# Patient Record
Sex: MALE | Race: WHITE
[De-identification: names, ages, dates, MRNs, and addresses within clinical notes are randomized per-mention and may not be internally consistent; named-entity substitution may affect disease eponyms.]

---

## 2021-08-25 ENCOUNTER — HOSPITAL ENCOUNTER (INPATIENT)
Dept: HOSPITAL 8 - ED | Age: 51
LOS: 3 days | Discharge: HOME | DRG: 380 | End: 2021-08-28
Attending: INTERNAL MEDICINE | Admitting: INTERNAL MEDICINE
Payer: COMMERCIAL

## 2021-08-25 VITALS — SYSTOLIC BLOOD PRESSURE: 168 MMHG | DIASTOLIC BLOOD PRESSURE: 95 MMHG

## 2021-08-25 VITALS — HEIGHT: 69 IN | WEIGHT: 210.1 LBS | BODY MASS INDEX: 31.12 KG/M2

## 2021-08-25 DIAGNOSIS — R00.0: ICD-10-CM

## 2021-08-25 DIAGNOSIS — R65.10: ICD-10-CM

## 2021-08-25 DIAGNOSIS — Z88.0: ICD-10-CM

## 2021-08-25 DIAGNOSIS — Z20.822: ICD-10-CM

## 2021-08-25 DIAGNOSIS — Z90.49: ICD-10-CM

## 2021-08-25 DIAGNOSIS — N17.0: ICD-10-CM

## 2021-08-25 DIAGNOSIS — K21.9: ICD-10-CM

## 2021-08-25 DIAGNOSIS — E87.1: ICD-10-CM

## 2021-08-25 DIAGNOSIS — K25.4: ICD-10-CM

## 2021-08-25 DIAGNOSIS — K29.70: ICD-10-CM

## 2021-08-25 DIAGNOSIS — I10: ICD-10-CM

## 2021-08-25 DIAGNOSIS — D72.829: ICD-10-CM

## 2021-08-25 DIAGNOSIS — E87.2: ICD-10-CM

## 2021-08-25 DIAGNOSIS — R06.82: ICD-10-CM

## 2021-08-25 DIAGNOSIS — K22.11: Primary | ICD-10-CM

## 2021-08-25 DIAGNOSIS — E86.0: ICD-10-CM

## 2021-08-25 DIAGNOSIS — F12.90: ICD-10-CM

## 2021-08-25 LAB
ALBUMIN SERPL-MCNC: 3.9 G/DL (ref 3.4–5)
ALP SERPL-CCNC: 96 U/L (ref 45–117)
ALT SERPL-CCNC: 49 U/L (ref 12–78)
ANION GAP SERPL CALC-SCNC: 13 MMOL/L (ref 5–15)
BASOPHILS # BLD AUTO: 0.1 X10^3/UL (ref 0–0.1)
BASOPHILS NFR BLD AUTO: 1 % (ref 0–1)
BILIRUB SERPL-MCNC: 0.7 MG/DL (ref 0.2–1)
CALCIUM SERPL-MCNC: 10.4 MG/DL (ref 8.5–10.1)
CHLORIDE SERPL-SCNC: 100 MMOL/L (ref 98–107)
CREAT SERPL-MCNC: 1.33 MG/DL (ref 0.7–1.3)
EOSINOPHIL # BLD AUTO: 0 X10^3/UL (ref 0–0.4)
EOSINOPHIL NFR BLD AUTO: 0 % (ref 1–7)
ERYTHROCYTE [DISTWIDTH] IN BLOOD BY AUTOMATED COUNT: 15 % (ref 9.4–14.8)
LYMPHOCYTES # BLD AUTO: 2.5 X10^3/UL (ref 1–3.4)
LYMPHOCYTES NFR BLD AUTO: 19 % (ref 22–44)
MCH RBC QN AUTO: 28.9 PG (ref 27.5–34.5)
MCHC RBC AUTO-ENTMCNC: 34.3 G/DL (ref 33.2–36.2)
MICROSCOPIC: (no result)
MONOCYTES # BLD AUTO: 1.3 X10^3/UL (ref 0.2–0.8)
MONOCYTES NFR BLD AUTO: 10 % (ref 2–9)
NEUTROPHILS # BLD AUTO: 9.4 X10^3/UL (ref 1.8–6.8)
NEUTROPHILS NFR BLD AUTO: 71 % (ref 42–75)
PLATELET # BLD AUTO: 413 X10^3/UL (ref 130–400)
PMV BLD AUTO: 7.1 FL (ref 7.4–10.4)
PROT SERPL-MCNC: 9.1 G/DL (ref 6.4–8.2)
RBC # BLD AUTO: 6.16 X10^6/UL (ref 4.38–5.82)

## 2021-08-25 PROCEDURE — 71045 X-RAY EXAM CHEST 1 VIEW: CPT

## 2021-08-25 PROCEDURE — 74177 CT ABD & PELVIS W/CONTRAST: CPT

## 2021-08-25 PROCEDURE — 88305 TISSUE EXAM BY PATHOLOGIST: CPT

## 2021-08-25 PROCEDURE — 71275 CT ANGIOGRAPHY CHEST: CPT

## 2021-08-25 PROCEDURE — 96374 THER/PROPH/DIAG INJ IV PUSH: CPT

## 2021-08-25 PROCEDURE — 74018 RADEX ABDOMEN 1 VIEW: CPT

## 2021-08-25 PROCEDURE — 81003 URINALYSIS AUTO W/O SCOPE: CPT

## 2021-08-25 PROCEDURE — 83690 ASSAY OF LIPASE: CPT

## 2021-08-25 PROCEDURE — 83605 ASSAY OF LACTIC ACID: CPT

## 2021-08-25 PROCEDURE — 82550 ASSAY OF CK (CPK): CPT

## 2021-08-25 PROCEDURE — 85025 COMPLETE CBC W/AUTO DIFF WBC: CPT

## 2021-08-25 PROCEDURE — 36415 COLL VENOUS BLD VENIPUNCTURE: CPT

## 2021-08-25 PROCEDURE — 88312 SPECIAL STAINS GROUP 1: CPT

## 2021-08-25 PROCEDURE — C9113 INJ PANTOPRAZOLE SODIUM, VIA: HCPCS

## 2021-08-25 PROCEDURE — 76700 US EXAM ABDOM COMPLETE: CPT

## 2021-08-25 PROCEDURE — 80053 COMPREHEN METABOLIC PANEL: CPT

## 2021-08-25 PROCEDURE — 93005 ELECTROCARDIOGRAM TRACING: CPT

## 2021-08-25 PROCEDURE — 85379 FIBRIN DEGRADATION QUANT: CPT

## 2021-08-25 PROCEDURE — 87040 BLOOD CULTURE FOR BACTERIA: CPT

## 2021-08-25 PROCEDURE — 87635 SARS-COV-2 COVID-19 AMP PRB: CPT

## 2021-08-25 RX ADMIN — HYDROCODONE BITARTRATE AND ACETAMINOPHEN PRN TAB: 5; 325 TABLET ORAL at 21:44

## 2021-08-25 RX ADMIN — CEFTRIAXONE SCH MLS/HR: 1 INJECTION, POWDER, FOR SOLUTION INTRAMUSCULAR; INTRAVENOUS at 14:30

## 2021-08-25 RX ADMIN — SODIUM CHLORIDE AND POTASSIUM CHLORIDE SCH MLS/HR: .9; .15 SOLUTION INTRAVENOUS at 14:00

## 2021-08-25 RX ADMIN — TEMAZEPAM PRN MG: 15 CAPSULE ORAL at 20:05

## 2021-08-25 RX ADMIN — MORPHINE SULFATE PRN MG: 10 INJECTION INTRAVENOUS at 17:01

## 2021-08-25 RX ADMIN — MORPHINE SULFATE PRN MG: 10 INJECTION INTRAVENOUS at 15:12

## 2021-08-25 RX ADMIN — MORPHINE SULFATE PRN MG: 10 INJECTION INTRAVENOUS at 18:07

## 2021-08-25 RX ADMIN — SODIUM CHLORIDE AND POTASSIUM CHLORIDE SCH MLS/HR: .9; .15 SOLUTION INTRAVENOUS at 21:44

## 2021-08-25 RX ADMIN — HYDROCODONE BITARTRATE AND ACETAMINOPHEN PRN TAB: 5; 325 TABLET ORAL at 19:54

## 2021-08-25 RX ADMIN — ENOXAPARIN SODIUM SCH MG: 40 INJECTION SUBCUTANEOUS at 15:02

## 2021-08-25 RX ADMIN — MORPHINE SULFATE PRN MG: 10 INJECTION INTRAVENOUS at 22:49

## 2021-08-25 NOTE — NUR
-------------------------------------------------------------------------------

           *** Note undone in [facility] - 08/25/21 at 1326 by LEWIS ***           

-------------------------------------------------------------------------------

REPORT FROM PAULO WATKINS AS

## 2021-08-25 NOTE — NUR
PT BIB SELF VIA POV. PER PT C/O RIGHT UPPER ABD PAIN AND N/V FOR A MONTH. PT 
REPORTS PAIN YESTERDAY AND TODAY IS NOW UNBEARABLE. PER PT PAIN IS WORSE WHEN 
EATING. PT RESTING IN GURNEY, MONITORING IN PLACE, PIV PLACE, LAB SPECIMEN 
DRAWN, PT MEDICATED PER EMAR, WCTM.

## 2021-08-25 NOTE — NUR
PT OUT OF BED, EXPLAIN TO CALL DUE TO HIGH FALL RISK DUE TO MEDICATION.  PT 
VERBALIZED UNDERSTANDING

## 2021-08-25 NOTE — NUR
PILLOW GIVEN, PT STATES ABD PAIN IS 10/10 MEDICATED PER MAR.  CALL LIGHT IN 
PLACE PT VERBALIZED NO OTHER NEEDS AT THIS TIME.

## 2021-08-25 NOTE — NUR
PER RUBIO: PT NPO BUT CAN HAVE SMALL SIPS WATER. PT STS IS ITCHY AFTER CT 
SCAN. MD MADE AWARE, BENADRYL. VSS, SLIGHTLY TACHY, WILL MEDICATED FOR PAIN. 
CALL BELL. AS

## 2021-08-26 VITALS — SYSTOLIC BLOOD PRESSURE: 123 MMHG | DIASTOLIC BLOOD PRESSURE: 75 MMHG

## 2021-08-26 VITALS — SYSTOLIC BLOOD PRESSURE: 138 MMHG | DIASTOLIC BLOOD PRESSURE: 88 MMHG

## 2021-08-26 VITALS — DIASTOLIC BLOOD PRESSURE: 91 MMHG | SYSTOLIC BLOOD PRESSURE: 152 MMHG

## 2021-08-26 VITALS — SYSTOLIC BLOOD PRESSURE: 134 MMHG | DIASTOLIC BLOOD PRESSURE: 90 MMHG

## 2021-08-26 LAB
ALBUMIN SERPL-MCNC: 3 G/DL (ref 3.4–5)
ALP SERPL-CCNC: 72 U/L (ref 45–117)
ALT SERPL-CCNC: 34 U/L (ref 12–78)
ANION GAP SERPL CALC-SCNC: 8 MMOL/L (ref 5–15)
BASOPHILS # BLD AUTO: 0.1 X10^3/UL (ref 0–0.1)
BASOPHILS NFR BLD AUTO: 2 % (ref 0–1)
BILIRUB SERPL-MCNC: 0.5 MG/DL (ref 0.2–1)
CALCIUM SERPL-MCNC: 8.2 MG/DL (ref 8.5–10.1)
CHLORIDE SERPL-SCNC: 106 MMOL/L (ref 98–107)
CREAT SERPL-MCNC: 0.89 MG/DL (ref 0.7–1.3)
EOSINOPHIL # BLD AUTO: 0.2 X10^3/UL (ref 0–0.4)
EOSINOPHIL NFR BLD AUTO: 4 % (ref 1–7)
ERYTHROCYTE [DISTWIDTH] IN BLOOD BY AUTOMATED COUNT: 14.6 % (ref 9.4–14.8)
LYMPHOCYTES # BLD AUTO: 1.5 X10^3/UL (ref 1–3.4)
LYMPHOCYTES NFR BLD AUTO: 23 % (ref 22–44)
MCH RBC QN AUTO: 28.7 PG (ref 27.5–34.5)
MCHC RBC AUTO-ENTMCNC: 33.5 G/DL (ref 33.2–36.2)
MICROSCOPIC: (no result)
MONOCYTES # BLD AUTO: 0.5 X10^3/UL (ref 0.2–0.8)
MONOCYTES NFR BLD AUTO: 8 % (ref 2–9)
NEUTROPHILS # BLD AUTO: 4.2 X10^3/UL (ref 1.8–6.8)
NEUTROPHILS NFR BLD AUTO: 64 % (ref 42–75)
PLATELET # BLD AUTO: 215 X10^3/UL (ref 130–400)
PMV BLD AUTO: 6.7 FL (ref 7.4–10.4)
PROT SERPL-MCNC: 6.9 G/DL (ref 6.4–8.2)
RBC # BLD AUTO: 5.1 X10^6/UL (ref 4.38–5.82)

## 2021-08-26 RX ADMIN — DIPHENHYDRAMINE HYDROCHLORIDE PRN MG: 50 INJECTION, SOLUTION INTRAMUSCULAR; INTRAVENOUS at 10:32

## 2021-08-26 RX ADMIN — SODIUM CHLORIDE AND POTASSIUM CHLORIDE SCH MLS/HR: .9; .15 SOLUTION INTRAVENOUS at 04:10

## 2021-08-26 RX ADMIN — MORPHINE SULFATE PRN MG: 10 INJECTION INTRAVENOUS at 09:10

## 2021-08-26 RX ADMIN — SODIUM CHLORIDE AND POTASSIUM CHLORIDE SCH MLS/HR: .9; .15 SOLUTION INTRAVENOUS at 15:15

## 2021-08-26 RX ADMIN — SODIUM CHLORIDE AND POTASSIUM CHLORIDE SCH MLS/HR: .9; .15 SOLUTION INTRAVENOUS at 09:26

## 2021-08-26 RX ADMIN — HYDROCODONE BITARTRATE AND ACETAMINOPHEN PRN TAB: 5; 325 TABLET ORAL at 21:34

## 2021-08-26 RX ADMIN — TEMAZEPAM PRN MG: 15 CAPSULE ORAL at 21:34

## 2021-08-26 RX ADMIN — DIPHENHYDRAMINE HYDROCHLORIDE PRN MG: 50 INJECTION, SOLUTION INTRAMUSCULAR; INTRAVENOUS at 02:39

## 2021-08-26 RX ADMIN — PANTOPRAZOLE SODIUM SCH MG: 40 INJECTION, POWDER, FOR SOLUTION INTRAVENOUS at 09:10

## 2021-08-26 RX ADMIN — MORPHINE SULFATE PRN MG: 10 INJECTION INTRAVENOUS at 23:27

## 2021-08-26 RX ADMIN — PANTOPRAZOLE SODIUM SCH MG: 40 INJECTION, POWDER, FOR SOLUTION INTRAVENOUS at 21:33

## 2021-08-26 RX ADMIN — METHOCARBAMOL PRN MG: 500 TABLET ORAL at 22:47

## 2021-08-26 RX ADMIN — ENOXAPARIN SODIUM SCH MG: 40 INJECTION SUBCUTANEOUS at 14:24

## 2021-08-26 RX ADMIN — MORPHINE SULFATE PRN MG: 10 INJECTION INTRAVENOUS at 16:07

## 2021-08-26 RX ADMIN — CEFTRIAXONE SCH MLS/HR: 1 INJECTION, POWDER, FOR SOLUTION INTRAMUSCULAR; INTRAVENOUS at 14:24

## 2021-08-26 RX ADMIN — MORPHINE SULFATE PRN MG: 10 INJECTION INTRAVENOUS at 02:04

## 2021-08-26 RX ADMIN — MORPHINE SULFATE PRN MG: 10 INJECTION INTRAVENOUS at 19:24

## 2021-08-26 RX ADMIN — SODIUM CHLORIDE AND POTASSIUM CHLORIDE SCH MLS/HR: .9; .15 SOLUTION INTRAVENOUS at 22:47

## 2021-08-26 RX ADMIN — HYDROCODONE BITARTRATE AND ACETAMINOPHEN PRN TAB: 5; 325 TABLET ORAL at 14:37

## 2021-08-26 RX ADMIN — MORPHINE SULFATE PRN MG: 10 INJECTION INTRAVENOUS at 12:20

## 2021-08-27 VITALS — SYSTOLIC BLOOD PRESSURE: 156 MMHG | DIASTOLIC BLOOD PRESSURE: 96 MMHG

## 2021-08-27 VITALS — SYSTOLIC BLOOD PRESSURE: 157 MMHG | DIASTOLIC BLOOD PRESSURE: 91 MMHG

## 2021-08-27 VITALS — DIASTOLIC BLOOD PRESSURE: 76 MMHG | SYSTOLIC BLOOD PRESSURE: 124 MMHG

## 2021-08-27 VITALS — DIASTOLIC BLOOD PRESSURE: 76 MMHG | SYSTOLIC BLOOD PRESSURE: 128 MMHG

## 2021-08-27 PROCEDURE — 0DB68ZX EXCISION OF STOMACH, VIA NATURAL OR ARTIFICIAL OPENING ENDOSCOPIC, DIAGNOSTIC: ICD-10-PCS | Performed by: INTERNAL MEDICINE

## 2021-08-27 PROCEDURE — 0DB38ZX EXCISION OF LOWER ESOPHAGUS, VIA NATURAL OR ARTIFICIAL OPENING ENDOSCOPIC, DIAGNOSTIC: ICD-10-PCS | Performed by: INTERNAL MEDICINE

## 2021-08-27 RX ADMIN — DIPHENHYDRAMINE HYDROCHLORIDE PRN MG: 50 INJECTION, SOLUTION INTRAMUSCULAR; INTRAVENOUS at 08:38

## 2021-08-27 RX ADMIN — MORPHINE SULFATE PRN MG: 10 INJECTION INTRAVENOUS at 11:26

## 2021-08-27 RX ADMIN — MORPHINE SULFATE PRN MG: 10 INJECTION INTRAVENOUS at 18:49

## 2021-08-27 RX ADMIN — FENTANYL CITRATE PRN MCG: 50 INJECTION INTRAMUSCULAR; INTRAVENOUS at 14:28

## 2021-08-27 RX ADMIN — ENOXAPARIN SODIUM SCH MG: 40 INJECTION SUBCUTANEOUS at 15:23

## 2021-08-27 RX ADMIN — SODIUM CHLORIDE AND POTASSIUM CHLORIDE SCH MLS/HR: .9; .15 SOLUTION INTRAVENOUS at 20:16

## 2021-08-27 RX ADMIN — MORPHINE SULFATE PRN MG: 10 INJECTION INTRAVENOUS at 05:23

## 2021-08-27 RX ADMIN — SODIUM CHLORIDE AND POTASSIUM CHLORIDE SCH MLS/HR: .9; .15 SOLUTION INTRAVENOUS at 11:25

## 2021-08-27 RX ADMIN — FENTANYL CITRATE PRN MCG: 50 INJECTION INTRAMUSCULAR; INTRAVENOUS at 14:46

## 2021-08-27 RX ADMIN — METHOCARBAMOL PRN MG: 500 TABLET ORAL at 21:48

## 2021-08-27 RX ADMIN — TEMAZEPAM PRN MG: 15 CAPSULE ORAL at 20:17

## 2021-08-27 RX ADMIN — PANTOPRAZOLE SODIUM SCH MG: 40 INJECTION, POWDER, FOR SOLUTION INTRAVENOUS at 08:30

## 2021-08-27 RX ADMIN — MORPHINE SULFATE PRN MG: 10 INJECTION INTRAVENOUS at 08:31

## 2021-08-27 RX ADMIN — CEFTRIAXONE SCH MLS/HR: 1 INJECTION, POWDER, FOR SOLUTION INTRAMUSCULAR; INTRAVENOUS at 15:27

## 2021-08-27 RX ADMIN — FENTANYL CITRATE PRN MCG: 50 INJECTION INTRAMUSCULAR; INTRAVENOUS at 14:10

## 2021-08-27 RX ADMIN — SODIUM CHLORIDE AND POTASSIUM CHLORIDE SCH MLS/HR: .9; .15 SOLUTION INTRAVENOUS at 05:11

## 2021-08-27 RX ADMIN — OMEPRAZOLE SCH MG: 20 CAPSULE, DELAYED RELEASE ORAL at 15:22

## 2021-08-27 RX ADMIN — HYDROCODONE BITARTRATE AND ACETAMINOPHEN PRN TAB: 5; 325 TABLET ORAL at 20:17

## 2021-08-28 VITALS — DIASTOLIC BLOOD PRESSURE: 88 MMHG | SYSTOLIC BLOOD PRESSURE: 126 MMHG

## 2021-08-28 VITALS — SYSTOLIC BLOOD PRESSURE: 133 MMHG | DIASTOLIC BLOOD PRESSURE: 94 MMHG

## 2021-08-28 RX ADMIN — HYDROCODONE BITARTRATE AND ACETAMINOPHEN PRN TAB: 5; 325 TABLET ORAL at 00:20

## 2021-08-28 RX ADMIN — HYDROCODONE BITARTRATE AND ACETAMINOPHEN PRN TAB: 5; 325 TABLET ORAL at 05:25

## 2021-08-28 RX ADMIN — OMEPRAZOLE SCH MG: 20 CAPSULE, DELAYED RELEASE ORAL at 08:03

## 2021-08-28 RX ADMIN — SODIUM CHLORIDE AND POTASSIUM CHLORIDE SCH MLS/HR: .9; .15 SOLUTION INTRAVENOUS at 02:52

## 2021-08-28 RX ADMIN — SODIUM CHLORIDE AND POTASSIUM CHLORIDE SCH MLS/HR: .9; .15 SOLUTION INTRAVENOUS at 08:26

## 2021-08-29 ENCOUNTER — HOSPITAL ENCOUNTER (INPATIENT)
Dept: HOSPITAL 8 - ED | Age: 51
LOS: 6 days | Discharge: HOME | DRG: 381 | End: 2021-09-04
Attending: FAMILY MEDICINE | Admitting: HOSPITALIST
Payer: COMMERCIAL

## 2021-08-29 VITALS — BODY MASS INDEX: 30.73 KG/M2 | WEIGHT: 207.45 LBS | HEIGHT: 69 IN

## 2021-08-29 DIAGNOSIS — I10: ICD-10-CM

## 2021-08-29 DIAGNOSIS — N28.89: ICD-10-CM

## 2021-08-29 DIAGNOSIS — E87.2: ICD-10-CM

## 2021-08-29 DIAGNOSIS — E83.52: ICD-10-CM

## 2021-08-29 DIAGNOSIS — K22.10: Primary | ICD-10-CM

## 2021-08-29 DIAGNOSIS — D72.829: ICD-10-CM

## 2021-08-29 DIAGNOSIS — Z87.19: ICD-10-CM

## 2021-08-29 DIAGNOSIS — K21.9: ICD-10-CM

## 2021-08-29 DIAGNOSIS — K25.9: ICD-10-CM

## 2021-08-29 DIAGNOSIS — N28.1: ICD-10-CM

## 2021-08-29 DIAGNOSIS — Z20.822: ICD-10-CM

## 2021-08-29 DIAGNOSIS — Z79.899: ICD-10-CM

## 2021-08-29 DIAGNOSIS — Z88.0: ICD-10-CM

## 2021-08-29 DIAGNOSIS — D75.1: ICD-10-CM

## 2021-08-29 DIAGNOSIS — R65.10: ICD-10-CM

## 2021-08-29 DIAGNOSIS — K57.30: ICD-10-CM

## 2021-08-29 DIAGNOSIS — E86.0: ICD-10-CM

## 2021-08-29 DIAGNOSIS — Z87.11: ICD-10-CM

## 2021-08-29 DIAGNOSIS — K44.9: ICD-10-CM

## 2021-08-29 DIAGNOSIS — F12.90: ICD-10-CM

## 2021-08-29 DIAGNOSIS — Z90.49: ICD-10-CM

## 2021-08-29 DIAGNOSIS — K76.0: ICD-10-CM

## 2021-08-29 DIAGNOSIS — E86.1: ICD-10-CM

## 2021-08-29 DIAGNOSIS — Z87.891: ICD-10-CM

## 2021-08-29 DIAGNOSIS — N17.9: ICD-10-CM

## 2021-08-29 LAB
ALBUMIN SERPL-MCNC: 3.7 G/DL (ref 3.4–5)
ALP SERPL-CCNC: 116 U/L (ref 45–117)
ALT SERPL-CCNC: 79 U/L (ref 12–78)
ANION GAP SERPL CALC-SCNC: 15 MMOL/L (ref 5–15)
BASOPHILS # BLD AUTO: 0.1 X10^3/UL (ref 0–0.1)
BASOPHILS NFR BLD AUTO: 0 % (ref 0–1)
BILIRUB SERPL-MCNC: 0.4 MG/DL (ref 0.2–1)
CALCIUM SERPL-MCNC: 11.3 MG/DL (ref 8.5–10.1)
CHLORIDE SERPL-SCNC: 98 MMOL/L (ref 98–107)
CREAT SERPL-MCNC: 1.81 MG/DL (ref 0.7–1.3)
EOSINOPHIL # BLD AUTO: 0 X10^3/UL (ref 0–0.4)
EOSINOPHIL NFR BLD AUTO: 0 % (ref 1–7)
ERYTHROCYTE [DISTWIDTH] IN BLOOD BY AUTOMATED COUNT: 15.1 % (ref 9.4–14.8)
LYMPHOCYTES # BLD AUTO: 2.6 X10^3/UL (ref 1–3.4)
LYMPHOCYTES NFR BLD AUTO: 14 % (ref 22–44)
MCH RBC QN AUTO: 28.9 PG (ref 27.5–34.5)
MCHC RBC AUTO-ENTMCNC: 34 G/DL (ref 33.2–36.2)
MONOCYTES # BLD AUTO: 1.6 X10^3/UL (ref 0.2–0.8)
MONOCYTES NFR BLD AUTO: 8 % (ref 2–9)
NEUTROPHILS # BLD AUTO: 15 X10^3/UL (ref 1.8–6.8)
NEUTROPHILS NFR BLD AUTO: 78 % (ref 42–75)
PLATELET # BLD AUTO: 424 X10^3/UL (ref 130–400)
PMV BLD AUTO: 7.5 FL (ref 7.4–10.4)
PROT SERPL-MCNC: 9.1 G/DL (ref 6.4–8.2)
RBC # BLD AUTO: 6.35 X10^6/UL (ref 4.38–5.82)
TROPONIN I SERPL-MCNC: < 0.015 NG/ML (ref 0–0.04)

## 2021-08-29 PROCEDURE — 83605 ASSAY OF LACTIC ACID: CPT

## 2021-08-29 PROCEDURE — 87046 STOOL CULTR AEROBIC BACT EA: CPT

## 2021-08-29 PROCEDURE — 81003 URINALYSIS AUTO W/O SCOPE: CPT

## 2021-08-29 PROCEDURE — 74240 X-RAY XM UPR GI TRC 1CNTRST: CPT

## 2021-08-29 PROCEDURE — 74018 RADEX ABDOMEN 1 VIEW: CPT

## 2021-08-29 PROCEDURE — 74177 CT ABD & PELVIS W/CONTRAST: CPT

## 2021-08-29 PROCEDURE — 96372 THER/PROPH/DIAG INJ SC/IM: CPT

## 2021-08-29 PROCEDURE — 88305 TISSUE EXAM BY PATHOLOGIST: CPT

## 2021-08-29 PROCEDURE — 80053 COMPREHEN METABOLIC PANEL: CPT

## 2021-08-29 PROCEDURE — 87427 SHIGA-LIKE TOXIN AG IA: CPT

## 2021-08-29 PROCEDURE — 84145 PROCALCITONIN (PCT): CPT

## 2021-08-29 PROCEDURE — C9113 INJ PANTOPRAZOLE SODIUM, VIA: HCPCS

## 2021-08-29 PROCEDURE — 84484 ASSAY OF TROPONIN QUANT: CPT

## 2021-08-29 PROCEDURE — 86900 BLOOD TYPING SEROLOGIC ABO: CPT

## 2021-08-29 PROCEDURE — 80307 DRUG TEST PRSMV CHEM ANLYZR: CPT

## 2021-08-29 PROCEDURE — 74022 RADEX COMPL AQT ABD SERIES: CPT

## 2021-08-29 PROCEDURE — 87635 SARS-COV-2 COVID-19 AMP PRB: CPT

## 2021-08-29 PROCEDURE — 96374 THER/PROPH/DIAG INJ IV PUSH: CPT

## 2021-08-29 PROCEDURE — 80048 BASIC METABOLIC PNL TOTAL CA: CPT

## 2021-08-29 PROCEDURE — 86850 RBC ANTIBODY SCREEN: CPT

## 2021-08-29 PROCEDURE — 96361 HYDRATE IV INFUSION ADD-ON: CPT

## 2021-08-29 PROCEDURE — 83690 ASSAY OF LIPASE: CPT

## 2021-08-29 PROCEDURE — 85025 COMPLETE CBC W/AUTO DIFF WBC: CPT

## 2021-08-29 PROCEDURE — 93005 ELECTROCARDIOGRAM TRACING: CPT

## 2021-08-29 PROCEDURE — 74178 CT ABD&PLV WO CNTR FLWD CNTR: CPT

## 2021-08-29 PROCEDURE — 89055 LEUKOCYTE ASSESSMENT FECAL: CPT

## 2021-08-29 PROCEDURE — 87040 BLOOD CULTURE FOR BACTERIA: CPT

## 2021-08-29 PROCEDURE — 96375 TX/PRO/DX INJ NEW DRUG ADDON: CPT

## 2021-08-29 PROCEDURE — 36415 COLL VENOUS BLD VENIPUNCTURE: CPT

## 2021-08-30 VITALS — SYSTOLIC BLOOD PRESSURE: 160 MMHG | DIASTOLIC BLOOD PRESSURE: 94 MMHG

## 2021-08-30 VITALS — SYSTOLIC BLOOD PRESSURE: 130 MMHG | DIASTOLIC BLOOD PRESSURE: 92 MMHG

## 2021-08-30 VITALS — DIASTOLIC BLOOD PRESSURE: 91 MMHG | SYSTOLIC BLOOD PRESSURE: 158 MMHG

## 2021-08-30 VITALS — DIASTOLIC BLOOD PRESSURE: 91 MMHG | SYSTOLIC BLOOD PRESSURE: 148 MMHG

## 2021-08-30 LAB
ALBUMIN SERPL-MCNC: 2.8 G/DL (ref 3.4–5)
ALP SERPL-CCNC: 85 U/L (ref 45–117)
ALT SERPL-CCNC: 58 U/L (ref 12–78)
ANION GAP SERPL CALC-SCNC: 7 MMOL/L (ref 5–15)
BASOPHILS # BLD AUTO: 0.1 X10^3/UL (ref 0–0.1)
BASOPHILS NFR BLD AUTO: 1 % (ref 0–1)
BILIRUB SERPL-MCNC: 0.3 MG/DL (ref 0.2–1)
CALCIUM SERPL-MCNC: 9 MG/DL (ref 8.5–10.1)
CHLORIDE SERPL-SCNC: 109 MMOL/L (ref 98–107)
CREAT SERPL-MCNC: 1.1 MG/DL (ref 0.7–1.3)
EOSINOPHIL # BLD AUTO: 0 X10^3/UL (ref 0–0.4)
EOSINOPHIL NFR BLD AUTO: 0 % (ref 1–7)
ERYTHROCYTE [DISTWIDTH] IN BLOOD BY AUTOMATED COUNT: 15 % (ref 9.4–14.8)
LYMPHOCYTES # BLD AUTO: 2.2 X10^3/UL (ref 1–3.4)
LYMPHOCYTES NFR BLD AUTO: 14 % (ref 22–44)
MCH RBC QN AUTO: 28.1 PG (ref 27.5–34.5)
MCHC RBC AUTO-ENTMCNC: 33.3 G/DL (ref 33.2–36.2)
MICROSCOPIC: (no result)
MONOCYTES # BLD AUTO: 1.3 X10^3/UL (ref 0.2–0.8)
MONOCYTES NFR BLD AUTO: 8 % (ref 2–9)
NEUTROPHILS # BLD AUTO: 11.8 X10^3/UL (ref 1.8–6.8)
NEUTROPHILS NFR BLD AUTO: 77 % (ref 42–75)
PLATELET # BLD AUTO: 302 X10^3/UL (ref 130–400)
PMV BLD AUTO: 6.9 FL (ref 7.4–10.4)
PROT SERPL-MCNC: 7.2 G/DL (ref 6.4–8.2)
RBC # BLD AUTO: 5.5 X10^6/UL (ref 4.38–5.82)

## 2021-08-30 RX ADMIN — BISACODYL SCH MG: 10 SUPPOSITORY RECTAL at 02:32

## 2021-08-30 RX ADMIN — HYDROMORPHONE HYDROCHLORIDE PRN MG: 2 INJECTION INTRAMUSCULAR; INTRAVENOUS; SUBCUTANEOUS at 18:33

## 2021-08-30 RX ADMIN — PANTOPRAZOLE SODIUM SCH MG: 40 INJECTION, POWDER, FOR SOLUTION INTRAVENOUS at 06:41

## 2021-08-30 RX ADMIN — SUCRALFATE SCH GM: 1 SUSPENSION ORAL at 20:15

## 2021-08-30 RX ADMIN — HYDROMORPHONE HYDROCHLORIDE PRN MG: 2 INJECTION INTRAMUSCULAR; INTRAVENOUS; SUBCUTANEOUS at 21:38

## 2021-08-30 RX ADMIN — SUCRALFATE SCH GM: 1 SUSPENSION ORAL at 09:18

## 2021-08-30 RX ADMIN — HYDROMORPHONE HYDROCHLORIDE PRN MG: 2 INJECTION INTRAMUSCULAR; INTRAVENOUS; SUBCUTANEOUS at 10:52

## 2021-08-30 RX ADMIN — SODIUM CHLORIDE SCH MLS/HR: 0.9 INJECTION, SOLUTION INTRAVENOUS at 00:49

## 2021-08-30 RX ADMIN — SODIUM CHLORIDE SCH MLS/HR: 0.9 INJECTION, SOLUTION INTRAVENOUS at 23:50

## 2021-08-30 RX ADMIN — DIPHENHYDRAMINE HYDROCHLORIDE PRN MG: 50 INJECTION, SOLUTION INTRAMUSCULAR; INTRAVENOUS at 20:15

## 2021-08-30 RX ADMIN — SUCRALFATE SCH GM: 1 SUSPENSION ORAL at 14:33

## 2021-08-30 RX ADMIN — PANTOPRAZOLE SODIUM SCH MG: 40 INJECTION, POWDER, FOR SOLUTION INTRAVENOUS at 18:31

## 2021-08-30 RX ADMIN — BISACODYL SCH MG: 10 SUPPOSITORY RECTAL at 09:19

## 2021-08-30 RX ADMIN — HYDROMORPHONE HYDROCHLORIDE PRN MG: 2 INJECTION INTRAMUSCULAR; INTRAVENOUS; SUBCUTANEOUS at 14:39

## 2021-08-30 RX ADMIN — SODIUM CHLORIDE SCH MLS/HR: 0.9 INJECTION, SOLUTION INTRAVENOUS at 09:00

## 2021-08-30 RX ADMIN — ONDANSETRON PRN MG: 2 INJECTION, SOLUTION INTRAMUSCULAR; INTRAVENOUS at 16:02

## 2021-08-31 VITALS — SYSTOLIC BLOOD PRESSURE: 137 MMHG | DIASTOLIC BLOOD PRESSURE: 75 MMHG

## 2021-08-31 VITALS — DIASTOLIC BLOOD PRESSURE: 80 MMHG | SYSTOLIC BLOOD PRESSURE: 140 MMHG

## 2021-08-31 VITALS — SYSTOLIC BLOOD PRESSURE: 136 MMHG | DIASTOLIC BLOOD PRESSURE: 78 MMHG

## 2021-08-31 VITALS — DIASTOLIC BLOOD PRESSURE: 83 MMHG | SYSTOLIC BLOOD PRESSURE: 143 MMHG

## 2021-08-31 LAB
ALBUMIN SERPL-MCNC: 2.8 G/DL (ref 3.4–5)
ALP SERPL-CCNC: 70 U/L (ref 45–117)
ALT SERPL-CCNC: 41 U/L (ref 12–78)
ANION GAP SERPL CALC-SCNC: 6 MMOL/L (ref 5–15)
BASOPHILS # BLD AUTO: 0.1 X10^3/UL (ref 0–0.1)
BASOPHILS NFR BLD AUTO: 1 % (ref 0–1)
BILIRUB SERPL-MCNC: 0.4 MG/DL (ref 0.2–1)
CALCIUM SERPL-MCNC: 8 MG/DL (ref 8.5–10.1)
CHLORIDE SERPL-SCNC: 107 MMOL/L (ref 98–107)
CREAT SERPL-MCNC: 0.76 MG/DL (ref 0.7–1.3)
EOSINOPHIL # BLD AUTO: 0.3 X10^3/UL (ref 0–0.4)
EOSINOPHIL NFR BLD AUTO: 4 % (ref 1–7)
ERYTHROCYTE [DISTWIDTH] IN BLOOD BY AUTOMATED COUNT: 14.8 % (ref 9.4–14.8)
LYMPHOCYTES # BLD AUTO: 2.2 X10^3/UL (ref 1–3.4)
LYMPHOCYTES NFR BLD AUTO: 26 % (ref 22–44)
MCH RBC QN AUTO: 28.3 PG (ref 27.5–34.5)
MCHC RBC AUTO-ENTMCNC: 32.9 G/DL (ref 33.2–36.2)
MONOCYTES # BLD AUTO: 0.7 X10^3/UL (ref 0.2–0.8)
MONOCYTES NFR BLD AUTO: 8 % (ref 2–9)
NEUTROPHILS # BLD AUTO: 5.1 X10^3/UL (ref 1.8–6.8)
NEUTROPHILS NFR BLD AUTO: 61 % (ref 42–75)
PLATELET # BLD AUTO: 230 X10^3/UL (ref 130–400)
PMV BLD AUTO: 6.7 FL (ref 7.4–10.4)
PROT SERPL-MCNC: 6.5 G/DL (ref 6.4–8.2)
RBC # BLD AUTO: 4.8 X10^6/UL (ref 4.38–5.82)

## 2021-08-31 RX ADMIN — DIPHENHYDRAMINE HYDROCHLORIDE PRN MG: 50 INJECTION, SOLUTION INTRAMUSCULAR; INTRAVENOUS at 20:21

## 2021-08-31 RX ADMIN — HYDROMORPHONE HYDROCHLORIDE PRN MG: 2 INJECTION INTRAMUSCULAR; INTRAVENOUS; SUBCUTANEOUS at 00:45

## 2021-08-31 RX ADMIN — PANTOPRAZOLE SODIUM SCH MG: 40 INJECTION, POWDER, FOR SOLUTION INTRAVENOUS at 05:48

## 2021-08-31 RX ADMIN — SUCRALFATE SCH GM: 1 SUSPENSION ORAL at 05:47

## 2021-08-31 RX ADMIN — HYDROMORPHONE HYDROCHLORIDE PRN MG: 2 INJECTION INTRAMUSCULAR; INTRAVENOUS; SUBCUTANEOUS at 04:01

## 2021-08-31 RX ADMIN — SUCRALFATE SCH GM: 1 SUSPENSION ORAL at 12:15

## 2021-08-31 RX ADMIN — SUCRALFATE SCH GM: 1 SUSPENSION ORAL at 18:23

## 2021-08-31 RX ADMIN — SUCRALFATE SCH GM: 1 SUSPENSION ORAL at 00:44

## 2021-08-31 RX ADMIN — HYDROMORPHONE HYDROCHLORIDE PRN MG: 2 INJECTION INTRAMUSCULAR; INTRAVENOUS; SUBCUTANEOUS at 12:16

## 2021-08-31 RX ADMIN — SODIUM CHLORIDE SCH MLS/HR: 0.9 INJECTION, SOLUTION INTRAVENOUS at 20:21

## 2021-08-31 RX ADMIN — PANTOPRAZOLE SODIUM SCH MG: 40 TABLET, DELAYED RELEASE ORAL at 16:21

## 2021-08-31 RX ADMIN — HYDROMORPHONE HYDROCHLORIDE PRN MG: 2 INJECTION INTRAMUSCULAR; INTRAVENOUS; SUBCUTANEOUS at 07:51

## 2021-08-31 RX ADMIN — DIPHENHYDRAMINE HYDROCHLORIDE PRN MG: 50 INJECTION, SOLUTION INTRAMUSCULAR; INTRAVENOUS at 12:15

## 2021-08-31 RX ADMIN — BISACODYL SCH MG: 10 SUPPOSITORY RECTAL at 09:00

## 2021-08-31 RX ADMIN — DIPHENHYDRAMINE HYDROCHLORIDE PRN MG: 50 INJECTION, SOLUTION INTRAMUSCULAR; INTRAVENOUS at 02:57

## 2021-09-01 VITALS — SYSTOLIC BLOOD PRESSURE: 117 MMHG | DIASTOLIC BLOOD PRESSURE: 65 MMHG

## 2021-09-01 VITALS — DIASTOLIC BLOOD PRESSURE: 75 MMHG | SYSTOLIC BLOOD PRESSURE: 128 MMHG

## 2021-09-01 VITALS — DIASTOLIC BLOOD PRESSURE: 86 MMHG | SYSTOLIC BLOOD PRESSURE: 135 MMHG

## 2021-09-01 PROCEDURE — 0DB68ZX EXCISION OF STOMACH, VIA NATURAL OR ARTIFICIAL OPENING ENDOSCOPIC, DIAGNOSTIC: ICD-10-PCS | Performed by: INTERNAL MEDICINE

## 2021-09-01 RX ADMIN — DIPHENHYDRAMINE HYDROCHLORIDE PRN MG: 50 INJECTION, SOLUTION INTRAMUSCULAR; INTRAVENOUS at 20:12

## 2021-09-01 RX ADMIN — PROMETHAZINE HYDROCHLORIDE PRN MG: 12.5 SUPPOSITORY RECTAL at 18:41

## 2021-09-01 RX ADMIN — SUCRALFATE SCH GM: 1 SUSPENSION ORAL at 20:12

## 2021-09-01 RX ADMIN — BISACODYL SCH MG: 10 SUPPOSITORY RECTAL at 09:00

## 2021-09-01 RX ADMIN — SUCRALFATE SCH GM: 1 SUSPENSION ORAL at 06:22

## 2021-09-01 RX ADMIN — HYDROMORPHONE HYDROCHLORIDE PRN MG: 1 INJECTION, SOLUTION INTRAMUSCULAR; INTRAVENOUS; SUBCUTANEOUS at 20:13

## 2021-09-01 RX ADMIN — DIPHENHYDRAMINE HYDROCHLORIDE PRN MG: 50 INJECTION, SOLUTION INTRAMUSCULAR; INTRAVENOUS at 11:33

## 2021-09-01 RX ADMIN — HYDROMORPHONE HYDROCHLORIDE PRN MG: 1 INJECTION, SOLUTION INTRAMUSCULAR; INTRAVENOUS; SUBCUTANEOUS at 16:03

## 2021-09-01 RX ADMIN — SODIUM CHLORIDE SCH MLS/HR: 0.9 INJECTION, SOLUTION INTRAVENOUS at 16:10

## 2021-09-01 RX ADMIN — SUCRALFATE SCH GM: 1 SUSPENSION ORAL at 00:31

## 2021-09-01 RX ADMIN — PROMETHAZINE HYDROCHLORIDE PRN MG: 12.5 SUPPOSITORY RECTAL at 11:52

## 2021-09-01 RX ADMIN — ONDANSETRON PRN MG: 2 INJECTION, SOLUTION INTRAMUSCULAR; INTRAVENOUS at 09:50

## 2021-09-01 RX ADMIN — HYDROMORPHONE HYDROCHLORIDE PRN MG: 1 INJECTION, SOLUTION INTRAMUSCULAR; INTRAVENOUS; SUBCUTANEOUS at 09:02

## 2021-09-01 RX ADMIN — PANTOPRAZOLE SODIUM SCH MG: 40 TABLET, DELAYED RELEASE ORAL at 06:22

## 2021-09-01 RX ADMIN — PANTOPRAZOLE SODIUM SCH MG: 40 TABLET, DELAYED RELEASE ORAL at 16:07

## 2021-09-01 RX ADMIN — OXYCODONE HYDROCHLORIDE PRN MG: 5 TABLET ORAL at 23:23

## 2021-09-01 RX ADMIN — SUCRALFATE SCH GM: 1 SUSPENSION ORAL at 11:33

## 2021-09-02 VITALS — SYSTOLIC BLOOD PRESSURE: 128 MMHG | DIASTOLIC BLOOD PRESSURE: 65 MMHG

## 2021-09-02 VITALS — DIASTOLIC BLOOD PRESSURE: 72 MMHG | SYSTOLIC BLOOD PRESSURE: 113 MMHG

## 2021-09-02 VITALS — SYSTOLIC BLOOD PRESSURE: 130 MMHG | DIASTOLIC BLOOD PRESSURE: 71 MMHG

## 2021-09-02 VITALS — DIASTOLIC BLOOD PRESSURE: 77 MMHG | SYSTOLIC BLOOD PRESSURE: 137 MMHG

## 2021-09-02 VITALS — SYSTOLIC BLOOD PRESSURE: 129 MMHG | DIASTOLIC BLOOD PRESSURE: 61 MMHG

## 2021-09-02 VITALS — DIASTOLIC BLOOD PRESSURE: 91 MMHG | SYSTOLIC BLOOD PRESSURE: 152 MMHG

## 2021-09-02 RX ADMIN — ONDANSETRON PRN MG: 2 INJECTION, SOLUTION INTRAMUSCULAR; INTRAVENOUS at 16:07

## 2021-09-02 RX ADMIN — PANTOPRAZOLE SODIUM SCH MG: 40 TABLET, DELAYED RELEASE ORAL at 16:26

## 2021-09-02 RX ADMIN — BISACODYL SCH MG: 10 SUPPOSITORY RECTAL at 12:03

## 2021-09-02 RX ADMIN — SUCRALFATE SCH GM: 1 SUSPENSION ORAL at 06:24

## 2021-09-02 RX ADMIN — BISACODYL SCH MG: 10 SUPPOSITORY RECTAL at 08:51

## 2021-09-02 RX ADMIN — DIPHENHYDRAMINE HYDROCHLORIDE PRN MG: 50 INJECTION, SOLUTION INTRAMUSCULAR; INTRAVENOUS at 12:16

## 2021-09-02 RX ADMIN — ONDANSETRON PRN MG: 2 INJECTION, SOLUTION INTRAMUSCULAR; INTRAVENOUS at 03:52

## 2021-09-02 RX ADMIN — DIPHENHYDRAMINE HYDROCHLORIDE PRN MG: 50 INJECTION, SOLUTION INTRAMUSCULAR; INTRAVENOUS at 18:28

## 2021-09-02 RX ADMIN — SUCRALFATE SCH GM: 1 SUSPENSION ORAL at 20:54

## 2021-09-02 RX ADMIN — SODIUM CHLORIDE SCH MLS/HR: 0.9 INJECTION, SOLUTION INTRAVENOUS at 12:03

## 2021-09-02 RX ADMIN — PROMETHAZINE HYDROCHLORIDE PRN MG: 12.5 SUPPOSITORY RECTAL at 04:59

## 2021-09-02 RX ADMIN — SUCRALFATE SCH GM: 1 SUSPENSION ORAL at 01:58

## 2021-09-02 RX ADMIN — HYDROMORPHONE HYDROCHLORIDE PRN MG: 1 INJECTION, SOLUTION INTRAMUSCULAR; INTRAVENOUS; SUBCUTANEOUS at 20:55

## 2021-09-02 RX ADMIN — HYDROMORPHONE HYDROCHLORIDE PRN MG: 1 INJECTION, SOLUTION INTRAMUSCULAR; INTRAVENOUS; SUBCUTANEOUS at 01:58

## 2021-09-02 RX ADMIN — DOCUSATE SODIUM SCH MG: 100 CAPSULE, LIQUID FILLED ORAL at 20:57

## 2021-09-02 RX ADMIN — METHOCARBAMOL PRN MG: 500 TABLET ORAL at 23:31

## 2021-09-02 RX ADMIN — HYDROMORPHONE HYDROCHLORIDE PRN MG: 1 INJECTION, SOLUTION INTRAMUSCULAR; INTRAVENOUS; SUBCUTANEOUS at 06:24

## 2021-09-02 RX ADMIN — OXYCODONE HYDROCHLORIDE PRN MG: 5 TABLET ORAL at 15:17

## 2021-09-02 RX ADMIN — LIDOCAINE HYDROCHLORIDE SCH ML: 20 SOLUTION ORAL; TOPICAL at 17:37

## 2021-09-02 RX ADMIN — SUCRALFATE SCH GM: 1 SUSPENSION ORAL at 14:20

## 2021-09-02 RX ADMIN — DIPHENHYDRAMINE HYDROCHLORIDE PRN MG: 50 INJECTION, SOLUTION INTRAMUSCULAR; INTRAVENOUS at 04:52

## 2021-09-02 RX ADMIN — PANTOPRAZOLE SODIUM SCH MG: 40 TABLET, DELAYED RELEASE ORAL at 06:23

## 2021-09-02 RX ADMIN — DOCUSATE SODIUM SCH MG: 100 CAPSULE, LIQUID FILLED ORAL at 14:20

## 2021-09-02 RX ADMIN — METHOCARBAMOL PRN MG: 500 TABLET ORAL at 16:54

## 2021-09-02 RX ADMIN — HYDROMORPHONE HYDROCHLORIDE PRN MG: 1 INJECTION, SOLUTION INTRAMUSCULAR; INTRAVENOUS; SUBCUTANEOUS at 10:47

## 2021-09-02 RX ADMIN — OXYCODONE HYDROCHLORIDE PRN MG: 5 TABLET ORAL at 08:53

## 2021-09-03 VITALS — DIASTOLIC BLOOD PRESSURE: 72 MMHG | SYSTOLIC BLOOD PRESSURE: 149 MMHG

## 2021-09-03 VITALS — SYSTOLIC BLOOD PRESSURE: 142 MMHG | DIASTOLIC BLOOD PRESSURE: 74 MMHG

## 2021-09-03 VITALS — SYSTOLIC BLOOD PRESSURE: 155 MMHG | DIASTOLIC BLOOD PRESSURE: 71 MMHG

## 2021-09-03 VITALS — SYSTOLIC BLOOD PRESSURE: 146 MMHG | DIASTOLIC BLOOD PRESSURE: 88 MMHG

## 2021-09-03 VITALS — SYSTOLIC BLOOD PRESSURE: 128 MMHG | DIASTOLIC BLOOD PRESSURE: 72 MMHG

## 2021-09-03 VITALS — DIASTOLIC BLOOD PRESSURE: 86 MMHG | SYSTOLIC BLOOD PRESSURE: 152 MMHG

## 2021-09-03 VITALS — DIASTOLIC BLOOD PRESSURE: 73 MMHG | SYSTOLIC BLOOD PRESSURE: 144 MMHG

## 2021-09-03 VITALS — SYSTOLIC BLOOD PRESSURE: 151 MMHG | DIASTOLIC BLOOD PRESSURE: 90 MMHG

## 2021-09-03 VITALS — DIASTOLIC BLOOD PRESSURE: 77 MMHG | SYSTOLIC BLOOD PRESSURE: 135 MMHG

## 2021-09-03 VITALS — DIASTOLIC BLOOD PRESSURE: 74 MMHG | SYSTOLIC BLOOD PRESSURE: 153 MMHG

## 2021-09-03 RX ADMIN — BISACODYL SCH MG: 10 SUPPOSITORY RECTAL at 09:00

## 2021-09-03 RX ADMIN — SUCRALFATE SCH GM: 1 SUSPENSION ORAL at 03:05

## 2021-09-03 RX ADMIN — PANTOPRAZOLE SODIUM SCH MG: 40 TABLET, DELAYED RELEASE ORAL at 06:22

## 2021-09-03 RX ADMIN — METHOCARBAMOL PRN MG: 500 TABLET ORAL at 19:04

## 2021-09-03 RX ADMIN — METHOCARBAMOL PRN MG: 500 TABLET ORAL at 13:03

## 2021-09-03 RX ADMIN — OXYCODONE HYDROCHLORIDE PRN MG: 5 TABLET ORAL at 20:57

## 2021-09-03 RX ADMIN — SUCRALFATE SCH GM: 1 SUSPENSION ORAL at 10:06

## 2021-09-03 RX ADMIN — LIDOCAINE HYDROCHLORIDE SCH ML: 20 SOLUTION ORAL; TOPICAL at 11:45

## 2021-09-03 RX ADMIN — OXYCODONE HYDROCHLORIDE PRN MG: 5 TABLET ORAL at 15:06

## 2021-09-03 RX ADMIN — LIDOCAINE HYDROCHLORIDE SCH ML: 20 SOLUTION ORAL; TOPICAL at 07:35

## 2021-09-03 RX ADMIN — DOCUSATE SODIUM SCH MG: 100 CAPSULE, LIQUID FILLED ORAL at 20:47

## 2021-09-03 RX ADMIN — PANTOPRAZOLE SODIUM SCH MG: 40 TABLET, DELAYED RELEASE ORAL at 17:07

## 2021-09-03 RX ADMIN — SODIUM CHLORIDE SCH MLS/HR: 0.9 INJECTION, SOLUTION INTRAVENOUS at 17:07

## 2021-09-03 RX ADMIN — SUCRALFATE SCH GM: 1 SUSPENSION ORAL at 20:46

## 2021-09-03 RX ADMIN — DOCUSATE SODIUM SCH MG: 100 CAPSULE, LIQUID FILLED ORAL at 09:00

## 2021-09-03 RX ADMIN — LIDOCAINE HYDROCHLORIDE SCH ML: 20 SOLUTION ORAL; TOPICAL at 17:07

## 2021-09-03 RX ADMIN — SUCRALFATE SCH GM: 1 SUSPENSION ORAL at 15:06

## 2021-09-03 RX ADMIN — METHOCARBAMOL PRN MG: 500 TABLET ORAL at 06:22

## 2021-09-03 RX ADMIN — HYDROMORPHONE HYDROCHLORIDE PRN MG: 1 INJECTION, SOLUTION INTRAMUSCULAR; INTRAVENOUS; SUBCUTANEOUS at 10:07

## 2021-09-03 RX ADMIN — DIPHENHYDRAMINE HYDROCHLORIDE PRN MG: 50 INJECTION, SOLUTION INTRAMUSCULAR; INTRAVENOUS at 20:56

## 2021-09-03 NOTE — NUR
ELSA MARQUEZ  Fall Risk Medications present and NOT receiving anticoagulants.

-------------------------------------------------------------------------------

Signed:    09/03/21 at 1006 by Syed CAMACHO

-------------------------------------------------------------------------------

## 2021-09-04 VITALS — DIASTOLIC BLOOD PRESSURE: 76 MMHG | SYSTOLIC BLOOD PRESSURE: 124 MMHG

## 2021-09-04 VITALS — DIASTOLIC BLOOD PRESSURE: 92 MMHG | SYSTOLIC BLOOD PRESSURE: 143 MMHG

## 2021-09-04 LAB
ANION GAP SERPL CALC-SCNC: 6 MMOL/L (ref 5–15)
CALCIUM SERPL-MCNC: 8.7 MG/DL (ref 8.5–10.1)
CHLORIDE SERPL-SCNC: 109 MMOL/L (ref 98–107)
CREAT SERPL-MCNC: 0.82 MG/DL (ref 0.7–1.3)

## 2021-09-04 RX ADMIN — LIDOCAINE HYDROCHLORIDE SCH ML: 20 SOLUTION ORAL; TOPICAL at 07:35

## 2021-09-04 RX ADMIN — METHOCARBAMOL PRN MG: 500 TABLET ORAL at 01:07

## 2021-09-04 RX ADMIN — OXYCODONE HYDROCHLORIDE PRN MG: 5 TABLET ORAL at 03:14

## 2021-09-04 RX ADMIN — METHOCARBAMOL PRN MG: 500 TABLET ORAL at 07:33

## 2021-09-04 RX ADMIN — SUCRALFATE SCH GM: 1 SUSPENSION ORAL at 02:00

## 2021-09-04 RX ADMIN — PANTOPRAZOLE SODIUM SCH MG: 40 TABLET, DELAYED RELEASE ORAL at 07:33

## 2021-09-04 RX ADMIN — BISACODYL SCH MG: 10 SUPPOSITORY RECTAL at 07:36

## 2021-09-04 RX ADMIN — DOCUSATE SODIUM SCH MG: 100 CAPSULE, LIQUID FILLED ORAL at 07:35

## 2021-09-04 RX ADMIN — SUCRALFATE SCH GM: 1 SUSPENSION ORAL at 07:33

## 2021-09-04 RX ADMIN — OXYCODONE HYDROCHLORIDE PRN MG: 5 TABLET ORAL at 09:40

## 2021-10-14 ENCOUNTER — APPOINTMENT (OUTPATIENT)
Dept: RADIOLOGY | Facility: MEDICAL CENTER | Age: 51
End: 2021-10-14
Attending: EMERGENCY MEDICINE
Payer: COMMERCIAL

## 2021-10-14 ENCOUNTER — HOSPITAL ENCOUNTER (EMERGENCY)
Facility: MEDICAL CENTER | Age: 51
End: 2021-10-14
Attending: EMERGENCY MEDICINE
Payer: COMMERCIAL

## 2021-10-14 VITALS
DIASTOLIC BLOOD PRESSURE: 102 MMHG | HEART RATE: 79 BPM | RESPIRATION RATE: 18 BRPM | OXYGEN SATURATION: 94 % | WEIGHT: 199.08 LBS | TEMPERATURE: 98 F | SYSTOLIC BLOOD PRESSURE: 172 MMHG | HEIGHT: 69 IN | BODY MASS INDEX: 29.49 KG/M2

## 2021-10-14 VITALS
BODY MASS INDEX: 29.47 KG/M2 | DIASTOLIC BLOOD PRESSURE: 95 MMHG | OXYGEN SATURATION: 96 % | HEART RATE: 100 BPM | TEMPERATURE: 98.9 F | RESPIRATION RATE: 18 BRPM | WEIGHT: 199 LBS | HEIGHT: 69 IN | SYSTOLIC BLOOD PRESSURE: 153 MMHG

## 2021-10-14 DIAGNOSIS — R11.2 NAUSEA AND VOMITING, INTRACTABILITY OF VOMITING NOT SPECIFIED, UNSPECIFIED VOMITING TYPE: ICD-10-CM

## 2021-10-14 DIAGNOSIS — K27.9 PEPTIC ULCER: ICD-10-CM

## 2021-10-14 DIAGNOSIS — R10.12 LEFT UPPER QUADRANT ABDOMINAL PAIN: ICD-10-CM

## 2021-10-14 DIAGNOSIS — R11.2 NON-INTRACTABLE VOMITING WITH NAUSEA, UNSPECIFIED VOMITING TYPE: ICD-10-CM

## 2021-10-14 DIAGNOSIS — R10.9 ABDOMINAL PAIN, UNSPECIFIED ABDOMINAL LOCATION: ICD-10-CM

## 2021-10-14 DIAGNOSIS — K20.90 ESOPHAGITIS: ICD-10-CM

## 2021-10-14 LAB
ALBUMIN SERPL BCP-MCNC: 4.4 G/DL (ref 3.2–4.9)
ALBUMIN SERPL BCP-MCNC: 4.6 G/DL (ref 3.2–4.9)
ALBUMIN/GLOB SERPL: 1.3 G/DL
ALBUMIN/GLOB SERPL: 1.4 G/DL
ALP SERPL-CCNC: 90 U/L (ref 30–99)
ALP SERPL-CCNC: 93 U/L (ref 30–99)
ALT SERPL-CCNC: 48 U/L (ref 2–50)
ALT SERPL-CCNC: 48 U/L (ref 2–50)
ANION GAP SERPL CALC-SCNC: 21 MMOL/L (ref 7–16)
ANION GAP SERPL CALC-SCNC: 22 MMOL/L (ref 7–16)
AST SERPL-CCNC: 54 U/L (ref 12–45)
AST SERPL-CCNC: 56 U/L (ref 12–45)
BASOPHILS # BLD AUTO: 0.9 % (ref 0–1.8)
BASOPHILS # BLD AUTO: 1 % (ref 0–1.8)
BASOPHILS # BLD: 0.07 K/UL (ref 0–0.12)
BASOPHILS # BLD: 0.09 K/UL (ref 0–0.12)
BILIRUB SERPL-MCNC: 0.4 MG/DL (ref 0.1–1.5)
BILIRUB SERPL-MCNC: 0.8 MG/DL (ref 0.1–1.5)
BUN SERPL-MCNC: 7 MG/DL (ref 8–22)
BUN SERPL-MCNC: 9 MG/DL (ref 8–22)
CALCIUM SERPL-MCNC: 9.4 MG/DL (ref 8.5–10.5)
CALCIUM SERPL-MCNC: 9.5 MG/DL (ref 8.5–10.5)
CHLORIDE SERPL-SCNC: 101 MMOL/L (ref 96–112)
CHLORIDE SERPL-SCNC: 104 MMOL/L (ref 96–112)
CO2 SERPL-SCNC: 16 MMOL/L (ref 20–33)
CO2 SERPL-SCNC: 19 MMOL/L (ref 20–33)
CREAT SERPL-MCNC: 0.74 MG/DL (ref 0.5–1.4)
CREAT SERPL-MCNC: 0.79 MG/DL (ref 0.5–1.4)
EKG IMPRESSION: NORMAL
EOSINOPHIL # BLD AUTO: 0.02 K/UL (ref 0–0.51)
EOSINOPHIL # BLD AUTO: 0.12 K/UL (ref 0–0.51)
EOSINOPHIL NFR BLD: 0.3 % (ref 0–6.9)
EOSINOPHIL NFR BLD: 1.4 % (ref 0–6.9)
ERYTHROCYTE [DISTWIDTH] IN BLOOD BY AUTOMATED COUNT: 42.8 FL (ref 35.9–50)
ERYTHROCYTE [DISTWIDTH] IN BLOOD BY AUTOMATED COUNT: 43 FL (ref 35.9–50)
ETHANOL BLD-MCNC: 121.5 MG/DL (ref 0–10)
GLOBULIN SER CALC-MCNC: 3.3 G/DL (ref 1.9–3.5)
GLOBULIN SER CALC-MCNC: 3.5 G/DL (ref 1.9–3.5)
GLUCOSE SERPL-MCNC: 122 MG/DL (ref 65–99)
GLUCOSE SERPL-MCNC: 171 MG/DL (ref 65–99)
HCT VFR BLD AUTO: 46.6 % (ref 42–52)
HCT VFR BLD AUTO: 47.9 % (ref 42–52)
HGB BLD-MCNC: 15.8 G/DL (ref 14–18)
HGB BLD-MCNC: 16.4 G/DL (ref 14–18)
IMM GRANULOCYTES # BLD AUTO: 0.03 K/UL (ref 0–0.11)
IMM GRANULOCYTES # BLD AUTO: 0.03 K/UL (ref 0–0.11)
IMM GRANULOCYTES NFR BLD AUTO: 0.3 % (ref 0–0.9)
IMM GRANULOCYTES NFR BLD AUTO: 0.4 % (ref 0–0.9)
LIPASE SERPL-CCNC: 35 U/L (ref 11–82)
LIPASE SERPL-CCNC: 48 U/L (ref 11–82)
LYMPHOCYTES # BLD AUTO: 1.77 K/UL (ref 1–4.8)
LYMPHOCYTES # BLD AUTO: 3.4 K/UL (ref 1–4.8)
LYMPHOCYTES NFR BLD: 23.1 % (ref 22–41)
LYMPHOCYTES NFR BLD: 38.9 % (ref 22–41)
MCH RBC QN AUTO: 28.3 PG (ref 27–33)
MCH RBC QN AUTO: 28.4 PG (ref 27–33)
MCHC RBC AUTO-ENTMCNC: 33.9 G/DL (ref 33.7–35.3)
MCHC RBC AUTO-ENTMCNC: 34.2 G/DL (ref 33.7–35.3)
MCV RBC AUTO: 83 FL (ref 81.4–97.8)
MCV RBC AUTO: 83.4 FL (ref 81.4–97.8)
MONOCYTES # BLD AUTO: 0.84 K/UL (ref 0–0.85)
MONOCYTES # BLD AUTO: 0.89 K/UL (ref 0–0.85)
MONOCYTES NFR BLD AUTO: 10.2 % (ref 0–13.4)
MONOCYTES NFR BLD AUTO: 11 % (ref 0–13.4)
NEUTROPHILS # BLD AUTO: 4.22 K/UL (ref 1.82–7.42)
NEUTROPHILS # BLD AUTO: 4.92 K/UL (ref 1.82–7.42)
NEUTROPHILS NFR BLD: 48.2 % (ref 44–72)
NEUTROPHILS NFR BLD: 64.3 % (ref 44–72)
NRBC # BLD AUTO: 0 K/UL
NRBC # BLD AUTO: 0 K/UL
NRBC BLD-RTO: 0 /100 WBC
NRBC BLD-RTO: 0 /100 WBC
PLATELET # BLD AUTO: 247 K/UL (ref 164–446)
PLATELET # BLD AUTO: 282 K/UL (ref 164–446)
PMV BLD AUTO: 8.7 FL (ref 9–12.9)
PMV BLD AUTO: 8.8 FL (ref 9–12.9)
POTASSIUM SERPL-SCNC: 3.7 MMOL/L (ref 3.6–5.5)
POTASSIUM SERPL-SCNC: 3.9 MMOL/L (ref 3.6–5.5)
PROT SERPL-MCNC: 7.9 G/DL (ref 6–8.2)
PROT SERPL-MCNC: 7.9 G/DL (ref 6–8.2)
RBC # BLD AUTO: 5.59 M/UL (ref 4.7–6.1)
RBC # BLD AUTO: 5.77 M/UL (ref 4.7–6.1)
SODIUM SERPL-SCNC: 141 MMOL/L (ref 135–145)
SODIUM SERPL-SCNC: 142 MMOL/L (ref 135–145)
TROPONIN T SERPL-MCNC: 8 NG/L (ref 6–19)
WBC # BLD AUTO: 7.7 K/UL (ref 4.8–10.8)
WBC # BLD AUTO: 8.8 K/UL (ref 4.8–10.8)

## 2021-10-14 PROCEDURE — 93005 ELECTROCARDIOGRAM TRACING: CPT

## 2021-10-14 PROCEDURE — 700102 HCHG RX REV CODE 250 W/ 637 OVERRIDE(OP): Performed by: EMERGENCY MEDICINE

## 2021-10-14 PROCEDURE — 74177 CT ABD & PELVIS W/CONTRAST: CPT

## 2021-10-14 PROCEDURE — 84484 ASSAY OF TROPONIN QUANT: CPT

## 2021-10-14 PROCEDURE — 93005 ELECTROCARDIOGRAM TRACING: CPT | Performed by: EMERGENCY MEDICINE

## 2021-10-14 PROCEDURE — 85025 COMPLETE CBC W/AUTO DIFF WBC: CPT

## 2021-10-14 PROCEDURE — 96374 THER/PROPH/DIAG INJ IV PUSH: CPT

## 2021-10-14 PROCEDURE — 96375 TX/PRO/DX INJ NEW DRUG ADDON: CPT

## 2021-10-14 PROCEDURE — 99285 EMERGENCY DEPT VISIT HI MDM: CPT

## 2021-10-14 PROCEDURE — 83690 ASSAY OF LIPASE: CPT | Mod: 91

## 2021-10-14 PROCEDURE — A9270 NON-COVERED ITEM OR SERVICE: HCPCS | Performed by: EMERGENCY MEDICINE

## 2021-10-14 PROCEDURE — 700111 HCHG RX REV CODE 636 W/ 250 OVERRIDE (IP): Performed by: EMERGENCY MEDICINE

## 2021-10-14 PROCEDURE — 76775 US EXAM ABDO BACK WALL LIM: CPT

## 2021-10-14 PROCEDURE — 700105 HCHG RX REV CODE 258: Performed by: EMERGENCY MEDICINE

## 2021-10-14 PROCEDURE — 700117 HCHG RX CONTRAST REV CODE 255: Performed by: EMERGENCY MEDICINE

## 2021-10-14 PROCEDURE — 80053 COMPREHEN METABOLIC PANEL: CPT

## 2021-10-14 PROCEDURE — 82077 ASSAY SPEC XCP UR&BREATH IA: CPT

## 2021-10-14 PROCEDURE — 80053 COMPREHEN METABOLIC PANEL: CPT | Mod: 91

## 2021-10-14 PROCEDURE — 83690 ASSAY OF LIPASE: CPT

## 2021-10-14 PROCEDURE — 85025 COMPLETE CBC W/AUTO DIFF WBC: CPT | Mod: 91

## 2021-10-14 RX ORDER — ONDANSETRON 2 MG/ML
4 INJECTION INTRAMUSCULAR; INTRAVENOUS ONCE
Status: COMPLETED | OUTPATIENT
Start: 2021-10-14 | End: 2021-10-14

## 2021-10-14 RX ORDER — SODIUM CHLORIDE 9 MG/ML
1000 INJECTION, SOLUTION INTRAVENOUS ONCE
Status: COMPLETED | OUTPATIENT
Start: 2021-10-14 | End: 2021-10-14

## 2021-10-14 RX ORDER — ONDANSETRON 4 MG/1
4 TABLET, ORALLY DISINTEGRATING ORAL EVERY 8 HOURS PRN
Qty: 20 TABLET | Refills: 0 | Status: SHIPPED | OUTPATIENT
Start: 2021-10-14 | End: 2022-05-31

## 2021-10-14 RX ORDER — HALOPERIDOL 5 MG/ML
5 INJECTION INTRAMUSCULAR ONCE
Status: COMPLETED | OUTPATIENT
Start: 2021-10-14 | End: 2021-10-14

## 2021-10-14 RX ORDER — OMEPRAZOLE 20 MG/1
20 CAPSULE, DELAYED RELEASE ORAL 2 TIMES DAILY
Qty: 60 CAPSULE | Refills: 0 | Status: SHIPPED | OUTPATIENT
Start: 2021-10-14 | End: 2021-11-13

## 2021-10-14 RX ORDER — PROMETHAZINE HYDROCHLORIDE 25 MG/1
25 TABLET ORAL EVERY 6 HOURS PRN
Qty: 10 TABLET | Refills: 0 | Status: SHIPPED | OUTPATIENT
Start: 2021-10-14 | End: 2022-05-31

## 2021-10-14 RX ORDER — DIPHENHYDRAMINE HYDROCHLORIDE 50 MG/ML
50 INJECTION INTRAMUSCULAR; INTRAVENOUS ONCE
Status: COMPLETED | OUTPATIENT
Start: 2021-10-14 | End: 2021-10-14

## 2021-10-14 RX ORDER — SUCRALFATE 1 G/1
1 TABLET ORAL
Qty: 120 TABLET | Refills: 0 | Status: SHIPPED | OUTPATIENT
Start: 2021-10-14 | End: 2022-05-31

## 2021-10-14 RX ORDER — HYDROMORPHONE HYDROCHLORIDE 1 MG/ML
1 INJECTION, SOLUTION INTRAMUSCULAR; INTRAVENOUS; SUBCUTANEOUS ONCE
Status: COMPLETED | OUTPATIENT
Start: 2021-10-14 | End: 2021-10-14

## 2021-10-14 RX ORDER — ACETAMINOPHEN 500 MG
1000 TABLET ORAL ONCE
Status: COMPLETED | OUTPATIENT
Start: 2021-10-14 | End: 2021-10-14

## 2021-10-14 RX ADMIN — LIDOCAINE HYDROCHLORIDE 30 ML: 20 SOLUTION OROPHARYNGEAL at 04:59

## 2021-10-14 RX ADMIN — HALOPERIDOL LACTATE 5 MG: 5 INJECTION, SOLUTION INTRAMUSCULAR at 14:54

## 2021-10-14 RX ADMIN — DIPHENHYDRAMINE HYDROCHLORIDE 50 MG: 50 INJECTION INTRAMUSCULAR; INTRAVENOUS at 14:58

## 2021-10-14 RX ADMIN — SODIUM CHLORIDE 1000 ML: 9 INJECTION, SOLUTION INTRAVENOUS at 14:54

## 2021-10-14 RX ADMIN — ACETAMINOPHEN 1000 MG: 500 TABLET ORAL at 14:59

## 2021-10-14 RX ADMIN — SODIUM CHLORIDE 1000 ML: 9 INJECTION, SOLUTION INTRAVENOUS at 04:59

## 2021-10-14 RX ADMIN — ONDANSETRON 4 MG: 2 INJECTION INTRAMUSCULAR; INTRAVENOUS at 04:59

## 2021-10-14 RX ADMIN — IOHEXOL 95 ML: 350 INJECTION, SOLUTION INTRAVENOUS at 16:30

## 2021-10-14 RX ADMIN — HYDROMORPHONE HYDROCHLORIDE 1 MG: 1 INJECTION, SOLUTION INTRAMUSCULAR; INTRAVENOUS; SUBCUTANEOUS at 04:59

## 2021-10-14 ASSESSMENT — PAIN DESCRIPTION - PAIN TYPE: TYPE: ACUTE PAIN

## 2021-10-14 ASSESSMENT — PAIN DESCRIPTION - DESCRIPTORS: DESCRIPTORS: SHARP

## 2021-10-14 ASSESSMENT — FIBROSIS 4 INDEX: FIB4 SCORE: 1.35

## 2021-10-14 NOTE — DISCHARGE INSTRUCTIONS
Avoid alcohol. Review provided diet information. Take medication as prescribed. Call the specialist today.

## 2021-10-14 NOTE — ED PROVIDER NOTES
ED Provider Note    Scribed for Junior Salamanca M.D. by Sara Edwards. 10/14/2021  4:40 AM    Primary care provider: PCP, Pt states none  Means of arrival: EMS  History obtained from: Patient  History limited by: None    CHIEF COMPLAINT  Chief Complaint   Patient presents with    Abdominal Pain     x 1 month, LLQ pain radiating to the back    Nausea     x1 month       HPI  Donal Carpio is a 51 y.o. male, with a history of stomach and esophageal ulcers, who presents to the Emergency Department via EMS, for worsening left lower quadrant abdominal pain with an onset of one month ago. Patient was seen and evaluated at [facility] and discharged 6 days ago after a 12 day stay for the same complaint  He was reportedly notified he had a small case of pancreatitis. Patient also notes he had a full lab and imaging workup including abdominal CT at [facility]. He was prescribed with Zofran and instructed to come back to the ED for worsening pain. He reports associated nausea, and vomiting. He denies any associated diarrhea, fever, or chills. No alleviating or exacerbating factors were identified. He admits to drinking alcohol occasionally, but not recently.     REVIEW OF SYSTEMS  Pertinent positives include abdominal pain, nausea, and vomiting.   Pertinent negatives include no diarrhea, fever, or chills.    All other systems reviewed and negative. See HPI for further details.     PAST MEDICAL HISTORY   has a past medical history of Ulcer of abdomen wall (HCC).    SURGICAL HISTORY  patient denies any surgical history    SOCIAL HISTORY  Social History     Tobacco Use    Smoking status: Never Smoker    Smokeless tobacco: Never Used   Vaping Use    Vaping Use: Former   Substance Use Topics    Alcohol use: Yes     Comment: occasional     Drug use: Yes     Types: Inhaled     Comment: marijuana      Social History     Substance and Sexual Activity   Drug Use Yes    Types: Inhaled    Comment: marijuana       FAMILY  "HISTORY  History reviewed. No pertinent family history.    CURRENT MEDICATIONS  Home Medications       Reviewed by Jaylin Lassiter R.N. (Registered Nurse) on 10/14/21 at 0432  Med List Status: Not Addressed     Medication Last Dose Status        Patient Brennan Taking any Medications                           ALLERGIES  Allergies   Allergen Reactions    Penicillins        PHYSICAL EXAM  VITAL SIGNS: /96   Pulse (!) 147   Temp 37 °C (98.6 °F)   Resp (!) 26   Ht 1.753 m (5' 9\")   Wt 90.3 kg (199 lb)   SpO2 96%   BMI 29.39 kg/m²     Nursing note and vitals reviewed.  Constitutional: Well-developed and well-nourished. Severe distress secondary to pain.    HENT: Head is normocephalic and atraumatic. Oropharynx is clear and moist without exudate or erythema.   Eyes: Pupils are equal, round, and reactive to light. Conjunctiva are normal.   Cardiovascular: Tachycardic, Normal regular rhythm. No murmur heard. Normal radial pulses.  Pulmonary/Chest: Breath sounds normal. No wheezes or rales.   Abdominal: Tender diffusely across upper abdomen with guarding, Active bowel sounds. Soft. No distention    Musculoskeletal: Extremities exhibit normal range of motion without edema or tenderness.   Neurological: Awake, alert and oriented to person, place, and time. No focal deficits noted.  Skin: Skin is warm and dry. No rash.   Psychiatric: Normal mood and affect. Appropriate for clinical situation.    DIAGNOSTIC STUDIES / PROCEDURES    EKG Interpretation  Interpreted by me as below.    LABS  Results for orders placed or performed during the hospital encounter of 10/14/21   CBC WITH DIFFERENTIAL   Result Value Ref Range    WBC 8.8 4.8 - 10.8 K/uL    RBC 5.77 4.70 - 6.10 M/uL    Hemoglobin 16.4 14.0 - 18.0 g/dL    Hematocrit 47.9 42.0 - 52.0 %    MCV 83.0 81.4 - 97.8 fL    MCH 28.4 27.0 - 33.0 pg    MCHC 34.2 33.7 - 35.3 g/dL    RDW 42.8 35.9 - 50.0 fL    Platelet Count 282 164 - 446 K/uL    MPV 8.7 (L) 9.0 - 12.9 fL    " Neutrophils-Polys 48.20 44.00 - 72.00 %    Lymphocytes 38.90 22.00 - 41.00 %    Monocytes 10.20 0.00 - 13.40 %    Eosinophils 1.40 0.00 - 6.90 %    Basophils 1.00 0.00 - 1.80 %    Immature Granulocytes 0.30 0.00 - 0.90 %    Nucleated RBC 0.00 /100 WBC    Neutrophils (Absolute) 4.22 1.82 - 7.42 K/uL    Lymphs (Absolute) 3.40 1.00 - 4.80 K/uL    Monos (Absolute) 0.89 (H) 0.00 - 0.85 K/uL    Eos (Absolute) 0.12 0.00 - 0.51 K/uL    Baso (Absolute) 0.09 0.00 - 0.12 K/uL    Immature Granulocytes (abs) 0.03 0.00 - 0.11 K/uL    NRBC (Absolute) 0.00 K/uL   COMP METABOLIC PANEL   Result Value Ref Range    Sodium 142 135 - 145 mmol/L    Potassium 3.7 3.6 - 5.5 mmol/L    Chloride 104 96 - 112 mmol/L    Co2 16 (L) 20 - 33 mmol/L    Anion Gap 22.0 (H) 7.0 - 16.0    Glucose 122 (H) 65 - 99 mg/dL    Bun 7 (L) 8 - 22 mg/dL    Creatinine 0.79 0.50 - 1.40 mg/dL    Calcium 9.5 8.5 - 10.5 mg/dL    AST(SGOT) 54 (H) 12 - 45 U/L    ALT(SGPT) 48 2 - 50 U/L    Alkaline Phosphatase 93 30 - 99 U/L    Total Bilirubin 0.4 0.1 - 1.5 mg/dL    Albumin 4.4 3.2 - 4.9 g/dL    Total Protein 7.9 6.0 - 8.2 g/dL    Globulin 3.5 1.9 - 3.5 g/dL    A-G Ratio 1.3 g/dL   LIPASE   Result Value Ref Range    Lipase 48 11 - 82 U/L   TROPONIN   Result Value Ref Range    Troponin T 8 6 - 19 ng/L   DIAGNOSTIC ALCOHOL   Result Value Ref Range    Diagnostic Alcohol 121.5 (H) 0.0 - 10.0 mg/dL   ESTIMATED GFR   Result Value Ref Range    GFR If African American >60 >60 mL/min/1.73 m 2    GFR If Non African American >60 >60 mL/min/1.73 m 2   EKG   Result Value Ref Range    Report       [facility] Emergency Dept.    Test Date:  2021-10-14  Pt Name:    JERICHO ALBA                  Department: ER  MRN:        [id number]                      Room:        12  Gender:     Male                         Technician: [id number]  :        1970                   Requested By:ER TRIAGE PROTOCOL  Order #:    [id number]                    Shayy MD: BIN GABRIEL,  MD    Measurements  Intervals                                Axis  Rate:       142                          P:          0  VT:         88                           QRS:        -9  QRSD:       80                           T:          -30  QT:         292  QTc:        449    Interpretive Statements  SINUS TACHYCARDIA  PROBABLE INFERIOR INFARCT, AGE INDETERMINATE  No previous ECG available for comparison  Electronically Signed On 10- 5:58:28 PDT by BIN GABRIEL MD       All labs reviewed by me.    COURSE & MEDICAL DECISION MAKING  Nursing notes, VS, PMSFHx reviewed in chart.     Review of past medical records shows the patient has no prior records to review.    4:40 AM - Patient seen and examined at bedside. Patient recently discharged from [facility] after having an extensive work up reportedly showing mild pancreatitis, and ulcers. Records will be obtained. Discussed plan of care with the patient. I informed him I will give him medications for his vomiting and abdominal pain. Lab studies will also be ordered to evaluate. He is understanding and agreeable to plan.  Patient will be treated with GI Cocktail 30 ml oral suspension, Zofran 4 mg injection, and Dilaudid 1 mg injection. Intravenous fluids were administered for acute vomiting. Ordered EKG,  Estimated GFR, Diagnostic Alcohol, Troponin, Lipase, CMP, CBC with diff to evaluate his symptoms. The differential diagnoses include but are not limited to: Pancreatitis, Gastritis, Peptic Ulcer Disease.     5:25 AM - Obtained past medical records from [facility] showed he had severe esophagitis and peptic ulcer disease. He left the hospital against medical advice. His work up showed Lipase 171, and Hemoglobin 17.    6:30 AM - Patient was reevaluated at bedside. He is resting in bed feeling improved.  Abdominal exam is now benign.  Discussed lab and radiology results with the patient and informed them that they showed a high level of alcohol. I advised him to  avoid alcohol, review provided diet information, and take his medication as prescribed.  He will follow up with his specialist today. The patient will return for new or worsening symptoms and is stable at the time of discharge. Patient verbalizes understanding and agreement to this plan of care.      The patient is referred to a primary physician for blood pressure management, diabetic screening, and for all other preventative health concerns.    HYDRATION: Based on the patient's presentation of Acute Vomiting and Tachycardia the patient was given IV fluids. IV Hydration was used because oral hydration was not adequate alone. Upon recheck following hydration, the patient was improved.    DISPOSITION:  Patient will be discharged home in stable condition.    FOLLOW UP:  [facility], Emergency Dept  [address] [facility] [address] [phone]    If symptoms worsen    Chauncey Kenney M.D.  [address] [place] [id number] [phone]    Schedule an appointment as soon as possible for a visit   call today. GI specialist      OUTPATIENT MEDICATIONS:  New Prescriptions    OMEPRAZOLE (PRILOSEC) 20 MG DELAYED-RELEASE CAPSULE    Take 1 Capsule by mouth 2 times a day for 30 days.    SUCRALFATE (CARAFATE) 1 GM TAB    Take 1 Tablet by mouth 4 Times a Day,Before Meals and at Bedtime.       FINAL IMPRESSION  1. Nausea and vomiting, intractability of vomiting not specified, unspecified vomiting type    2. Abdominal pain, unspecified abdominal location    3. Peptic ulcer    4. Esophagitis          Sara NEWTON (Timothy), am scribing for, and in the presence of, Junior Salamanca M.D..    Electronically signed by: Sara Edwards (Timothy), 10/14/2021    Junior NEWTON M.D. personally performed the services described in this documentation, as scribed by Sara Edwards in my presence, and it is both accurate and complete.    C    The note accurately reflects work and decisions made by me.  Junior PERALTA  JEFFREY Salamanca  10/14/2021  11:05 AM

## 2021-10-14 NOTE — ED PROVIDER NOTES
"ED Provider Note    Scribed for Js Gtz M.D. by Claudio Ballesteros. 10/14/2021  2:27 PM    Primary care provider: None reported.  Means of arrival: EMS  History obtained from: Patient  History limited by: None    CHIEF COMPLAINT  Chief Complaint   Patient presents with    Abdominal Pain     LLQ abd pain x two months, worsening several days ago. +Constipation. Seen in ED this AM for same. Hx peptic ulcer.     Vomiting     several episodes daily, dry heaving in triage. +\"streaks of blood\"       HPI  Donal Carpio is a 51 y.o. male who presents to the Emergency Department complaining of worsening left lower quadrant abdominal pain onset several days ago. He was in the ED this morning and was given fluids and discharged. He was prompted to return to the ED as when he got home he began vomiting and there was blood in his vomit. He has associated symptoms of back pain, nausea, dizziness, and difficulty breathing. He denies any fevers. He notes that he has fallen two times since he was discharged this morning. He has not seen a GI doctor for his symptoms, as his insurance does not cover this outside of [place].     REVIEW OF SYSTEMS  Pertinent positives include left lower quadrant abdominal pain, vomiting, blood in vomit, back pain, nausea, dizziness, and difficulty breathing . Pertinent negatives include no fevers.  All other systems reviewed and negative.    PAST MEDICAL HISTORY   has a past medical history of Peptic ulcer and Ulcer of abdomen wall (HCC).    SURGICAL HISTORY   has a past surgical history that includes appendectomy.    SOCIAL HISTORY  Social History     Tobacco Use    Smoking status: Never Smoker    Smokeless tobacco: Never Used   Vaping Use    Vaping Use: Former   Substance Use Topics    Alcohol use: Yes     Comment: occasional     Drug use: Yes     Types: Inhaled     Comment: marijuana      Social History     Substance and Sexual Activity   Drug Use Yes    Types: Inhaled    Comment: marijuana " "      FAMILY HISTORY  History reviewed. No pertinent family history.    CURRENT MEDICATIONS  Current Outpatient Medications   Medication Instructions    omeprazole (PRILOSEC) 20 mg, Oral, 2 TIMES DAILY    sucralfate (CARAFATE) 1 g, Oral, 4 TIMES DAILY - BEFORE MEALS , NIGHTLY           ALLERGIES  Allergies   Allergen Reactions    Penicillins Unspecified     unknown       PHYSICAL EXAM  VITAL SIGNS: BP (!) 163/104   Pulse 93   Temp 36.6 °C (97.9 °F) (Oral)   Resp 20   Ht 1.753 m (5' 9\")   Wt 90.3 kg (199 lb 1.2 oz)   SpO2 92%   BMI 29.40 kg/m²     Constitutional: Well developed, Well nourished, Moderate distress, Non-toxic appearance.   HENT: Dry mucous membranes, Normocephalic, Atraumatic, Bilateral external ears normal, No oral exudates.   Eyes: PERRLA, EOMI, Conjunctiva normal, No discharge.   Neck: No tenderness, Supple, No stridor.   Lymphatic: No lymphadenopathy noted.   Cardiovascular: Normal heart rate, Normal rhythm.   Thorax & Lungs: Clear to auscultation bilaterally, No respiratory distress, No wheezing, No crackles.   Abdomen: Abdomen difficult to examine, due to voluntary guarding with light touch of skin on left side. Soft, No masses, No pulsatile masses.   Skin: Warm, Dry, No erythema, No rash.   Extremities:, No edema No cyanosis.   Musculoskeletal: No tenderness to palpation or major deformities noted.  Intact distal pulses  Neurologic: Awake, alert. Moves all extremities spontaneously.  Psychiatric: Anxious, Hyperventilating, Affect normal, Judgment normal, Mood normal.     LABS  Results for orders placed or performed during the hospital encounter of 10/14/21   CBC WITH DIFFERENTIAL   Result Value Ref Range    WBC 7.7 4.8 - 10.8 K/uL    RBC 5.59 4.70 - 6.10 M/uL    Hemoglobin 15.8 14.0 - 18.0 g/dL    Hematocrit 46.6 42.0 - 52.0 %    MCV 83.4 81.4 - 97.8 fL    MCH 28.3 27.0 - 33.0 pg    MCHC 33.9 33.7 - 35.3 g/dL    RDW 43.0 35.9 - 50.0 fL    Platelet Count 247 164 - 446 K/uL    MPV 8.8 (L) " 9.0 - 12.9 fL    Neutrophils-Polys 64.30 44.00 - 72.00 %    Lymphocytes 23.10 22.00 - 41.00 %    Monocytes 11.00 0.00 - 13.40 %    Eosinophils 0.30 0.00 - 6.90 %    Basophils 0.90 0.00 - 1.80 %    Immature Granulocytes 0.40 0.00 - 0.90 %    Nucleated RBC 0.00 /100 WBC    Neutrophils (Absolute) 4.92 1.82 - 7.42 K/uL    Lymphs (Absolute) 1.77 1.00 - 4.80 K/uL    Monos (Absolute) 0.84 0.00 - 0.85 K/uL    Eos (Absolute) 0.02 0.00 - 0.51 K/uL    Baso (Absolute) 0.07 0.00 - 0.12 K/uL    Immature Granulocytes (abs) 0.03 0.00 - 0.11 K/uL    NRBC (Absolute) 0.00 K/uL   COMP METABOLIC PANEL   Result Value Ref Range    Sodium 141 135 - 145 mmol/L    Potassium 3.9 3.6 - 5.5 mmol/L    Chloride 101 96 - 112 mmol/L    Co2 19 (L) 20 - 33 mmol/L    Anion Gap 21.0 (H) 7.0 - 16.0    Glucose 171 (H) 65 - 99 mg/dL    Bun 9 8 - 22 mg/dL    Creatinine 0.74 0.50 - 1.40 mg/dL    Calcium 9.4 8.5 - 10.5 mg/dL    AST(SGOT) 56 (H) 12 - 45 U/L    ALT(SGPT) 48 2 - 50 U/L    Alkaline Phosphatase 90 30 - 99 U/L    Total Bilirubin 0.8 0.1 - 1.5 mg/dL    Albumin 4.6 3.2 - 4.9 g/dL    Total Protein 7.9 6.0 - 8.2 g/dL    Globulin 3.3 1.9 - 3.5 g/dL    A-G Ratio 1.4 g/dL   LIPASE   Result Value Ref Range    Lipase 35 11 - 82 U/L   ESTIMATED GFR   Result Value Ref Range    GFR If African American >60 >60 mL/min/1.73 m 2    GFR If Non African American >60 >60 mL/min/1.73 m 2   EKG   Result Value Ref Range    Report       [facility] Emergency Dept.    Test Date:  2021-10-14  Pt Name:    JERICHO ALBA                  Department: ER  MRN:        [id number]                      Room:  Gender:     Male                         Technician: ALEJANDRO  :        1970                   Requested By:ER TRIAGE PROTOCOL  Order #:    [id number]                    Reading MD:    Measurements  Intervals                                Axis  Rate:       111                          P:          78  KY:         121                          QRS:         -26  QRSD:       92                           T:          -9  QT:         357  QTc:        486    Interpretive Statements  Sinus tachycardia  Inferior infarct, old  Compared to ECG 10/14/2021 04:24:29  No significant changes          RADIOLOGY  US-RENAL   Final Result      1.  Bilateral renal cysts.   2.  No evidence of hydronephrosis.   3.  Post void residual 53 mL.      CT-ABDOMEN-PELVIS WITH   Final Result      1.  No acute intra-abdominal abnormality is identified.   2.  Hepatic steatosis.   3.  Bilateral dense renal lesions may represent hemorrhagic/proteinaceous cysts but solid mass is not excluded. Further evaluation with renal ultrasound is recommended.   4.  Borderline splenomegaly.   5.  Colonic diverticulosis.           The radiologist's interpretation of all radiological studies have been reviewed by me.      COURSE & MEDICAL DECISION MAKING  Pertinent Labs & Imaging studies reviewed. (See chart for details)    I reviewed the patient's medical records which showed the patient was admitted to [facility] on the 10/5/2021 for and left AMA on 10/08/2021. He was admitted for abdominal pain and vomiting blood. He exhibited drug seeking behavior, and ultimately left AMA. He was seen here this morning, got IV fluids and lab tests, and was discharged.     2:27 PM - Patient seen and examined at bedside. Patient will be treated with NS 1,000 mL, Haldol injection 5 mg, and Benadryl injection 50 mg. Ordered Ct-Abdomen-Pelvis with, Urine drug screen, Estimated GFR, CBC with differential, CMP, Lipase, Urinalysis, and EKG to evaluate his symptoms. The differential diagnoses include but are not limited to: Cyclic vomiting syndrome, Choric abdominal pain, Intraabdominal infection.    2:56 PM - Nursing staff informed me the patient was asking to be given Tylenol. Patient will be treated with Tylenol 1,000 mg.     4:50 PM - Patient was reevaluated at bedside. The patient informs me they are feeling improved.     6:34 PM -  I reevaluated the patient at bedside. The patient informs me they feel improved following. I discussed the patient's diagnostic study results. I discussed plan for discharge and follow up as outlined below. The patient verbalizes they feel comfortable going home. The patient is stable for discharge at this time and will return for any new or worsening symptoms. Patient verbalizes understanding and support with my plan for discharge.       Decision Making:  Patient with recent hospitalization at [facility] for several days late, left AMA.  The patient returned this morning was seen by my partner, work-up was negative, the patient continues to have abdominal pain with just light touch, since the patient's pain is getting worse over the last several days I did elect to get a CT scan, CT scan was unremarkable except for questionable renal cyst, renal ultrasound was unremarkable except for cyst.  Patient was dramatically improved with Haldol and Benadryl.  I recommend the patient follow-up with the GI, have the patient return with worsening symptoms.    HYDRATION: Based on the patient's presentation of Acute Vomiting the patient was given IV fluids. IV Hydration was used because oral hydration was not adequate alone. Upon recheck following hydration, the patient was feeling slightly improved.      The patient is referred to a primary physician for blood pressure management, diabetic screening, and for all other preventative health concerns.    DISPOSITION:  Patient will be discharged home in stable condition.    FOLLOW UP:  [facility], Emergency Dept  [address] [facility] [id number] [phone]    If symptoms worsen      OUTPATIENT MEDICATIONS:  Discharge Medication List as of 10/14/2021  6:38 PM        START taking these medications    Details   ondansetron (ZOFRAN ODT) 4 MG TABLET DISPERSIBLE Take 1 Tablet by mouth every 8 hours as needed., Disp-20 Tablet, R-0, Normal      promethazine  (PHENERGAN) 25 MG Tab Take 1 Tablet by mouth every 6 hours as needed for Nausea/Vomiting., Disp-10 Tablet, R-0, Normal               FINAL IMPRESSION  1. Non-intractable vomiting with nausea, unspecified vomiting type    2. Left upper quadrant abdominal pain          Claudio NEWTON (Scribe), am scribing for, and in the presence of, Js Gtz M.D..    Electronically signed by: Claudio Ballesteros (Scribe), 10/14/2021    IJs M.D. personally performed the services described in this documentation, as scribed by Claudio Ballesteros in my presence, and it is both accurate and complete. C.    The note accurately reflects work and decisions made by me.  Js Gtz M.D.  10/14/2021  8:28 PM

## 2021-10-14 NOTE — ED NOTES
Pt brought back to [address] from triage. Pt able to transfer self to bed, call light in reach. Chart up for ERP.

## 2021-10-14 NOTE — ED NOTES
Pt returned from imaging. Unable to provide urine sample at this time. Urinal left at bedside and instructed to call with sample.

## 2021-10-14 NOTE — ED TRIAGE NOTES
Chief Complaint   Patient presents with   • Abdominal Pain     x 1 month, LLQ pain radiating to the back   • Nausea     x1 month     Patient was discharged Friday from [facility] Friday after an 11 day stay for the same complaints. Patient reports he was diagnosed with esophageal ulcers. Patient given 4mg Zofran per EMS. Patient is in town for work from [place].

## 2021-10-14 NOTE — ED TRIAGE NOTES
"Chief Complaint   Patient presents with   • Abdominal Pain     LLQ abd pain x two months, worsening several days ago. +Constipation. Seen in ED this AM for same. Hx peptic ulcer.    • Vomiting     several episodes daily, dry heaving in triage. +\"streaks of blood\"     Rx'd omeprazole and carafate this AM in ED, unable to fill.     BP (!) 166/98   Pulse (!) 120   Temp 36.6 °C (97.9 °F) (Oral)   Resp 16   Ht 1.753 m (5' 9\")   Wt 90.3 kg (199 lb 1.2 oz)   SpO2 97%   BMI 29.40 kg/m²       Pt to triage via wheelchair in moderate distress secondary to pain. ED/triage process explained to patient who demonstrates understanding. Pt agrees to notify RN/tech of new or worsening s/s   "

## 2021-10-15 NOTE — ED NOTES
Discharge orders received, IV and monitor discontinued, instructions and education given, follow-up discussed, prescriptions sent to pharmacy, pt verbalized understanding.

## 2022-02-08 ENCOUNTER — HOSPITAL ENCOUNTER (EMERGENCY)
Facility: MEDICAL CENTER | Age: 52
End: 2022-02-09
Attending: EMERGENCY MEDICINE
Payer: COMMERCIAL

## 2022-02-08 DIAGNOSIS — F10.10 ALCOHOL ABUSE: ICD-10-CM

## 2022-02-08 LAB — EKG IMPRESSION: NORMAL

## 2022-02-08 PROCEDURE — 93005 ELECTROCARDIOGRAM TRACING: CPT | Performed by: EMERGENCY MEDICINE

## 2022-02-08 PROCEDURE — 85025 COMPLETE CBC W/AUTO DIFF WBC: CPT

## 2022-02-08 PROCEDURE — 83690 ASSAY OF LIPASE: CPT

## 2022-02-08 PROCEDURE — 99285 EMERGENCY DEPT VISIT HI MDM: CPT

## 2022-02-08 PROCEDURE — 96374 THER/PROPH/DIAG INJ IV PUSH: CPT

## 2022-02-08 PROCEDURE — 80053 COMPREHEN METABOLIC PANEL: CPT

## 2022-02-08 PROCEDURE — 82077 ASSAY SPEC XCP UR&BREATH IA: CPT

## 2022-02-08 PROCEDURE — 700111 HCHG RX REV CODE 636 W/ 250 OVERRIDE (IP): Performed by: EMERGENCY MEDICINE

## 2022-02-08 PROCEDURE — 84484 ASSAY OF TROPONIN QUANT: CPT

## 2022-02-08 RX ORDER — ONDANSETRON 2 MG/ML
4 INJECTION INTRAMUSCULAR; INTRAVENOUS ONCE
Status: COMPLETED | OUTPATIENT
Start: 2022-02-09 | End: 2022-02-09

## 2022-02-08 RX ORDER — ONDANSETRON 2 MG/ML
INJECTION INTRAMUSCULAR; INTRAVENOUS
Status: COMPLETED
Start: 2022-02-08 | End: 2022-02-09

## 2022-02-08 RX ORDER — LORAZEPAM 2 MG/ML
1 INJECTION INTRAMUSCULAR ONCE
Status: COMPLETED | OUTPATIENT
Start: 2022-02-09 | End: 2022-02-08

## 2022-02-08 RX ADMIN — LORAZEPAM 1 MG: 2 INJECTION INTRAMUSCULAR; INTRAVENOUS at 23:50

## 2022-02-08 ASSESSMENT — PAIN DESCRIPTION - DESCRIPTORS: DESCRIPTORS: SHARP

## 2022-02-08 ASSESSMENT — FIBROSIS 4 INDEX: FIB4 SCORE: 1.6

## 2022-02-09 ENCOUNTER — APPOINTMENT (OUTPATIENT)
Dept: RADIOLOGY | Facility: MEDICAL CENTER | Age: 52
End: 2022-02-09
Attending: EMERGENCY MEDICINE
Payer: COMMERCIAL

## 2022-02-09 VITALS
WEIGHT: 190 LBS | BODY MASS INDEX: 28.14 KG/M2 | HEART RATE: 102 BPM | TEMPERATURE: 98.7 F | DIASTOLIC BLOOD PRESSURE: 72 MMHG | SYSTOLIC BLOOD PRESSURE: 134 MMHG | RESPIRATION RATE: 17 BRPM | HEIGHT: 69 IN | OXYGEN SATURATION: 97 %

## 2022-02-09 LAB
ALBUMIN SERPL BCP-MCNC: 4.1 G/DL (ref 3.2–4.9)
ALBUMIN/GLOB SERPL: 1.1 G/DL
ALP SERPL-CCNC: 86 U/L (ref 30–99)
ALT SERPL-CCNC: 38 U/L (ref 2–50)
ANION GAP SERPL CALC-SCNC: 18 MMOL/L (ref 7–16)
AST SERPL-CCNC: 40 U/L (ref 12–45)
BASOPHILS # BLD AUTO: 1 % (ref 0–1.8)
BASOPHILS # BLD: 0.11 K/UL (ref 0–0.12)
BILIRUB SERPL-MCNC: 0.2 MG/DL (ref 0.1–1.5)
BUN SERPL-MCNC: 12 MG/DL (ref 8–22)
CALCIUM SERPL-MCNC: 8.3 MG/DL (ref 8.5–10.5)
CHLORIDE SERPL-SCNC: 106 MMOL/L (ref 96–112)
CO2 SERPL-SCNC: 17 MMOL/L (ref 20–33)
CREAT SERPL-MCNC: 0.73 MG/DL (ref 0.5–1.4)
EKG IMPRESSION: NORMAL
EKG IMPRESSION: NORMAL
EOSINOPHIL # BLD AUTO: 0.27 K/UL (ref 0–0.51)
EOSINOPHIL NFR BLD: 2.4 % (ref 0–6.9)
ERYTHROCYTE [DISTWIDTH] IN BLOOD BY AUTOMATED COUNT: 47 FL (ref 35.9–50)
ETHANOL BLD-MCNC: 263.9 MG/DL (ref 0–10)
GLOBULIN SER CALC-MCNC: 3.7 G/DL (ref 1.9–3.5)
GLUCOSE SERPL-MCNC: 93 MG/DL (ref 65–99)
HCT VFR BLD AUTO: 46.7 % (ref 42–52)
HGB BLD-MCNC: 15.6 G/DL (ref 14–18)
IMM GRANULOCYTES # BLD AUTO: 0.05 K/UL (ref 0–0.11)
IMM GRANULOCYTES NFR BLD AUTO: 0.4 % (ref 0–0.9)
LIPASE SERPL-CCNC: 44 U/L (ref 11–82)
LYMPHOCYTES # BLD AUTO: 3.56 K/UL (ref 1–4.8)
LYMPHOCYTES NFR BLD: 31.2 % (ref 22–41)
MCH RBC QN AUTO: 28.3 PG (ref 27–33)
MCHC RBC AUTO-ENTMCNC: 33.4 G/DL (ref 33.7–35.3)
MCV RBC AUTO: 84.6 FL (ref 81.4–97.8)
MONOCYTES # BLD AUTO: 0.71 K/UL (ref 0–0.85)
MONOCYTES NFR BLD AUTO: 6.2 % (ref 0–13.4)
NEUTROPHILS # BLD AUTO: 6.72 K/UL (ref 1.82–7.42)
NEUTROPHILS NFR BLD: 58.8 % (ref 44–72)
NRBC # BLD AUTO: 0 K/UL
NRBC BLD-RTO: 0 /100 WBC
PLATELET # BLD AUTO: 335 K/UL (ref 164–446)
PMV BLD AUTO: 9.2 FL (ref 9–12.9)
POTASSIUM SERPL-SCNC: 3.8 MMOL/L (ref 3.6–5.5)
PROT SERPL-MCNC: 7.8 G/DL (ref 6–8.2)
RBC # BLD AUTO: 5.52 M/UL (ref 4.7–6.1)
SODIUM SERPL-SCNC: 141 MMOL/L (ref 135–145)
TROPONIN T SERPL-MCNC: 9 NG/L (ref 6–19)
WBC # BLD AUTO: 11.4 K/UL (ref 4.8–10.8)

## 2022-02-09 PROCEDURE — 36415 COLL VENOUS BLD VENIPUNCTURE: CPT

## 2022-02-09 PROCEDURE — 96375 TX/PRO/DX INJ NEW DRUG ADDON: CPT

## 2022-02-09 PROCEDURE — 700102 HCHG RX REV CODE 250 W/ 637 OVERRIDE(OP): Performed by: EMERGENCY MEDICINE

## 2022-02-09 PROCEDURE — 71045 X-RAY EXAM CHEST 1 VIEW: CPT

## 2022-02-09 PROCEDURE — 700111 HCHG RX REV CODE 636 W/ 250 OVERRIDE (IP)

## 2022-02-09 PROCEDURE — A9270 NON-COVERED ITEM OR SERVICE: HCPCS | Performed by: EMERGENCY MEDICINE

## 2022-02-09 RX ADMIN — LIDOCAINE HYDROCHLORIDE 30 ML: 20 SOLUTION OROPHARYNGEAL at 02:34

## 2022-02-09 RX ADMIN — ONDANSETRON 4 MG: 2 INJECTION INTRAMUSCULAR; INTRAVENOUS at 00:15

## 2022-02-09 ASSESSMENT — HEART SCORE
HEART SCORE: 1
HISTORY: SLIGHTLY SUSPICIOUS
RISK FACTORS: NO KNOWN RISK FACTORS
TROPONIN: LESS THAN OR EQUAL TO NORMAL LIMIT
AGE: 45-64
ECG: NORMAL

## 2022-02-09 NOTE — ED TRIAGE NOTES
"Chief Complaint   Patient presents with   • Abdominal Pain   • Chest Pain   • N/V   • Syncope       52 yo M to triage for above complaint. Pt BIB EMS from home. Pt reports he has been having severe generalized abdominal pain this morning, pt report had one episode of coffee ground emesis prior to EMS arriving. Pt reports to also have L sided chest pain, and reports it to also to have numbness and tingling. Pt reports breathing increases the pain. +Guarding. FSBS 102. Pt reports he drank a pint of whiskey to help with the pain. History of pancreatitis and reports GI issues but does not state which ones. He reports he does not drink regularly. RN started to complete EKG and patient had about 6-8 episodes of syncope. +Pulse and +airway every time however would awake confused. RN reoriented patient. Notified Charge RN. Pt placed in RD 06, ERP paged to bedside.    PIV in place, PTA EMS administered 250 xx of NS and 4mg of Zofran.    /96   Pulse (!) 117   Temp 36.7 °C (98 °F) (Temporal)   Resp 18   Ht 1.753 m (5' 9\")   Wt 86.2 kg (190 lb)   SpO2 93% Comment: Room air  BMI 28.06 kg/m²   "

## 2022-02-09 NOTE — ED NOTES
Discharge instructions provided, pt verbalizes understanding and has no questions. Vital signs stable, PIV removed. pt ambulated out of ER with steady gate to ER lobby.

## 2022-02-09 NOTE — ED PROVIDER NOTES
ED Provider Note    CHIEF COMPLAINT  Chief Complaint   Patient presents with   • Abdominal Pain   • Chest Pain   • N/V   • Syncope       HPI  Donal Carpio is a 51 y.o. male who presents with abdominal pain.  The patient states the pain started earlier in the day.  He states he did drink a significant alcohol secondary to the pain.  The patient was taken emergently back to the room after he had multiple syncopal episodes in triage.  The patient does have active emesis on exam.  He states he also has some left-sided chest pain.  He denies change in bowel and bladder function.  He does appear significant intoxicated and is difficult interview.    REVIEW OF SYSTEMS  See HPI for further details. All other systems are negative.     PAST MEDICAL HISTORY  Past Medical History:   Diagnosis Date   • Peptic ulcer    • Ulcer of abdomen wall (HCC)        FAMILY HISTORY  [unfilled]    SOCIAL HISTORY  Social History     Socioeconomic History   • Marital status: Single     Spouse name: Not on file   • Number of children: Not on file   • Years of education: Not on file   • Highest education level: Not on file   Occupational History   • Not on file   Tobacco Use   • Smoking status: Never Smoker   • Smokeless tobacco: Never Used   Vaping Use   • Vaping Use: Every day   Substance and Sexual Activity   • Alcohol use: Yes     Comment: pint of whiskey   • Drug use: Yes     Types: Inhaled     Comment: marijuana   • Sexual activity: Not on file   Other Topics Concern   • Not on file   Social History Narrative   • Not on file     Social Determinants of Health     Financial Resource Strain:    • Difficulty of Paying Living Expenses: Not on file   Food Insecurity:    • Worried About Running Out of Food in the Last Year: Not on file   • Ran Out of Food in the Last Year: Not on file   Transportation Needs:    • Lack of Transportation (Medical): Not on file   • Lack of Transportation (Non-Medical): Not on file   Physical Activity:    • Days of  "Exercise per Week: Not on file   • Minutes of Exercise per Session: Not on file   Stress:    • Feeling of Stress : Not on file   Social Connections:    • Frequency of Communication with Friends and Family: Not on file   • Frequency of Social Gatherings with Friends and Family: Not on file   • Attends Bahai Services: Not on file   • Active Member of Clubs or Organizations: Not on file   • Attends Club or Organization Meetings: Not on file   • Marital Status: Not on file   Intimate Partner Violence:    • Fear of Current or Ex-Partner: Not on file   • Emotionally Abused: Not on file   • Physically Abused: Not on file   • Sexually Abused: Not on file   Housing Stability:    • Unable to Pay for Housing in the Last Year: Not on file   • Number of Places Lived in the Last Year: Not on file   • Unstable Housing in the Last Year: Not on file       SURGICAL HISTORY  Past Surgical History:   Procedure Laterality Date   • APPENDECTOMY         CURRENT MEDICATIONS  Home Medications     Reviewed by Amanda Franco R.N. (Registered Nurse) on 02/08/22 at 2329  Med List Status: Partial   Medication Last Dose Status   ondansetron (ZOFRAN ODT) 4 MG TABLET DISPERSIBLE  Active   promethazine (PHENERGAN) 25 MG Tab  Active   sucralfate (CARAFATE) 1 GM Tab  Active                ALLERGIES  Allergies   Allergen Reactions   • Penicillins Unspecified     unknown       PHYSICAL EXAM  VITAL SIGNS: /96   Pulse (!) 117   Temp 36.7 °C (98 °F) (Temporal)   Resp 18   Ht 1.753 m (5' 9\")   Wt 86.2 kg (190 lb)   SpO2 93% Comment: Room air  BMI 28.06 kg/m²       Constitutional: In acute distress, Non-toxic appearance.   HENT: Normocephalic, Atraumatic, Bilateral external ears normal, Oropharynx moist, No oral exudates, Nose normal.   Eyes: PERRLA, EOMI, Conjunctiva normal, No discharge.   Neck: Normal range of motion, No tenderness, Supple, No stridor.   Lymphatic: No lymphadenopathy noted.   Cardiovascular: Tachycardic heart rate, " Normal rhythm, No murmurs, No rubs, No gallops.   Thorax & Lungs: Normal breath sounds, No respiratory distress, No wheezing, No chest tenderness.   Abdomen: Bowel sounds normal, Soft, diffuse tenderness, No masses, No pulsatile masses.   Skin: Warm, Dry, No erythema, No rash.   Back: No tenderness, No CVA tenderness.   Extremities: Intact distal pulses, No edema, No tenderness, No cyanosis, No clubbing. .   Neurologic: Alert & oriented x 3, Normal motor function, Normal sensory function, No focal deficits noted.   Psychiatric: Affect normal, Judgment normal, Mood normal.     Results for orders placed or performed during the hospital encounter of 02/08/22   CBC with Differential   Result Value Ref Range    WBC 11.4 (H) 4.8 - 10.8 K/uL    RBC 5.52 4.70 - 6.10 M/uL    Hemoglobin 15.6 14.0 - 18.0 g/dL    Hematocrit 46.7 42.0 - 52.0 %    MCV 84.6 81.4 - 97.8 fL    MCH 28.3 27.0 - 33.0 pg    MCHC 33.4 (L) 33.7 - 35.3 g/dL    RDW 47.0 35.9 - 50.0 fL    Platelet Count 335 164 - 446 K/uL    MPV 9.2 9.0 - 12.9 fL    Neutrophils-Polys 58.80 44.00 - 72.00 %    Lymphocytes 31.20 22.00 - 41.00 %    Monocytes 6.20 0.00 - 13.40 %    Eosinophils 2.40 0.00 - 6.90 %    Basophils 1.00 0.00 - 1.80 %    Immature Granulocytes 0.40 0.00 - 0.90 %    Nucleated RBC 0.00 /100 WBC    Neutrophils (Absolute) 6.72 1.82 - 7.42 K/uL    Lymphs (Absolute) 3.56 1.00 - 4.80 K/uL    Monos (Absolute) 0.71 0.00 - 0.85 K/uL    Eos (Absolute) 0.27 0.00 - 0.51 K/uL    Baso (Absolute) 0.11 0.00 - 0.12 K/uL    Immature Granulocytes (abs) 0.05 0.00 - 0.11 K/uL    NRBC (Absolute) 0.00 K/uL   Complete Metabolic Panel (CMP)   Result Value Ref Range    Sodium 141 135 - 145 mmol/L    Potassium 3.8 3.6 - 5.5 mmol/L    Chloride 106 96 - 112 mmol/L    Co2 17 (L) 20 - 33 mmol/L    Anion Gap 18.0 (H) 7.0 - 16.0    Glucose 93 65 - 99 mg/dL    Bun 12 8 - 22 mg/dL    Creatinine 0.73 0.50 - 1.40 mg/dL    Calcium 8.3 (L) 8.5 - 10.5 mg/dL    AST(SGOT) 40 12 - 45 U/L     ALT(SGPT) 38 2 - 50 U/L    Alkaline Phosphatase 86 30 - 99 U/L    Total Bilirubin 0.2 0.1 - 1.5 mg/dL    Albumin 4.1 3.2 - 4.9 g/dL    Total Protein 7.8 6.0 - 8.2 g/dL    Globulin 3.7 (H) 1.9 - 3.5 g/dL    A-G Ratio 1.1 g/dL   Troponin   Result Value Ref Range    Troponin T 9 6 - 19 ng/L   DIAGNOSTIC ALCOHOL   Result Value Ref Range    Diagnostic Alcohol 263.9 (H) 0.0 - 10.0 mg/dL   LIPASE   Result Value Ref Range    Lipase 44 11 - 82 U/L   ESTIMATED GFR   Result Value Ref Range    GFR If African American >60 >60 mL/min/1.73 m 2    GFR If Non African American >60 >60 mL/min/1.73 m 2   EKG   Result Value Ref Range    Report       [facility] Emergency Dept.    Test Date:  2022  Pt Name:    JERICHO ALBA                  Department: ER  MRN:        [id number]                      Room:  Gender:     Male                         Technician: [id number]  :        1970                   Requested By:ER TRIAGE PROTOCOL  Order #:    [id number]                    Reading MD:    Measurements  Intervals                                Axis  Rate:       119                          P:          19  MO:         110                          QRS:        2  QRSD:       80                           T:          -5  QT:         316  QTc:        445    Interpretive Statements  SINUS TACHYCARDIA  PROBABLE LEFT ATRIAL ABNORMALITY  BORDERLINE R WAVE PROGRESSION, ANTERIOR LEADS  NONSPECIFIC T ABNORMALITIES, INFERIOR LEADS  ARTIFACT IN LEAD(S) I,II,aVR,aVL,aVF,V1,V4  Compared to ECG 10/14/2021 11:35:29  T-wave abnormality now present  Myocardial infarct finding no longer present     EKG   Result Value Ref Range    Report       [facility] Emergency Dept.    Test Date:  2022  Pt Name:    JERICHO ALBA                  Department: ER  MRN:        [id number]                      Room:        06  Gender:     Male                         Technician: [id number]  :        1970                    Requested By:PENNIE CARTER  Order #:    [id number]                    Reading MD: PENNIE CARTER MD    Measurements  Intervals                                Axis  Rate:       116                          P:          44  CT:         136                          QRS:        -3  QRSD:       90                           T:          4  QT:         336  QTc:        467    Interpretive Statements  Twelve-lead EKG shows a sinus tachycardia with a ventricular rate of 116, QRS  has poor R wave progression, no ST segment elevation or depression overall no  dynamic change from prior  Electronically Signed On 2-9-2022 2:24:11 PST by PENNIE CARTER MD       HEART Score: 1    RADIOLOGY/PROCEDURES  DX-CHEST-PORTABLE (1 VIEW)   Final Result         1.  No acute cardiopulmonary disease.            COURSE & MEDICAL DECISION MAKING  Pertinent Labs & Imaging studies reviewed. (See chart for details)  This a 51-year-old male who was brought back emergently after multiple recurrent syncopal episodes.  On arrival to bedside on open the patient's eyes he did attempt to swing out at us.  He admitted to alcohol use and clinically appears intoxicated.  His vital signs were stable on the monitor and is not suspect he was truly having syncopal episodes.  I suspect there is a psychogenic component.  He is been watched for a prolonged period of time.  EKG does not show any significant arrhythmic change.  He is little tachycardic most likely from his alcohol abuse.  Labs do not show any evidence of an electrolyte imbalance that could cause cardiac irritability.  As for his chest pain EKG was repeated after the initial 1 and needed EKG shows any dynamic change consistent with cardiac ischemia.  The only significant laboratory abnormality is his alcohol level which is significantly elevated.  The patient will be observed until he can walk safely at which time he will be discharged with clear instructions to follow-up with his primary care  doctor for routine health maintenance.  He is instructed refrain from further alcohol abuse.  As for the abdominal pain the labs do not support pancreatitis nor cholecystitis.  His abdomen overall is nonsurgical.  I suspect his pain could be from gastritis.    FINAL IMPRESSION  1.  Alcohol abuse and intoxication  2.  Abdominal pain possible gastritis  3.  Chest pain nonspecific    Disposition  The patient will be discharged in stable condition         Electronically signed by: Braden Bae M.D., 2/8/2022 11:45 PM

## 2022-02-10 ENCOUNTER — HOSPITAL ENCOUNTER (EMERGENCY)
Facility: MEDICAL CENTER | Age: 52
End: 2022-02-10
Payer: COMMERCIAL

## 2022-02-10 VITALS
RESPIRATION RATE: 18 BRPM | DIASTOLIC BLOOD PRESSURE: 119 MMHG | TEMPERATURE: 98.7 F | HEIGHT: 69 IN | HEART RATE: 116 BPM | SYSTOLIC BLOOD PRESSURE: 163 MMHG | BODY MASS INDEX: 28.14 KG/M2 | WEIGHT: 190 LBS | OXYGEN SATURATION: 96 %

## 2022-02-10 LAB
ALBUMIN SERPL BCP-MCNC: 4.4 G/DL (ref 3.2–4.9)
ALBUMIN/GLOB SERPL: 1.4 G/DL
ALP SERPL-CCNC: 91 U/L (ref 30–99)
ALT SERPL-CCNC: 37 U/L (ref 2–50)
ANION GAP SERPL CALC-SCNC: 19 MMOL/L (ref 7–16)
AST SERPL-CCNC: 45 U/L (ref 12–45)
BASOPHILS # BLD AUTO: 1.2 % (ref 0–1.8)
BASOPHILS # BLD: 0.12 K/UL (ref 0–0.12)
BILIRUB SERPL-MCNC: 0.3 MG/DL (ref 0.1–1.5)
BUN SERPL-MCNC: 12 MG/DL (ref 8–22)
CALCIUM SERPL-MCNC: 8.4 MG/DL (ref 8.5–10.5)
CHLORIDE SERPL-SCNC: 103 MMOL/L (ref 96–112)
CO2 SERPL-SCNC: 19 MMOL/L (ref 20–33)
CREAT SERPL-MCNC: 0.73 MG/DL (ref 0.5–1.4)
EKG IMPRESSION: NORMAL
EOSINOPHIL # BLD AUTO: 0.23 K/UL (ref 0–0.51)
EOSINOPHIL NFR BLD: 2.3 % (ref 0–6.9)
ERYTHROCYTE [DISTWIDTH] IN BLOOD BY AUTOMATED COUNT: 46.2 FL (ref 35.9–50)
GLOBULIN SER CALC-MCNC: 3.2 G/DL (ref 1.9–3.5)
GLUCOSE SERPL-MCNC: 98 MG/DL (ref 65–99)
HCT VFR BLD AUTO: 45.7 % (ref 42–52)
HGB BLD-MCNC: 15.6 G/DL (ref 14–18)
IMM GRANULOCYTES # BLD AUTO: 0.03 K/UL (ref 0–0.11)
IMM GRANULOCYTES NFR BLD AUTO: 0.3 % (ref 0–0.9)
LIPASE SERPL-CCNC: 34 U/L (ref 11–82)
LYMPHOCYTES # BLD AUTO: 2.29 K/UL (ref 1–4.8)
LYMPHOCYTES NFR BLD: 23.1 % (ref 22–41)
MCH RBC QN AUTO: 28.5 PG (ref 27–33)
MCHC RBC AUTO-ENTMCNC: 34.1 G/DL (ref 33.7–35.3)
MCV RBC AUTO: 83.4 FL (ref 81.4–97.8)
MONOCYTES # BLD AUTO: 0.63 K/UL (ref 0–0.85)
MONOCYTES NFR BLD AUTO: 6.4 % (ref 0–13.4)
NEUTROPHILS # BLD AUTO: 6.61 K/UL (ref 1.82–7.42)
NEUTROPHILS NFR BLD: 66.7 % (ref 44–72)
NRBC # BLD AUTO: 0 K/UL
NRBC BLD-RTO: 0 /100 WBC
PLATELET # BLD AUTO: 287 K/UL (ref 164–446)
PMV BLD AUTO: 8.6 FL (ref 9–12.9)
POTASSIUM SERPL-SCNC: 3.7 MMOL/L (ref 3.6–5.5)
PROT SERPL-MCNC: 7.6 G/DL (ref 6–8.2)
RBC # BLD AUTO: 5.48 M/UL (ref 4.7–6.1)
SODIUM SERPL-SCNC: 141 MMOL/L (ref 135–145)
TROPONIN T SERPL-MCNC: 8 NG/L (ref 6–19)
WBC # BLD AUTO: 9.9 K/UL (ref 4.8–10.8)

## 2022-02-10 PROCEDURE — 83690 ASSAY OF LIPASE: CPT

## 2022-02-10 PROCEDURE — 93005 ELECTROCARDIOGRAM TRACING: CPT

## 2022-02-10 PROCEDURE — 84484 ASSAY OF TROPONIN QUANT: CPT

## 2022-02-10 PROCEDURE — 80053 COMPREHEN METABOLIC PANEL: CPT

## 2022-02-10 PROCEDURE — 302449 STATCHG TRIAGE ONLY (STATISTIC)

## 2022-02-10 PROCEDURE — 85025 COMPLETE CBC W/AUTO DIFF WBC: CPT

## 2022-02-10 ASSESSMENT — FIBROSIS 4 INDEX: FIB4 SCORE: 0.95

## 2022-02-10 NOTE — ED NOTES
"Patient screaming \"aspirin\". Educated patient on the importance of not taking any medications or food or water prior to ERP evaluation, verbalized understanding   "

## 2022-02-10 NOTE — ED TRIAGE NOTES
"Chief Complaint   Patient presents with   • Abdominal Pain     Pt complains of chest and ab pain starting today after drinking a pint of whiskey. Pt seen yesterday in ER and dx with ulcers. Pt has not picked up his rx from yesterday. Pt states \"I don't really drink\". Given zofran by EMS       EKG completed in triage    Pt moaning and groaning in triage. Pt stopped answering triage questions to answer his phone call, educated patient on importance of completing triage. Patient verbalized understanding and hung up phone.      Pt amb to triage with steady gait for above complaint.   Pt is alert and oriented, speaking in full sentences, follows commands and responds appropriately to questions. Resp are even and unlabored. No obvious acute distress.    Pt placed in lobby. Pt educated on triage process. Pt encouraged to alert staff for any changes.    Vitals:    02/10/22 0401   BP: (!) 163/119   Pulse: (!) 116   Resp: 18   Temp: 37.1 °C (98.7 °F)   SpO2: 96%       "

## 2022-02-10 NOTE — ED NOTES
Patient leaving AMA, wouldn't answer questions about why he is leaving. Patient refused to sign AMA paperwork

## 2022-02-17 LAB — EKG IMPRESSION: NORMAL

## 2022-02-17 PROCEDURE — 93010 ELECTROCARDIOGRAM REPORT: CPT | Performed by: INTERNAL MEDICINE

## 2022-05-20 ENCOUNTER — HOSPITAL ENCOUNTER (EMERGENCY)
Facility: MEDICAL CENTER | Age: 52
End: 2022-05-20
Attending: EMERGENCY MEDICINE
Payer: COMMERCIAL

## 2022-05-20 VITALS
DIASTOLIC BLOOD PRESSURE: 82 MMHG | HEART RATE: 112 BPM | WEIGHT: 190 LBS | HEIGHT: 69 IN | BODY MASS INDEX: 28.14 KG/M2 | RESPIRATION RATE: 18 BRPM | SYSTOLIC BLOOD PRESSURE: 150 MMHG | OXYGEN SATURATION: 93 % | TEMPERATURE: 98 F

## 2022-05-20 DIAGNOSIS — F10.930 ALCOHOL WITHDRAWAL SYNDROME WITHOUT COMPLICATION (HCC): ICD-10-CM

## 2022-05-20 LAB
ALBUMIN SERPL BCP-MCNC: 3.9 G/DL (ref 3.2–4.9)
ALBUMIN/GLOB SERPL: 1.1 G/DL
ALP SERPL-CCNC: 112 U/L (ref 30–99)
ALT SERPL-CCNC: 71 U/L (ref 2–50)
ANION GAP SERPL CALC-SCNC: 19 MMOL/L (ref 7–16)
AST SERPL-CCNC: 55 U/L (ref 12–45)
BASOPHILS # BLD AUTO: 0.9 % (ref 0–1.8)
BASOPHILS # BLD: 0.07 K/UL (ref 0–0.12)
BILIRUB SERPL-MCNC: 0.2 MG/DL (ref 0.1–1.5)
BUN SERPL-MCNC: 14 MG/DL (ref 8–22)
CALCIUM SERPL-MCNC: 9 MG/DL (ref 8.5–10.5)
CHLORIDE SERPL-SCNC: 107 MMOL/L (ref 96–112)
CO2 SERPL-SCNC: 14 MMOL/L (ref 20–33)
CREAT SERPL-MCNC: 0.77 MG/DL (ref 0.5–1.4)
EKG IMPRESSION: NORMAL
EOSINOPHIL # BLD AUTO: 0.06 K/UL (ref 0–0.51)
EOSINOPHIL NFR BLD: 0.8 % (ref 0–6.9)
ERYTHROCYTE [DISTWIDTH] IN BLOOD BY AUTOMATED COUNT: 45.1 FL (ref 35.9–50)
ETHANOL BLD-MCNC: 30 MG/DL
GFR SERPLBLD CREATININE-BSD FMLA CKD-EPI: 109 ML/MIN/1.73 M 2
GLOBULIN SER CALC-MCNC: 3.6 G/DL (ref 1.9–3.5)
GLUCOSE SERPL-MCNC: 158 MG/DL (ref 65–99)
HCT VFR BLD AUTO: 43.1 % (ref 42–52)
HGB BLD-MCNC: 14.7 G/DL (ref 14–18)
IMM GRANULOCYTES # BLD AUTO: 0.03 K/UL (ref 0–0.11)
IMM GRANULOCYTES NFR BLD AUTO: 0.4 % (ref 0–0.9)
LIPASE SERPL-CCNC: 30 U/L (ref 11–82)
LYMPHOCYTES # BLD AUTO: 1.85 K/UL (ref 1–4.8)
LYMPHOCYTES NFR BLD: 24.9 % (ref 22–41)
MAGNESIUM SERPL-MCNC: 2.1 MG/DL (ref 1.5–2.5)
MCH RBC QN AUTO: 27.6 PG (ref 27–33)
MCHC RBC AUTO-ENTMCNC: 34.1 G/DL (ref 33.7–35.3)
MCV RBC AUTO: 81 FL (ref 81.4–97.8)
MONOCYTES # BLD AUTO: 0.85 K/UL (ref 0–0.85)
MONOCYTES NFR BLD AUTO: 11.4 % (ref 0–13.4)
NEUTROPHILS # BLD AUTO: 4.58 K/UL (ref 1.82–7.42)
NEUTROPHILS NFR BLD: 61.6 % (ref 44–72)
NRBC # BLD AUTO: 0 K/UL
NRBC BLD-RTO: 0 /100 WBC
PLATELET # BLD AUTO: 360 K/UL (ref 164–446)
PMV BLD AUTO: 8.3 FL (ref 9–12.9)
POTASSIUM SERPL-SCNC: 3.7 MMOL/L (ref 3.6–5.5)
PROT SERPL-MCNC: 7.5 G/DL (ref 6–8.2)
RBC # BLD AUTO: 5.32 M/UL (ref 4.7–6.1)
SODIUM SERPL-SCNC: 140 MMOL/L (ref 135–145)
WBC # BLD AUTO: 7.4 K/UL (ref 4.8–10.8)

## 2022-05-20 PROCEDURE — 96376 TX/PRO/DX INJ SAME DRUG ADON: CPT

## 2022-05-20 PROCEDURE — 96365 THER/PROPH/DIAG IV INF INIT: CPT

## 2022-05-20 PROCEDURE — 96366 THER/PROPH/DIAG IV INF ADDON: CPT

## 2022-05-20 PROCEDURE — A9270 NON-COVERED ITEM OR SERVICE: HCPCS | Performed by: EMERGENCY MEDICINE

## 2022-05-20 PROCEDURE — 93005 ELECTROCARDIOGRAM TRACING: CPT | Performed by: EMERGENCY MEDICINE

## 2022-05-20 PROCEDURE — 85025 COMPLETE CBC W/AUTO DIFF WBC: CPT

## 2022-05-20 PROCEDURE — 700102 HCHG RX REV CODE 250 W/ 637 OVERRIDE(OP): Performed by: EMERGENCY MEDICINE

## 2022-05-20 PROCEDURE — 99285 EMERGENCY DEPT VISIT HI MDM: CPT

## 2022-05-20 PROCEDURE — 700101 HCHG RX REV CODE 250: Performed by: EMERGENCY MEDICINE

## 2022-05-20 PROCEDURE — 83735 ASSAY OF MAGNESIUM: CPT

## 2022-05-20 PROCEDURE — 36415 COLL VENOUS BLD VENIPUNCTURE: CPT

## 2022-05-20 PROCEDURE — 700111 HCHG RX REV CODE 636 W/ 250 OVERRIDE (IP): Performed by: EMERGENCY MEDICINE

## 2022-05-20 PROCEDURE — 83690 ASSAY OF LIPASE: CPT

## 2022-05-20 PROCEDURE — 82077 ASSAY SPEC XCP UR&BREATH IA: CPT

## 2022-05-20 PROCEDURE — 96375 TX/PRO/DX INJ NEW DRUG ADDON: CPT

## 2022-05-20 PROCEDURE — 80053 COMPREHEN METABOLIC PANEL: CPT

## 2022-05-20 RX ORDER — ONDANSETRON 2 MG/ML
4 INJECTION INTRAMUSCULAR; INTRAVENOUS ONCE
Status: COMPLETED | OUTPATIENT
Start: 2022-05-20 | End: 2022-05-20

## 2022-05-20 RX ORDER — LORAZEPAM 2 MG/ML
2 INJECTION INTRAMUSCULAR
Status: DISCONTINUED | OUTPATIENT
Start: 2022-05-20 | End: 2022-05-20 | Stop reason: HOSPADM

## 2022-05-20 RX ORDER — CHLORDIAZEPOXIDE HYDROCHLORIDE 25 MG/1
50 CAPSULE, GELATIN COATED ORAL ONCE
Status: COMPLETED | OUTPATIENT
Start: 2022-05-20 | End: 2022-05-20

## 2022-05-20 RX ORDER — KETOROLAC TROMETHAMINE 30 MG/ML
15 INJECTION, SOLUTION INTRAMUSCULAR; INTRAVENOUS ONCE
Status: COMPLETED | OUTPATIENT
Start: 2022-05-20 | End: 2022-05-20

## 2022-05-20 RX ADMIN — CHLORDIAZEPOXIDE HYDROCHLORIDE 50 MG: 25 CAPSULE ORAL at 18:33

## 2022-05-20 RX ADMIN — KETOROLAC TROMETHAMINE 15 MG: 30 INJECTION, SOLUTION INTRAMUSCULAR at 17:10

## 2022-05-20 RX ADMIN — THIAMINE HYDROCHLORIDE: 100 INJECTION, SOLUTION INTRAMUSCULAR; INTRAVENOUS at 16:15

## 2022-05-20 RX ADMIN — LORAZEPAM 2 MG: 2 INJECTION INTRAMUSCULAR; INTRAVENOUS at 17:28

## 2022-05-20 RX ADMIN — LORAZEPAM 2 MG: 2 INJECTION INTRAMUSCULAR; INTRAVENOUS at 16:16

## 2022-05-20 RX ADMIN — ONDANSETRON 4 MG: 2 INJECTION INTRAMUSCULAR; INTRAVENOUS at 16:16

## 2022-05-20 ASSESSMENT — LIFESTYLE VARIABLES
ORIENTATION AND CLOUDING OF SENSORIUM: ORIENTED AND CAN DO SERIAL ADDITIONS
TREMOR: NO TREMOR
ANXIETY: MODERATELY ANXIOUS OR GUARDED, SO ANXIETY IS INFERRED
HEADACHE, FULLNESS IN HEAD: MODERATE
PAROXYSMAL SWEATS: BARELY PERCEPTIBLE SWEATING. PALMS MOIST
AGITATION: *
AGITATION: MODERATELY FIDGETY AND RESTLESS
TOTAL SCORE: 9
VISUAL DISTURBANCES: NOT PRESENT
VISUAL DISTURBANCES: NOT PRESENT
ANXIETY: *
HEADACHE, FULLNESS IN HEAD: SEVERE
PAROXYSMAL SWEATS: NO SWEAT VISIBLE
AUDITORY DISTURBANCES: NOT PRESENT
TOTAL SCORE: 19
NAUSEA AND VOMITING: INTERMITTENT NAUSEA WITH DRY HEAVES
AUDITORY DISTURBANCES: NOT PRESENT
TREMOR: TREMOR NOT VISIBLE BUT CAN BE FELT, FINGERTIP TO FINGERTIP
ORIENTATION AND CLOUDING OF SENSORIUM: ORIENTED AND CAN DO SERIAL ADDITIONS
NAUSEA AND VOMITING: MILD NAUSEA WITH NO VOMITING

## 2022-05-20 ASSESSMENT — FIBROSIS 4 INDEX: FIB4 SCORE: 1.26

## 2022-05-20 NOTE — ED PROVIDER NOTES
ED Provider Note    CHIEF COMPLAINT  Chief Complaint   Patient presents with   • Seizure     Approx 1 hour ago pt was walking to take a shower, felt fine, then woke up on the ground, incontinent of urine, reports bleeding from mouth (no trauma noted). LOC approx 3 minutes, unwitnessed. Unsure if head strike - no trauma noted. Now c/o 7/10 generalized headache & nausea.  for REMSA on arrival, GCD 15, not post ictal.        HPI  Donal Carpio is a 51 y.o. male who presents for evaluation of likely seizure activity.  The patient was brought in by EMS.  He was walking to take a shower and then woke up on the ground.  He was incontinent of urine and had a small laceration to the tongue.  This was unwitnessed.  The patient was just recently hospitalized at [facility] for alcohol withdrawal and seizure activity.  He had extensive work-up there including CT scan of the head which was unremarkable.  Patient has a history of alcohol abuse.  He has had withdrawal seizures in the past.  He denies drinking any alcohol since he was discharged from [facility] 48 hours ago.  He reports agitation and dizziness mild headache.  Denies any fevers chills or productive cough.  No chest pain    REVIEW OF SYSTEMS  See HPI for further details.  No night sweats weight loss numbness tingling weakness rash all other systems are negative.     PAST MEDICAL HISTORY  Past Medical History:   Diagnosis Date   • Peptic ulcer    • Ulcer of abdomen wall (HCC)      History of alcoholism and alcohol withdrawal  FAMILY HISTORY  Noncontributory    SOCIAL HISTORY  Social History     Socioeconomic History   • Marital status: Single   Tobacco Use   • Smoking status: Never Smoker   • Smokeless tobacco: Never Used   Vaping Use   • Vaping Use: Every day   Substance and Sexual Activity   • Alcohol use: Yes     Comment: pint of whiskey   • Drug use: Yes     Types: Inhaled     Comment: marijuana   Extensive polysubstance use  "history    SURGICAL HISTORY  Past Surgical History:   Procedure Laterality Date   • APPENDECTOMY         CURRENT MEDICATIONS  Home Medications     Reviewed by Qiana Cline R.N. (Registered Nurse) on 05/20/22 at 1600  Med List Status: Not Addressed   Medication Last Dose Status   ondansetron (ZOFRAN ODT) 4 MG TABLET DISPERSIBLE  Active   promethazine (PHENERGAN) 25 MG Tab  Active   sucralfate (CARAFATE) 1 GM Tab  Active                ALLERGIES  Allergies   Allergen Reactions   • Penicillins Unspecified     unknown       PHYSICAL EXAM  VITAL SIGNS: BP (!) 141/80   Pulse (!) 110   Temp 37.3 °C (99.1 °F) (Temporal)   Resp (!) 25   Ht 1.753 m (5' 9\")   Wt 86.2 kg (190 lb)   SpO2 93%   BMI 28.06 kg/m²       Constitutional: Well developed, Well nourished, No acute distress, Non-toxic appearance.   HENT: Normocephalic, no hematomas noted no hemotympanum negative white sign atraumatic, Bilateral external ears normal, Oropharynx moist, No oral exudates, Nose normal.   Eyes: PERRLA, EOMI, Conjunctiva normal, No discharge.   Neck: Normal range of motion, No tenderness, Supple, No stridor.   Cardiovascular: Tachycardic normal rhythm, No murmurs, No rubs, No gallops.   Thorax & Lungs: Normal breath sounds, No respiratory distress, No wheezing, No chest tenderness.   Abdomen: Bowel sounds normal, Soft, No tenderness, No masses, No pulsatile masses.   Skin: Warm, Dry, No erythema, No rash.   Back: No tenderness, No CVA tenderness.   Extremities: Intact distal pulses, No edema, No tenderness, No cyanosis, No clubbing.   Neurologic: Tremulous no active seizures alert & oriented x 3, Normal motor function, Normal sensory function, No focal deficits noted.   Psychiatric: Agitated    Results for orders placed or performed during the hospital encounter of 05/20/22   MAGNESIUM   Result Value Ref Range    Magnesium 2.1 1.5 - 2.5 mg/dL   DIAGNOSTIC ALCOHOL   Result Value Ref Range    Diagnostic Alcohol 30.0 (H) <10.1 mg/dL "   CBC WITH DIFFERENTIAL   Result Value Ref Range    WBC 7.4 4.8 - 10.8 K/uL    RBC 5.32 4.70 - 6.10 M/uL    Hemoglobin 14.7 14.0 - 18.0 g/dL    Hematocrit 43.1 42.0 - 52.0 %    MCV 81.0 (L) 81.4 - 97.8 fL    MCH 27.6 27.0 - 33.0 pg    MCHC 34.1 33.7 - 35.3 g/dL    RDW 45.1 35.9 - 50.0 fL    Platelet Count 360 164 - 446 K/uL    MPV 8.3 (L) 9.0 - 12.9 fL    Neutrophils-Polys 61.60 44.00 - 72.00 %    Lymphocytes 24.90 22.00 - 41.00 %    Monocytes 11.40 0.00 - 13.40 %    Eosinophils 0.80 0.00 - 6.90 %    Basophils 0.90 0.00 - 1.80 %    Immature Granulocytes 0.40 0.00 - 0.90 %    Nucleated RBC 0.00 /100 WBC    Neutrophils (Absolute) 4.58 1.82 - 7.42 K/uL    Lymphs (Absolute) 1.85 1.00 - 4.80 K/uL    Monos (Absolute) 0.85 0.00 - 0.85 K/uL    Eos (Absolute) 0.06 0.00 - 0.51 K/uL    Baso (Absolute) 0.07 0.00 - 0.12 K/uL    Immature Granulocytes (abs) 0.03 0.00 - 0.11 K/uL    NRBC (Absolute) 0.00 K/uL   Comp Metabolic Panel   Result Value Ref Range    Sodium 140 135 - 145 mmol/L    Potassium 3.7 3.6 - 5.5 mmol/L    Chloride 107 96 - 112 mmol/L    Co2 14 (L) 20 - 33 mmol/L    Anion Gap 19.0 (H) 7.0 - 16.0    Glucose 158 (H) 65 - 99 mg/dL    Bun 14 8 - 22 mg/dL    Creatinine 0.77 0.50 - 1.40 mg/dL    Calcium 9.0 8.5 - 10.5 mg/dL    AST(SGOT) 55 (H) 12 - 45 U/L    ALT(SGPT) 71 (H) 2 - 50 U/L    Alkaline Phosphatase 112 (H) 30 - 99 U/L    Total Bilirubin 0.2 0.1 - 1.5 mg/dL    Albumin 3.9 3.2 - 4.9 g/dL    Total Protein 7.5 6.0 - 8.2 g/dL    Globulin 3.6 (H) 1.9 - 3.5 g/dL    A-G Ratio 1.1 g/dL   LIPASE   Result Value Ref Range    Lipase 30 11 - 82 U/L   ESTIMATED GFR   Result Value Ref Range    GFR (CKD-EPI) 109 >60 mL/min/1.73 m 2   EKG   Result Value Ref Range    Report       [facility] Emergency Dept.    Test Date:  2022-05-20  Pt Name:    JERICHO ALBA                  Department: ER  MRN:        [id number]                      Room:        03  Gender:     Male                         Technician:  [id number]  :        1970                   Requested By:ER TRIAGE PROTOCOL  Order #:    [id number]                    Reading MD:    Measurements  Intervals                                Axis  Rate:       131                          P:          8  OR:         134                          QRS:        70  QRSD:       79                           T:          46  QT:         303  QTc:        448    Interpretive Statements  Sinus tachycardia  Probable left atrial enlargement  Compared to ECG 2022 12:44:38  Sinus rhythm no longer present           COURSE & MEDICAL DECISION MAKING  Pertinent Labs & Imaging studies reviewed. (See chart for details)  An IV was established.  The patient presents here after likely alcohol withdrawal seizure.  He has a extensive history of this.  He was recently hospitalized at [facility] not extensive work-up including CT angiograms of the head and MRI CT scan of the head and will blood work.  He reports that he did not drink any alcohol since leaving [place] but he has a small amount of alcohol in his system here.  I did address this with the patient and he continued to deny alcohol use.  He was given a detox bag as well as Ativan and some oral Librium.  Heart rate came down.  He was observed for several hours and did not have any significant tremulous notes or repeated seizures.  His laboratory studies were reassuring.  He had bedside resources for alcohol detox programs.  He would like to go home to assess some of his affairs and insist that he will partake in alcohol cessation programs.  I think it is reasonable to discharge the patient.  He was given oral Librium as well which should mitigate withdrawal seizures for the next day or so.  Return precautions were reviewed    FINAL IMPRESSION  1.  Chronic alcoholism  2.  Alcohol withdrawal seizure         Electronically signed by: Donal Clay M.D., 2022 4:22 PM

## 2022-05-20 NOTE — ED TRIAGE NOTES
Donal Carpio  51 y.o.  male  Chief Complaint   Patient presents with   • Seizure     Approx 1 hour ago pt was walking to take a shower, felt fine, then woke up on the ground, incontinent of urine, reports bleeding from mouth (no trauma noted). LOC approx 3 minutes, unwitnessed. Unsure if head strike - no trauma noted. Now c/o 7/10 generalized headache & nausea.  for REMSA on arrival, GCD 15, not post ictal.        Hx seizure 1 year ago, not on anticonvulsant meds. Pt trying to wean off of ETOH, last drink 2 days ago, denies withdrawal symptoms prior to fall today. Drinks approx 1/2 pint vodka 3x/week.  with REMSA.

## 2022-05-31 ENCOUNTER — APPOINTMENT (OUTPATIENT)
Dept: RADIOLOGY | Facility: MEDICAL CENTER | Age: 52
End: 2022-05-31
Attending: EMERGENCY MEDICINE
Payer: COMMERCIAL

## 2022-05-31 ENCOUNTER — HOSPITAL ENCOUNTER (EMERGENCY)
Facility: MEDICAL CENTER | Age: 52
End: 2022-05-31
Attending: EMERGENCY MEDICINE
Payer: COMMERCIAL

## 2022-05-31 VITALS
RESPIRATION RATE: 16 BRPM | TEMPERATURE: 97.1 F | HEART RATE: 93 BPM | DIASTOLIC BLOOD PRESSURE: 98 MMHG | OXYGEN SATURATION: 94 % | HEIGHT: 69 IN | BODY MASS INDEX: 28.14 KG/M2 | WEIGHT: 190 LBS | SYSTOLIC BLOOD PRESSURE: 173 MMHG

## 2022-05-31 DIAGNOSIS — R56.9 SEIZURE (HCC): ICD-10-CM

## 2022-05-31 LAB
ALBUMIN SERPL BCP-MCNC: 4 G/DL (ref 3.2–4.9)
ALBUMIN/GLOB SERPL: 1.2 G/DL
ALP SERPL-CCNC: 120 U/L (ref 30–99)
ALT SERPL-CCNC: 129 U/L (ref 2–50)
AMPHET UR QL SCN: NEGATIVE
ANION GAP SERPL CALC-SCNC: 14 MMOL/L (ref 7–16)
APPEARANCE UR: CLEAR
AST SERPL-CCNC: 116 U/L (ref 12–45)
BACTERIA #/AREA URNS HPF: NEGATIVE /HPF
BARBITURATES UR QL SCN: NEGATIVE
BASOPHILS # BLD AUTO: 1.3 % (ref 0–1.8)
BASOPHILS # BLD: 0.13 K/UL (ref 0–0.12)
BENZODIAZ UR QL SCN: POSITIVE
BILIRUB SERPL-MCNC: 0.3 MG/DL (ref 0.1–1.5)
BILIRUB UR QL STRIP.AUTO: NEGATIVE
BUN SERPL-MCNC: 10 MG/DL (ref 8–22)
BZE UR QL SCN: NEGATIVE
CALCIUM SERPL-MCNC: 9.4 MG/DL (ref 8.5–10.5)
CANNABINOIDS UR QL SCN: POSITIVE
CHLORIDE SERPL-SCNC: 108 MMOL/L (ref 96–112)
CK SERPL-CCNC: 48 U/L (ref 0–154)
CO2 SERPL-SCNC: 20 MMOL/L (ref 20–33)
COLOR UR: YELLOW
CREAT SERPL-MCNC: 0.74 MG/DL (ref 0.5–1.4)
EOSINOPHIL # BLD AUTO: 0.1 K/UL (ref 0–0.51)
EOSINOPHIL NFR BLD: 1 % (ref 0–6.9)
EPI CELLS #/AREA URNS HPF: NEGATIVE /HPF
ERYTHROCYTE [DISTWIDTH] IN BLOOD BY AUTOMATED COUNT: 48.9 FL (ref 35.9–50)
ETHANOL BLD-MCNC: <10.1 MG/DL
GFR SERPLBLD CREATININE-BSD FMLA CKD-EPI: 110 ML/MIN/1.73 M 2
GLOBULIN SER CALC-MCNC: 3.4 G/DL (ref 1.9–3.5)
GLUCOSE SERPL-MCNC: 116 MG/DL (ref 65–99)
GLUCOSE UR STRIP.AUTO-MCNC: NEGATIVE MG/DL
HCT VFR BLD AUTO: 42.8 % (ref 42–52)
HGB BLD-MCNC: 14.5 G/DL (ref 14–18)
HYALINE CASTS #/AREA URNS LPF: ABNORMAL /LPF
IMM GRANULOCYTES # BLD AUTO: 0.17 K/UL (ref 0–0.11)
IMM GRANULOCYTES NFR BLD AUTO: 1.7 % (ref 0–0.9)
KETONES UR STRIP.AUTO-MCNC: ABNORMAL MG/DL
LEUKOCYTE ESTERASE UR QL STRIP.AUTO: NEGATIVE
LYMPHOCYTES # BLD AUTO: 2.46 K/UL (ref 1–4.8)
LYMPHOCYTES NFR BLD: 24.1 % (ref 22–41)
MCH RBC QN AUTO: 28.1 PG (ref 27–33)
MCHC RBC AUTO-ENTMCNC: 33.9 G/DL (ref 33.7–35.3)
MCV RBC AUTO: 82.9 FL (ref 81.4–97.8)
METHADONE UR QL SCN: NEGATIVE
MICRO URNS: ABNORMAL
MONOCYTES # BLD AUTO: 1.12 K/UL (ref 0–0.85)
MONOCYTES NFR BLD AUTO: 11 % (ref 0–13.4)
NEUTROPHILS # BLD AUTO: 6.23 K/UL (ref 1.82–7.42)
NEUTROPHILS NFR BLD: 60.9 % (ref 44–72)
NITRITE UR QL STRIP.AUTO: NEGATIVE
NRBC # BLD AUTO: 0 K/UL
NRBC BLD-RTO: 0 /100 WBC
OPIATES UR QL SCN: NEGATIVE
OXYCODONE UR QL SCN: NEGATIVE
PCP UR QL SCN: NEGATIVE
PH UR STRIP.AUTO: 8.5 [PH] (ref 5–8)
PLATELET # BLD AUTO: 353 K/UL (ref 164–446)
PMV BLD AUTO: 8.8 FL (ref 9–12.9)
POTASSIUM SERPL-SCNC: 4.1 MMOL/L (ref 3.6–5.5)
PROPOXYPH UR QL SCN: NEGATIVE
PROT SERPL-MCNC: 7.4 G/DL (ref 6–8.2)
PROT UR QL STRIP: 100 MG/DL
RBC # BLD AUTO: 5.16 M/UL (ref 4.7–6.1)
RBC # URNS HPF: ABNORMAL /HPF
RBC UR QL AUTO: NEGATIVE
SODIUM SERPL-SCNC: 142 MMOL/L (ref 135–145)
SP GR UR STRIP.AUTO: 1.03
UROBILINOGEN UR STRIP.AUTO-MCNC: 1 MG/DL
WBC # BLD AUTO: 10.2 K/UL (ref 4.8–10.8)
WBC #/AREA URNS HPF: ABNORMAL /HPF

## 2022-05-31 PROCEDURE — 700111 HCHG RX REV CODE 636 W/ 250 OVERRIDE (IP): Performed by: EMERGENCY MEDICINE

## 2022-05-31 PROCEDURE — 70450 CT HEAD/BRAIN W/O DYE: CPT

## 2022-05-31 PROCEDURE — 82550 ASSAY OF CK (CPK): CPT

## 2022-05-31 PROCEDURE — 700105 HCHG RX REV CODE 258: Performed by: EMERGENCY MEDICINE

## 2022-05-31 PROCEDURE — 72100 X-RAY EXAM L-S SPINE 2/3 VWS: CPT

## 2022-05-31 PROCEDURE — 80053 COMPREHEN METABOLIC PANEL: CPT

## 2022-05-31 PROCEDURE — 96374 THER/PROPH/DIAG INJ IV PUSH: CPT

## 2022-05-31 PROCEDURE — 36415 COLL VENOUS BLD VENIPUNCTURE: CPT

## 2022-05-31 PROCEDURE — A9270 NON-COVERED ITEM OR SERVICE: HCPCS | Performed by: EMERGENCY MEDICINE

## 2022-05-31 PROCEDURE — 81001 URINALYSIS AUTO W/SCOPE: CPT

## 2022-05-31 PROCEDURE — 99285 EMERGENCY DEPT VISIT HI MDM: CPT

## 2022-05-31 PROCEDURE — 96375 TX/PRO/DX INJ NEW DRUG ADDON: CPT

## 2022-05-31 PROCEDURE — 82077 ASSAY SPEC XCP UR&BREATH IA: CPT

## 2022-05-31 PROCEDURE — 85025 COMPLETE CBC W/AUTO DIFF WBC: CPT

## 2022-05-31 PROCEDURE — 700102 HCHG RX REV CODE 250 W/ 637 OVERRIDE(OP): Performed by: EMERGENCY MEDICINE

## 2022-05-31 PROCEDURE — 80307 DRUG TEST PRSMV CHEM ANLYZR: CPT

## 2022-05-31 PROCEDURE — 72070 X-RAY EXAM THORAC SPINE 2VWS: CPT

## 2022-05-31 PROCEDURE — 96376 TX/PRO/DX INJ SAME DRUG ADON: CPT

## 2022-05-31 RX ORDER — SODIUM CHLORIDE, SODIUM LACTATE, POTASSIUM CHLORIDE, CALCIUM CHLORIDE 600; 310; 30; 20 MG/100ML; MG/100ML; MG/100ML; MG/100ML
1000 INJECTION, SOLUTION INTRAVENOUS ONCE
Status: COMPLETED | OUTPATIENT
Start: 2022-05-31 | End: 2022-05-31

## 2022-05-31 RX ORDER — ONDANSETRON 2 MG/ML
4 INJECTION INTRAMUSCULAR; INTRAVENOUS ONCE
Status: COMPLETED | OUTPATIENT
Start: 2022-05-31 | End: 2022-05-31

## 2022-05-31 RX ORDER — ACETAMINOPHEN 325 MG/1
650 TABLET ORAL ONCE
Status: COMPLETED | OUTPATIENT
Start: 2022-05-31 | End: 2022-05-31

## 2022-05-31 RX ORDER — LORAZEPAM 2 MG/ML
1 INJECTION INTRAMUSCULAR ONCE
Status: COMPLETED | OUTPATIENT
Start: 2022-05-31 | End: 2022-05-31

## 2022-05-31 RX ORDER — MULTIVITAMIN,THERAPEUTIC
1 TABLET ORAL DAILY
COMMUNITY
Start: 2022-05-19 | End: 2022-06-02

## 2022-05-31 RX ORDER — LEVETIRACETAM 500 MG/1
500 TABLET ORAL 2 TIMES DAILY
COMMUNITY
Start: 2022-05-23 | End: 2022-06-22

## 2022-05-31 RX ORDER — ESOMEPRAZOLE MAGNESIUM 20 MG/1
20 GRANULE, DELAYED RELEASE ORAL
Status: ON HOLD | COMMUNITY
End: 2022-06-30

## 2022-05-31 RX ORDER — HYDROCODONE BITARTRATE AND ACETAMINOPHEN 5; 325 MG/1; MG/1
1 TABLET ORAL ONCE
Status: COMPLETED | OUTPATIENT
Start: 2022-05-31 | End: 2022-05-31

## 2022-05-31 RX ORDER — SODIUM CHLORIDE, SODIUM LACTATE, POTASSIUM CHLORIDE, CALCIUM CHLORIDE 600; 310; 30; 20 MG/100ML; MG/100ML; MG/100ML; MG/100ML
1000 INJECTION, SOLUTION INTRAVENOUS ONCE
Status: DISCONTINUED | OUTPATIENT
Start: 2022-05-31 | End: 2022-05-31 | Stop reason: HOSPADM

## 2022-05-31 RX ADMIN — ONDANSETRON 4 MG: 2 INJECTION INTRAMUSCULAR; INTRAVENOUS at 11:36

## 2022-05-31 RX ADMIN — SODIUM CHLORIDE, POTASSIUM CHLORIDE, SODIUM LACTATE AND CALCIUM CHLORIDE 1000 ML: 600; 310; 30; 20 INJECTION, SOLUTION INTRAVENOUS at 11:13

## 2022-05-31 RX ADMIN — ACETAMINOPHEN 650 MG: 325 TABLET ORAL at 11:35

## 2022-05-31 RX ADMIN — LORAZEPAM 1 MG: 2 INJECTION INTRAMUSCULAR; INTRAVENOUS at 11:13

## 2022-05-31 RX ADMIN — HYDROCODONE BITARTRATE AND ACETAMINOPHEN 1 TABLET: 5; 325 TABLET ORAL at 13:18

## 2022-05-31 RX ADMIN — LORAZEPAM 1 MG: 2 INJECTION INTRAMUSCULAR; INTRAVENOUS at 13:18

## 2022-05-31 ASSESSMENT — FIBROSIS 4 INDEX: FIB4 SCORE: 0.91

## 2022-05-31 NOTE — ED PROVIDER NOTES
ED Provider Note    CHIEF COMPLAINT  Chief Complaint   Patient presents with   • Seizure     x 3 today, unwitnessed.  Pt reports taking Keppra as prescribed.  Pt reports in bed when seizures happened and he did not hit his head.   • Back Pain     Since 2nd seizure this morning.       HPI  Donal Carpio is a 52 y.o. male who presents with estimated 3 seizures today.  They were not witnessed by anyone else at his home.  He states that he lost consciousness and felt confused for a few minutes as he was coming to.    The patient states that he recently relocated from [place] about 6 months ago and is under [employer].    He states that he has never had alcohol withdrawal seizures.  That was in fact his diagnosis however upon recent visits to [facility] emergency department.  He was initially presenting for alcohol intoxication and alcohol withdrawal and he was thought to have possibly a withdrawal seizure.    He recently started Keppra a few weeks ago.    Upon review of the patient's medical record, this is the patient's eighth visit in a month.  He states that his seizure just started a few weeks ago when he had a traumatic blunt head trauma however upon review of the medical record, it appears that he has had a history of seizure documented back in October 2021 and has had several seizures this month thought to be related to alcohol withdrawal.  He has had multiple work-ups including MRI and multiple CTs of the head which have all been negative.  It appears that the patient had some unsteadiness in his gait throughout his multiple visits at [place].    Patient states that his last alcoholic drink was 1 beer on Friday.    Patient reports severe diffuse headache and severe diffuse back pain.  No focal numbness or weakness in the extremities at this time.    REVIEW OF SYSTEMS  See HPI for further details. All other systems are negative.     PAST MEDICAL HISTORY   has a past medical history of  "Peptic ulcer and Ulcer of abdomen wall (HCC).    SOCIAL HISTORY  Social History     Tobacco Use   • Smoking status: Never Smoker   • Smokeless tobacco: Never Used   Vaping Use   • Vaping Use: Every day   Substance and Sexual Activity   • Alcohol use: Yes     Comment: pint of whiskey   • Drug use: Yes     Types: Inhaled     Comment: marijuana   • Sexual activity: Not on file       SURGICAL HISTORY   has a past surgical history that includes appendectomy.    CURRENT MEDICATIONS  Home Medications     Reviewed by Haroldo Grimm (Pharmacy Tech) on 05/31/22 at 1125  Med List Status: Complete   Medication Last Dose Status   Esomeprazole Magnesium 20 MG Pack 5/31/2022 Active   levETIRAcetam (KEPPRA) 500 MG Tab 5/31/2022 Active   multivitamin (THERAPEUTIC MULTIVITAMIN) Tab 5/31/2022 Active                ALLERGIES  Allergies   Allergen Reactions   • Penicillins Unspecified     unknown       PHYSICAL EXAM  VITAL SIGNS: BP (!) 142/101   Pulse (!) 136   Temp 36.8 °C (98.2 °F) (Temporal)   Resp (!) 30   Ht 1.753 m (5' 9\")   Wt 86.2 kg (190 lb)   SpO2 98%   BMI 28.06 kg/m²   Pulse ox interpretation: I interpret this pulse ox as normal.  Constitutional: Alert in no apparent distress.  HENT: No signs of trauma, Bilateral external ears normal, Nose normal.   Eyes: Pupils are equal and reactive, Conjunctiva normal, Non-icteric.   Neck: Normal range of motion, No tenderness, Supple, No stridor.   Cardiovascular: Regular rate and rhythm.   Thorax & Lungs: Normal breath sounds, No respiratory distress, No wheezing  Abdomen: Bowel sounds normal, Soft, No tenderness, No masses, No pulsatile masses. No peritoneal signs.  Skin: Warm, Dry, No erythema, No rash.   Back: Diffuse nonfocal tenderness, No CVA tenderness.   Extremities: Intact distal pulses, No edema, No cyanosis  Musculoskeletal: Good range of motion in all major joints. No tenderness to palpation or major deformities noted.   Neurologic: Alert, Normal motor function, " Normal sensory function, No focal deficits noted.       DIAGNOSTIC STUDIES / PROCEDURES    EKG - Physician interpretation      LABS  Labs Reviewed   CBC WITH DIFFERENTIAL - Abnormal; Notable for the following components:       Result Value    MPV 8.8 (*)     Immature Granulocytes 1.70 (*)     Monos (Absolute) 1.12 (*)     Baso (Absolute) 0.13 (*)     Immature Granulocytes (abs) 0.17 (*)     All other components within normal limits   COMP METABOLIC PANEL - Abnormal; Notable for the following components:    Glucose 116 (*)     AST(SGOT) 116 (*)     ALT(SGPT) 129 (*)     Alkaline Phosphatase 120 (*)     All other components within normal limits   URINALYSIS - Abnormal; Notable for the following components:    Ph 8.5 (*)     Ketones Trace (*)     Protein 100 (*)     All other components within normal limits   URINE DRUG SCREEN - Abnormal; Notable for the following components:    Benzodiazepines Positive (*)     Cannabinoid Metab Positive (*)     All other components within normal limits   URINE MICROSCOPIC (W/UA) - Abnormal; Notable for the following components:    WBC 0-2 (*)     RBC 0-2 (*)     All other components within normal limits   CREATINE KINASE   DIAGNOSTIC ALCOHOL   ESTIMATED GFR         RADIOLOGY  DX-LUMBAR SPINE-2 OR 3 VIEWS   Final Result      1.  No acute traumatic injury of the lumbar spine. If there is concern for occult fracture, cross-sectional imaging can be obtained.   2.  Degenerative changes at T12-L1 and L5-S1.   3.  Catheter projects at the left mid abdomen on frontal view, which may be external to patient.      DX-THORACIC SPINE-2 VIEWS   Final Result      1.  Mild anterior wedging of T7, most likely chronic.   2.  No acute fracture is detected.   3.  Mild to moderate thoracic spondylosis.      CT-HEAD W/O   Final Result      No acute intracranial abnormality.               COURSE & MEDICAL DECISION MAKING    Medications   lactated ringers (LR) bolus (0 mL Intravenous Held 5/31/22 8325)    LORazepam (ATIVAN) injection 1 mg (1 mg Intravenous Given 5/31/22 1113)   lactated ringers (LR) bolus (0 mL Intravenous Stopped 5/31/22 1453)   ondansetron (ZOFRAN) syringe/vial injection 4 mg (4 mg Intravenous Given 5/31/22 1136)   acetaminophen (Tylenol) tablet 650 mg (650 mg Oral Given 5/31/22 1135)   LORazepam (ATIVAN) injection 1 mg (1 mg Intravenous Given 5/31/22 1318)   HYDROcodone-acetaminophen (NORCO) 5-325 MG per tablet 1 Tablet (1 Tablet Oral Given 5/31/22 1318)       Pertinent Labs & Imaging studies reviewed. (See chart for details)  52 y.o. male presenting with concerns for recurrent seizures.  Was recently diagnosed with alcohol withdrawal seizures.  He was also recently started on Keppra 500 twice daily over the past few weeks.  He has not followed by a neurologist as an outpatient.  Has had several visits to the emergency department in hospital over the past month.  This is his eighth.  This included hospitalization with MRI of the head and several CT images of the head without acute abnormalities identified.  Patient states that his last drink was on Friday.  Over this period of time he has also had some unsteadiness with his gait.  Thought to be related to deconditioning.  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    Laboratory studies were performed.  Largely unremarkable.  Does have slight elevation in liver enzymes consistent with chronic alcohol use.  Positive for benzodiazepines and cannabinoids.  No alcohol in his system.  Last alcoholic drink was about 4 days ago according to the patient.      Given the patient's headache and back pain, imaging was performed.  These were found to be unremarkable for any acute findings.  I have reviewed the results with the patient.  Recommending outpatient follow-up with a primary care physician.    I briefly consulted neurology as well by telephone.  No further recommendations given the history of alcohol withdrawal seizures and recent MRI imaging  "that was unremarkable.  Recommending outpatient follow-up with neurology as needed.  And to abstain from alcohol use.  No obvious signs of active alcohol withdrawal at this time.  No seizures here in the emergency department.    Patient was discharged by nursing staff.  He was able to transfer unassisted.    The patient was instructed to follow-up with primary care physician for further management.  To return immediately for any worsening symptoms or development of any other concerning signs or symptoms. The patient verbalizes understanding in their own words.    BP (!) 173/98   Pulse 93   Temp 36.2 °C (97.1 °F) (Temporal)   Resp 16   Ht 1.753 m (5' 9\")   Wt 86.2 kg (190 lb)   SpO2 94%   BMI 28.06 kg/m²     The patient was referred to primary care where they will receive further BP management.      [facility], Emergency Dept  [address] [place] [id number] [phone]    As needed, If symptoms worsen    Primary care doctor    Schedule an appointment as soon as possible for a visit         FINAL IMPRESSION  1. Seizure (HCC)            Electronically signed by: Leobardo Marin M.D., 5/31/2022 10:59 AM    "

## 2022-05-31 NOTE — ED NOTES
Reviewed discharge instructions, pt verbalized understanding of instructions and medication. States he will schedule follow-up appointment. Denies further questions at this time. Pt wheeled out to the front lobby to be driven home by family.

## 2022-05-31 NOTE — ED TRIAGE NOTES
"Donal Carpio 52 y.o. male bib EMS to triage via w/c for     Chief Complaint   Patient presents with   • Seizure     x 3 today, unwitnessed.  Pt reports taking Keppra as prescribed.  Pt reports in bed when seizures happened and he did not hit his head.   • Back Pain     Since 2nd seizure this morning.     Pt hyperventilating in triage, c/o tingling, dry heaving.  Pt educated on slow deep breathing.  PIV placed PTA and pt was given 1 gram tylenol, 600mg ibuprofen, and a heat pack by EMS.  EKG pta WNL.  Pt seen here 2 weeks ago after a fall and was told he had concussion, forehead sutures placed and pt states seizures started after the fall.  Pt reports he wants sober housing as well, states he hasn't drank since Friday.  Charge RN aware of patient.  BP (!) 142/101   Pulse (!) 136   Temp 36.8 °C (98.2 °F) (Temporal)   Resp (!) 30   Ht 1.753 m (5' 9\")   Wt 86.2 kg (190 lb)   SpO2 98%   BMI 28.06 kg/m²     "

## 2022-06-06 ENCOUNTER — HOSPITAL ENCOUNTER (EMERGENCY)
Facility: MEDICAL CENTER | Age: 52
End: 2022-06-06
Attending: EMERGENCY MEDICINE
Payer: COMMERCIAL

## 2022-06-06 ENCOUNTER — APPOINTMENT (OUTPATIENT)
Dept: RADIOLOGY | Facility: MEDICAL CENTER | Age: 52
End: 2022-06-06
Attending: EMERGENCY MEDICINE
Payer: COMMERCIAL

## 2022-06-06 ENCOUNTER — HOSPITAL ENCOUNTER (INPATIENT)
Facility: MEDICAL CENTER | Age: 52
LOS: 3 days | DRG: 897 | End: 2022-06-09
Attending: EMERGENCY MEDICINE | Admitting: INTERNAL MEDICINE
Payer: COMMERCIAL

## 2022-06-06 VITALS
HEIGHT: 69 IN | OXYGEN SATURATION: 97 % | DIASTOLIC BLOOD PRESSURE: 84 MMHG | HEART RATE: 102 BPM | BODY MASS INDEX: 28.14 KG/M2 | WEIGHT: 190 LBS | TEMPERATURE: 98.6 F | SYSTOLIC BLOOD PRESSURE: 145 MMHG | RESPIRATION RATE: 50 BRPM

## 2022-06-06 DIAGNOSIS — R56.9 SEIZURE (HCC): ICD-10-CM

## 2022-06-06 DIAGNOSIS — F10.930 ALCOHOL WITHDRAWAL SYNDROME WITHOUT COMPLICATION (HCC): ICD-10-CM

## 2022-06-06 DIAGNOSIS — F10.920 ALCOHOLIC INTOXICATION WITHOUT COMPLICATION (HCC): ICD-10-CM

## 2022-06-06 PROBLEM — R79.89 LFT ELEVATION: Status: ACTIVE | Noted: 2022-06-06

## 2022-06-06 PROBLEM — F10.931 ALCOHOL WITHDRAWAL DELIRIUM (HCC): Status: ACTIVE | Noted: 2022-06-06

## 2022-06-06 PROBLEM — E87.20 LACTIC ACIDOSIS: Status: ACTIVE | Noted: 2022-06-06

## 2022-06-06 LAB
ALBUMIN SERPL BCP-MCNC: 4 G/DL (ref 3.2–4.9)
ALBUMIN/GLOB SERPL: 1.1 G/DL
ALP SERPL-CCNC: 126 U/L (ref 30–99)
ALT SERPL-CCNC: 138 U/L (ref 2–50)
AMPHET UR QL SCN: NEGATIVE
ANION GAP SERPL CALC-SCNC: 17 MMOL/L (ref 7–16)
ANION GAP SERPL CALC-SCNC: 19 MMOL/L (ref 7–16)
APPEARANCE UR: CLEAR
AST SERPL-CCNC: 157 U/L (ref 12–45)
BARBITURATES UR QL SCN: NEGATIVE
BASOPHILS # BLD AUTO: 0.6 % (ref 0–1.8)
BASOPHILS # BLD AUTO: 1.2 % (ref 0–1.8)
BASOPHILS # BLD: 0.06 K/UL (ref 0–0.12)
BASOPHILS # BLD: 0.12 K/UL (ref 0–0.12)
BENZODIAZ UR QL SCN: NEGATIVE
BILIRUB SERPL-MCNC: 0.3 MG/DL (ref 0.1–1.5)
BILIRUB UR QL STRIP.AUTO: NEGATIVE
BUN SERPL-MCNC: 10 MG/DL (ref 8–22)
BUN SERPL-MCNC: 9 MG/DL (ref 8–22)
BZE UR QL SCN: NEGATIVE
CALCIUM SERPL-MCNC: 8.6 MG/DL (ref 8.5–10.5)
CALCIUM SERPL-MCNC: 8.7 MG/DL (ref 8.5–10.5)
CANNABINOIDS UR QL SCN: POSITIVE
CHLORIDE SERPL-SCNC: 104 MMOL/L (ref 96–112)
CHLORIDE SERPL-SCNC: 104 MMOL/L (ref 96–112)
CO2 SERPL-SCNC: 18 MMOL/L (ref 20–33)
CO2 SERPL-SCNC: 19 MMOL/L (ref 20–33)
COLOR UR: YELLOW
CREAT SERPL-MCNC: 0.83 MG/DL (ref 0.5–1.4)
CREAT SERPL-MCNC: 0.88 MG/DL (ref 0.5–1.4)
CRP SERPL HS-MCNC: <0.3 MG/DL (ref 0–0.75)
EKG IMPRESSION: NORMAL
EOSINOPHIL # BLD AUTO: 0.13 K/UL (ref 0–0.51)
EOSINOPHIL # BLD AUTO: 0.3 K/UL (ref 0–0.51)
EOSINOPHIL NFR BLD: 1.3 % (ref 0–6.9)
EOSINOPHIL NFR BLD: 2.9 % (ref 0–6.9)
ERYTHROCYTE [DISTWIDTH] IN BLOOD BY AUTOMATED COUNT: 50.8 FL (ref 35.9–50)
ERYTHROCYTE [DISTWIDTH] IN BLOOD BY AUTOMATED COUNT: 53.6 FL (ref 35.9–50)
ETHANOL BLD-MCNC: 312.3 MG/DL
ETHANOL BLD-MCNC: <10.1 MG/DL
GFR SERPLBLD CREATININE-BSD FMLA CKD-EPI: 105 ML/MIN/1.73 M 2
GFR SERPLBLD CREATININE-BSD FMLA CKD-EPI: 107 ML/MIN/1.73 M 2
GLOBULIN SER CALC-MCNC: 3.7 G/DL (ref 1.9–3.5)
GLUCOSE SERPL-MCNC: 154 MG/DL (ref 65–99)
GLUCOSE SERPL-MCNC: 96 MG/DL (ref 65–99)
GLUCOSE UR STRIP.AUTO-MCNC: NEGATIVE MG/DL
HCT VFR BLD AUTO: 43.8 % (ref 42–52)
HCT VFR BLD AUTO: 49.1 % (ref 42–52)
HGB BLD-MCNC: 15 G/DL (ref 14–18)
HGB BLD-MCNC: 16.1 G/DL (ref 14–18)
IMM GRANULOCYTES # BLD AUTO: 0.06 K/UL (ref 0–0.11)
IMM GRANULOCYTES # BLD AUTO: 0.09 K/UL (ref 0–0.11)
IMM GRANULOCYTES NFR BLD AUTO: 0.6 % (ref 0–0.9)
IMM GRANULOCYTES NFR BLD AUTO: 0.9 % (ref 0–0.9)
KETONES UR STRIP.AUTO-MCNC: NEGATIVE MG/DL
LACTATE BLD-SCNC: 3.1 MMOL/L (ref 0.5–2)
LACTATE BLD-SCNC: 3.9 MMOL/L (ref 0.5–2)
LEUKOCYTE ESTERASE UR QL STRIP.AUTO: NEGATIVE
LYMPHOCYTES # BLD AUTO: 1.06 K/UL (ref 1–4.8)
LYMPHOCYTES # BLD AUTO: 4.12 K/UL (ref 1–4.8)
LYMPHOCYTES NFR BLD: 10.3 % (ref 22–41)
LYMPHOCYTES NFR BLD: 40.3 % (ref 22–41)
MAGNESIUM SERPL-MCNC: 1.6 MG/DL (ref 1.5–2.5)
MCH RBC QN AUTO: 28 PG (ref 27–33)
MCH RBC QN AUTO: 28.9 PG (ref 27–33)
MCHC RBC AUTO-ENTMCNC: 32.8 G/DL (ref 33.7–35.3)
MCHC RBC AUTO-ENTMCNC: 34.2 G/DL (ref 33.7–35.3)
MCV RBC AUTO: 84.4 FL (ref 81.4–97.8)
MCV RBC AUTO: 85.2 FL (ref 81.4–97.8)
METHADONE UR QL SCN: NEGATIVE
MICRO URNS: NORMAL
MONOCYTES # BLD AUTO: 0.68 K/UL (ref 0–0.85)
MONOCYTES # BLD AUTO: 0.82 K/UL (ref 0–0.85)
MONOCYTES NFR BLD AUTO: 6.6 % (ref 0–13.4)
MONOCYTES NFR BLD AUTO: 8 % (ref 0–13.4)
NEUTROPHILS # BLD AUTO: 4.8 K/UL (ref 1.82–7.42)
NEUTROPHILS # BLD AUTO: 8.24 K/UL (ref 1.82–7.42)
NEUTROPHILS NFR BLD: 47 % (ref 44–72)
NEUTROPHILS NFR BLD: 80.3 % (ref 44–72)
NITRITE UR QL STRIP.AUTO: NEGATIVE
NRBC # BLD AUTO: 0 K/UL
NRBC # BLD AUTO: 0 K/UL
NRBC BLD-RTO: 0 /100 WBC
NRBC BLD-RTO: 0 /100 WBC
OPIATES UR QL SCN: NEGATIVE
OXYCODONE UR QL SCN: NEGATIVE
PCP UR QL SCN: NEGATIVE
PH UR STRIP.AUTO: 8 [PH] (ref 5–8)
PLATELET # BLD AUTO: 227 K/UL (ref 164–446)
PLATELET # BLD AUTO: 323 K/UL (ref 164–446)
PMV BLD AUTO: 8.4 FL (ref 9–12.9)
PMV BLD AUTO: 8.5 FL (ref 9–12.9)
POTASSIUM SERPL-SCNC: 3.8 MMOL/L (ref 3.6–5.5)
POTASSIUM SERPL-SCNC: 3.9 MMOL/L (ref 3.6–5.5)
PROPOXYPH UR QL SCN: NEGATIVE
PROT SERPL-MCNC: 7.7 G/DL (ref 6–8.2)
PROT UR QL STRIP: NEGATIVE MG/DL
RBC # BLD AUTO: 5.19 M/UL (ref 4.7–6.1)
RBC # BLD AUTO: 5.76 M/UL (ref 4.7–6.1)
RBC UR QL AUTO: NEGATIVE
SODIUM SERPL-SCNC: 139 MMOL/L (ref 135–145)
SODIUM SERPL-SCNC: 142 MMOL/L (ref 135–145)
SP GR UR STRIP.AUTO: 1.01
UROBILINOGEN UR STRIP.AUTO-MCNC: 0.2 MG/DL
WBC # BLD AUTO: 10.2 K/UL (ref 4.8–10.8)
WBC # BLD AUTO: 10.3 K/UL (ref 4.8–10.8)

## 2022-06-06 PROCEDURE — 700102 HCHG RX REV CODE 250 W/ 637 OVERRIDE(OP): Performed by: EMERGENCY MEDICINE

## 2022-06-06 PROCEDURE — 700105 HCHG RX REV CODE 258: Performed by: INTERNAL MEDICINE

## 2022-06-06 PROCEDURE — 700111 HCHG RX REV CODE 636 W/ 250 OVERRIDE (IP): Performed by: EMERGENCY MEDICINE

## 2022-06-06 PROCEDURE — 81003 URINALYSIS AUTO W/O SCOPE: CPT

## 2022-06-06 PROCEDURE — 70450 CT HEAD/BRAIN W/O DYE: CPT

## 2022-06-06 PROCEDURE — 83735 ASSAY OF MAGNESIUM: CPT

## 2022-06-06 PROCEDURE — 82077 ASSAY SPEC XCP UR&BREATH IA: CPT | Mod: 91

## 2022-06-06 PROCEDURE — A9270 NON-COVERED ITEM OR SERVICE: HCPCS | Performed by: EMERGENCY MEDICINE

## 2022-06-06 PROCEDURE — 700105 HCHG RX REV CODE 258: Performed by: EMERGENCY MEDICINE

## 2022-06-06 PROCEDURE — 83605 ASSAY OF LACTIC ACID: CPT | Mod: 91

## 2022-06-06 PROCEDURE — 86140 C-REACTIVE PROTEIN: CPT

## 2022-06-06 PROCEDURE — 99284 EMERGENCY DEPT VISIT MOD MDM: CPT

## 2022-06-06 PROCEDURE — 80053 COMPREHEN METABOLIC PANEL: CPT

## 2022-06-06 PROCEDURE — 93005 ELECTROCARDIOGRAM TRACING: CPT | Performed by: EMERGENCY MEDICINE

## 2022-06-06 PROCEDURE — 85025 COMPLETE CBC W/AUTO DIFF WBC: CPT | Mod: 91

## 2022-06-06 PROCEDURE — 96374 THER/PROPH/DIAG INJ IV PUSH: CPT

## 2022-06-06 PROCEDURE — 700111 HCHG RX REV CODE 636 W/ 250 OVERRIDE (IP): Performed by: INTERNAL MEDICINE

## 2022-06-06 PROCEDURE — HZ2ZZZZ DETOXIFICATION SERVICES FOR SUBSTANCE ABUSE TREATMENT: ICD-10-PCS | Performed by: INTERNAL MEDICINE

## 2022-06-06 PROCEDURE — 99223 1ST HOSP IP/OBS HIGH 75: CPT | Performed by: INTERNAL MEDICINE

## 2022-06-06 PROCEDURE — 36415 COLL VENOUS BLD VENIPUNCTURE: CPT

## 2022-06-06 PROCEDURE — 96375 TX/PRO/DX INJ NEW DRUG ADDON: CPT

## 2022-06-06 PROCEDURE — 770020 HCHG ROOM/CARE - TELE (206)

## 2022-06-06 PROCEDURE — 82077 ASSAY SPEC XCP UR&BREATH IA: CPT

## 2022-06-06 PROCEDURE — 80307 DRUG TEST PRSMV CHEM ANLYZR: CPT

## 2022-06-06 PROCEDURE — 99285 EMERGENCY DEPT VISIT HI MDM: CPT

## 2022-06-06 PROCEDURE — 80048 BASIC METABOLIC PNL TOTAL CA: CPT

## 2022-06-06 PROCEDURE — 85025 COMPLETE CBC W/AUTO DIFF WBC: CPT

## 2022-06-06 RX ORDER — LORAZEPAM 2 MG/1
2 TABLET ORAL
Status: DISCONTINUED | OUTPATIENT
Start: 2022-06-06 | End: 2022-06-09 | Stop reason: HOSPADM

## 2022-06-06 RX ORDER — OMEPRAZOLE 20 MG/1
20 CAPSULE, DELAYED RELEASE ORAL DAILY
Status: DISCONTINUED | OUTPATIENT
Start: 2022-06-07 | End: 2022-06-09 | Stop reason: HOSPADM

## 2022-06-06 RX ORDER — KETOROLAC TROMETHAMINE 30 MG/ML
15 INJECTION, SOLUTION INTRAMUSCULAR; INTRAVENOUS EVERY 6 HOURS PRN
Status: DISPENSED | OUTPATIENT
Start: 2022-06-06 | End: 2022-06-07

## 2022-06-06 RX ORDER — SODIUM CHLORIDE 9 MG/ML
INJECTION, SOLUTION INTRAVENOUS CONTINUOUS
Status: ACTIVE | OUTPATIENT
Start: 2022-06-06 | End: 2022-06-07

## 2022-06-06 RX ORDER — LORAZEPAM 2 MG/ML
0.5 INJECTION INTRAMUSCULAR EVERY 4 HOURS PRN
Status: DISCONTINUED | OUTPATIENT
Start: 2022-06-06 | End: 2022-06-09 | Stop reason: HOSPADM

## 2022-06-06 RX ORDER — LEVETIRACETAM 500 MG/1
500 TABLET ORAL 2 TIMES DAILY
Status: DISCONTINUED | OUTPATIENT
Start: 2022-06-06 | End: 2022-06-09 | Stop reason: HOSPADM

## 2022-06-06 RX ORDER — METOCLOPRAMIDE HYDROCHLORIDE 5 MG/ML
10 INJECTION INTRAMUSCULAR; INTRAVENOUS ONCE
Status: COMPLETED | OUTPATIENT
Start: 2022-06-06 | End: 2022-06-06

## 2022-06-06 RX ORDER — SODIUM CHLORIDE, SODIUM LACTATE, POTASSIUM CHLORIDE, CALCIUM CHLORIDE 600; 310; 30; 20 MG/100ML; MG/100ML; MG/100ML; MG/100ML
1000 INJECTION, SOLUTION INTRAVENOUS ONCE
Status: COMPLETED | OUTPATIENT
Start: 2022-06-06 | End: 2022-06-06

## 2022-06-06 RX ORDER — LORAZEPAM 2 MG/ML
1 INJECTION INTRAMUSCULAR
Status: DISCONTINUED | OUTPATIENT
Start: 2022-06-06 | End: 2022-06-09 | Stop reason: HOSPADM

## 2022-06-06 RX ORDER — ONDANSETRON 2 MG/ML
4 INJECTION INTRAMUSCULAR; INTRAVENOUS EVERY 4 HOURS PRN
Status: DISCONTINUED | OUTPATIENT
Start: 2022-06-06 | End: 2022-06-09 | Stop reason: HOSPADM

## 2022-06-06 RX ORDER — FOLIC ACID 1 MG/1
1 TABLET ORAL DAILY
Status: DISCONTINUED | OUTPATIENT
Start: 2022-06-07 | End: 2022-06-09 | Stop reason: HOSPADM

## 2022-06-06 RX ORDER — GAUZE BANDAGE 2" X 2"
100 BANDAGE TOPICAL EVERY EVENING
Status: DISCONTINUED | OUTPATIENT
Start: 2022-06-06 | End: 2022-06-07

## 2022-06-06 RX ORDER — KETOROLAC TROMETHAMINE 30 MG/ML
15 INJECTION, SOLUTION INTRAMUSCULAR; INTRAVENOUS ONCE
Status: COMPLETED | OUTPATIENT
Start: 2022-06-06 | End: 2022-06-06

## 2022-06-06 RX ORDER — DIPHENHYDRAMINE HYDROCHLORIDE 50 MG/ML
25 INJECTION INTRAMUSCULAR; INTRAVENOUS ONCE
Status: COMPLETED | OUTPATIENT
Start: 2022-06-06 | End: 2022-06-06

## 2022-06-06 RX ORDER — ENOXAPARIN SODIUM 100 MG/ML
40 INJECTION SUBCUTANEOUS DAILY
Status: DISCONTINUED | OUTPATIENT
Start: 2022-06-06 | End: 2022-06-09 | Stop reason: HOSPADM

## 2022-06-06 RX ORDER — AMOXICILLIN 250 MG
2 CAPSULE ORAL 2 TIMES DAILY
Status: DISCONTINUED | OUTPATIENT
Start: 2022-06-06 | End: 2022-06-09 | Stop reason: HOSPADM

## 2022-06-06 RX ORDER — DIAZEPAM 5 MG/ML
10 INJECTION, SOLUTION INTRAMUSCULAR; INTRAVENOUS ONCE
Status: COMPLETED | OUTPATIENT
Start: 2022-06-06 | End: 2022-06-06

## 2022-06-06 RX ORDER — ACETAMINOPHEN 325 MG/1
650 TABLET ORAL ONCE
Status: COMPLETED | OUTPATIENT
Start: 2022-06-06 | End: 2022-06-06

## 2022-06-06 RX ORDER — LORAZEPAM 2 MG/ML
1.5 INJECTION INTRAMUSCULAR
Status: DISCONTINUED | OUTPATIENT
Start: 2022-06-06 | End: 2022-06-09 | Stop reason: HOSPADM

## 2022-06-06 RX ORDER — LORAZEPAM 2 MG/1
2 TABLET ORAL ONCE
Status: COMPLETED | OUTPATIENT
Start: 2022-06-06 | End: 2022-06-06

## 2022-06-06 RX ORDER — LORAZEPAM 0.5 MG/1
0.5 TABLET ORAL EVERY 4 HOURS PRN
Status: DISCONTINUED | OUTPATIENT
Start: 2022-06-06 | End: 2022-06-09 | Stop reason: HOSPADM

## 2022-06-06 RX ORDER — ESOMEPRAZOLE MAGNESIUM 20 MG/1
20 GRANULE, DELAYED RELEASE ORAL
Status: DISCONTINUED | OUTPATIENT
Start: 2022-06-07 | End: 2022-06-06

## 2022-06-06 RX ORDER — LORAZEPAM 2 MG/ML
2 INJECTION INTRAMUSCULAR ONCE
Status: COMPLETED | OUTPATIENT
Start: 2022-06-06 | End: 2022-06-06

## 2022-06-06 RX ORDER — DIAZEPAM 5 MG/ML
20 INJECTION, SOLUTION INTRAMUSCULAR; INTRAVENOUS ONCE
Status: COMPLETED | OUTPATIENT
Start: 2022-06-06 | End: 2022-06-06

## 2022-06-06 RX ORDER — POLYETHYLENE GLYCOL 3350 17 G/17G
1 POWDER, FOR SOLUTION ORAL
Status: DISCONTINUED | OUTPATIENT
Start: 2022-06-06 | End: 2022-06-09 | Stop reason: HOSPADM

## 2022-06-06 RX ORDER — BISACODYL 10 MG
10 SUPPOSITORY, RECTAL RECTAL
Status: DISCONTINUED | OUTPATIENT
Start: 2022-06-06 | End: 2022-06-09 | Stop reason: HOSPADM

## 2022-06-06 RX ORDER — HYDRALAZINE HYDROCHLORIDE 20 MG/ML
10 INJECTION INTRAMUSCULAR; INTRAVENOUS EVERY 4 HOURS PRN
Status: DISCONTINUED | OUTPATIENT
Start: 2022-06-06 | End: 2022-06-09 | Stop reason: HOSPADM

## 2022-06-06 RX ORDER — M-VIT,TX,IRON,MINS/CALC/FOLIC 27MG-0.4MG
1 TABLET ORAL DAILY
COMMUNITY
End: 2022-06-28

## 2022-06-06 RX ORDER — LORAZEPAM 1 MG/1
1 TABLET ORAL EVERY 4 HOURS PRN
Status: DISCONTINUED | OUTPATIENT
Start: 2022-06-06 | End: 2022-06-09 | Stop reason: HOSPADM

## 2022-06-06 RX ORDER — LORAZEPAM 2 MG/1
4 TABLET ORAL
Status: DISCONTINUED | OUTPATIENT
Start: 2022-06-06 | End: 2022-06-09 | Stop reason: HOSPADM

## 2022-06-06 RX ORDER — LORAZEPAM 2 MG/ML
2 INJECTION INTRAMUSCULAR
Status: DISCONTINUED | OUTPATIENT
Start: 2022-06-06 | End: 2022-06-09 | Stop reason: HOSPADM

## 2022-06-06 RX ADMIN — LORAZEPAM 2 MG: 2 TABLET ORAL at 10:38

## 2022-06-06 RX ADMIN — SODIUM CHLORIDE, POTASSIUM CHLORIDE, SODIUM LACTATE AND CALCIUM CHLORIDE 1000 ML: 600; 310; 30; 20 INJECTION, SOLUTION INTRAVENOUS at 06:52

## 2022-06-06 RX ADMIN — SODIUM CHLORIDE, POTASSIUM CHLORIDE, SODIUM LACTATE AND CALCIUM CHLORIDE 1000 ML: 600; 310; 30; 20 INJECTION, SOLUTION INTRAVENOUS at 05:18

## 2022-06-06 RX ADMIN — DIAZEPAM 20 MG: 5 INJECTION, SOLUTION INTRAMUSCULAR; INTRAVENOUS at 21:08

## 2022-06-06 RX ADMIN — METOCLOPRAMIDE 10 MG: 5 INJECTION, SOLUTION INTRAMUSCULAR; INTRAVENOUS at 05:23

## 2022-06-06 RX ADMIN — ACETAMINOPHEN 650 MG: 325 TABLET ORAL at 03:28

## 2022-06-06 RX ADMIN — DIAZEPAM 10 MG: 5 INJECTION, SOLUTION INTRAMUSCULAR; INTRAVENOUS at 20:59

## 2022-06-06 RX ADMIN — LORAZEPAM 2 MG: 2 INJECTION INTRAMUSCULAR; INTRAVENOUS at 02:44

## 2022-06-06 RX ADMIN — DIPHENHYDRAMINE HYDROCHLORIDE 25 MG: 50 INJECTION INTRAMUSCULAR; INTRAVENOUS at 05:23

## 2022-06-06 RX ADMIN — SODIUM CHLORIDE, POTASSIUM CHLORIDE, SODIUM LACTATE AND CALCIUM CHLORIDE 1000 ML: 600; 310; 30; 20 INJECTION, SOLUTION INTRAVENOUS at 20:59

## 2022-06-06 RX ADMIN — KETOROLAC TROMETHAMINE 15 MG: 30 INJECTION, SOLUTION INTRAMUSCULAR at 04:15

## 2022-06-06 RX ADMIN — SODIUM CHLORIDE: 9 INJECTION, SOLUTION INTRAVENOUS at 22:43

## 2022-06-06 RX ADMIN — SODIUM CHLORIDE, POTASSIUM CHLORIDE, SODIUM LACTATE AND CALCIUM CHLORIDE 1000 ML: 600; 310; 30; 20 INJECTION, SOLUTION INTRAVENOUS at 03:30

## 2022-06-06 RX ADMIN — LORAZEPAM 2 MG: 2 INJECTION INTRAMUSCULAR; INTRAVENOUS at 22:26

## 2022-06-06 ASSESSMENT — LIFESTYLE VARIABLES
ALCOHOL_USE: YES
NAUSEA AND VOMITING: INTERMITTENT NAUSEA WITH DRY HEAVES
AUDITORY DISTURBANCES: MILD HARSHNESS OR ABILITY TO FRIGHTEN
TOTAL SCORE: 24
DOES PATIENT WANT TO TALK TO SOMEONE ABOUT QUITTING: NO
TACTILE DISTURBANCES: MODERATELY SEVERE HALLUCINATIONS
PAROXYSMAL SWEATS: *
ON A TYPICAL DAY WHEN YOU DRINK ALCOHOL HOW MANY DRINKS DO YOU HAVE: 5
EVER FELT BAD OR GUILTY ABOUT YOUR DRINKING: YES
HEADACHE, FULLNESS IN HEAD: VERY MILD
CONSUMPTION TOTAL: POSITIVE
EVER HAD A DRINK FIRST THING IN THE MORNING TO STEADY YOUR NERVES TO GET RID OF A HANGOVER: YES
TACTILE DISTURBANCES: MODERATELY SEVERE HALLUCINATIONS
HEADACHE, FULLNESS IN HEAD: VERY MILD
AVERAGE NUMBER OF DAYS PER WEEK YOU HAVE A DRINK CONTAINING ALCOHOL: 4
AGITATION: SOMEWHAT MORE THAN NORMAL ACTIVITY
AGITATION: *
ORIENTATION AND CLOUDING OF SENSORIUM: ORIENTED AND CAN DO SERIAL ADDITIONS
HAVE PEOPLE ANNOYED YOU BY CRITICIZING YOUR DRINKING: YES
PAROXYSMAL SWEATS: *
PAROXYSMAL SWEATS: *
TOTAL SCORE: 4
TREMOR: *
TOTAL SCORE: 4
TREMOR: MODERATE TREMOR WITH ARMS EXTENDED
AUDITORY DISTURBANCES: MILD HARSHNESS OR ABILITY TO FRIGHTEN
ANXIETY: MODERATELY ANXIOUS OR GUARDED, SO ANXIETY IS INFERRED
HAVE YOU EVER FELT YOU SHOULD CUT DOWN ON YOUR DRINKING: YES
VISUAL DISTURBANCES: NOT PRESENT
TREMOR: MODERATE TREMOR WITH ARMS EXTENDED
TOTAL SCORE: 4
TOTAL SCORE: 17
TOTAL SCORE: MILD ITCHING, PINS AND NEEDLES SENSATION, BURNING OR NUMBNESS
ANXIETY: MODERATELY ANXIOUS OR GUARDED, SO ANXIETY IS INFERRED
TOTAL SCORE: 19
ORIENTATION AND CLOUDING OF SENSORIUM: ORIENTED AND CAN DO SERIAL ADDITIONS
HEADACHE, FULLNESS IN HEAD: MODERATE
NAUSEA AND VOMITING: MILD NAUSEA WITH NO VOMITING
ANXIETY: *
DOES PATIENT WANT TO STOP DRINKING: YES
AGITATION: *
HOW MANY TIMES IN THE PAST YEAR HAVE YOU HAD 5 OR MORE DRINKS IN A DAY: 30
NAUSEA AND VOMITING: NO NAUSEA AND NO VOMITING
AUDITORY DISTURBANCES: VERY MILD HARSHNESS OR ABILITY TO FRIGHTEN
VISUAL DISTURBANCES: NOT PRESENT
ORIENTATION AND CLOUDING OF SENSORIUM: ORIENTED AND CAN DO SERIAL ADDITIONS
VISUAL DISTURBANCES: NOT PRESENT

## 2022-06-06 ASSESSMENT — PATIENT HEALTH QUESTIONNAIRE - PHQ9
2. FEELING DOWN, DEPRESSED, IRRITABLE, OR HOPELESS: NOT AT ALL
1. LITTLE INTEREST OR PLEASURE IN DOING THINGS: NOT AT ALL
1. LITTLE INTEREST OR PLEASURE IN DOING THINGS: NOT AT ALL
SUM OF ALL RESPONSES TO PHQ9 QUESTIONS 1 AND 2: 0
SUM OF ALL RESPONSES TO PHQ9 QUESTIONS 1 AND 2: 0
2. FEELING DOWN, DEPRESSED, IRRITABLE, OR HOPELESS: NOT AT ALL

## 2022-06-06 ASSESSMENT — COGNITIVE AND FUNCTIONAL STATUS - GENERAL
MOVING FROM LYING ON BACK TO SITTING ON SIDE OF FLAT BED: A LITTLE
DRESSING REGULAR LOWER BODY CLOTHING: A LITTLE
CLIMB 3 TO 5 STEPS WITH RAILING: A LOT
TOILETING: A LITTLE
STANDING UP FROM CHAIR USING ARMS: A LOT
MOVING TO AND FROM BED TO CHAIR: A LITTLE
DAILY ACTIVITIY SCORE: 20
WALKING IN HOSPITAL ROOM: A LOT
HELP NEEDED FOR BATHING: A LITTLE
SUGGESTED CMS G CODE MODIFIER DAILY ACTIVITY: CJ
SUGGESTED CMS G CODE MODIFIER MOBILITY: CK
DRESSING REGULAR UPPER BODY CLOTHING: A LITTLE
MOBILITY SCORE: 16

## 2022-06-06 ASSESSMENT — FIBROSIS 4 INDEX
FIB4 SCORE: 2.07
FIB4 SCORE: 1.45
FIB4 SCORE: 2.94

## 2022-06-06 NOTE — ED NOTES
Assumed care of pt, report received. Pt resting on gurney, no distress noted.   HR noted. Other VS stable.

## 2022-06-06 NOTE — DISCHARGE PLANNING
SW provided a cab voucher for the patient to discharge home. The patient reported residing at the [facility] located on [address] [place] [address] which is a different address noted on the facesheet.

## 2022-06-06 NOTE — ED NOTES
Reviewed discharge instructions, pt verbalized understanding of instructions.   States he will schedule follow-up as needed with [facility] for resources and detox as needed.   Denies further questions at this time.   Requested cab voucher back to [facility] where he is living.     He is notably tremulous. ERP notified, requested medication for pt for this.

## 2022-06-06 NOTE — ED PROVIDER NOTES
ED Provider Note    CHIEF COMPLAINT  Chief Complaint   Patient presents with   • Seizure     Pt pt, he has been having seizures all day. Pt unable to describe the seizure activity.    • ALOC     Pt unable to answer questions, poor historian.   • Fall     Pt states he fell today and that he hit his head.  Pt denies use of blood thinners.         HPI  Donal Carpio is a 52 y.o. male who presents following seizures at home.  He had multiple seizures at home apparently -however there were no witnesses and the patient states that he lives alone.  He states that he drank a pint of whiskey and beer today.  He has a known history of alcohol withdrawal seizures.  He is on Keppra as well.  He has multiple visits for similar presentations recently.  This is his 10th visit to the ER since late April between [facility] and [facility].  He has been hospitalized at [facility] for seizure activity in the past.  He has had MRI and neurology evaluation which were unremarkable.  Patient has chronic headache and neck pain.  No new numbness or weakness.  No active seizure activity upon arrival.  No fever or recent illness.    REVIEW OF SYSTEMS  See HPI for further details. All other systems are negative.     PAST MEDICAL HISTORY   has a past medical history of Peptic ulcer and Ulcer of abdomen wall (HCC).    FAMILY HISTORY  No family history on file.    SOCIAL HISTORY  Social History     Tobacco Use   • Smoking status: Never Smoker   • Smokeless tobacco: Never Used   Vaping Use   • Vaping Use: Every day   Substance and Sexual Activity   • Alcohol use: Yes     Comment: pint of whiskey   • Drug use: Yes     Types: Inhaled     Comment: marijuana   • Sexual activity: Not on file       SURGICAL HISTORY   has a past surgical history that includes appendectomy.    CURRENT MEDICATIONS  Home Medications    **Home medications have not yet been reviewed for this encounter**         ALLERGIES  Allergies   Allergen Reactions   • Penicillins Unspecified      "Unknown childhood reaction        PHYSICAL EXAM  VITAL SIGNS: Pulse (!) 143   Resp (!) 22   Ht 1.753 m (5' 9\")   Wt 86.2 kg (190 lb)   SpO2 96%   BMI 28.06 kg/m²   Pulse ox interpretation: I interpret this pulse ox as normal.  Constitutional: Alert in no apparent distress.  HENT: No signs of trauma, Bilateral external ears normal, Nose normal.   Eyes: Pupils are equal and reactive, Conjunctiva normal, Non-icteric.   Neck: Normal range of motion, No tenderness, Supple, No stridor.   Cardiovascular: Regular rate and rhythm.   Thorax & Lungs: Normal breath sounds, No respiratory distress, No wheezing, No chest tenderness.   Abdomen: Bowel sounds normal, Soft, No tenderness, No masses, No pulsatile masses. No peritoneal signs.  Skin: Warm, Dry, No erythema, No rash.   Back: No bony tenderness, No CVA tenderness.   Extremities: Intact distal pulses, No edema, No cyanosis  Musculoskeletal: Good range of motion in all major joints. No major deformities noted.   Neurologic: Alert, No focal deficits noted.       DIAGNOSTIC STUDIES / PROCEDURES      LABS  Labs Reviewed   CBC WITH DIFFERENTIAL - Abnormal; Notable for the following components:       Result Value    MCHC 32.8 (*)     RDW 53.6 (*)     MPV 8.4 (*)     All other components within normal limits   COMP METABOLIC PANEL - Abnormal; Notable for the following components:    Co2 19 (*)     Anion Gap 19.0 (*)     Glucose 154 (*)     AST(SGOT) 157 (*)     ALT(SGPT) 138 (*)     Alkaline Phosphatase 126 (*)     Globulin 3.7 (*)     All other components within normal limits   LACTIC ACID - Abnormal; Notable for the following components:    Lactic Acid 3.9 (*)     All other components within normal limits   DIAGNOSTIC ALCOHOL - Abnormal; Notable for the following components:    Diagnostic Alcohol 312.3 (*)     All other components within normal limits   CRP QUANTITIVE (NON-CARDIAC)   ESTIMATED GFR   URINALYSIS   URINE DRUG SCREEN         RADIOLOGY  CT-HEAD W/O   Final " Result      Negative noncontrast CT scan of the head / brain.               COURSE & MEDICAL DECISION MAKING    Medications   lactated ringers (LR) bolus (has no administration in time range)   LORazepam (ATIVAN) injection 2 mg (2 mg Intravenous Given 6/6/22 0244)   acetaminophen (Tylenol) tablet 650 mg (650 mg Oral Given 6/6/22 0328)   lactated ringers (LR) bolus (0 mL Intravenous Stopped 6/6/22 0517)   ketorolac (TORADOL) injection 15 mg (15 mg Intravenous Given 6/6/22 0415)   lactated ringers (LR) bolus (1,000 mL Intravenous New Bag 6/6/22 0518)   metoclopramide (REGLAN) injection 10 mg (10 mg Intravenous Given 6/6/22 0523)   diphenhydrAMINE (BENADRYL) injection 25 mg (25 mg Intravenous Given 6/6/22 0523)       Pertinent Labs & Imaging studies reviewed. (See chart for details)  52 y.o. male presenting with suspected seizures.  Patient believes that he may have had multiple seizures today.  He lives alone and there were no witnesses to these events and he does not recall how many he may have had.  He has had multiple seizures in the past thought to be secondary to withdrawal seizures.  He has been evaluated several times in the emergency department both here and at [place] for similar symptoms.  He has had neurology evaluation and MRI at [place].  MRI was unremarkable.  Patient was seen in this emergency department recently about a week ago.  He was discharged after an unremarkable work-up.  The patient is on Keppra however the patient's symptoms appear to be secondary to withdrawal seizures.    According to the patient, the patient drank approximately a pint of whiskey earlier today.    Patient states that he had a fall earlier today and has severe headache.  CT head was performed given his intoxicated state and history of trauma and seizures.  There is no acute abnormality on CT.    Patient is significantly intoxicated on laboratory studies.  Blood alcohol level is 312.  Slightly elevated lactic acid  "likely related to dehydration from chronic alcohol abuse.  He was given IV fluid hydration.  Tachycardia is improving with IV fluid hydration.  Patient remains safely intoxicated.  Repeat lactic acid was ordered to see that there is resolution.    Symptoms appear to be related to alcohol intoxication and possible withdrawal seizures from alcohol earlier in the day though these were not witnessed.  Recommending outpatient follow-up with a neurologist and primary care physician.  We will await for the patient to be more alert prior to discharge as he is rather intoxicated.      The case was signed out to oncoming physician to follow-up and discharge the patient when more sober.    The patient was instructed to follow-up with primary care physician for further management.  To return immediately for any worsening symptoms or development of any other concerning signs or symptoms. The patient verbalizes understanding in their own words.    BP (!) 152/72   Pulse (!) 124   Temp 37.1 °C (98.8 °F) (Temporal)   Resp 20   Ht 1.753 m (5' 9\")   Wt 86.2 kg (190 lb)   SpO2 92%   BMI 28.06 kg/m²     The patient was referred to primary care where they will receive further BP management.      [facility], Emergency Dept  [address] [facility] [id number] [phone]    As needed, If symptoms worsen    Primary care doctor    Schedule an appointment as soon as possible for a visit         FINAL IMPRESSION  1. Alcoholic intoxication without complication (HCC)    2. Seizure (HCC)            Electronically signed by: Leobardo Marin M.D., 6/6/2022 2:36 AM    "

## 2022-06-06 NOTE — ED TRIAGE NOTES
Chief Complaint   Patient presents with   • Seizure     Pt pt, he has been having seizures all day. Pt unable to describe the seizure activity.    • ALOC     Pt unable to answer questions, poor historian.   • Fall     Pt states he fell today and that he hit his head.  Pt denies use of blood thinners.           Per EMS, pt was here a few weeks ago for a head injury and is on keppra for seizures.  Pt is shaky and anxious.  Pt keeps having periods of falling asleep then wakes up with aggression.  Pt reoriented as needed.  Pt placed on monitor. Chart up for ERP.

## 2022-06-06 NOTE — DISCHARGE SUMMARY
"  ED Observation Discharge Summary    Patient:Donal Carpio  Patient : 1970  Patient MRN: [id number]  Patient PCP: No primary care provider on file.    Admit Date: 2022  Discharge Date and Time: 22 9:09 AM  Discharge Diagnosis:   1. Alcoholic intoxication without complication (HCC)    2. Seizure (HCC)        Discharge Attending: Junior Salamanca M.D.  Discharge Service: ED Observation    ED Course  Donal is a 52 y.o. male who was evaluated at [facility] for alcohol related seizures and alcohol intoxication.  The patient presented today profoundly intoxicated.  He has a history of heavy alcohol use.  Prior history of seizure.  Seen here previously for similar symptoms.  Neurology does not recommend any additional treatment or intervention.  Patient needs to seek treatment for alcoholism.  Patient was observed in the emergency department.  Initially significantly tachycardic.  This is likely related to volume depletion.  Was volume resuscitated in the emergency department with good improvement of his heart rate.  At this time the patient is clinically improved and stable for discharge.  Able to ambulate with a stable gait.    Discharge Exam:  /73   Pulse (!) 112   Temp 37.1 °C (98.8 °F) (Temporal)   Resp 18   Ht 1.753 m (5' 9\")   Wt 86.2 kg (190 lb)   SpO2 93%   BMI 28.06 kg/m² .    Constitutional: Awake and alert. Nontoxic  HENT:  Grossly normal  Eyes: Grossly normal  Neck: Normal range of motion  Cardiovascular: Regular rhythm, improving tachycardia.  Thorax & Lungs: No respiratory distress  Abdomen: Nontender.  No rebound or guarding.  Active bowel sounds.  Skin:  No pathologic rash.   Extremities: Well perfused  Psychiatric: Affect normal    Labs  Results for orders placed or performed during the hospital encounter of 22   CBC WITH DIFFERENTIAL   Result Value Ref Range    WBC 10.2 4.8 - 10.8 K/uL    RBC 5.76 4.70 - 6.10 M/uL    Hemoglobin 16.1 14.0 - 18.0 g/dL    " Hematocrit 49.1 42.0 - 52.0 %    MCV 85.2 81.4 - 97.8 fL    MCH 28.0 27.0 - 33.0 pg    MCHC 32.8 (L) 33.7 - 35.3 g/dL    RDW 53.6 (H) 35.9 - 50.0 fL    Platelet Count 323 164 - 446 K/uL    MPV 8.4 (L) 9.0 - 12.9 fL    Neutrophils-Polys 47.00 44.00 - 72.00 %    Lymphocytes 40.30 22.00 - 41.00 %    Monocytes 8.00 0.00 - 13.40 %    Eosinophils 2.90 0.00 - 6.90 %    Basophils 1.20 0.00 - 1.80 %    Immature Granulocytes 0.60 0.00 - 0.90 %    Nucleated RBC 0.00 /100 WBC    Neutrophils (Absolute) 4.80 1.82 - 7.42 K/uL    Lymphs (Absolute) 4.12 1.00 - 4.80 K/uL    Monos (Absolute) 0.82 0.00 - 0.85 K/uL    Eos (Absolute) 0.30 0.00 - 0.51 K/uL    Baso (Absolute) 0.12 0.00 - 0.12 K/uL    Immature Granulocytes (abs) 0.06 0.00 - 0.11 K/uL    NRBC (Absolute) 0.00 K/uL   Comp Metabolic Panel   Result Value Ref Range    Sodium 142 135 - 145 mmol/L    Potassium 3.9 3.6 - 5.5 mmol/L    Chloride 104 96 - 112 mmol/L    Co2 19 (L) 20 - 33 mmol/L    Anion Gap 19.0 (H) 7.0 - 16.0    Glucose 154 (H) 65 - 99 mg/dL    Bun 9 8 - 22 mg/dL    Creatinine 0.88 0.50 - 1.40 mg/dL    Calcium 8.7 8.5 - 10.5 mg/dL    AST(SGOT) 157 (H) 12 - 45 U/L    ALT(SGPT) 138 (H) 2 - 50 U/L    Alkaline Phosphatase 126 (H) 30 - 99 U/L    Total Bilirubin 0.3 0.1 - 1.5 mg/dL    Albumin 4.0 3.2 - 4.9 g/dL    Total Protein 7.7 6.0 - 8.2 g/dL    Globulin 3.7 (H) 1.9 - 3.5 g/dL    A-G Ratio 1.1 g/dL   LACTIC ACID   Result Value Ref Range    Lactic Acid 3.9 (H) 0.5 - 2.0 mmol/L   CRP QUANTITIVE (NON-CARDIAC)   Result Value Ref Range    Stat C-Reactive Protein <0.30 0.00 - 0.75 mg/dL   DIAGNOSTIC ALCOHOL   Result Value Ref Range    Diagnostic Alcohol 312.3 (H) <10.1 mg/dL   ESTIMATED GFR   Result Value Ref Range    GFR (CKD-EPI) 105 >60 mL/min/1.73 m 2   LACTIC ACID   Result Value Ref Range    Lactic Acid 3.1 (H) 0.5 - 2.0 mmol/L       Radiology  CT-HEAD W/O   Final Result      Negative noncontrast CT scan of the head / brain.               Medications:   New Prescriptions     No medications on file       Discharge Condition: Stable    Electronically signed by: Junior Salamanca M.D., 6/6/2022 9:09 AM

## 2022-06-06 NOTE — ED NOTES
"Pt was asleep in bed, awoken for assessment.  Pt immediately requests pain medications.  States, \"I suddenly feel so confused again.\"   No distress noted. HR decreasing.   "

## 2022-06-07 PROBLEM — F10.939 ALCOHOL WITHDRAWAL SYNDROME WITH COMPLICATION (HCC): Status: ACTIVE | Noted: 2022-06-06

## 2022-06-07 PROBLEM — F10.931 ALCOHOL WITHDRAWAL DELIRIUM (HCC): Status: RESOLVED | Noted: 2022-06-06 | Resolved: 2022-06-07

## 2022-06-07 LAB
ALBUMIN SERPL BCP-MCNC: 3.5 G/DL (ref 3.2–4.9)
ALBUMIN/GLOB SERPL: 1.2 G/DL
ALP SERPL-CCNC: 110 U/L (ref 30–99)
ALT SERPL-CCNC: 101 U/L (ref 2–50)
AMPHET UR QL SCN: NEGATIVE
ANION GAP SERPL CALC-SCNC: 13 MMOL/L (ref 7–16)
AST SERPL-CCNC: 106 U/L (ref 12–45)
BARBITURATES UR QL SCN: NEGATIVE
BENZODIAZ UR QL SCN: POSITIVE
BILIRUB SERPL-MCNC: 0.7 MG/DL (ref 0.1–1.5)
BUN SERPL-MCNC: 9 MG/DL (ref 8–22)
BZE UR QL SCN: NEGATIVE
CALCIUM SERPL-MCNC: 8.3 MG/DL (ref 8.5–10.5)
CANNABINOIDS UR QL SCN: POSITIVE
CHLORIDE SERPL-SCNC: 106 MMOL/L (ref 96–112)
CO2 SERPL-SCNC: 19 MMOL/L (ref 20–33)
CREAT SERPL-MCNC: 0.63 MG/DL (ref 0.5–1.4)
ERYTHROCYTE [DISTWIDTH] IN BLOOD BY AUTOMATED COUNT: 52.3 FL (ref 35.9–50)
GFR SERPLBLD CREATININE-BSD FMLA CKD-EPI: 116 ML/MIN/1.73 M 2
GLOBULIN SER CALC-MCNC: 2.9 G/DL (ref 1.9–3.5)
GLUCOSE SERPL-MCNC: 102 MG/DL (ref 65–99)
HCT VFR BLD AUTO: 40.1 % (ref 42–52)
HGB BLD-MCNC: 13.4 G/DL (ref 14–18)
LIPASE SERPL-CCNC: 28 U/L (ref 11–82)
MCH RBC QN AUTO: 28.3 PG (ref 27–33)
MCHC RBC AUTO-ENTMCNC: 33.4 G/DL (ref 33.7–35.3)
MCV RBC AUTO: 84.8 FL (ref 81.4–97.8)
METHADONE UR QL SCN: NEGATIVE
OPIATES UR QL SCN: NEGATIVE
OXYCODONE UR QL SCN: NEGATIVE
PCP UR QL SCN: NEGATIVE
PHOSPHATE SERPL-MCNC: 3 MG/DL (ref 2.5–4.5)
PLATELET # BLD AUTO: 179 K/UL (ref 164–446)
PMV BLD AUTO: 8.6 FL (ref 9–12.9)
POTASSIUM SERPL-SCNC: 3.4 MMOL/L (ref 3.6–5.5)
PROPOXYPH UR QL SCN: NEGATIVE
PROT SERPL-MCNC: 6.4 G/DL (ref 6–8.2)
RBC # BLD AUTO: 4.73 M/UL (ref 4.7–6.1)
SODIUM SERPL-SCNC: 138 MMOL/L (ref 135–145)
WBC # BLD AUTO: 8.4 K/UL (ref 4.8–10.8)

## 2022-06-07 PROCEDURE — 85027 COMPLETE CBC AUTOMATED: CPT

## 2022-06-07 PROCEDURE — 700102 HCHG RX REV CODE 250 W/ 637 OVERRIDE(OP): Performed by: INTERNAL MEDICINE

## 2022-06-07 PROCEDURE — 83690 ASSAY OF LIPASE: CPT

## 2022-06-07 PROCEDURE — 770020 HCHG ROOM/CARE - TELE (206)

## 2022-06-07 PROCEDURE — 700102 HCHG RX REV CODE 250 W/ 637 OVERRIDE(OP)

## 2022-06-07 PROCEDURE — 700111 HCHG RX REV CODE 636 W/ 250 OVERRIDE (IP): Performed by: STUDENT IN AN ORGANIZED HEALTH CARE EDUCATION/TRAINING PROGRAM

## 2022-06-07 PROCEDURE — 700111 HCHG RX REV CODE 636 W/ 250 OVERRIDE (IP)

## 2022-06-07 PROCEDURE — 84100 ASSAY OF PHOSPHORUS: CPT

## 2022-06-07 PROCEDURE — 80053 COMPREHEN METABOLIC PANEL: CPT

## 2022-06-07 PROCEDURE — A9270 NON-COVERED ITEM OR SERVICE: HCPCS | Performed by: INTERNAL MEDICINE

## 2022-06-07 PROCEDURE — 700105 HCHG RX REV CODE 258

## 2022-06-07 PROCEDURE — 99233 SBSQ HOSP IP/OBS HIGH 50: CPT | Mod: GC | Performed by: HOSPITALIST

## 2022-06-07 PROCEDURE — 80307 DRUG TEST PRSMV CHEM ANLYZR: CPT

## 2022-06-07 PROCEDURE — 36415 COLL VENOUS BLD VENIPUNCTURE: CPT

## 2022-06-07 PROCEDURE — A9270 NON-COVERED ITEM OR SERVICE: HCPCS

## 2022-06-07 PROCEDURE — 700111 HCHG RX REV CODE 636 W/ 250 OVERRIDE (IP): Performed by: INTERNAL MEDICINE

## 2022-06-07 RX ORDER — POTASSIUM CHLORIDE 20 MEQ/1
40 TABLET, EXTENDED RELEASE ORAL ONCE
Status: COMPLETED | OUTPATIENT
Start: 2022-06-07 | End: 2022-06-07

## 2022-06-07 RX ORDER — HYDROCORTISONE 25 MG/G
CREAM TOPICAL DAILY
Status: DISCONTINUED | OUTPATIENT
Start: 2022-06-07 | End: 2022-06-09 | Stop reason: HOSPADM

## 2022-06-07 RX ORDER — SODIUM CHLORIDE, SODIUM LACTATE, POTASSIUM CHLORIDE, AND CALCIUM CHLORIDE .6; .31; .03; .02 G/100ML; G/100ML; G/100ML; G/100ML
150 INJECTION, SOLUTION INTRAVENOUS ONCE
Status: COMPLETED | OUTPATIENT
Start: 2022-06-07 | End: 2022-06-07

## 2022-06-07 RX ORDER — MAGNESIUM SULFATE HEPTAHYDRATE 40 MG/ML
4 INJECTION, SOLUTION INTRAVENOUS ONCE
Status: COMPLETED | OUTPATIENT
Start: 2022-06-07 | End: 2022-06-07

## 2022-06-07 RX ORDER — SODIUM CHLORIDE, SODIUM LACTATE, POTASSIUM CHLORIDE, CALCIUM CHLORIDE 600; 310; 30; 20 MG/100ML; MG/100ML; MG/100ML; MG/100ML
INJECTION, SOLUTION INTRAVENOUS CONTINUOUS
Status: DISCONTINUED | OUTPATIENT
Start: 2022-06-07 | End: 2022-06-09

## 2022-06-07 RX ORDER — CHOLECALCIFEROL (VITAMIN D3) 125 MCG
5 CAPSULE ORAL NIGHTLY
Status: DISCONTINUED | OUTPATIENT
Start: 2022-06-07 | End: 2022-06-09 | Stop reason: HOSPADM

## 2022-06-07 RX ORDER — CHLORDIAZEPOXIDE HYDROCHLORIDE 25 MG/1
50 CAPSULE, GELATIN COATED ORAL EVERY 6 HOURS
Status: DISCONTINUED | OUTPATIENT
Start: 2022-06-07 | End: 2022-06-07

## 2022-06-07 RX ORDER — BENZOCAINE/MENTHOL 6 MG-10 MG
LOZENGE MUCOUS MEMBRANE 2 TIMES DAILY
Status: DISCONTINUED | OUTPATIENT
Start: 2022-06-07 | End: 2022-06-07

## 2022-06-07 RX ORDER — HALOPERIDOL 5 MG/ML
5 INJECTION INTRAMUSCULAR ONCE
Status: DISCONTINUED | OUTPATIENT
Start: 2022-06-07 | End: 2022-06-08

## 2022-06-07 RX ORDER — CHLORDIAZEPOXIDE HYDROCHLORIDE 25 MG/1
25 CAPSULE, GELATIN COATED ORAL EVERY 6 HOURS
Status: DISCONTINUED | OUTPATIENT
Start: 2022-06-08 | End: 2022-06-07

## 2022-06-07 RX ADMIN — THERA TABS 1 TABLET: TAB at 05:29

## 2022-06-07 RX ADMIN — LORAZEPAM 1 MG: 2 INJECTION INTRAMUSCULAR; INTRAVENOUS at 09:53

## 2022-06-07 RX ADMIN — OMEPRAZOLE 20 MG: 20 CAPSULE, DELAYED RELEASE ORAL at 05:28

## 2022-06-07 RX ADMIN — KETOROLAC TROMETHAMINE 15 MG: 30 INJECTION, SOLUTION INTRAMUSCULAR at 00:02

## 2022-06-07 RX ADMIN — LORAZEPAM 2 MG: 2 INJECTION INTRAMUSCULAR; INTRAVENOUS at 01:26

## 2022-06-07 RX ADMIN — FOLIC ACID 1 MG: 1 TABLET ORAL at 05:28

## 2022-06-07 RX ADMIN — LORAZEPAM 1.5 MG: 2 INJECTION INTRAMUSCULAR; INTRAVENOUS at 05:31

## 2022-06-07 RX ADMIN — THIAMINE HYDROCHLORIDE: 100 INJECTION, SOLUTION INTRAMUSCULAR; INTRAVENOUS at 17:52

## 2022-06-07 RX ADMIN — LORAZEPAM 1.5 MG: 2 INJECTION INTRAMUSCULAR; INTRAVENOUS at 03:39

## 2022-06-07 RX ADMIN — LORAZEPAM 2 MG: 2 INJECTION INTRAMUSCULAR; INTRAVENOUS at 21:35

## 2022-06-07 RX ADMIN — ENOXAPARIN SODIUM 40 MG: 40 INJECTION SUBCUTANEOUS at 17:52

## 2022-06-07 RX ADMIN — LORAZEPAM 2 MG: 2 TABLET ORAL at 19:46

## 2022-06-07 RX ADMIN — Medication 5 MG: at 21:00

## 2022-06-07 RX ADMIN — LEVETIRACETAM 500 MG: 500 TABLET, FILM COATED ORAL at 05:28

## 2022-06-07 RX ADMIN — THIAMINE HCL TAB 100 MG 100 MG: 100 TAB at 00:02

## 2022-06-07 RX ADMIN — SODIUM CHLORIDE, POTASSIUM CHLORIDE, SODIUM LACTATE AND CALCIUM CHLORIDE 150 ML: 600; 310; 30; 20 INJECTION, SOLUTION INTRAVENOUS at 11:09

## 2022-06-07 RX ADMIN — LEVETIRACETAM 500 MG: 500 TABLET, FILM COATED ORAL at 17:53

## 2022-06-07 RX ADMIN — KETOROLAC TROMETHAMINE 15 MG: 30 INJECTION, SOLUTION INTRAMUSCULAR at 17:06

## 2022-06-07 RX ADMIN — CHLORDIAZEPOXIDE HYDROCHLORIDE 50 MG: 25 CAPSULE ORAL at 07:46

## 2022-06-07 RX ADMIN — LORAZEPAM 1 MG: 2 INJECTION INTRAMUSCULAR; INTRAVENOUS at 16:58

## 2022-06-07 RX ADMIN — KETOROLAC TROMETHAMINE 15 MG: 30 INJECTION, SOLUTION INTRAMUSCULAR at 05:37

## 2022-06-07 RX ADMIN — LORAZEPAM 1 MG: 2 INJECTION INTRAMUSCULAR; INTRAVENOUS at 14:23

## 2022-06-07 RX ADMIN — LORAZEPAM 1 MG: 2 INJECTION INTRAMUSCULAR; INTRAVENOUS at 12:15

## 2022-06-07 RX ADMIN — HALOPERIDOL LACTATE 5 MG: 5 INJECTION, SOLUTION INTRAMUSCULAR at 23:30

## 2022-06-07 RX ADMIN — LORAZEPAM 1.5 MG: 2 INJECTION INTRAMUSCULAR; INTRAVENOUS at 00:01

## 2022-06-07 RX ADMIN — MAGNESIUM SULFATE HEPTAHYDRATE 4 G: 40 INJECTION, SOLUTION INTRAVENOUS at 10:45

## 2022-06-07 RX ADMIN — SODIUM CHLORIDE, POTASSIUM CHLORIDE, SODIUM LACTATE AND CALCIUM CHLORIDE: 600; 310; 30; 20 INJECTION, SOLUTION INTRAVENOUS at 11:10

## 2022-06-07 RX ADMIN — LORAZEPAM 2 MG: 2 INJECTION INTRAMUSCULAR; INTRAVENOUS at 22:21

## 2022-06-07 RX ADMIN — THIAMINE HYDROCHLORIDE: 100 INJECTION, SOLUTION INTRAMUSCULAR; INTRAVENOUS at 14:27

## 2022-06-07 RX ADMIN — ENOXAPARIN SODIUM 40 MG: 40 INJECTION SUBCUTANEOUS at 00:02

## 2022-06-07 RX ADMIN — LORAZEPAM 2 MG: 2 INJECTION INTRAMUSCULAR; INTRAVENOUS at 23:09

## 2022-06-07 RX ADMIN — POTASSIUM CHLORIDE 40 MEQ: 1500 TABLET, EXTENDED RELEASE ORAL at 11:07

## 2022-06-07 RX ADMIN — LEVETIRACETAM 500 MG: 500 TABLET, FILM COATED ORAL at 00:02

## 2022-06-07 ASSESSMENT — LIFESTYLE VARIABLES
AGITATION: *
ORIENTATION AND CLOUDING OF SENSORIUM: ORIENTED AND CAN DO SERIAL ADDITIONS
TOTAL SCORE: 26
ANXIETY: MODERATELY ANXIOUS OR GUARDED, SO ANXIETY IS INFERRED
HEADACHE, FULLNESS IN HEAD: VERY MILD
TREMOR: *
ANXIETY: *
TREMOR: NO TREMOR
AUDITORY DISTURBANCES: NOT PRESENT
PAROXYSMAL SWEATS: *
PAROXYSMAL SWEATS: NO SWEAT VISIBLE
AUDITORY DISTURBANCES: MODERATELY SEVERE HALLUCINATIONS
NAUSEA AND VOMITING: MILD NAUSEA WITH NO VOMITING
TOTAL SCORE: MILD ITCHING, PINS AND NEEDLES SENSATION, BURNING OR NUMBNESS
TOTAL SCORE: MILD ITCHING, PINS AND NEEDLES SENSATION, BURNING OR NUMBNESS
VISUAL DISTURBANCES: NOT PRESENT
AGITATION: MODERATELY FIDGETY AND RESTLESS
NAUSEA AND VOMITING: *
NAUSEA AND VOMITING: NO NAUSEA AND NO VOMITING
NAUSEA AND VOMITING: *
AGITATION: MODERATELY FIDGETY AND RESTLESS
VISUAL DISTURBANCES: VERY MILD SENSITIVITY
AGITATION: *
AGITATION: MODERATELY FIDGETY AND RESTLESS
ORIENTATION AND CLOUDING OF SENSORIUM: ORIENTED AND CAN DO SERIAL ADDITIONS
ANXIETY: MODERATELY ANXIOUS OR GUARDED, SO ANXIETY IS INFERRED
SUBSTANCE_ABUSE: 1
VISUAL DISTURBANCES: MILD SENSITIVITY
AUDITORY DISTURBANCES: NOT PRESENT
ANXIETY: *
VISUAL DISTURBANCES: MILD SENSITIVITY
TOTAL SCORE: 26
VISUAL DISTURBANCES: MILD SENSITIVITY
NAUSEA AND VOMITING: *
ANXIETY: *
AGITATION: NORMAL ACTIVITY
ORIENTATION AND CLOUDING OF SENSORIUM: ORIENTED AND CAN DO SERIAL ADDITIONS
AUDITORY DISTURBANCES: VERY MILD HARSHNESS OR ABILITY TO FRIGHTEN
TREMOR: *
ORIENTATION AND CLOUDING OF SENSORIUM: ORIENTED AND CAN DO SERIAL ADDITIONS
AUDITORY DISTURBANCES: NOT PRESENT
TREMOR: MODERATE TREMOR WITH ARMS EXTENDED
HEADACHE, FULLNESS IN HEAD: VERY MILD
PAROXYSMAL SWEATS: *
TOTAL SCORE: MILD ITCHING, PINS AND NEEDLES SENSATION, BURNING OR NUMBNESS
PAROXYSMAL SWEATS: BARELY PERCEPTIBLE SWEATING. PALMS MOIST
TREMOR: *
AUDITORY DISTURBANCES: MODERATELY SEVERE HALLUCINATIONS
HEADACHE, FULLNESS IN HEAD: MODERATE
AUDITORY DISTURBANCES: MODERATELY SEVERE HALLUCINATIONS
PAROXYSMAL SWEATS: BARELY PERCEPTIBLE SWEATING. PALMS MOIST
TREMOR: TREMOR NOT VISIBLE BUT CAN BE FELT, FINGERTIP TO FINGERTIP
PAROXYSMAL SWEATS: BARELY PERCEPTIBLE SWEATING. PALMS MOIST
NAUSEA AND VOMITING: *
TOTAL SCORE: 26
ORIENTATION AND CLOUDING OF SENSORIUM: ORIENTED AND CAN DO SERIAL ADDITIONS
HEADACHE, FULLNESS IN HEAD: MODERATE
ANXIETY: *
HEADACHE, FULLNESS IN HEAD: MODERATE
AGITATION: MODERATELY FIDGETY AND RESTLESS
PAROXYSMAL SWEATS: NO SWEAT VISIBLE
PAROXYSMAL SWEATS: BARELY PERCEPTIBLE SWEATING. PALMS MOIST
AGITATION: *
PAROXYSMAL SWEATS: BARELY PERCEPTIBLE SWEATING. PALMS MOIST
TOTAL SCORE: MILD ITCHING, PINS AND NEEDLES SENSATION, BURNING OR NUMBNESS
AGITATION: *
AGITATION: MODERATELY FIDGETY AND RESTLESS
NAUSEA AND VOMITING: MILD NAUSEA WITH NO VOMITING
ANXIETY: MODERATELY ANXIOUS OR GUARDED, SO ANXIETY IS INFERRED
HEADACHE, FULLNESS IN HEAD: MILD
PAROXYSMAL SWEATS: BEADS OF SWEAT OBVIOUS ON FOREHEAD
TREMOR: *
HEADACHE, FULLNESS IN HEAD: VERY MILD
VISUAL DISTURBANCES: MILD SENSITIVITY
TREMOR: *
AGITATION: SOMEWHAT MORE THAN NORMAL ACTIVITY
NAUSEA AND VOMITING: *
VISUAL DISTURBANCES: VERY MILD SENSITIVITY
ORIENTATION AND CLOUDING OF SENSORIUM: ORIENTED AND CAN DO SERIAL ADDITIONS
ORIENTATION AND CLOUDING OF SENSORIUM: CANNOT DO SERIAL ADDITIONS OR IS UNCERTAIN ABOUT DATE
ORIENTATION AND CLOUDING OF SENSORIUM: ORIENTED AND CAN DO SERIAL ADDITIONS
ORIENTATION AND CLOUDING OF SENSORIUM: ORIENTED AND CAN DO SERIAL ADDITIONS
PAROXYSMAL SWEATS: BARELY PERCEPTIBLE SWEATING. PALMS MOIST
TOTAL SCORE: 14
NAUSEA AND VOMITING: NO NAUSEA AND NO VOMITING
AGITATION: *
AUDITORY DISTURBANCES: NOT PRESENT
ANXIETY: MODERATELY ANXIOUS OR GUARDED, SO ANXIETY IS INFERRED
AGITATION: *
ORIENTATION AND CLOUDING OF SENSORIUM: CANNOT DO SERIAL ADDITIONS OR IS UNCERTAIN ABOUT DATE
TOTAL SCORE: 26
TOTAL SCORE: 20
TREMOR: *
TREMOR: TREMOR NOT VISIBLE BUT CAN BE FELT, FINGERTIP TO FINGERTIP
TOTAL SCORE: 14
VISUAL DISTURBANCES: MILD SENSITIVITY
HEADACHE, FULLNESS IN HEAD: VERY MILD
VISUAL DISTURBANCES: VERY MILD SENSITIVITY
TOTAL SCORE: 14
HEADACHE, FULLNESS IN HEAD: MODERATE
ORIENTATION AND CLOUDING OF SENSORIUM: ORIENTED AND CAN DO SERIAL ADDITIONS
AUDITORY DISTURBANCES: MODERATELY SEVERE HALLUCINATIONS
TOTAL SCORE: 26
VISUAL DISTURBANCES: MILD SENSITIVITY
PAROXYSMAL SWEATS: *
PAROXYSMAL SWEATS: BARELY PERCEPTIBLE SWEATING. PALMS MOIST
NAUSEA AND VOMITING: *
TOTAL SCORE: 26
ANXIETY: *
AUDITORY DISTURBANCES: NOT PRESENT
ANXIETY: *
AUDITORY DISTURBANCES: MODERATELY SEVERE HALLUCINATIONS
HEADACHE, FULLNESS IN HEAD: VERY MILD
ANXIETY: *
AUDITORY DISTURBANCES: VERY MILD HARSHNESS OR ABILITY TO FRIGHTEN
VISUAL DISTURBANCES: NOT PRESENT
TOTAL SCORE: 21
AGITATION: MODERATELY FIDGETY AND RESTLESS
VISUAL DISTURBANCES: MILD SENSITIVITY
ORIENTATION AND CLOUDING OF SENSORIUM: ORIENTED AND CAN DO SERIAL ADDITIONS
NAUSEA AND VOMITING: NO NAUSEA AND NO VOMITING
NAUSEA AND VOMITING: *
AUDITORY DISTURBANCES: NOT PRESENT
ORIENTATION AND CLOUDING OF SENSORIUM: ORIENTED AND CAN DO SERIAL ADDITIONS
TREMOR: *
ANXIETY: *
TREMOR: *
VISUAL DISTURBANCES: MILD SENSITIVITY
ORIENTATION AND CLOUDING OF SENSORIUM: ORIENTED AND CAN DO SERIAL ADDITIONS
HEADACHE, FULLNESS IN HEAD: VERY MILD
ANXIETY: *
NAUSEA AND VOMITING: NO NAUSEA AND NO VOMITING
TOTAL SCORE: 7
HEADACHE, FULLNESS IN HEAD: VERY MILD
TOTAL SCORE: 21
ANXIETY: *
HEADACHE, FULLNESS IN HEAD: VERY MILD
PAROXYSMAL SWEATS: BARELY PERCEPTIBLE SWEATING. PALMS MOIST
ANXIETY: *
TREMOR: TREMOR NOT VISIBLE BUT CAN BE FELT, FINGERTIP TO FINGERTIP
VISUAL DISTURBANCES: VERY MILD SENSITIVITY
NAUSEA AND VOMITING: *
TOTAL SCORE: 14
TOTAL SCORE: 12
VISUAL DISTURBANCES: MODERATELY SEVERE HALLUCINATIONS
NAUSEA AND VOMITING: MILD NAUSEA WITH NO VOMITING
TOTAL SCORE: VERY MILD ITCHING, PINS AND NEEDLES SENSATION, BURNING OR NUMBNESS
PAROXYSMAL SWEATS: BARELY PERCEPTIBLE SWEATING. PALMS MOIST
AUDITORY DISTURBANCES: VERY MILD HARSHNESS OR ABILITY TO FRIGHTEN
TREMOR: *
TREMOR: *
ORIENTATION AND CLOUDING OF SENSORIUM: ORIENTED AND CAN DO SERIAL ADDITIONS
HEADACHE, FULLNESS IN HEAD: VERY MILD
AUDITORY DISTURBANCES: VERY MILD HARSHNESS OR ABILITY TO FRIGHTEN
AGITATION: *
HEADACHE, FULLNESS IN HEAD: MILD

## 2022-06-07 ASSESSMENT — ENCOUNTER SYMPTOMS
ORTHOPNEA: 0
COUGH: 0
INSOMNIA: 0
MYALGIAS: 0
HEMOPTYSIS: 0
SHORTNESS OF BREATH: 0
SORE THROAT: 0
SEIZURES: 1
DEPRESSION: 0
PALPITATIONS: 1
HEADACHES: 1
NAUSEA: 0
WHEEZING: 0
BLURRED VISION: 0
DOUBLE VISION: 0
WEIGHT LOSS: 0
BLURRED VISION: 1
STRIDOR: 0
FEVER: 0
NECK PAIN: 0
NAUSEA: 1
DIZZINESS: 0
ABDOMINAL PAIN: 1
PALPITATIONS: 0
HEADACHES: 0
VOMITING: 0
BRUISES/BLEEDS EASILY: 0
CHILLS: 0
VOMITING: 1

## 2022-06-07 ASSESSMENT — FIBROSIS 4 INDEX: FIB4 SCORE: 2.95

## 2022-06-07 NOTE — H&P
Hospital Medicine History & Physical Note    Date of Service  6/6/2022    Primary Care Physician  No primary care provider on file.      Code Status  Full Code    Chief Complaint  Chief Complaint   Patient presents with   • ETOH Withdrawal     Last drink yesterday at 0630 AM       History of Presenting Illness  Donal Carpio is a 52 y.o. male who presented 6/6/2022 following seizures at home.  He had multiple seizures at home apparently -however there were no witnesses and the patient states that he lives alone.  He states that he drank a pint of whiskey and beer today.  He has a known history of alcohol withdrawal seizures.  He is on Keppra as well.  He has multiple visits for similar presentations recently.  This is his 10th visit to the ER since late April between [facility] and [facility].  He has been hospitalized at [facility] for seizure activity in the past.  He has had MRI and neurology evaluation which were unremarkable.  Patient has chronic headache and neck pain.  No new numbness or weakness.  No active seizure activity upon arrival.  No fever or recent illness.   In the emergency department he develops severe tremor, tachycardia hypertension and delirium he receives Valium 10 mg IV x2 without significant improvement and he is referred to the hospitalist for admission.    I discussed the plan of care with patient.    Review of Systems  Review of Systems   Unable to perform ROS: Medical condition   Constitutional: Negative for fever, malaise/fatigue and weight loss.   HENT: Negative for sore throat and tinnitus.    Eyes: Negative for blurred vision and double vision.   Respiratory: Negative for cough, hemoptysis and stridor.    Cardiovascular: Negative for chest pain and palpitations.   Gastrointestinal: Negative for nausea and vomiting.   Genitourinary: Negative for dysuria and urgency.   Musculoskeletal: Negative for myalgias and neck pain.   Skin: Negative for itching and rash.   Neurological: Negative for  dizziness and headaches.   Endo/Heme/Allergies: Does not bruise/bleed easily.   Psychiatric/Behavioral: Negative for depression. The patient does not have insomnia.        Past Medical History   has a past medical history of Peptic ulcer, Seizure disorder (HCC), and Ulcer of abdomen wall (HCC).    Surgical History   has a past surgical history that includes appendectomy.     Family History  family history is not on file. istory reviewed with patient. There is no family history that is pertinent to the chief complaint.     Social History   reports that he has never smoked. He has never used smokeless tobacco. He reports current alcohol use. He reports current drug use. Drug: Inhaled.    Allergies  Allergies   Allergen Reactions   • Penicillins Unspecified     Unknown childhood reaction        Medications  Prior to Admission Medications   Prescriptions Last Dose Informant Patient Reported? Taking?   Esomeprazole Magnesium 20 MG Pack  Patient Yes No   Sig: Take 20 mg by mouth every morning before breakfast.   levETIRAcetam (KEPPRA) 500 MG Tab  Patient Yes No   Sig: Take 500 mg by mouth 2 times a day.      Facility-Administered Medications: None       Physical Exam  Temp:  [36.5 °C (97.7 °F)-37.1 °C (98.8 °F)] 36.5 °C (97.7 °F)  Pulse:  [] 140  Resp:  [15-50] 20  BP: (119-176)/() 176/103  SpO2:  [89 %-99 %] 99 %  Blood Pressure: (!) 176/103   Temperature: 36.5 °C (97.7 °F)   Pulse: (!) 140   Respiration: 20   Pulse Oximetry: 99 %       Physical Exam  Vitals and nursing note reviewed.   Constitutional:       General: He is not in acute distress.     Appearance: Normal appearance. He is normal weight. He is not toxic-appearing.   HENT:      Head: Normocephalic and atraumatic.      Nose: Nose normal. No congestion or rhinorrhea.      Mouth/Throat:      Mouth: Mucous membranes are moist.      Pharynx: Oropharynx is clear.   Eyes:      Extraocular Movements: Extraocular movements intact.      Conjunctiva/sclera:  Conjunctivae normal.      Pupils: Pupils are equal, round, and reactive to light.   Neck:      Vascular: No carotid bruit.   Cardiovascular:      Rate and Rhythm: Normal rate and regular rhythm.      Pulses: Normal pulses.      Heart sounds: Normal heart sounds. No murmur heard.    No gallop.   Pulmonary:      Effort: No respiratory distress.      Breath sounds: Normal breath sounds. No wheezing or rales.   Abdominal:      General: Abdomen is flat. Bowel sounds are normal. There is no distension.      Palpations: Abdomen is soft. There is no mass.      Tenderness: There is no abdominal tenderness.      Hernia: No hernia is present.   Musculoskeletal:         General: No tenderness or signs of injury.      Cervical back: Normal range of motion and neck supple. No muscular tenderness.   Lymphadenopathy:      Cervical: No cervical adenopathy.   Skin:     Capillary Refill: Capillary refill takes less than 2 seconds.      Coloration: Skin is not jaundiced or pale.      Findings: No bruising.   Neurological:      General: No focal deficit present.      Mental Status: He is alert and oriented to person, place, and time. Mental status is at baseline.      Cranial Nerves: No cranial nerve deficit.      Motor: No weakness.      Coordination: Coordination normal.   Psychiatric:         Mood and Affect: Mood normal.         Thought Content: Thought content normal.         Judgment: Judgment normal.         Laboratory:  Recent Labs     06/06/22 0237 06/06/22 2103   WBC 10.2 10.3   RBC 5.76 5.19   HEMOGLOBIN 16.1 15.0   HEMATOCRIT 49.1 43.8   MCV 85.2 84.4   MCH 28.0 28.9   MCHC 32.8* 34.2   RDW 53.6* 50.8*   PLATELETCT 323 227   MPV 8.4* 8.5*     Recent Labs     06/06/22 0237 06/06/22 2103   SODIUM 142 139   POTASSIUM 3.9 3.8   CHLORIDE 104 104   CO2 19* 18*   GLUCOSE 154* 96   BUN 9 10   CREATININE 0.88 0.83   CALCIUM 8.7 8.6     Recent Labs     06/06/22 0237 06/06/22 2103   ALTSGPT 138*  --    ASTSGOT 157*  --     ALKPHOSPHAT 126*  --    TBILIRUBIN 0.3  --    GLUCOSE 154* 96         No results for input(s): NTPROBNP in the last 72 hours.      No results for input(s): TROPONINT in the last 72 hours.    Imaging:  No orders to display       X-Ray:  I have personally reviewed the images and compared with prior images.    Assessment/Plan:  Justification for Admission Status  I anticipate this patient will require at least two midnights for appropriate medical management, necessitating inpatient admission because Alcohol withdrawal complicated by seizure    * Alcohol withdrawal delirium (HCC)- (present on admission)  Assessment & Plan  Telemetry admission, [facility] protocol    Lactic acidosis  Assessment & Plan  Secondary to alcoholism, follow-up chemistry    LFT elevation  Assessment & Plan  Likely secondary to alcohol abuse, follow-up LFTs      VTE prophylaxis: SCDs/TEDs

## 2022-06-07 NOTE — PROGRESS NOTES
Report received from night shift RN; patient is actively in ETOH withdrawal; CIWA=14; Ativan q2 hrs; patient very impulsive; likes to get out of bed; poor ambulation; very unsteady on feet; reinforced need to call RN for help/needs; bed alarm in place; monitor

## 2022-06-07 NOTE — DISCHARGE PLANNING
Case Management Discharge Planning    Admission Date: 6/6/2022  GMLOS: 3.4  ALOS: 1    6-Clicks ADL Score: 20  6-Clicks Mobility Score: 16  PT and/or OT Eval ordered: No  Post-acute Referrals Ordered: No  Post-acute Choice Obtained: No  Has referral(s) been sent to post-acute provider:  No      Anticipated Discharge Dispo: Discharge Disposition: Discharged to home/self care (01)    DME Needed: TBD    Action(s) Taken: chart review    Escalations Completed: None    Medically Clear: No    Next Steps:  f/u with pt, medical and nursing teams regarding treatment plan, discharge planning needs and barriers.     Barriers to Discharge: Medical clearance, [facility] ins

## 2022-06-07 NOTE — ED TRIAGE NOTES
Patient comes in for ETOH withdrawal symptoms, patient states his last drink was 0630 yesterday morning. Tremors noted, visible bead sweats, , aox4 at this time.

## 2022-06-07 NOTE — ED PROVIDER NOTES
"ER Provider Note     Scribed for Paulo Isidro M.D. by Tino Keller. 6/6/2022, 8:46 PM.    Primary Care Provider: No primary care provider noted  Means of Arrival: EMS   History obtained from: Patient  History limited by: None     CHIEF COMPLAINT  Chief Complaint   Patient presents with    ETOH Withdrawal     Last drink yesterday at 0630 AM       [facility]  Donal Carpio is a 52 y.o. male who presents to the Emergency Department via EMS for evaluation of tremors onset a few hours ago. The patient endorses associated increased sweating, multiple seizures, and difficulty ambulating, but denies any associated suicidal ideation. He describes falling from standing 3-4x per day due to his tremors. Per nursing, he notes that his last alcoholic drink was 38.5 hours ago, but he typically regularly consumes alcohol. He states that after being discharged this morning, he dumped everything out, got Track B, and is attempting to turn his life around. He notes that \"I am not going to hurt myself, but if we can't figure this out that is what is going to happen\" in regards to his falls. He adds that he is all alone in [place] at this time, and he \"finds it much easier to lay there and drink my day away\" currently.    REVIEW OF SYSTEMS  See HPI for further details. All other systems are negative.     PAST MEDICAL HISTORY   has a past medical history of Peptic ulcer, Seizure disorder (HCC), and Ulcer of abdomen wall (HCC).    SURGICAL HISTORY   has a past surgical history that includes appendectomy.    SOCIAL HISTORY  Social History     Tobacco Use    Smoking status: Never Smoker    Smokeless tobacco: Never Used   Vaping Use    Vaping Use: Every day   Substance Use Topics    Alcohol use: Yes     Comment: pint of whiskey    Drug use: Yes     Types: Inhaled     Comment: marijuana      Social History     Substance and Sexual Activity   Drug Use Yes    Types: Inhaled    Comment: marijuana       FAMILY HISTORY  History reviewed. No pertinent " "family history.    CURRENT MEDICATIONS  Home Medications       Reviewed by Haroldo Brito (Pharmacy Tech) on 06/06/22 at 2241  Med List Status: Complete     Medication Last Dose Status   Chlorpheniramine Maleate (ALLERGY PO) 6/5/2022 Active   Esomeprazole Magnesium 20 MG Pack 6/6/2022 Active   levETIRAcetam (KEPPRA) 500 MG Tab 6/6/2022 Active   therapeutic multivitamin-minerals (THERAGRAN-M) Tab 6/5/2022 Active                    ALLERGIES  Allergies   Allergen Reactions    Penicillins Unspecified     Unknown childhood reaction        PHYSICAL EXAM  VITAL SIGNS: BP (!) 176/103   Pulse (!) 140   Temp 36.5 °C (97.7 °F) (Temporal)   Resp 20   Ht 1.753 m (5' 9\")   Wt 86.2 kg (190 lb)   SpO2 99%   BMI 28.06 kg/m²      Constitutional: Alert in no apparent distress. Tremulous, Anxious.  HENT: No signs of trauma, Bilateral external ears normal, Nose normal.   Eyes: Pupils are equal and reactive, Conjunctiva normal, Non-icteric.   Neck: Normal range of motion, No tenderness, Supple, No stridor.   Lymphatic: No lymphadenopathy noted.   Cardiovascular: Tachycardic, Regular rhythm, no palpable thrill  Thorax & Lungs: No respiratory distress,  No chest tenderness.  CTAB  Abdomen: Bowel sounds normal, Soft, No tenderness, No masses, No pulsatile masses. No peritoneal signs.  Skin: Warm, Dry, No erythema, No rash.   Back: No bony tenderness, No CVA tenderness.   Extremities: Intact distal pulses, No edema, No tenderness, No cyanosis.  Musculoskeletal: Good range of motion in all major joints. No tenderness to palpation or major deformities noted.   Neurologic: Alert , Normal motor function, Normal sensory function, No focal deficits noted.   Psychiatric: Affect normal, Judgment normal, Mood normal.     DIAGNOSTIC STUDIES / PROCEDURES    EKG Interpretation:  Interpreted by me    12 Lead EKG interpreted by me to show:  Normal sinus rhythm  Rate 110  Axis: Normal  Intervals: Normal  Normal T waves  Normal ST " segments  My impression of this EKG: Does not indicate ischemia or arrhythmia at this time.     LABS  Labs Reviewed   CBC WITH DIFFERENTIAL - Abnormal; Notable for the following components:       Result Value    RDW 50.8 (*)     MPV 8.5 (*)     Neutrophils-Polys 80.30 (*)     Lymphocytes 10.30 (*)     Neutrophils (Absolute) 8.24 (*)     All other components within normal limits   BASIC METABOLIC PANEL - Abnormal; Notable for the following components:    Co2 18 (*)     Anion Gap 17.0 (*)     All other components within normal limits   URINE DRUG SCREEN - Abnormal; Notable for the following components:    Cannabinoid Metab Positive (*)     All other components within normal limits   DIAGNOSTIC ALCOHOL   ESTIMATED GFR   URINALYSIS   MAGNESIUM   CBC WITHOUT DIFFERENTIAL   COMP METABOLIC PANEL   PHOSPHORUS   URINE DRUG SCREEN   LIPASE     All labs reviewed by me.    COURSE & MEDICAL DECISION MAKING  Pertinent Labs reviewed. (See chart for details)    This is a 52 y.o. male that presents via EMS for evaluation of tremors onset a few hours ago.  The patient does appear to be in alcohol withdrawal.  He will need aggressive therapy of benzodiazepines.  Will evaluate for electrolyte derangements as well as alcohol level and evaluate for arrhythmia genic causes for his tremulousness.    8:46 PM - Patient seen and examined at bedside. Ordered CBC w/diff, BMP, Diagnostic Alcohol, and EKG to evaluate.  Patient will be medicated with LR infusion and Valium 30 mg injection for his symptoms. Informed the patient that he is likely going to need to stay in the hospital for a couple of days for further care.    9:57 PM - Paged Hospitalist.    10:06 PM - I discussed the patient's case and the above findings with Dr. Sanchez (Hospitalist) who agrees to evaluate the patient for hospitalization.    The patient required multiple doses of medication.  Patient is still tremulous.  The patient was positive for cannabinoids.  He will need to be  admitted to the hospitalist.  He will be admitted in guarded condition.    The total critical care time on this patient is 35 minutes, resuscitating patient, speaking with admitting physician, and deciphering test results. This  is exclusive of separately billable procedures.         DISPOSITION:  Patient will be hospitalized by Dr. Sanchez (Hospitalist) in guarded condition.    FINAL IMPRESSION  1. Alcohol withdrawal syndrome without complication (HCC)          Tino NEWTON (Scribe), am scribing for, and in the presence of, Paulo Isidro M.D..    Electronically signed by: Tino Keller (Scribe), 6/6/2022    Paulo NEWTON M.D. personally performed the services described in this documentation, as scribed by Tino Keller in my presence, and it is both accurate and complete. C.    The note accurately reflects work and decisions made by me.  Paulo Isidro M.D.  6/7/2022  3:09 AM

## 2022-06-07 NOTE — PROGRESS NOTES
4 Eyes Skin Assessment Completed by Ebonie Kenny, RN and Edil Rice, RN.    Head Bruising and Scar  Ears WDL  Nose Redness and Scab  Mouth WDL  Neck WDL  Breast/Chest WDL  Shoulder Blades WDL  Spine WDL  (R) Arm/Elbow/Hand WDL  (L) Arm/Elbow/Hand WDL  Abdomen WDL  Groin WDL  Scrotum/Coccyx/Buttocks Blanching + hemorrhoids  (R) Leg WDL  (L) Leg WDL  (R) Heel/Foot/Toe WDL  (L) Heel/Foot/Toe WDL          Devices In Places Tele Box, Blood Pressure Cuff and Pulse Ox      Interventions In Place Pillows    Possible Skin Injury No    Pictures Uploaded Into Epic N/A  Wound Consult Placed N/A  RN Wound Prevention Protocol Ordered No

## 2022-06-07 NOTE — ED NOTES
Correction: Patient's last drink was at 630 this morning, but patient was very drunk and in the emergency room *here*. After discharge this AM, patient went to [facility] where they also discharged him. Patient then came back here.

## 2022-06-07 NOTE — ASSESSMENT & PLAN NOTE
Presented to ED with unwitnessed seizure at home. Multiple prior ER visits/hospital stays for alcohol withdrawal. Patient was started on Keppra related to seizures without following up with outpatient neurologist. Reports consistently taking Keppra at home. Drinking at least 11 drinks daily for past 6 months, previously reports to be 16 years sober. Conjugate gaze on exam has resolved, though still experiencing some nystagmus. CIWA scores have trended to mostly below 10, though still requiring ativan.   Plan:  -Continue Keppra 500 mg BID  -CIWA protocol w/ ativan  -Encourage AA w/ outpatient resources provided  -D/C IV thiamine and start thiamine 100 mg daily as patient continues to pull out IV  -Continue multivitamin

## 2022-06-07 NOTE — ED NOTES
Report given to oncoming RN Ebonie at bedside, Patient aware of POC to be admitted and get withdraws under control.

## 2022-06-07 NOTE — ED NOTES
Med rec completed per patient  Allergies reviewed  No PO Antibiotics in the last 30 days     Patient states he got doses of his medications while in the hospital this morning.

## 2022-06-08 PROBLEM — E87.29 HIGH ANION GAP METABOLIC ACIDOSIS: Status: ACTIVE | Noted: 2022-06-08

## 2022-06-08 LAB
ALBUMIN SERPL BCP-MCNC: 3.8 G/DL (ref 3.2–4.9)
ALBUMIN/GLOB SERPL: 1.2 G/DL
ALP SERPL-CCNC: 110 U/L (ref 30–99)
ALT SERPL-CCNC: 89 U/L (ref 2–50)
ANION GAP SERPL CALC-SCNC: 14 MMOL/L (ref 7–16)
AST SERPL-CCNC: 88 U/L (ref 12–45)
BASOPHILS # BLD AUTO: 0.6 % (ref 0–1.8)
BASOPHILS # BLD: 0.04 K/UL (ref 0–0.12)
BILIRUB SERPL-MCNC: 0.5 MG/DL (ref 0.1–1.5)
BUN SERPL-MCNC: 8 MG/DL (ref 8–22)
CALCIUM SERPL-MCNC: 8.3 MG/DL (ref 8.5–10.5)
CHLORIDE SERPL-SCNC: 108 MMOL/L (ref 96–112)
CO2 SERPL-SCNC: 17 MMOL/L (ref 20–33)
CREAT SERPL-MCNC: 0.65 MG/DL (ref 0.5–1.4)
EOSINOPHIL # BLD AUTO: 0.25 K/UL (ref 0–0.51)
EOSINOPHIL NFR BLD: 3.5 % (ref 0–6.9)
ERYTHROCYTE [DISTWIDTH] IN BLOOD BY AUTOMATED COUNT: 52.2 FL (ref 35.9–50)
GFR SERPLBLD CREATININE-BSD FMLA CKD-EPI: 115 ML/MIN/1.73 M 2
GLOBULIN SER CALC-MCNC: 3.2 G/DL (ref 1.9–3.5)
GLUCOSE SERPL-MCNC: 81 MG/DL (ref 65–99)
HCT VFR BLD AUTO: 40.7 % (ref 42–52)
HGB BLD-MCNC: 13.7 G/DL (ref 14–18)
IMM GRANULOCYTES # BLD AUTO: 0.03 K/UL (ref 0–0.11)
IMM GRANULOCYTES NFR BLD AUTO: 0.4 % (ref 0–0.9)
LACTATE BLD-SCNC: 0.9 MMOL/L (ref 0.5–2)
LYMPHOCYTES # BLD AUTO: 1.39 K/UL (ref 1–4.8)
LYMPHOCYTES NFR BLD: 19.5 % (ref 22–41)
MCH RBC QN AUTO: 28.6 PG (ref 27–33)
MCHC RBC AUTO-ENTMCNC: 33.7 G/DL (ref 33.7–35.3)
MCV RBC AUTO: 85 FL (ref 81.4–97.8)
MONOCYTES # BLD AUTO: 0.51 K/UL (ref 0–0.85)
MONOCYTES NFR BLD AUTO: 7.2 % (ref 0–13.4)
NEUTROPHILS # BLD AUTO: 4.89 K/UL (ref 1.82–7.42)
NEUTROPHILS NFR BLD: 68.8 % (ref 44–72)
NRBC # BLD AUTO: 0 K/UL
NRBC BLD-RTO: 0 /100 WBC
PLATELET # BLD AUTO: 141 K/UL (ref 164–446)
PMV BLD AUTO: 8.7 FL (ref 9–12.9)
POTASSIUM SERPL-SCNC: 3.9 MMOL/L (ref 3.6–5.5)
PROT SERPL-MCNC: 7 G/DL (ref 6–8.2)
RBC # BLD AUTO: 4.79 M/UL (ref 4.7–6.1)
SODIUM SERPL-SCNC: 139 MMOL/L (ref 135–145)
WBC # BLD AUTO: 7.1 K/UL (ref 4.8–10.8)

## 2022-06-08 PROCEDURE — 36415 COLL VENOUS BLD VENIPUNCTURE: CPT

## 2022-06-08 PROCEDURE — A9270 NON-COVERED ITEM OR SERVICE: HCPCS | Performed by: INTERNAL MEDICINE

## 2022-06-08 PROCEDURE — 80053 COMPREHEN METABOLIC PANEL: CPT

## 2022-06-08 PROCEDURE — 83605 ASSAY OF LACTIC ACID: CPT

## 2022-06-08 PROCEDURE — A9270 NON-COVERED ITEM OR SERVICE: HCPCS

## 2022-06-08 PROCEDURE — 700102 HCHG RX REV CODE 250 W/ 637 OVERRIDE(OP): Performed by: INTERNAL MEDICINE

## 2022-06-08 PROCEDURE — 700102 HCHG RX REV CODE 250 W/ 637 OVERRIDE(OP)

## 2022-06-08 PROCEDURE — 700102 HCHG RX REV CODE 250 W/ 637 OVERRIDE(OP): Performed by: STUDENT IN AN ORGANIZED HEALTH CARE EDUCATION/TRAINING PROGRAM

## 2022-06-08 PROCEDURE — 85025 COMPLETE CBC W/AUTO DIFF WBC: CPT

## 2022-06-08 PROCEDURE — A9270 NON-COVERED ITEM OR SERVICE: HCPCS | Performed by: STUDENT IN AN ORGANIZED HEALTH CARE EDUCATION/TRAINING PROGRAM

## 2022-06-08 PROCEDURE — 700111 HCHG RX REV CODE 636 W/ 250 OVERRIDE (IP): Performed by: STUDENT IN AN ORGANIZED HEALTH CARE EDUCATION/TRAINING PROGRAM

## 2022-06-08 PROCEDURE — 99233 SBSQ HOSP IP/OBS HIGH 50: CPT | Mod: GC | Performed by: HOSPITALIST

## 2022-06-08 PROCEDURE — 770020 HCHG ROOM/CARE - TELE (206)

## 2022-06-08 PROCEDURE — 700111 HCHG RX REV CODE 636 W/ 250 OVERRIDE (IP): Performed by: INTERNAL MEDICINE

## 2022-06-08 RX ORDER — GAUZE BANDAGE 2" X 2"
100 BANDAGE TOPICAL DAILY
Status: DISCONTINUED | OUTPATIENT
Start: 2022-06-08 | End: 2022-06-09 | Stop reason: HOSPADM

## 2022-06-08 RX ORDER — HALOPERIDOL 5 MG/ML
5 INJECTION INTRAMUSCULAR ONCE
Status: COMPLETED | OUTPATIENT
Start: 2022-06-08 | End: 2022-06-08

## 2022-06-08 RX ORDER — NICOTINE 21 MG/24HR
21 PATCH, TRANSDERMAL 24 HOURS TRANSDERMAL
Status: DISCONTINUED | OUTPATIENT
Start: 2022-06-08 | End: 2022-06-09 | Stop reason: HOSPADM

## 2022-06-08 RX ORDER — GABAPENTIN 300 MG/1
300 CAPSULE ORAL 3 TIMES DAILY
Status: DISCONTINUED | OUTPATIENT
Start: 2022-06-08 | End: 2022-06-09

## 2022-06-08 RX ADMIN — THIAMINE HCL TAB 100 MG 100 MG: 100 TAB at 15:25

## 2022-06-08 RX ADMIN — LORAZEPAM 1 MG: 1 TABLET ORAL at 20:31

## 2022-06-08 RX ADMIN — ENOXAPARIN SODIUM 40 MG: 40 INJECTION SUBCUTANEOUS at 17:47

## 2022-06-08 RX ADMIN — LEVETIRACETAM 500 MG: 500 TABLET, FILM COATED ORAL at 17:47

## 2022-06-08 RX ADMIN — DOCUSATE SODIUM 50 MG AND SENNOSIDES 8.6 MG 2 TABLET: 8.6; 5 TABLET, FILM COATED ORAL at 17:47

## 2022-06-08 RX ADMIN — LORAZEPAM 4 MG: 2 TABLET ORAL at 04:55

## 2022-06-08 RX ADMIN — HALOPERIDOL LACTATE 5 MG: 5 INJECTION, SOLUTION INTRAMUSCULAR at 00:45

## 2022-06-08 RX ADMIN — FOLIC ACID 1 MG: 1 TABLET ORAL at 06:00

## 2022-06-08 RX ADMIN — GABAPENTIN 300 MG: 300 CAPSULE ORAL at 13:01

## 2022-06-08 RX ADMIN — Medication 5 MG: at 21:00

## 2022-06-08 RX ADMIN — NICOTINE TRANSDERMAL SYSTEM 21 MG: 21 PATCH, EXTENDED RELEASE TRANSDERMAL at 18:08

## 2022-06-08 RX ADMIN — LORAZEPAM 1 MG: 1 TABLET ORAL at 15:25

## 2022-06-08 RX ADMIN — GABAPENTIN 300 MG: 300 CAPSULE ORAL at 17:47

## 2022-06-08 RX ADMIN — DOCUSATE SODIUM 50 MG AND SENNOSIDES 8.6 MG 2 TABLET: 8.6; 5 TABLET, FILM COATED ORAL at 06:00

## 2022-06-08 RX ADMIN — LORAZEPAM 3 MG: 2 TABLET ORAL at 18:03

## 2022-06-08 RX ADMIN — OMEPRAZOLE 20 MG: 20 CAPSULE, DELAYED RELEASE ORAL at 06:00

## 2022-06-08 RX ADMIN — LORAZEPAM 2 MG: 2 INJECTION INTRAMUSCULAR; INTRAVENOUS at 00:25

## 2022-06-08 RX ADMIN — LEVETIRACETAM 500 MG: 500 TABLET, FILM COATED ORAL at 06:00

## 2022-06-08 RX ADMIN — THERA TABS 1 TABLET: TAB at 06:00

## 2022-06-08 ASSESSMENT — ENCOUNTER SYMPTOMS
ORTHOPNEA: 0
PALPITATIONS: 0
ABDOMINAL PAIN: 1
WHEEZING: 0
SEIZURES: 0
BLURRED VISION: 1
FEVER: 0
SHORTNESS OF BREATH: 0
HEADACHES: 0
VOMITING: 0
NAUSEA: 0
CHILLS: 0

## 2022-06-08 ASSESSMENT — LIFESTYLE VARIABLES
AUDITORY DISTURBANCES: NOT PRESENT
ANXIETY: MILDLY ANXIOUS
TREMOR: NO TREMOR
TOTAL SCORE: 4
HEADACHE, FULLNESS IN HEAD: MODERATE
AGITATION: SOMEWHAT MORE THAN NORMAL ACTIVITY
AUDITORY DISTURBANCES: NOT PRESENT
ANXIETY: MODERATELY ANXIOUS OR GUARDED, SO ANXIETY IS INFERRED
NAUSEA AND VOMITING: MILD NAUSEA WITH NO VOMITING
ORIENTATION AND CLOUDING OF SENSORIUM: ORIENTED AND CAN DO SERIAL ADDITIONS
ORIENTATION AND CLOUDING OF SENSORIUM: ORIENTED AND CAN DO SERIAL ADDITIONS
AGITATION: PACES BACK AND FORTH DURING MOST OF THE INTERVIEW OR CONSTANTLY THRASHES ABOUT.
TREMOR: NO TREMOR
TREMOR: *
ORIENTATION AND CLOUDING OF SENSORIUM: ORIENTED AND CAN DO SERIAL ADDITIONS
HEADACHE, FULLNESS IN HEAD: NOT PRESENT
AGITATION: SOMEWHAT MORE THAN NORMAL ACTIVITY
TREMOR: TREMOR NOT VISIBLE BUT CAN BE FELT, FINGERTIP TO FINGERTIP
PAROXYSMAL SWEATS: NO SWEAT VISIBLE
HEADACHE, FULLNESS IN HEAD: NOT PRESENT
AUDITORY DISTURBANCES: NOT PRESENT
ANXIETY: *
TREMOR: MODERATE TREMOR WITH ARMS EXTENDED
TOTAL SCORE: 10
TREMOR: MODERATE TREMOR WITH ARMS EXTENDED
ORIENTATION AND CLOUDING OF SENSORIUM: ORIENTED AND CAN DO SERIAL ADDITIONS
AGITATION: SOMEWHAT MORE THAN NORMAL ACTIVITY
AUDITORY DISTURBANCES: NOT PRESENT
ORIENTATION AND CLOUDING OF SENSORIUM: DATE DISORIENTATION BY MORE THAN TWO CALENDAR DAYS
PAROXYSMAL SWEATS: NO SWEAT VISIBLE
VISUAL DISTURBANCES: NOT PRESENT
VISUAL DISTURBANCES: NOT PRESENT
AUDITORY DISTURBANCES: NOT PRESENT
VISUAL DISTURBANCES: NOT PRESENT
VISUAL DISTURBANCES: NOT PRESENT
TREMOR: *
ANXIETY: NO ANXIETY (AT EASE)
PAROXYSMAL SWEATS: NO SWEAT VISIBLE
TOTAL SCORE: 16
PAROXYSMAL SWEATS: BARELY PERCEPTIBLE SWEATING. PALMS MOIST
TOTAL SCORE: 35
PAROXYSMAL SWEATS: BARELY PERCEPTIBLE SWEATING. PALMS MOIST
HEADACHE, FULLNESS IN HEAD: MODERATE
HEADACHE, FULLNESS IN HEAD: NOT PRESENT
AGITATION: NORMAL ACTIVITY
ORIENTATION AND CLOUDING OF SENSORIUM: ORIENTED AND CAN DO SERIAL ADDITIONS
ANXIETY: MILDLY ANXIOUS
HEADACHE, FULLNESS IN HEAD: NOT PRESENT
PAROXYSMAL SWEATS: *
TOTAL SCORE: 9
AGITATION: SOMEWHAT MORE THAN NORMAL ACTIVITY
HEADACHE, FULLNESS IN HEAD: NOT PRESENT
TOTAL SCORE: 0
VISUAL DISTURBANCES: NOT PRESENT
TOTAL SCORE: SEVERE HALLUCINATIONS
NAUSEA AND VOMITING: NO NAUSEA AND NO VOMITING
TOTAL SCORE: 2
ANXIETY: *
PAROXYSMAL SWEATS: NO SWEAT VISIBLE
NAUSEA AND VOMITING: *
VISUAL DISTURBANCES: NOT PRESENT
NAUSEA AND VOMITING: MILD NAUSEA WITH NO VOMITING
NAUSEA AND VOMITING: NO NAUSEA AND NO VOMITING
NAUSEA AND VOMITING: NO NAUSEA AND NO VOMITING
AUDITORY DISTURBANCES: SEVERE HALLUCINATIONS
SUBSTANCE_ABUSE: 1
ANXIETY: MILDLY ANXIOUS
AGITATION: SOMEWHAT MORE THAN NORMAL ACTIVITY
ORIENTATION AND CLOUDING OF SENSORIUM: ORIENTED AND CAN DO SERIAL ADDITIONS
NAUSEA AND VOMITING: NO NAUSEA AND NO VOMITING
VISUAL DISTURBANCES: NOT PRESENT
AUDITORY DISTURBANCES: NOT PRESENT

## 2022-06-08 ASSESSMENT — PATIENT HEALTH QUESTIONNAIRE - PHQ9
SUM OF ALL RESPONSES TO PHQ9 QUESTIONS 1 AND 2: 0
2. FEELING DOWN, DEPRESSED, IRRITABLE, OR HOPELESS: NOT AT ALL
1. LITTLE INTEREST OR PLEASURE IN DOING THINGS: NOT AT ALL

## 2022-06-08 ASSESSMENT — FIBROSIS 4 INDEX: FIB4 SCORE: 3.31

## 2022-06-08 NOTE — DISCHARGE PLANNING
Care Transition Team Assessment    LSW attempted to meet with pt at bedside to complete assessment. Pt requested this LSW come back in 5 min as he needs to make a quick phone call.    Addendum @1516  LSW met with pt at bedside to complete assessment. Pt A&Ox4 and able to verify the information on the face sheet. Pt reported he is currently staying with his parents in a single story house with no steps to enter. Pt is independent with all ADLs and IADLs. Pt manages his own medications and drives himself. Pt does not use any DME. Pt reported his parents make up his support system.    Pt is currently unemployed and has no source of income. Pt has a history of etoh, however no MH concerns. Chemical dependency resources given to pt at bedside. Pt reported his PCP is Dr Martinez with [facility]. Pt reported he intends to stay in [place] and would like to speak with PFA to be screened for [place] medicaid. LSW sent email to PFA. Pt does not have an advance directive, however would like his S/O [place] to be his DPOA. LSW educated pt on advance directives and provided AD packet at bedside. Pt will likely need assistance with transportation home upon DC.    Information Source  Orientation Level: Oriented X4  Information Given By: Patient  Informant's Name: Donal Carpio  Who is responsible for making decisions for patient? : Patient    Readmission Evaluation  Is this a readmission?: Yes - unplanned readmission    Elopement Risk  Legal Hold: No  Ambulatory or Self Mobile in Wheelchair: No-Not an Elopement Risk  Elopement Risk: Not at Risk for Elopement    Interdisciplinary Discharge Planning  Patient or legal guardian wants to designate a caregiver: No    Discharge Preparedness  What is your plan after discharge?: Home with help  What are your discharge supports?: Parent  Prior Functional Level: Ambulatory, Drives Self, Independent with Activities of Daily Living, Independent with Medication Management  Difficulity with ADLs:  None  Difficulity with IADLs: None    Functional Assesment  Prior Functional Level: Ambulatory, Drives Self, Independent with Activities of Daily Living, Independent with Medication Management    Finances  Financial Barriers to Discharge: Yes  Average Monthly Income: 0 $  Source of Income: None  Prescription Coverage: Yes    Vision / Hearing Impairment  Vision Impairment : Yes  Right Eye Vision: Impaired, Wears Glasses  Left Eye Vision: Impaired, Wears Glasses  Hearing Impairment : No    Advance Directive  Advance Directive?: None  Advance Directive offered?: AD Booklet given    Domestic Abuse  Have you ever been the victim of abuse or violence?: No  Physical Abuse or Sexual Abuse: No  Verbal Abuse or Emotional Abuse: No  Possible Abuse/Neglect Reported to:: Not Applicable    Psychological Assessment  History of Substance Abuse: Alcohol  History of Psychiatric Problems: No  Non-compliant with Treatment: No  Newly Diagnosed Illness: No    Discharge Risks or Barriers  Discharge risks or barriers?: Uninsured / underinsured, Substance abuse  Patient risk factors: Substance abuse, Uninsured or underinsured    Anticipated Discharge Information  Discharge Disposition: Discharged to home/self care (01)

## 2022-06-08 NOTE — PROGRESS NOTES
Patient having ETOH withdrawal; CIWA=14 q 2/hr;pulled out L AC IV; replaced IV 20g' r/ wrist; w/o diffculty; patient very impulsive; continue Ativan as needed

## 2022-06-08 NOTE — PROGRESS NOTES
Daily Progress Note:     Date of Service: 6/8/2022  Primary Team: [facility] Team   Attending: YOAV Ordonez M.D.   Senior Resident: Dr. Robi Hagan  Intern: Dr. Christiano Chan  Contact:  [phone]    Chief Complaint:   Chief Complaint   Patient presents with   • ETOH Withdrawal     Last drink yesterday at 0630 AM     ID:  52 year old male with history of alcohol use disorder with prior ED visits for alcohol intoxication/withdrawal who reported to ED on 06/06/22 after experiencing an unwitnessed seizure at home.     Subjective:  Patient became agitated overnight, pulling out IV lines. Patient required haldol and non-violent restraints. When discussing with patient in the morning, he denied remembering the event. However, speaking with him in the afternoon, he noted that the night nurse upset him and that is why he became agitated. He also reported having multiple unwitnessed seizures this morning. When he describes his seizures, he notes they last 10 minutes and he is incontinent to urine and has a period of confusion afterward. He reports that the only person who has ever witnessed his seizures is a nancy who lived at his motel who is now dead. He mentions that he has been living at the [facility], though he is behind in rent and is concerned about not being able to return back to the mot. Patient endorsed continued desire to quit alcohol. Patient denies any nausea, vomiting; though continues to experience some abdominal pain, though tolerating food.     Consultants/Specialty:  None.    Review of Systems:   Review of Systems   Constitutional: Negative for chills and fever.   HENT: Negative.    Eyes: Positive for blurred vision.   Respiratory: Negative for shortness of breath and wheezing.    Cardiovascular: Negative for chest pain, palpitations and orthopnea.   Gastrointestinal: Positive for abdominal pain. Negative for nausea and vomiting.   Genitourinary: Negative.    Skin: Negative.     Neurological: Negative for seizures and headaches.   Psychiatric/Behavioral: Positive for substance abuse. Negative for suicidal ideas.       Objective Data:   Physical Exam:   Vitals:   Temp:  [36.1 °C (97 °F)-36.7 °C (98.1 °F)] 36.3 °C (97.3 °F)  Pulse:  [] 80  Resp:  [17-18] 18  BP: (126-159)/() 126/87  SpO2:  [94 %-95 %] 94 %    Physical Exam  Constitutional:       Appearance: Normal appearance. He is not toxic-appearing.   HENT:      Head: Normocephalic and atraumatic.   Eyes:      General: No scleral icterus.  Cardiovascular:      Rate and Rhythm: Regular rhythm. Tachycardia present.      Heart sounds: No murmur heard.  Pulmonary:      Effort: Pulmonary effort is normal.      Breath sounds: Normal breath sounds. No wheezing.   Abdominal:      General: Abdomen is flat.      Palpations: Abdomen is soft.      Tenderness: There is abdominal tenderness. There is no guarding.   Musculoskeletal:      Right lower leg: No edema.      Left lower leg: No edema.   Neurological:      Mental Status: He is alert.       Labs:   Recent Labs     06/06/22  0237 06/06/22  2103 06/07/22  0358 06/08/22  0257   WBC 10.2 10.3 8.4 7.1   RBC 5.76 5.19 4.73 4.79   HEMOGLOBIN 16.1 15.0 13.4* 13.7*   HEMATOCRIT 49.1 43.8 40.1* 40.7*   MCV 85.2 84.4 84.8 85.0   MCH 28.0 28.9 28.3 28.6   RDW 53.6* 50.8* 52.3* 52.2*   PLATELETCT 323 227 179 141*   MPV 8.4* 8.5* 8.6* 8.7*   NEUTSPOLYS 47.00 80.30*  --  68.80   LYMPHOCYTES 40.30 10.30*  --  19.50*   MONOCYTES 8.00 6.60  --  7.20   EOSINOPHILS 2.90 1.30  --  3.50   BASOPHILS 1.20 0.60  --  0.60     Lab Results   Component Value Date/Time    SODIUM 139 06/08/2022 02:57 AM    POTASSIUM 3.9 06/08/2022 02:57 AM    CHLORIDE 108 06/08/2022 02:57 AM    CO2 17 (L) 06/08/2022 02:57 AM    GLUCOSE 81 06/08/2022 02:57 AM    BUN 8 06/08/2022 02:57 AM    CREATININE 0.65 06/08/2022 02:57 AM        Imaging:   CT Head w/o contrast 06/07/22:  IMPRESSION:     Negative noncontrast CT scan of the head  / brain.      Problem Representation:   52 year old male with history of alcohol use disorder with prior ED visits for alcohol intoxication/withdrawal who reported to ED on 22 after experiencing an unwitnessed seizure at home.     * Alcohol withdrawal syndrome with complication (HCC)- (present on admission)  Assessment & Plan  Presented to ED with unwitnessed seizure at home. Multiple prior ER visits/hospital stays for alcohol withdrawal. Patient was started on Keppra related to seizures without following up with outpatient neurologist. Reports consistently taking Keppra at home. Drinking at least 11 drinks daily for past 6 months, previously reports to be 16 years sober. Conjugate gaze on exam has resolved, though still experiencing some nystagmus. CIWA scores have trended to mostly below 10, though still requiring ativan.   Plan:  -Continue Keppra 500 mg BID  -CIWA protocol w/ ativan  -Encourage AA w/ outpatient resources provided  -D/C IV thiamine and start thiamine 100 mg daily as patient continues to pull out IV  -Continue multivitamin      Seizure disorder (HCC)  Assessment & Plan  Reports starting on Keppra in the last 2 months related to seizures after hitting his head. Though prior notes mention history of seizure going back to 2021. Has had previous syncopal episodes with loss of bladder control. Mentions only person who has ever witnessed his seizures has .   Plan:  -Continue Keppra  -Encourage continued alcohol cessation  -Follow-up with outpatient neurology    High anion gap metabolic acidosis  Assessment & Plan  High anion gap metabolic of 14 with bicarb of 17.  Likely related to starvation ketoacidosis in setting of poor nutrition related to alcohol use. Phosphorous normal yesterday with adequate potassium on labs today.   Plan:  -CMP w/ Mg, Phos and beta-hydroxybutyric acid ordered for tomorrow.     Lactic acidosis  Assessment & Plan  Resolved s/p IV fluids.   Secondary to alcohol  abuse and volume depletion.      LFT elevation  Assessment & Plan  Likely secondary to alcohol abuse.  Trending down off alcohol.  -Repeat metabolic panel tomorrow

## 2022-06-08 NOTE — ASSESSMENT & PLAN NOTE
Reports starting on Keppra in the last 2 months related to seizures after hitting his head. Though prior notes mention history of seizure going back to 2021. Has had previous syncopal episodes with loss of bladder control. Mentions only person who has ever witnessed his seizures has .   Plan:  -Continue Keppra  -Encourage continued alcohol cessation  -Follow-up with outpatient neurology

## 2022-06-08 NOTE — PROGRESS NOTES
Daily Progress Note:     Date of Service: 6/7/2022  Primary Team: [facility] Team   Attending: YOAV Ordonez M.D.   Senior Resident: Dr. Robi Hagan  Intern: Dr. Christiano Chan  Contact:  [phone]    Chief Complaint:   Chief Complaint   Patient presents with   • ETOH Withdrawal     Last drink yesterday at 0630 AM     ID:  52 year old male with history of alcohol use disorder with prior ED visits for alcohol intoxication/withdrawal who reported to ED on 06/06/22 after experiencing an unwitnessed seizure at home.     Subjective:  Patient notes this morning that he had been sober for 16 years until about 6 months ago after his daughter who he hadn't talked to in 3 years got into a car accident.  He has a history of seizure documented back in October 2021 (moved from [place] 6 months ago, previously at [facility]) and has had several seizures in May thought to be related to alcohol withdrawal. Patient has had unsteadiness of gait during prior visits at [facility]. He sustained a laceration on his head during a fall with CTs head that were negative.   He admits to drinking 1 pint of [place] and 1 beer of [place] every night. He has concern about dying from alcohol like his father did in 2002. He has gone to  before and is desiring to get into [facility]. He is currently living at the [facility] in [place]. He endorses abdominal pain, nausea, vomiting and tachycardia. No seizures since hospital admission. He otherwise denies ICU admission for alcohol withdrawal, but was intubated in the field.     Consultants/Specialty:  None.    Review of Systems:   Review of Systems   Constitutional: Negative for chills and fever.   HENT: Negative.    Eyes: Positive for blurred vision.   Respiratory: Negative for shortness of breath and wheezing.    Cardiovascular: Positive for palpitations. Negative for chest pain and orthopnea.   Gastrointestinal: Positive for abdominal pain, nausea and vomiting.    Genitourinary: Negative.    Skin: Negative.    Neurological: Positive for seizures and headaches.   Psychiatric/Behavioral: Positive for substance abuse. Negative for suicidal ideas.       Objective Data:   Physical Exam:   Vitals:   Temp:  [36.1 °C (97 °F)-37.2 °C (99 °F)] 36.5 °C (97.7 °F)  Pulse:  [] 104  Resp:  [18-20] 18  BP: (151-176)/() 159/101  SpO2:  [93 %-99 %] 95 %    Physical Exam  Constitutional:       Appearance: Normal appearance. He is ill-appearing. He is not toxic-appearing.   HENT:      Head: Normocephalic and atraumatic.   Eyes:      General: No scleral icterus.     Comments: Disconjugate gaze    Cardiovascular:      Rate and Rhythm: Regular rhythm. Tachycardia present.      Heart sounds: No murmur heard.  Pulmonary:      Effort: Pulmonary effort is normal.      Breath sounds: Normal breath sounds. No wheezing.   Abdominal:      General: Abdomen is flat.      Palpations: Abdomen is soft.      Tenderness: There is abdominal tenderness. There is no guarding.   Musculoskeletal:      Right lower leg: No edema.      Left lower leg: No edema.   Neurological:      Mental Status: He is alert.       Labs:   Recent Labs     06/06/22  0237 06/06/22  2103 06/07/22  0358   WBC 10.2 10.3 8.4   RBC 5.76 5.19 4.73   HEMOGLOBIN 16.1 15.0 13.4*   HEMATOCRIT 49.1 43.8 40.1*   MCV 85.2 84.4 84.8   MCH 28.0 28.9 28.3   RDW 53.6* 50.8* 52.3*   PLATELETCT 323 227 179   MPV 8.4* 8.5* 8.6*   NEUTSPOLYS 47.00 80.30*  --    LYMPHOCYTES 40.30 10.30*  --    MONOCYTES 8.00 6.60  --    EOSINOPHILS 2.90 1.30  --    BASOPHILS 1.20 0.60  --      Lab Results   Component Value Date/Time    SODIUM 138 06/07/2022 03:58 AM    POTASSIUM 3.4 (L) 06/07/2022 03:58 AM    CHLORIDE 106 06/07/2022 03:58 AM    CO2 19 (L) 06/07/2022 03:58 AM    GLUCOSE 102 (H) 06/07/2022 03:58 AM    BUN 9 06/07/2022 03:58 AM    CREATININE 0.63 06/07/2022 03:58 AM        Imaging:   CT Head w/o contrast 06/07/22:  IMPRESSION:     Negative  noncontrast CT scan of the head / brain.      Problem Representation:   52 year old male with history of alcohol use disorder with prior ED visits for alcohol intoxication/withdrawal who reported to ED on 06/06/22 after experiencing an unwitnessed seizure at home.     * Alcohol withdrawal syndrome with complication (HCC)- (present on admission)  Assessment & Plan  Presented to ED with unwitnessed seizure at home. Multiple prior ER visits/hospital stays for alcohol withdrawal. Patient was started on Keppra related to seizures without following up with outpatient neurologist. Reports consistently taking Keppra at home. Drinking at least 11 drinks daily for past 6 months, previously reports to be 16 years sober.   Patient also had conjugate gaze on exam concerning for [place].   Plan:  -Continue Keppra 500 mg BID  -CIWA protocol w/ ativan  -Refer to outpatient rehab/AA  -Start on IV thiamine  -Continue multivitamin      Seizure disorder (HCC)  Assessment & Plan  Reports starting on Keppra in the last 2 months related to seizures after hitting his head. Though prior notes mention history of seizure going back to October 2021. Has had previous syncopal episodes with loss of bladder control.  Plan:  -Encourage continued alcohol cessation  -Follow-up with outpatient neurology    Lactic acidosis  Assessment & Plan  Secondary to alcohol abuse and volume depletion.  Improved with 1L IVF in ED.   Plan:  -Continue IVF (1L bolus, followed by LR at 83cc/hr)  -Repeat lactic acid in morning    LFT elevation  Assessment & Plan  Likely secondary to alcohol abuse.  -Trend LFTs

## 2022-06-08 NOTE — CARE PLAN
Problem: Knowledge Deficit - Standard  Goal: Patient and family/care givers will demonstrate understanding of plan of care, disease process/condition, diagnostic tests and medications  Outcome: Progressing     Problem: Optimal Care for Alcohol Withdrawal  Goal: Optimal Care for the alcohol withdrawal patient  Outcome: Progressing     Problem: Seizure Precautions  Goal: Implementation of seizure precautions  Outcome: Progressing     Problem: Lifestyle Changes  Goal: Patient's ability to identify lifestyle changes and available resources to help reduce recurrence of condition will improve  Outcome: Progressing     Problem: Psychosocial  Goal: Patient's level of anxiety will decrease  Outcome: Progressing  Goal: Spiritual and cultural needs incorporated into hospitalization  Outcome: Progressing     Problem: Risk for Aspiration  Goal: Patient's risk for aspiration will be absent or decrease  Outcome: Progressing     Problem: Fall Risk  Goal: Patient will remain free from falls  Outcome: Progressing     Problem: Pain - Standard  Goal: Alleviation of pain or a reduction in pain to the patient’s comfort goal  Outcome: Progressing     Problem: Safety - Violent/Self-destructive Restraint  Goal: Remains free of injury from restraints (Restraint for Violent/Self-Destructive Behavior)  Outcome: Progressing  Goal: Free from restraints (Restraint for Violent or Self-Destructive Behavior)  Outcome: Progressing     Problem: Safety - Medical Restraint  Goal: Remains free of injury from restraints (Restraint for Interference with Medical Device)  Outcome: Progressing  Goal: Free from restraint(s) (Restraint for Interference with Medical Device)  Outcome: Progressing   The patient is Watcher - Medium risk of patient condition declining or worsening    Shift Goals  Clinical Goals: ciwa  Patient Goals: anxiety control  Family Goals: n/a    Progress made toward(s) clinical / shift goals:  decline    Patient is not progressing towards  the following goals:

## 2022-06-08 NOTE — ASSESSMENT & PLAN NOTE
High anion gap metabolic of 14 with bicarb of 17.  Likely related to starvation ketoacidosis in setting of poor nutrition related to alcohol use. Phosphorous normal yesterday with adequate potassium on labs today.   Plan:  -CMP w/ Mg, Phos and beta-hydroxybutyric acid ordered for tomorrow.

## 2022-06-08 NOTE — PROGRESS NOTES
Patient becomin increasing adgitationpull out of soft restraints took swing at nurse attempted to enlope security called2 point leather restraints started orders for haldol and ativan given

## 2022-06-09 ENCOUNTER — PHARMACY VISIT (OUTPATIENT)
Dept: PHARMACY | Facility: MEDICAL CENTER | Age: 52
End: 2022-06-09
Payer: COMMERCIAL

## 2022-06-09 VITALS
DIASTOLIC BLOOD PRESSURE: 93 MMHG | TEMPERATURE: 96.4 F | HEART RATE: 77 BPM | WEIGHT: 191.58 LBS | RESPIRATION RATE: 20 BRPM | SYSTOLIC BLOOD PRESSURE: 145 MMHG | BODY MASS INDEX: 28.38 KG/M2 | HEIGHT: 69 IN | OXYGEN SATURATION: 92 %

## 2022-06-09 LAB
ALBUMIN SERPL BCP-MCNC: 4 G/DL (ref 3.2–4.9)
ALBUMIN/GLOB SERPL: 1.2 G/DL
ALP SERPL-CCNC: 110 U/L (ref 30–99)
ALT SERPL-CCNC: 83 U/L (ref 2–50)
ANION GAP SERPL CALC-SCNC: 12 MMOL/L (ref 7–16)
ANION GAP SERPL CALC-SCNC: 13 MMOL/L (ref 7–16)
AST SERPL-CCNC: 89 U/L (ref 12–45)
B-OH-BUTYR SERPL-MCNC: 0.9 MMOL/L (ref 0.02–0.27)
BASOPHILS # BLD AUTO: 0.8 % (ref 0–1.8)
BASOPHILS # BLD: 0.07 K/UL (ref 0–0.12)
BILIRUB SERPL-MCNC: 0.6 MG/DL (ref 0.1–1.5)
BUN SERPL-MCNC: 11 MG/DL (ref 8–22)
BUN SERPL-MCNC: 17 MG/DL (ref 8–22)
CALCIUM SERPL-MCNC: 8.4 MG/DL (ref 8.5–10.5)
CALCIUM SERPL-MCNC: 8.8 MG/DL (ref 8.5–10.5)
CHLORIDE SERPL-SCNC: 105 MMOL/L (ref 96–112)
CHLORIDE SERPL-SCNC: 108 MMOL/L (ref 96–112)
CO2 SERPL-SCNC: 17 MMOL/L (ref 20–33)
CO2 SERPL-SCNC: 17 MMOL/L (ref 20–33)
CREAT SERPL-MCNC: 0.74 MG/DL (ref 0.5–1.4)
CREAT SERPL-MCNC: 0.98 MG/DL (ref 0.5–1.4)
EOSINOPHIL # BLD AUTO: 0.46 K/UL (ref 0–0.51)
EOSINOPHIL NFR BLD: 5 % (ref 0–6.9)
ERYTHROCYTE [DISTWIDTH] IN BLOOD BY AUTOMATED COUNT: 52.8 FL (ref 35.9–50)
GFR SERPLBLD CREATININE-BSD FMLA CKD-EPI: 110 ML/MIN/1.73 M 2
GFR SERPLBLD CREATININE-BSD FMLA CKD-EPI: 94 ML/MIN/1.73 M 2
GLOBULIN SER CALC-MCNC: 3.4 G/DL (ref 1.9–3.5)
GLUCOSE SERPL-MCNC: 129 MG/DL (ref 65–99)
GLUCOSE SERPL-MCNC: 76 MG/DL (ref 65–99)
HCT VFR BLD AUTO: 44.4 % (ref 42–52)
HGB BLD-MCNC: 14.6 G/DL (ref 14–18)
IMM GRANULOCYTES # BLD AUTO: 0.07 K/UL (ref 0–0.11)
IMM GRANULOCYTES NFR BLD AUTO: 0.8 % (ref 0–0.9)
LYMPHOCYTES # BLD AUTO: 1.8 K/UL (ref 1–4.8)
LYMPHOCYTES NFR BLD: 19.5 % (ref 22–41)
MAGNESIUM SERPL-MCNC: 2.4 MG/DL (ref 1.5–2.5)
MCH RBC QN AUTO: 28.6 PG (ref 27–33)
MCHC RBC AUTO-ENTMCNC: 32.9 G/DL (ref 33.7–35.3)
MCV RBC AUTO: 86.9 FL (ref 81.4–97.8)
MONOCYTES # BLD AUTO: 0.73 K/UL (ref 0–0.85)
MONOCYTES NFR BLD AUTO: 7.9 % (ref 0–13.4)
NEUTROPHILS # BLD AUTO: 6.1 K/UL (ref 1.82–7.42)
NEUTROPHILS NFR BLD: 66 % (ref 44–72)
NRBC # BLD AUTO: 0 K/UL
NRBC BLD-RTO: 0 /100 WBC
PHOSPHATE SERPL-MCNC: 3.4 MG/DL (ref 2.5–4.5)
PLATELET # BLD AUTO: 162 K/UL (ref 164–446)
PMV BLD AUTO: 9.1 FL (ref 9–12.9)
POTASSIUM SERPL-SCNC: 3.7 MMOL/L (ref 3.6–5.5)
POTASSIUM SERPL-SCNC: 3.9 MMOL/L (ref 3.6–5.5)
PROT SERPL-MCNC: 7.4 G/DL (ref 6–8.2)
RBC # BLD AUTO: 5.11 M/UL (ref 4.7–6.1)
SODIUM SERPL-SCNC: 134 MMOL/L (ref 135–145)
SODIUM SERPL-SCNC: 138 MMOL/L (ref 135–145)
WBC # BLD AUTO: 9.2 K/UL (ref 4.8–10.8)

## 2022-06-09 PROCEDURE — 82010 KETONE BODYS QUAN: CPT

## 2022-06-09 PROCEDURE — A9270 NON-COVERED ITEM OR SERVICE: HCPCS | Performed by: HOSPITALIST

## 2022-06-09 PROCEDURE — 84100 ASSAY OF PHOSPHORUS: CPT

## 2022-06-09 PROCEDURE — 83735 ASSAY OF MAGNESIUM: CPT

## 2022-06-09 PROCEDURE — 36415 COLL VENOUS BLD VENIPUNCTURE: CPT

## 2022-06-09 PROCEDURE — 97162 PT EVAL MOD COMPLEX 30 MIN: CPT

## 2022-06-09 PROCEDURE — 700102 HCHG RX REV CODE 250 W/ 637 OVERRIDE(OP): Performed by: STUDENT IN AN ORGANIZED HEALTH CARE EDUCATION/TRAINING PROGRAM

## 2022-06-09 PROCEDURE — RXMED WILLOW AMBULATORY MEDICATION CHARGE

## 2022-06-09 PROCEDURE — 700102 HCHG RX REV CODE 250 W/ 637 OVERRIDE(OP)

## 2022-06-09 PROCEDURE — A9270 NON-COVERED ITEM OR SERVICE: HCPCS | Performed by: INTERNAL MEDICINE

## 2022-06-09 PROCEDURE — A9270 NON-COVERED ITEM OR SERVICE: HCPCS

## 2022-06-09 PROCEDURE — 700102 HCHG RX REV CODE 250 W/ 637 OVERRIDE(OP): Performed by: HOSPITALIST

## 2022-06-09 PROCEDURE — 99239 HOSP IP/OBS DSCHRG MGMT >30: CPT | Mod: GC | Performed by: HOSPITALIST

## 2022-06-09 PROCEDURE — 85025 COMPLETE CBC W/AUTO DIFF WBC: CPT

## 2022-06-09 PROCEDURE — 700111 HCHG RX REV CODE 636 W/ 250 OVERRIDE (IP): Performed by: INTERNAL MEDICINE

## 2022-06-09 PROCEDURE — 80053 COMPREHEN METABOLIC PANEL: CPT

## 2022-06-09 PROCEDURE — 700102 HCHG RX REV CODE 250 W/ 637 OVERRIDE(OP): Performed by: INTERNAL MEDICINE

## 2022-06-09 PROCEDURE — 700105 HCHG RX REV CODE 258

## 2022-06-09 PROCEDURE — A9270 NON-COVERED ITEM OR SERVICE: HCPCS | Performed by: STUDENT IN AN ORGANIZED HEALTH CARE EDUCATION/TRAINING PROGRAM

## 2022-06-09 PROCEDURE — 80048 BASIC METABOLIC PNL TOTAL CA: CPT

## 2022-06-09 PROCEDURE — 700111 HCHG RX REV CODE 636 W/ 250 OVERRIDE (IP): Performed by: STUDENT IN AN ORGANIZED HEALTH CARE EDUCATION/TRAINING PROGRAM

## 2022-06-09 RX ORDER — FOLIC ACID 1 MG/1
1 TABLET ORAL DAILY
Qty: 30 TABLET | Refills: 0 | Status: ON HOLD | OUTPATIENT
Start: 2022-06-10 | End: 2022-06-30 | Stop reason: SDUPTHER

## 2022-06-09 RX ORDER — BUTALBITAL, ACETAMINOPHEN AND CAFFEINE 50; 325; 40 MG/1; MG/1; MG/1
1 TABLET ORAL EVERY 6 HOURS PRN
Status: DISCONTINUED | OUTPATIENT
Start: 2022-06-09 | End: 2022-06-09 | Stop reason: HOSPADM

## 2022-06-09 RX ORDER — LANOLIN ALCOHOL/MO/W.PET/CERES
100 CREAM (GRAM) TOPICAL DAILY
Qty: 30 TABLET | Refills: 0 | Status: ON HOLD | OUTPATIENT
Start: 2022-06-10 | End: 2022-06-30 | Stop reason: SDUPTHER

## 2022-06-09 RX ORDER — SODIUM BICARBONATE 650 MG/1
1300 TABLET ORAL 2 TIMES DAILY
Qty: 10 TABLET | Refills: 0 | Status: SHIPPED | OUTPATIENT
Start: 2022-06-09 | End: 2022-06-14

## 2022-06-09 RX ORDER — AMLODIPINE BESYLATE 5 MG/1
2.5 TABLET ORAL
Status: DISCONTINUED | OUTPATIENT
Start: 2022-06-09 | End: 2022-06-09 | Stop reason: HOSPADM

## 2022-06-09 RX ORDER — NICOTINE 21 MG/24HR
1 PATCH, TRANSDERMAL 24 HOURS TRANSDERMAL EVERY 24 HOURS
Qty: 30 PATCH | Refills: 0 | Status: SHIPPED | OUTPATIENT
Start: 2022-06-09 | End: 2022-07-09

## 2022-06-09 RX ORDER — KETOROLAC TROMETHAMINE 30 MG/ML
30 INJECTION, SOLUTION INTRAMUSCULAR; INTRAVENOUS ONCE
Status: COMPLETED | OUTPATIENT
Start: 2022-06-09 | End: 2022-06-09

## 2022-06-09 RX ORDER — AMLODIPINE BESYLATE 5 MG/1
5 TABLET ORAL DAILY
Qty: 30 TABLET | Refills: 0 | Status: ON HOLD | OUTPATIENT
Start: 2022-06-10 | End: 2022-06-30

## 2022-06-09 RX ORDER — SODIUM CHLORIDE 9 MG/ML
INJECTION, SOLUTION INTRAVENOUS
Status: COMPLETED | OUTPATIENT
Start: 2022-06-09 | End: 2022-06-09

## 2022-06-09 RX ORDER — GABAPENTIN 400 MG/1
400 CAPSULE ORAL 3 TIMES DAILY
Status: DISCONTINUED | OUTPATIENT
Start: 2022-06-09 | End: 2022-06-09 | Stop reason: HOSPADM

## 2022-06-09 RX ADMIN — GABAPENTIN 300 MG: 300 CAPSULE ORAL at 05:26

## 2022-06-09 RX ADMIN — LORAZEPAM 1 MG: 2 INJECTION INTRAMUSCULAR; INTRAVENOUS at 11:18

## 2022-06-09 RX ADMIN — LORAZEPAM 2 MG: 2 TABLET ORAL at 08:06

## 2022-06-09 RX ADMIN — AMLODIPINE BESYLATE 2.5 MG: 5 TABLET ORAL at 11:19

## 2022-06-09 RX ADMIN — FOLIC ACID 1 MG: 1 TABLET ORAL at 05:26

## 2022-06-09 RX ADMIN — GABAPENTIN 300 MG: 300 CAPSULE ORAL at 11:20

## 2022-06-09 RX ADMIN — NICOTINE TRANSDERMAL SYSTEM 21 MG: 21 PATCH, EXTENDED RELEASE TRANSDERMAL at 05:34

## 2022-06-09 RX ADMIN — THERA TABS 1 TABLET: TAB at 05:26

## 2022-06-09 RX ADMIN — THIAMINE HCL TAB 100 MG 100 MG: 100 TAB at 05:25

## 2022-06-09 RX ADMIN — LORAZEPAM 1 MG: 2 INJECTION INTRAMUSCULAR; INTRAVENOUS at 14:00

## 2022-06-09 RX ADMIN — SODIUM CHLORIDE: 9 INJECTION, SOLUTION INTRAVENOUS at 11:20

## 2022-06-09 RX ADMIN — OMEPRAZOLE 20 MG: 20 CAPSULE, DELAYED RELEASE ORAL at 05:26

## 2022-06-09 RX ADMIN — LEVETIRACETAM 500 MG: 500 TABLET, FILM COATED ORAL at 05:26

## 2022-06-09 RX ADMIN — KETOROLAC TROMETHAMINE 30 MG: 30 INJECTION, SOLUTION INTRAMUSCULAR at 05:25

## 2022-06-09 RX ADMIN — BUTALBITAL, ACETAMINOPHEN, AND CAFFEINE 1 TABLET: 50; 325; 40 TABLET ORAL at 11:19

## 2022-06-09 RX ADMIN — LORAZEPAM 1 MG: 1 TABLET ORAL at 01:51

## 2022-06-09 ASSESSMENT — LIFESTYLE VARIABLES
PAROXYSMAL SWEATS: BARELY PERCEPTIBLE SWEATING. PALMS MOIST
PAROXYSMAL SWEATS: NO SWEAT VISIBLE
ANXIETY: *
TREMOR: *
TREMOR: TREMOR NOT VISIBLE BUT CAN BE FELT, FINGERTIP TO FINGERTIP
AGITATION: MODERATELY FIDGETY AND RESTLESS
PAROXYSMAL SWEATS: BARELY PERCEPTIBLE SWEATING. PALMS MOIST
PAROXYSMAL SWEATS: BARELY PERCEPTIBLE SWEATING. PALMS MOIST
HEADACHE, FULLNESS IN HEAD: MODERATE
AGITATION: SOMEWHAT MORE THAN NORMAL ACTIVITY
AUDITORY DISTURBANCES: NOT PRESENT
NAUSEA AND VOMITING: MILD NAUSEA WITH NO VOMITING
VISUAL DISTURBANCES: NOT PRESENT
NAUSEA AND VOMITING: MILD NAUSEA WITH NO VOMITING
HEADACHE, FULLNESS IN HEAD: MODERATE
AGITATION: SOMEWHAT MORE THAN NORMAL ACTIVITY
NAUSEA AND VOMITING: MILD NAUSEA WITH NO VOMITING
ANXIETY: MILDLY ANXIOUS
AUDITORY DISTURBANCES: NOT PRESENT
NAUSEA AND VOMITING: MILD NAUSEA WITH NO VOMITING
HEADACHE, FULLNESS IN HEAD: NOT PRESENT
VISUAL DISTURBANCES: NOT PRESENT
ORIENTATION AND CLOUDING OF SENSORIUM: ORIENTED AND CAN DO SERIAL ADDITIONS
ORIENTATION AND CLOUDING OF SENSORIUM: ORIENTED AND CAN DO SERIAL ADDITIONS
AGITATION: MODERATELY FIDGETY AND RESTLESS
TOTAL SCORE: 11
HEADACHE, FULLNESS IN HEAD: MODERATE
ANXIETY: MILDLY ANXIOUS
TOTAL SCORE: 11
VISUAL DISTURBANCES: NOT PRESENT
AUDITORY DISTURBANCES: NOT PRESENT
TREMOR: TREMOR NOT VISIBLE BUT CAN BE FELT, FINGERTIP TO FINGERTIP
ANXIETY: MILDLY ANXIOUS
TREMOR: MODERATE TREMOR WITH ARMS EXTENDED
AUDITORY DISTURBANCES: NOT PRESENT
TOTAL SCORE: 11
VISUAL DISTURBANCES: NOT PRESENT
ORIENTATION AND CLOUDING OF SENSORIUM: ORIENTED AND CAN DO SERIAL ADDITIONS
ORIENTATION AND CLOUDING OF SENSORIUM: ORIENTED AND CAN DO SERIAL ADDITIONS
TOTAL SCORE: 9

## 2022-06-09 ASSESSMENT — COGNITIVE AND FUNCTIONAL STATUS - GENERAL
SUGGESTED CMS G CODE MODIFIER MOBILITY: CI
CLIMB 3 TO 5 STEPS WITH RAILING: A LITTLE
MOBILITY SCORE: 23

## 2022-06-09 ASSESSMENT — GAIT ASSESSMENTS
ASSISTIVE DEVICE: FRONT WHEEL WALKER
DISTANCE (FEET): 20
GAIT LEVEL OF ASSIST: SUPERVISED

## 2022-06-09 ASSESSMENT — PAIN DESCRIPTION - PAIN TYPE: TYPE: ACUTE PAIN;CHRONIC PAIN

## 2022-06-09 NOTE — CARE PLAN
Problem: Knowledge Deficit - Standard  Goal: Patient and family/care givers will demonstrate understanding of plan of care, disease process/condition, diagnostic tests and medications  Outcome: Progressing     Problem: Optimal Care for Alcohol Withdrawal  Goal: Optimal Care for the alcohol withdrawal patient  Outcome: Progressing     Problem: Seizure Precautions  Goal: Implementation of seizure precautions  Outcome: Progressing     Problem: Lifestyle Changes  Goal: Patient's ability to identify lifestyle changes and available resources to help reduce recurrence of condition will improve  Outcome: Progressing     Problem: Psychosocial  Goal: Patient's level of anxiety will decrease  Outcome: Progressing  Flowsheets (Taken 6/8/2022 2010)  Patient Behaviors:   Anxious   Restless  Goal: Spiritual and cultural needs incorporated into hospitalization  Outcome: Progressing     Problem: Risk for Aspiration  Goal: Patient's risk for aspiration will be absent or decrease  Outcome: Progressing     Problem: Fall Risk  Goal: Patient will remain free from falls  Outcome: Progressing     Problem: Pain - Standard  Goal: Alleviation of pain or a reduction in pain to the patient’s comfort goal  Outcome: Progressing     Problem: Safety - Violent/Self-destructive Restraint  Goal: Remains free of injury from restraints (Restraint for Violent/Self-Destructive Behavior)  Outcome: Progressing  Goal: Free from restraints (Restraint for Violent or Self-Destructive Behavior)  Outcome: Progressing   The patient is Watcher - Medium risk of patient condition declining or worsening    Shift Goals  Clinical Goals: ciwa  Patient Goals: comfort, pain control  Family Goals: n/a    Progress made toward(s) clinical / shift goals:  ciwa improving    Patient is not progressing towards the following goals:

## 2022-06-09 NOTE — DISCHARGE INSTRUCTIONS
-Please drink plenty of fluids, such as gatorade, water or pedialyte.  -Please continue to abstain from using alcohol and utilize resources we discussed such as alcoholics anonymous meetings.Discharge Instructions    Discharged to home by taxi with friend. Discharged via wheelchair, hospital escort: Yes.  Special equipment needed: Not Applicable    Be sure to schedule a follow-up appointment with your primary care doctor or any specialists as instructed.     Discharge Plan:   Diet Plan: Discussed  Activity Level: Discussed  Confirmed Follow up Appointment: Patient to Call and Schedule Appointment  Confirmed Symptoms Management: Discussed  Medication Reconciliation Updated: Yes    I understand that a diet low in cholesterol, fat, and sodium is recommended for good health. Unless I have been given specific instructions below for another diet, I accept this instruction as my diet prescription.   Other diet: cardiac    Special Instructions: None    Is patient discharged on Warfarin / Coumadin?   No     Depression / Suicide Risk    As you are discharged from this [facility] facility, it is important to learn how to keep safe from harming yourself.    Recognize the warning signs:  Abrupt changes in personality, positive or negative- including increase in energy   Giving away possessions  Change in eating patterns- significant weight changes-  positive or negative  Change in sleeping patterns- unable to sleep or sleeping all the time   Unwillingness or inability to communicate  Depression  Unusual sadness, discouragement and loneliness  Talk of wanting to die  Neglect of personal appearance   Rebelliousness- reckless behavior  Withdrawal from people/activities they love  Confusion- inability to concentrate     If you or a loved one observes any of these behaviors or has concerns about self-harm, here's what you can do:  Talk about it- your feelings and reasons for harming yourself  Remove any means that you might use to  hurt yourself (examples: pills, rope, extension cords, firearm)  Get professional help from the community (Mental Health, Substance Abuse, psychological counseling)  Do not be alone:Call your Safe Contact- someone whom you trust who will be there for you.  Call your local CRISIS HOTLINE 654-7718 or 281-652-4630  Call your local Children's Mobile Crisis Response Team [place] (186) 474-4729 or www.[place] [employer]  Call the toll free National Suicide Prevention Hotlines   National Suicide Prevention Lifeline 694-262-HMOV (1527)  National Hope Line Network 800-SUICIDE (495-6034)

## 2022-06-09 NOTE — THERAPY
Physical Therapy   Initial Evaluation     Patient Name: Donal Carpio  Age:  52 y.o., Sex:  male  Medical Record #: [id number]  Today's Date: 6/9/2022          Assessment  Patient is 52 y.o. male w/ hx of etoh w/d sz.  Admitted w/ self report of seizure at home, unwitnessed.  MRI head, unremarkable.  Per chart review, he has had 10 ED visits since April.  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  Per chart review, he lives w/ his parents and is unemployed.  Spoke at length w/ nsg, who reports yesterday he was up self to/from the bathroom and able to ambulate in the hallway w/o assistive device and w/o assist.  Today, he reports needing a fww.  He is able to move in/out of bed w/o assist and ambulate 20 ft w/ a fww.  No signs of swaying or balance deficit, no need of physical assist.  Self limiting w/ regard to ambulation distance, reporting that he feels like he needs to vomit, but no vomiting observed.  PT will not formally follow.  Anticipate that as he feels better his gait will naturally improve.  Plan    Recommend Physical Therapy for Evaluation only     DC Equipment Recommendations: Unable to determine at this time  Discharge Recommendations: Anticipate that the patient will have no further physical therapy needs after discharge from the hospital         Objective       06/09/22 1330   Prior Living Situation   Housing / Facility Motel   Steps Into Home 0   Steps In Home 0   Lives with - Patient's Self Care Capacity Alone and Able to Care For Self   Prior Level of Functional Mobility   Bed Mobility Independent   Transfer Status Independent   Ambulation Independent   Assistive Devices Used None   Strength Lower Body   Comments no deficits   Strength Upper Body   Comments no deficits   Balance Assessment   Sitting Balance (Static) Fair +   Sitting Balance (Dynamic) Fair +   Standing Balance (Static) Fair   Standing Balance (Dynamic) Fair   Weight Shift Sitting Good   Weight  Shift Standing Good   Comments w/ fww   Gait Analysis   Gait Level Of Assist Supervised   Assistive Device Front Wheel Walker   Distance (Feet) 20   Bed Mobility    Supine to Sit Supervised   Sit to Supine Supervised   Functional Mobility   Sit to Stand Supervised   Anticipated Discharge Equipment and Recommendations   DC Equipment Recommendations Unable to determine at this time   Discharge Recommendations Anticipate that the patient will have no further physical therapy needs after discharge from the hospital

## 2022-06-09 NOTE — DISCHARGE SUMMARY
Discharge Summary    CHIEF COMPLAINT ON ADMISSION  Chief Complaint   Patient presents with   • ETOH Withdrawal     Last drink yesterday at 0630 AM       Reason for Admission  Alcohol withdrawal    Admission Date  6/6/2022    CODE STATUS  Full Code    HPI & HOSPITAL COURSE           This is a 52 y.o. male here with with past medical history of alcohol use disorder with prior ED visits for alcohol intoxication/withdrawal who reported to ED on 06/06/22 after experiencing an unwitnessed seizure at home. Patient was started on alcohol withdrawal protocol with ativan as well as thiamine with multivitamin.             Patient drinking 11 drinks daily for the prior 6 months. He was previously sober for the past 16 years. Outside records showed CT and MRI of head were unremarkable. CT scan on admission also showed no acute intracranial findings. By his third day of hospitalization patient's CIWA scores improved and patient was no longer tremulous without any disconjugate gaze. No witnessed seizures during hospitalization.           On day of discharge patient noted new weakness on his left side with multiple generalized nonspecific pain. Physical therapy was asked to help evaluate patient and found that patient was able to to move in and out of bed without assist and ambulate 20 feet with a front wheel walker. No signs of swaying or balance deficit without need of physical assist.             Primary team repeated physical examination prior to discharge. Cranial nerves 2-12 were grossly intact and nonfocal. Extra occular muscles intact, pupils equal and reactive to light, sensation intact. Patellar reflexes were normal. Speech fluent and face symmetric. Patient reported pain on entire left side. All joints were evaluated and were nonerythematous without calor. Passive and active range of motion were inconsistent throughout the exam. No evidence of trauma. Patient was able to stand up without help.            Patient also had  a nonanion gap metabolic acidosis likely due to diarrhea. Encouraged patient to maintain adequate PO intake. Patient was discharged with 5 days of 1300 mg sodium bicarbonate tablets twice daily. Patient also discharged with thiamine and nicotine patch for smoking cessation. Patient was given resources including information about alcoholic anonymous meetings to help with alcohol cessation. He will continue on his Keppra and should follow-up with neurology. Patient should follow-up at [facility] clinic to establish PCP. Therefore, he is discharged in fair and stable condition to home with close outpatient follow-up.    The patient met 2-midnight criteria for an inpatient stay at the time of discharge.    Discharge Date  06/09/22    FOLLOW UP ITEMS POST DISCHARGE  -Follow-up on chemistry panel regarding non-anion gap metabolic acidosis related to diarrhea.  -Please follow-up with patient regarding strategies for continued alcohol cessation.  -Of note Keppra 500 mg BID was started 3 weeks ago in emergency room empirically due to multiple ER visits related to alcohol withdrawal seizures.   -Will likely need referral for neurology and behavioral health for issues mentioned above.    DISCHARGE DIAGNOSES  Principal Problem:    Alcohol withdrawal syndrome with complication (HCC) POA: Yes  Active Problems:    Seizure disorder (HCC) POA: Unknown    High anion gap metabolic acidosis POA: Unknown    LFT elevation POA: Unknown    Lactic acidosis POA: Unknown  Resolved Problems:    * No resolved hospital problems. *      FOLLOW UP  No future appointments.  [facility]  [address] [facility] [address]  [phone]  Call in 1 week(s)        MEDICATIONS ON DISCHARGE     Medication List      START taking these medications      Instructions   amLODIPine 5 MG Tabs  Start taking on: Carol 10, 2022  Commonly known as: NORVASC  Next Dose Due: 6/10/22   Take 1 Tablet by mouth every day.  Dose: 5 mg     folic acid 1 MG  Tabs  Start taking on: Carol 10, 2022  Commonly known as: FOLVITE  Next Dose Due: 6/10/22   Take 1 Tablet by mouth every day.  Dose: 1 mg     multivitamin Tabs  Start taking on: Carol 10, 2022  Next Dose Due: 6/10/22   Take 1 Tablet by mouth every day.  Dose: 1 Tablet     nicotine 21 MG/24HR Pt24  Commonly known as: NICODERM   Place 1 Patch on the skin every 24 hours for 30 days.  Dose: 1 Patch     sodium bicarbonate 650 MG Tabs  Commonly known as: SODIUM BICARBONATE  Next Dose Due: 6/9/22   Take 2 Tablets by mouth 2 times a day for 5 days.  Dose: 1,300 mg     thiamine 100 MG tablet  Start taking on: Carol 10, 2022  Commonly known as: THIAMINE  Next Dose Due: 6/10/22   Take 1 Tablet by mouth every day for 30 days.  Dose: 100 mg        CONTINUE taking these medications      Instructions   ALLERGY PO   Take 1 Tablet by mouth every day. Unknown allergy medication  Dose: 1 Tablet     Esomeprazole Magnesium 20 MG Pack   Take 20 mg by mouth every morning before breakfast.  Dose: 20 mg     levETIRAcetam 500 MG Tabs  Commonly known as: KEPPRA   Take 500 mg by mouth 2 times a day.  Dose: 500 mg     therapeutic multivitamin-minerals Tabs   Take 1 Tablet by mouth every day.  Dose: 1 Tablet            Allergies  Allergies   Allergen Reactions   • Penicillins Unspecified     Unknown childhood reaction        DIET  Orders Placed This Encounter   Procedures   • Diet Order Diet: Cardiac     Standing Status:   Standing     Number of Occurrences:   1     Order Specific Question:   Diet:     Answer:   Cardiac [6]       ACTIVITY  As tolerated.  Weight bearing as tolerated    CONSULTATIONS  None.    PROCEDURES  None.    LABORATORY  Lab Results   Component Value Date    SODIUM 134 (L) 06/09/2022    POTASSIUM 3.7 06/09/2022    CHLORIDE 105 06/09/2022    CO2 17 (L) 06/09/2022    GLUCOSE 129 (H) 06/09/2022    BUN 17 06/09/2022    CREATININE 0.98 06/09/2022        Lab Results   Component Value Date    WBC 9.2 06/09/2022    HEMOGLOBIN 14.6  06/09/2022    HEMATOCRIT 44.4 06/09/2022    PLATELETCT 162 (L) 06/09/2022        Total time of the discharge process exceeds 74 minutes.

## 2022-06-12 ENCOUNTER — APPOINTMENT (OUTPATIENT)
Dept: RADIOLOGY | Facility: MEDICAL CENTER | Age: 52
DRG: 894 | End: 2022-06-12
Attending: EMERGENCY MEDICINE
Payer: COMMERCIAL

## 2022-06-12 ENCOUNTER — HOSPITAL ENCOUNTER (INPATIENT)
Facility: MEDICAL CENTER | Age: 52
LOS: 7 days | DRG: 894 | End: 2022-06-20
Attending: EMERGENCY MEDICINE | Admitting: STUDENT IN AN ORGANIZED HEALTH CARE EDUCATION/TRAINING PROGRAM
Payer: COMMERCIAL

## 2022-06-12 DIAGNOSIS — R56.9 ALCOHOL WITHDRAWAL SEIZURE, UNCOMPLICATED (HCC): ICD-10-CM

## 2022-06-12 DIAGNOSIS — F10.930 ALCOHOL WITHDRAWAL SEIZURE, UNCOMPLICATED (HCC): ICD-10-CM

## 2022-06-12 DIAGNOSIS — F10.939 ALCOHOL WITHDRAWAL SYNDROME WITH COMPLICATION (HCC): ICD-10-CM

## 2022-06-12 DIAGNOSIS — R56.9 SEIZURE (HCC): ICD-10-CM

## 2022-06-12 LAB
ALBUMIN SERPL BCP-MCNC: 4 G/DL (ref 3.2–4.9)
ALBUMIN/GLOB SERPL: 1.2 G/DL
ALP SERPL-CCNC: 127 U/L (ref 30–99)
ALT SERPL-CCNC: 90 U/L (ref 2–50)
ANION GAP SERPL CALC-SCNC: 14 MMOL/L (ref 7–16)
AST SERPL-CCNC: 87 U/L (ref 12–45)
BASOPHILS # BLD AUTO: 0.7 % (ref 0–1.8)
BASOPHILS # BLD: 0.07 K/UL (ref 0–0.12)
BILIRUB SERPL-MCNC: 0.3 MG/DL (ref 0.1–1.5)
BUN SERPL-MCNC: 7 MG/DL (ref 8–22)
CALCIUM SERPL-MCNC: 9.2 MG/DL (ref 8.5–10.5)
CHLORIDE SERPL-SCNC: 106 MMOL/L (ref 96–112)
CO2 SERPL-SCNC: 22 MMOL/L (ref 20–33)
CREAT SERPL-MCNC: 0.69 MG/DL (ref 0.5–1.4)
EOSINOPHIL # BLD AUTO: 0.24 K/UL (ref 0–0.51)
EOSINOPHIL NFR BLD: 2.6 % (ref 0–6.9)
ERYTHROCYTE [DISTWIDTH] IN BLOOD BY AUTOMATED COUNT: 55.8 FL (ref 35.9–50)
ETHANOL BLD-MCNC: <10.1 MG/DL
GFR SERPLBLD CREATININE-BSD FMLA CKD-EPI: 113 ML/MIN/1.73 M 2
GLOBULIN SER CALC-MCNC: 3.4 G/DL (ref 1.9–3.5)
GLUCOSE SERPL-MCNC: 134 MG/DL (ref 65–99)
HCT VFR BLD AUTO: 44.1 % (ref 42–52)
HGB BLD-MCNC: 14.9 G/DL (ref 14–18)
IMM GRANULOCYTES # BLD AUTO: 0.07 K/UL (ref 0–0.11)
IMM GRANULOCYTES NFR BLD AUTO: 0.7 % (ref 0–0.9)
LIPASE SERPL-CCNC: 32 U/L (ref 11–82)
LYMPHOCYTES # BLD AUTO: 1.85 K/UL (ref 1–4.8)
LYMPHOCYTES NFR BLD: 19.8 % (ref 22–41)
MAGNESIUM SERPL-MCNC: 1.8 MG/DL (ref 1.5–2.5)
MCH RBC QN AUTO: 29 PG (ref 27–33)
MCHC RBC AUTO-ENTMCNC: 33.8 G/DL (ref 33.7–35.3)
MCV RBC AUTO: 86 FL (ref 81.4–97.8)
MONOCYTES # BLD AUTO: 1.05 K/UL (ref 0–0.85)
MONOCYTES NFR BLD AUTO: 11.2 % (ref 0–13.4)
NEUTROPHILS # BLD AUTO: 6.06 K/UL (ref 1.82–7.42)
NEUTROPHILS NFR BLD: 65 % (ref 44–72)
NRBC # BLD AUTO: 0 K/UL
NRBC BLD-RTO: 0 /100 WBC
PHOSPHATE SERPL-MCNC: 2 MG/DL (ref 2.5–4.5)
PLATELET # BLD AUTO: 229 K/UL (ref 164–446)
PMV BLD AUTO: 8.7 FL (ref 9–12.9)
POTASSIUM SERPL-SCNC: 3.7 MMOL/L (ref 3.6–5.5)
PROT SERPL-MCNC: 7.4 G/DL (ref 6–8.2)
RBC # BLD AUTO: 5.13 M/UL (ref 4.7–6.1)
SODIUM SERPL-SCNC: 142 MMOL/L (ref 135–145)
WBC # BLD AUTO: 9.3 K/UL (ref 4.8–10.8)

## 2022-06-12 PROCEDURE — 83690 ASSAY OF LIPASE: CPT

## 2022-06-12 PROCEDURE — 80053 COMPREHEN METABOLIC PANEL: CPT

## 2022-06-12 PROCEDURE — 94760 N-INVAS EAR/PLS OXIMETRY 1: CPT

## 2022-06-12 PROCEDURE — HZ2ZZZZ DETOXIFICATION SERVICES FOR SUBSTANCE ABUSE TREATMENT: ICD-10-PCS | Performed by: EMERGENCY MEDICINE

## 2022-06-12 PROCEDURE — 72128 CT CHEST SPINE W/O DYE: CPT

## 2022-06-12 PROCEDURE — 700111 HCHG RX REV CODE 636 W/ 250 OVERRIDE (IP): Performed by: EMERGENCY MEDICINE

## 2022-06-12 PROCEDURE — 85025 COMPLETE CBC W/AUTO DIFF WBC: CPT

## 2022-06-12 PROCEDURE — 72070 X-RAY EXAM THORAC SPINE 2VWS: CPT

## 2022-06-12 PROCEDURE — 36415 COLL VENOUS BLD VENIPUNCTURE: CPT

## 2022-06-12 PROCEDURE — 99285 EMERGENCY DEPT VISIT HI MDM: CPT

## 2022-06-12 PROCEDURE — 72125 CT NECK SPINE W/O DYE: CPT

## 2022-06-12 PROCEDURE — 84100 ASSAY OF PHOSPHORUS: CPT

## 2022-06-12 PROCEDURE — 83735 ASSAY OF MAGNESIUM: CPT

## 2022-06-12 PROCEDURE — 700105 HCHG RX REV CODE 258: Performed by: EMERGENCY MEDICINE

## 2022-06-12 PROCEDURE — 70450 CT HEAD/BRAIN W/O DYE: CPT

## 2022-06-12 PROCEDURE — 96374 THER/PROPH/DIAG INJ IV PUSH: CPT

## 2022-06-12 PROCEDURE — 82077 ASSAY SPEC XCP UR&BREATH IA: CPT

## 2022-06-12 PROCEDURE — A9270 NON-COVERED ITEM OR SERVICE: HCPCS | Performed by: EMERGENCY MEDICINE

## 2022-06-12 PROCEDURE — 700102 HCHG RX REV CODE 250 W/ 637 OVERRIDE(OP): Performed by: EMERGENCY MEDICINE

## 2022-06-12 RX ORDER — LORAZEPAM 2 MG/1
2 TABLET ORAL
Status: DISCONTINUED | OUTPATIENT
Start: 2022-06-12 | End: 2022-06-19

## 2022-06-12 RX ORDER — LORAZEPAM 1 MG/1
1 TABLET ORAL EVERY 4 HOURS PRN
Status: DISCONTINUED | OUTPATIENT
Start: 2022-06-12 | End: 2022-06-19

## 2022-06-12 RX ORDER — CHLORDIAZEPOXIDE HYDROCHLORIDE 25 MG/1
50 CAPSULE, GELATIN COATED ORAL EVERY 6 HOURS
Status: COMPLETED | OUTPATIENT
Start: 2022-06-12 | End: 2022-06-13

## 2022-06-12 RX ORDER — LORAZEPAM 2 MG/ML
1 INJECTION INTRAMUSCULAR
Status: DISCONTINUED | OUTPATIENT
Start: 2022-06-12 | End: 2022-06-19

## 2022-06-12 RX ORDER — LORAZEPAM 0.5 MG/1
0.5 TABLET ORAL EVERY 4 HOURS PRN
Status: DISCONTINUED | OUTPATIENT
Start: 2022-06-12 | End: 2022-06-19

## 2022-06-12 RX ORDER — LORAZEPAM 2 MG/ML
1.5 INJECTION INTRAMUSCULAR
Status: DISCONTINUED | OUTPATIENT
Start: 2022-06-12 | End: 2022-06-19

## 2022-06-12 RX ORDER — SODIUM CHLORIDE 9 MG/ML
1000 INJECTION, SOLUTION INTRAVENOUS ONCE
Status: COMPLETED | OUTPATIENT
Start: 2022-06-12 | End: 2022-06-12

## 2022-06-12 RX ORDER — LORAZEPAM 2 MG/ML
2 INJECTION INTRAMUSCULAR
Status: DISCONTINUED | OUTPATIENT
Start: 2022-06-12 | End: 2022-06-19

## 2022-06-12 RX ORDER — LORAZEPAM 2 MG/ML
0.5 INJECTION INTRAMUSCULAR EVERY 4 HOURS PRN
Status: DISCONTINUED | OUTPATIENT
Start: 2022-06-12 | End: 2022-06-19

## 2022-06-12 RX ORDER — ACETAMINOPHEN 325 MG/1
650 TABLET ORAL ONCE
Status: COMPLETED | OUTPATIENT
Start: 2022-06-12 | End: 2022-06-12

## 2022-06-12 RX ORDER — FOLIC ACID 1 MG/1
1 TABLET ORAL DAILY
Status: COMPLETED | OUTPATIENT
Start: 2022-06-13 | End: 2022-06-16

## 2022-06-12 RX ORDER — LEVETIRACETAM 500 MG/5ML
1500 INJECTION, SOLUTION, CONCENTRATE INTRAVENOUS EVERY 12 HOURS
Status: DISCONTINUED | OUTPATIENT
Start: 2022-06-12 | End: 2022-06-13

## 2022-06-12 RX ORDER — GAUZE BANDAGE 2" X 2"
100 BANDAGE TOPICAL DAILY
Status: COMPLETED | OUTPATIENT
Start: 2022-06-13 | End: 2022-06-16

## 2022-06-12 RX ORDER — CHLORDIAZEPOXIDE HYDROCHLORIDE 25 MG/1
25 CAPSULE, GELATIN COATED ORAL EVERY 6 HOURS
Status: COMPLETED | OUTPATIENT
Start: 2022-06-14 | End: 2022-06-15

## 2022-06-12 RX ORDER — LORAZEPAM 2 MG/1
4 TABLET ORAL
Status: DISCONTINUED | OUTPATIENT
Start: 2022-06-12 | End: 2022-06-19

## 2022-06-12 RX ADMIN — SODIUM CHLORIDE 1000 ML: 9 INJECTION, SOLUTION INTRAVENOUS at 22:01

## 2022-06-12 RX ADMIN — LORAZEPAM 0.5 MG: 1 TABLET ORAL at 23:29

## 2022-06-12 RX ADMIN — CHLORDIAZEPOXIDE HYDROCHLORIDE 50 MG: 25 CAPSULE ORAL at 22:00

## 2022-06-12 RX ADMIN — DIBASIC SODIUM PHOSPHATE, MONOBASIC POTASSIUM PHOSPHATE AND MONOBASIC SODIUM PHOSPHATE 250 MG: 852; 155; 130 TABLET ORAL at 23:29

## 2022-06-12 RX ADMIN — LEVETIRACETAM 1500 MG: 100 INJECTION, SOLUTION INTRAVENOUS at 23:31

## 2022-06-12 RX ADMIN — ACETAMINOPHEN 650 MG: 325 TABLET ORAL at 22:00

## 2022-06-12 ASSESSMENT — LIFESTYLE VARIABLES
HEADACHE, FULLNESS IN HEAD: MODERATE
AUDITORY DISTURBANCES: NOT PRESENT
VISUAL DISTURBANCES: NOT PRESENT
TREMOR: *
VISUAL DISTURBANCES: NOT PRESENT
TOTAL SCORE: 14
AGITATION: NORMAL ACTIVITY
NAUSEA AND VOMITING: MILD NAUSEA WITH NO VOMITING
AGITATION: NORMAL ACTIVITY
ORIENTATION AND CLOUDING OF SENSORIUM: ORIENTED AND CAN DO SERIAL ADDITIONS
ORIENTATION AND CLOUDING OF SENSORIUM: ORIENTED AND CAN DO SERIAL ADDITIONS
ANXIETY: *
AUDITORY DISTURBANCES: NOT PRESENT
TOTAL SCORE: 5
TREMOR: NO TREMOR
PAROXYSMAL SWEATS: *
ANXIETY: MILDLY ANXIOUS
HEADACHE, FULLNESS IN HEAD: MILD
PAROXYSMAL SWEATS: BARELY PERCEPTIBLE SWEATING. PALMS MOIST
NAUSEA AND VOMITING: *

## 2022-06-12 ASSESSMENT — PAIN DESCRIPTION - PAIN TYPE: TYPE: ACUTE PAIN

## 2022-06-12 ASSESSMENT — FIBROSIS 4 INDEX: FIB4 SCORE: 3.02

## 2022-06-13 PROBLEM — F10.930 ALCOHOL WITHDRAWAL SEIZURE, UNCOMPLICATED (HCC): Status: ACTIVE | Noted: 2022-06-13

## 2022-06-13 PROBLEM — I10 HYPERTENSION: Status: ACTIVE | Noted: 2022-06-13

## 2022-06-13 PROBLEM — R74.01 TRANSAMINITIS: Status: ACTIVE | Noted: 2022-06-13

## 2022-06-13 PROBLEM — R56.9 ALCOHOL WITHDRAWAL SEIZURE, UNCOMPLICATED (HCC): Status: ACTIVE | Noted: 2022-06-13

## 2022-06-13 PROCEDURE — 700102 HCHG RX REV CODE 250 W/ 637 OVERRIDE(OP): Performed by: STUDENT IN AN ORGANIZED HEALTH CARE EDUCATION/TRAINING PROGRAM

## 2022-06-13 PROCEDURE — 96375 TX/PRO/DX INJ NEW DRUG ADDON: CPT

## 2022-06-13 PROCEDURE — A9270 NON-COVERED ITEM OR SERVICE: HCPCS | Performed by: INTERNAL MEDICINE

## 2022-06-13 PROCEDURE — 700111 HCHG RX REV CODE 636 W/ 250 OVERRIDE (IP): Performed by: STUDENT IN AN ORGANIZED HEALTH CARE EDUCATION/TRAINING PROGRAM

## 2022-06-13 PROCEDURE — 96376 TX/PRO/DX INJ SAME DRUG ADON: CPT

## 2022-06-13 PROCEDURE — 700102 HCHG RX REV CODE 250 W/ 637 OVERRIDE(OP): Performed by: EMERGENCY MEDICINE

## 2022-06-13 PROCEDURE — 770001 HCHG ROOM/CARE - MED/SURG/GYN PRIV*

## 2022-06-13 PROCEDURE — 700111 HCHG RX REV CODE 636 W/ 250 OVERRIDE (IP): Performed by: EMERGENCY MEDICINE

## 2022-06-13 PROCEDURE — 99223 1ST HOSP IP/OBS HIGH 75: CPT | Performed by: STUDENT IN AN ORGANIZED HEALTH CARE EDUCATION/TRAINING PROGRAM

## 2022-06-13 PROCEDURE — 96372 THER/PROPH/DIAG INJ SC/IM: CPT

## 2022-06-13 PROCEDURE — A9270 NON-COVERED ITEM OR SERVICE: HCPCS | Performed by: STUDENT IN AN ORGANIZED HEALTH CARE EDUCATION/TRAINING PROGRAM

## 2022-06-13 PROCEDURE — A9270 NON-COVERED ITEM OR SERVICE: HCPCS | Performed by: EMERGENCY MEDICINE

## 2022-06-13 PROCEDURE — 700102 HCHG RX REV CODE 250 W/ 637 OVERRIDE(OP): Performed by: INTERNAL MEDICINE

## 2022-06-13 RX ORDER — OXYCODONE HYDROCHLORIDE 10 MG/1
10 TABLET ORAL EVERY 4 HOURS PRN
Status: DISCONTINUED | OUTPATIENT
Start: 2022-06-13 | End: 2022-06-18

## 2022-06-13 RX ORDER — OXYCODONE HYDROCHLORIDE 5 MG/1
5 TABLET ORAL EVERY 4 HOURS PRN
Status: DISCONTINUED | OUTPATIENT
Start: 2022-06-13 | End: 2022-06-13

## 2022-06-13 RX ORDER — HEPARIN SODIUM 5000 [USP'U]/ML
5000 INJECTION, SOLUTION INTRAVENOUS; SUBCUTANEOUS EVERY 8 HOURS
Status: DISCONTINUED | OUTPATIENT
Start: 2022-06-13 | End: 2022-06-14

## 2022-06-13 RX ORDER — OXYCODONE HYDROCHLORIDE 5 MG/1
5 TABLET ORAL EVERY 4 HOURS PRN
Status: DISCONTINUED | OUTPATIENT
Start: 2022-06-13 | End: 2022-06-18

## 2022-06-13 RX ORDER — KETOROLAC TROMETHAMINE 30 MG/ML
30 INJECTION, SOLUTION INTRAMUSCULAR; INTRAVENOUS ONCE
Status: COMPLETED | OUTPATIENT
Start: 2022-06-13 | End: 2022-06-13

## 2022-06-13 RX ORDER — ACETAMINOPHEN 500 MG
500 TABLET ORAL EVERY 6 HOURS PRN
Status: DISCONTINUED | OUTPATIENT
Start: 2022-06-13 | End: 2022-06-20 | Stop reason: HOSPADM

## 2022-06-13 RX ORDER — DIVALPROEX SODIUM 500 MG/1
500 TABLET, DELAYED RELEASE ORAL EVERY 12 HOURS
Status: DISCONTINUED | OUTPATIENT
Start: 2022-06-13 | End: 2022-06-20 | Stop reason: HOSPADM

## 2022-06-13 RX ORDER — LEVETIRACETAM 500 MG/1
500 TABLET ORAL 2 TIMES DAILY
Status: DISCONTINUED | OUTPATIENT
Start: 2022-06-13 | End: 2022-06-13

## 2022-06-13 RX ADMIN — CHLORDIAZEPOXIDE HYDROCHLORIDE 50 MG: 25 CAPSULE ORAL at 18:15

## 2022-06-13 RX ADMIN — LORAZEPAM 1 MG: 2 INJECTION INTRAMUSCULAR; INTRAVENOUS at 02:41

## 2022-06-13 RX ADMIN — OXYCODONE HYDROCHLORIDE 10 MG: 10 TABLET ORAL at 19:59

## 2022-06-13 RX ADMIN — HEPARIN SODIUM 5000 UNITS: 5000 INJECTION, SOLUTION INTRAVENOUS; SUBCUTANEOUS at 14:00

## 2022-06-13 RX ADMIN — LORAZEPAM 2 MG: 2 TABLET ORAL at 22:37

## 2022-06-13 RX ADMIN — LORAZEPAM 2 MG: 2 TABLET ORAL at 20:00

## 2022-06-13 RX ADMIN — HEPARIN SODIUM 5000 UNITS: 5000 INJECTION, SOLUTION INTRAVENOUS; SUBCUTANEOUS at 22:38

## 2022-06-13 RX ADMIN — DIVALPROEX SODIUM 500 MG: 500 TABLET, DELAYED RELEASE ORAL at 18:15

## 2022-06-13 RX ADMIN — HEPARIN SODIUM 5000 UNITS: 5000 INJECTION, SOLUTION INTRAVENOUS; SUBCUTANEOUS at 09:09

## 2022-06-13 RX ADMIN — LORAZEPAM 1 MG: 2 INJECTION INTRAMUSCULAR; INTRAVENOUS at 05:04

## 2022-06-13 RX ADMIN — LORAZEPAM 2 MG: 2 TABLET ORAL at 09:10

## 2022-06-13 RX ADMIN — CHLORDIAZEPOXIDE HYDROCHLORIDE 50 MG: 25 CAPSULE ORAL at 06:16

## 2022-06-13 RX ADMIN — OXYCODONE 5 MG: 5 TABLET ORAL at 22:37

## 2022-06-13 RX ADMIN — OXYCODONE 5 MG: 5 TABLET ORAL at 16:32

## 2022-06-13 RX ADMIN — OXYCODONE 5 MG: 5 TABLET ORAL at 07:48

## 2022-06-13 RX ADMIN — THERA TABS 1 TABLET: TAB at 06:16

## 2022-06-13 RX ADMIN — CHLORDIAZEPOXIDE HYDROCHLORIDE 50 MG: 25 CAPSULE ORAL at 11:58

## 2022-06-13 RX ADMIN — FOLIC ACID 1 MG: 1 TABLET ORAL at 06:16

## 2022-06-13 RX ADMIN — KETOROLAC TROMETHAMINE 30 MG: 30 INJECTION, SOLUTION INTRAMUSCULAR at 01:58

## 2022-06-13 RX ADMIN — HEPARIN SODIUM 5000 UNITS: 5000 INJECTION, SOLUTION INTRAVENOUS; SUBCUTANEOUS at 01:59

## 2022-06-13 RX ADMIN — OXYCODONE 5 MG: 5 TABLET ORAL at 11:59

## 2022-06-13 RX ADMIN — LORAZEPAM 1 MG: 2 INJECTION INTRAMUSCULAR; INTRAVENOUS at 00:06

## 2022-06-13 RX ADMIN — LORAZEPAM 3 MG: 2 TABLET ORAL at 16:52

## 2022-06-13 RX ADMIN — THIAMINE HCL TAB 100 MG 100 MG: 100 TAB at 06:16

## 2022-06-13 RX ADMIN — LEVETIRACETAM 500 MG: 500 TABLET, FILM COATED ORAL at 06:16

## 2022-06-13 ASSESSMENT — LIFESTYLE VARIABLES
TOTAL SCORE: 14
HEADACHE, FULLNESS IN HEAD: MODERATELY SEVERE
HEADACHE, FULLNESS IN HEAD: MODERATELY SEVERE
PAROXYSMAL SWEATS: BARELY PERCEPTIBLE SWEATING. PALMS MOIST
VISUAL DISTURBANCES: NOT PRESENT
ANXIETY: MILDLY ANXIOUS
TREMOR: MODERATE TREMOR WITH ARMS EXTENDED
NAUSEA AND VOMITING: MILD NAUSEA WITH NO VOMITING
ANXIETY: *
AGITATION: NORMAL ACTIVITY
TOTAL SCORE: 11
AUDITORY DISTURBANCES: NOT PRESENT
ANXIETY: MILDLY ANXIOUS
ANXIETY: MILDLY ANXIOUS
TREMOR: MODERATE TREMOR WITH ARMS EXTENDED
TREMOR: *
PAROXYSMAL SWEATS: NO SWEAT VISIBLE
TOTAL SCORE: 10
AUDITORY DISTURBANCES: NOT PRESENT
HEADACHE, FULLNESS IN HEAD: SEVERE
NAUSEA AND VOMITING: MILD NAUSEA WITH NO VOMITING
PAROXYSMAL SWEATS: BARELY PERCEPTIBLE SWEATING. PALMS MOIST
AUDITORY DISTURBANCES: NOT PRESENT
VISUAL DISTURBANCES: NOT PRESENT
TREMOR: *
ORIENTATION AND CLOUDING OF SENSORIUM: ORIENTED AND CAN DO SERIAL ADDITIONS
VISUAL DISTURBANCES: VERY MILD SENSITIVITY
AGITATION: NORMAL ACTIVITY
TOTAL SCORE: 14
TOTAL SCORE: MILD ITCHING, PINS AND NEEDLES SENSATION, BURNING OR NUMBNESS
AUDITORY DISTURBANCES: NOT PRESENT
ORIENTATION AND CLOUDING OF SENSORIUM: CANNOT DO SERIAL ADDITIONS OR IS UNCERTAIN ABOUT DATE
SUBSTANCE_ABUSE: 1
ORIENTATION AND CLOUDING OF SENSORIUM: ORIENTED AND CAN DO SERIAL ADDITIONS
AUDITORY DISTURBANCES: NOT PRESENT
PAROXYSMAL SWEATS: NO SWEAT VISIBLE
AGITATION: NORMAL ACTIVITY
NAUSEA AND VOMITING: MILD NAUSEA WITH NO VOMITING
TOTAL SCORE: 15
AGITATION: NORMAL ACTIVITY
TREMOR: MODERATE TREMOR WITH ARMS EXTENDED
ANXIETY: NO ANXIETY (AT EASE)
AGITATION: MODERATELY FIDGETY AND RESTLESS
HEADACHE, FULLNESS IN HEAD: MILD
VISUAL DISTURBANCES: NOT PRESENT
NAUSEA AND VOMITING: MILD NAUSEA WITH NO VOMITING
VISUAL DISTURBANCES: VERY MILD SENSITIVITY
ORIENTATION AND CLOUDING OF SENSORIUM: ORIENTED AND CAN DO SERIAL ADDITIONS
ANXIETY: *
PAROXYSMAL SWEATS: *
TOTAL SCORE: MILD ITCHING, PINS AND NEEDLES SENSATION, BURNING OR NUMBNESS
HEADACHE, FULLNESS IN HEAD: SEVERE
ORIENTATION AND CLOUDING OF SENSORIUM: ORIENTED AND CAN DO SERIAL ADDITIONS
ORIENTATION AND CLOUDING OF SENSORIUM: ORIENTED AND CAN DO SERIAL ADDITIONS
VISUAL DISTURBANCES: NOT PRESENT
AGITATION: NORMAL ACTIVITY
TOTAL SCORE: 14
AUDITORY DISTURBANCES: NOT PRESENT
TREMOR: MODERATE TREMOR WITH ARMS EXTENDED
PAROXYSMAL SWEATS: NO SWEAT VISIBLE
HEADACHE, FULLNESS IN HEAD: MODERATELY SEVERE
NAUSEA AND VOMITING: *
NAUSEA AND VOMITING: *

## 2022-06-13 ASSESSMENT — ENCOUNTER SYMPTOMS
TREMORS: 1
MEMORY LOSS: 1
ABDOMINAL PAIN: 0
DIZZINESS: 0
GASTROINTESTINAL NEGATIVE: 1
RESPIRATORY NEGATIVE: 1
FEVER: 0
DEPRESSION: 0
HEADACHES: 0
SHORTNESS OF BREATH: 0
SEIZURES: 1
MUSCULOSKELETAL NEGATIVE: 1
CARDIOVASCULAR NEGATIVE: 1
EYES NEGATIVE: 1
MYALGIAS: 0
NAUSEA: 0
PSYCHIATRIC NEGATIVE: 1
CHILLS: 0
VOMITING: 0

## 2022-06-13 ASSESSMENT — PAIN DESCRIPTION - PAIN TYPE
TYPE: ACUTE PAIN

## 2022-06-13 NOTE — ASSESSMENT & PLAN NOTE
"Restart prn\"    Vitals:    06/19/22 1134   BP: 134/77   Pulse: 83   Resp: 18   Temp: 36.2 °C (97.2 °F)   SpO2: 95%     Stable.  Resumed amlodipine  Added BB  Normalized now  "

## 2022-06-13 NOTE — ED NOTES
Pt resting on gurney, medicated per [facility] protocol, pt remains tremulous with complaints of nausea and headache, no signs of seizure activity.

## 2022-06-13 NOTE — ASSESSMENT & PLAN NOTE
"CIWA protocol  librium  Multivitamins, thiamine  Counseled on benefits of alcohol cessation\"    Still withdrawing  Continue [facility] protocol  Alcohol cessation enxouraged  Should be done withdrawing tomorrow (day no 5)  May have chronic ataxia; skilled or rehab recommended.  OT reeval    "

## 2022-06-13 NOTE — H&P
Hospital Medicine History & Physical Note    Date of Service  6/13/2022    Primary Care Physician  No primary care provider on file.    Consultants  None    Code Status  Full Code    Chief Complaint  Chief Complaint   Patient presents with   • Seizure       History of Presenting Illness  Donal Carpio is a 52 y.o. male who presented 6/12/2022 with several seizure episodes today.  Patient only recently began drinking alcohol heavily about 7 months ago when his daughter got into a car accident.  His last drink was about 24 hours ago.  Recently discharged from [facility] about 4 days ago for alcohol withdrawal.     Uses marijuana frequently, denies tobacco and illicit drug use.    In the ED, patient found to be tachycardic.  CT head negative.  Found to have 2 seizures in ED to which she was loaded with Keppra and started on CIWA protocol.    I discussed the plan of care with patient.    Review of Systems  Review of Systems   Constitutional: Positive for malaise/fatigue.   HENT: Negative.    Eyes: Negative.    Respiratory: Negative.    Cardiovascular: Negative.    Gastrointestinal: Negative.    Genitourinary: Negative.    Musculoskeletal: Negative.    Skin: Negative.    Neurological: Positive for seizures.   Endo/Heme/Allergies: Negative.    Psychiatric/Behavioral: Negative.        Past Medical History   has a past medical history of Peptic ulcer, Seizure disorder (HCC), and Ulcer of abdomen wall (HCC).    Surgical History   has a past surgical history that includes appendectomy.     Family History   Family history reviewed with patient. There is no family history that is pertinent to the chief complaint.     Social History   reports that he has never smoked. He has never used smokeless tobacco. He reports current alcohol use. He reports current drug use. Drug: Inhaled.    Allergies  Allergies   Allergen Reactions   • Penicillins Unspecified     Unknown childhood reaction        Medications  Prior to Admission  Medications   Prescriptions Last Dose Informant Patient Reported? Taking?   Chlorpheniramine Maleate (ALLERGY PO)  Patient Yes No   Sig: Take 1 Tablet by mouth every day. Unknown allergy medication   Esomeprazole Magnesium 20 MG Pack  Patient Yes No   Sig: Take 20 mg by mouth every morning before breakfast.   amLODIPine (NORVASC) 5 MG Tab   No No   Sig: Take 1 Tablet by mouth every day.   folic acid (FOLVITE) 1 MG Tab   No No   Sig: Take 1 Tablet by mouth every day.   levETIRAcetam (KEPPRA) 500 MG Tab  Patient Yes No   Sig: Take 500 mg by mouth 2 times a day.   multivitamin (THERAGRAN) Tab   No No   Sig: Take 1 Tablet by mouth every day.   nicotine (NICODERM) 21 MG/24HR PATCH 24 HR   No No   Sig: Place 1 Patch on the skin every 24 hours for 30 days.   sodium bicarbonate (SODIUM BICARBONATE) 650 MG Tab   No No   Sig: Take 2 Tablets by mouth 2 times a day for 5 days.   therapeutic multivitamin-minerals (THERAGRAN-M) Tab  Patient Yes No   Sig: Take 1 Tablet by mouth every day.   thiamine (THIAMINE) 100 MG tablet   No No   Sig: Take 1 Tablet by mouth every day for 30 days.      Facility-Administered Medications: None       Physical Exam  Temp:  [36.4 °C (97.5 °F)-36.7 °C (98 °F)] 36.7 °C (98 °F)  Pulse:  [] 98  Resp:  [20-29] 23  BP: (152-171)/() 152/89  SpO2:  [94 %-97 %] 94 %  Blood Pressure: (!) 152/89   Temperature: 36.7 °C (98 °F)   Pulse: 98   Respiration: (!) 23   Pulse Oximetry: 94 %       Physical Exam  Constitutional:       Appearance: Normal appearance. He is normal weight.   HENT:      Head: Normocephalic.      Nose: Nose normal.      Mouth/Throat:      Mouth: Mucous membranes are moist.   Eyes:      Pupils: Pupils are equal, round, and reactive to light.   Cardiovascular:      Rate and Rhythm: Regular rhythm. Tachycardia present.   Pulmonary:      Effort: Pulmonary effort is normal.      Breath sounds: Normal breath sounds.   Abdominal:      General: Abdomen is flat.   Musculoskeletal:       Cervical back: Normal range of motion.      Comments: Tremors in upper extremities bilaterally   Skin:     General: Skin is warm.   Neurological:      General: No focal deficit present.      Mental Status: He is alert and oriented to person, place, and time. Mental status is at baseline.   Psychiatric:         Mood and Affect: Mood normal.         Behavior: Behavior normal.         Thought Content: Thought content normal.         Judgment: Judgment normal.         Laboratory:  Recent Labs     06/12/22 2117   WBC 9.3   RBC 5.13   HEMOGLOBIN 14.9   HEMATOCRIT 44.1   MCV 86.0   MCH 29.0   MCHC 33.8   RDW 55.8*   PLATELETCT 229   MPV 8.7*     Recent Labs     06/12/22 2117   SODIUM 142   POTASSIUM 3.7   CHLORIDE 106   CO2 22   GLUCOSE 134*   BUN 7*   CREATININE 0.69   CALCIUM 9.2     Recent Labs     06/12/22 2117   ALTSGPT 90*   ASTSGOT 87*   ALKPHOSPHAT 127*   TBILIRUBIN 0.3   LIPASE 32   GLUCOSE 134*         No results for input(s): NTPROBNP in the last 72 hours.      No results for input(s): TROPONINT in the last 72 hours.    Imaging:  CT-TSPINE W/O PLUS RECONS   Final Result         Moderate vertebral height loss of T6 and T7 with superior endplate concavity appears chronic. No acute fracture identified.      No malalignment.      CT-HEAD W/O   Final Result         1. No acute intracranial abnormality. No evidence of acute intracranial hemorrhage or mass lesion.                     CT-CSPINE WITHOUT PLUS RECONS   Final Result         1. No acute fracture from C1 through T1 is visualized.         DX-THORACIC SPINE-2 VIEWS   Final Result         Mild superior endplate compression deformity of the midthoracic spine, likely T7, slightly worse than prior, concerning for more acute injury.          no X-Ray or EKG requiring interpretation    Assessment/Plan:  Justification for Admission Status  I anticipate this patient is appropriate for inpatient.    * Alcohol withdrawal syndrome with complication (HCC)- (present on  admission)  Assessment & Plan  CIWA protocol  Multivitamins   Counseled on benefits of alcohol cessation      Hypertension  Assessment & Plan  Restart prn    Seizure disorder (HCC)- (present on admission)  Assessment & Plan  Loaded with Keppra   Continue keppra       VTE prophylaxis: heparin ppx

## 2022-06-13 NOTE — ED PROVIDER NOTES
ED Provider Note    Scribed for Umberto Roberts M.D. by Tobias Edwards. 6/12/2022, 8:48 PM.    Primary care provider: No primary care provider reported .  Means of arrival: EMS  History obtained from: Patient   History limited by: None     CHIEF COMPLAINT  Chief Complaint   Patient presents with   • Seizure     HPI  Donal Carpio is a 52 y.o. male who presents to the Emergency Department for worsening seizures onset today. Patient reports he had several seizures today with the last resulting in a fall and loss of consciousness. He reports a history of seizures. Patient denies drinking and reports his last drink was yesterday. He denies having seizures after stopping drinking. Patient reports his seizures began after a fall which resulted in sutures. He reports associated chills, nausea, and back pain. There are no alleviating or exacerbating factors reported. He denies associated weakness.    REVIEW OF SYSTEMS  Pertinent positives include seizures, chills, nausea, and back pain. Pertinent negatives include weakness. All other systems negative.    PAST MEDICAL HISTORY   has a past medical history of Peptic ulcer, Seizure disorder (HCC), and Ulcer of abdomen wall (HCC).    SURGICAL HISTORY   has a past surgical history that includes appendectomy.    SOCIAL HISTORY  Social History     Tobacco Use   • Smoking status: Never Smoker   • Smokeless tobacco: Never Used   Vaping Use   • Vaping Use: Every day   Substance Use Topics   • Alcohol use: Yes     Comment: pint of whiskey   • Drug use: Yes     Types: Inhaled     Comment: marijuana      Social History     Substance and Sexual Activity   Drug Use Yes   • Types: Inhaled    Comment: marijuana       FAMILY HISTORY  None pertinent.    CURRENT MEDICATIONS  Current Outpatient Medications   Medication Instructions   • amLODIPine (NORVASC) 5 mg, Oral, DAILY   • Chlorpheniramine Maleate (ALLERGY PO) 1 Tablet, Oral, DAILY, Unknown allergy medication   • Esomeprazole Magnesium 20  "mg, Oral, EVERY MORNING BEFORE BREAKFAST   • folic acid (FOLVITE) 1 mg, Oral, DAILY   • levETIRAcetam (KEPPRA) 500 mg, Oral, 2 TIMES DAILY   • multivitamin (THERAGRAN) Tab 1 Tablet, Oral, DAILY   • nicotine (NICODERM) 21 mg, Transdermal, EVERY 24 HOURS   • sodium bicarbonate (SODIUM BICARBONATE) 1,300 mg, Oral, 2 TIMES DAILY   • therapeutic multivitamin-minerals (THERAGRAN-M) Tab 1 Tablet, Oral, DAILY   • thiamine (THIAMINE) 100 mg, Oral, DAILY          ALLERGIES  Allergies   Allergen Reactions   • Penicillins Unspecified     Unknown childhood reaction        PHYSICAL EXAM  VITAL SIGNS: BP (!) 171/104   Pulse (!) 128   Temp 36.4 °C (97.5 °F) (Temporal)   Resp (!) 24   Ht 1.753 m (5' 9\")   Wt 86.9 kg (191 lb 9.3 oz)   SpO2 96%   BMI 28.29 kg/m²     Constitutional: Well developed, Well nourished, mild distress.   HENT: Normocephalic, Atraumatic, mask in place.  Eyes: Conjunctiva normal, No discharge.   Neck: Supple, No stridor, cervical midline tenderness.  Cardiovascular: Tachycardic, Normal rhythm, No murmurs, equal pulses.   Pulmonary: Normal breath sounds, No respiratory distress, No wheezing, No rales, No rhonchi.  Chest: No chest wall tenderness or deformity.   Abdomen:Soft, No tenderness, No masses, no rebound, no guarding.   Back: No CVA tenderness. T10 tenderness noted.  Musculoskeletal: No major deformities noted, No tenderness.   Skin: Warm, Dry, No erythema, No rash.   Neurologic: Alert & oriented x 3. Tremor to tongue and hands, no laceration to tongue.  Psychiatric: Affect normal, Judgment normal, Mood normal.     LABS  Results for orders placed or performed during the hospital encounter of 06/12/22   CBC WITH DIFFERENTIAL   Result Value Ref Range    WBC 9.3 4.8 - 10.8 K/uL    RBC 5.13 4.70 - 6.10 M/uL    Hemoglobin 14.9 14.0 - 18.0 g/dL    Hematocrit 44.1 42.0 - 52.0 %    MCV 86.0 81.4 - 97.8 fL    MCH 29.0 27.0 - 33.0 pg    MCHC 33.8 33.7 - 35.3 g/dL    RDW 55.8 (H) 35.9 - 50.0 fL    Platelet " Count 229 164 - 446 K/uL    MPV 8.7 (L) 9.0 - 12.9 fL    Neutrophils-Polys 65.00 44.00 - 72.00 %    Lymphocytes 19.80 (L) 22.00 - 41.00 %    Monocytes 11.20 0.00 - 13.40 %    Eosinophils 2.60 0.00 - 6.90 %    Basophils 0.70 0.00 - 1.80 %    Immature Granulocytes 0.70 0.00 - 0.90 %    Nucleated RBC 0.00 /100 WBC    Neutrophils (Absolute) 6.06 1.82 - 7.42 K/uL    Lymphs (Absolute) 1.85 1.00 - 4.80 K/uL    Monos (Absolute) 1.05 (H) 0.00 - 0.85 K/uL    Eos (Absolute) 0.24 0.00 - 0.51 K/uL    Baso (Absolute) 0.07 0.00 - 0.12 K/uL    Immature Granulocytes (abs) 0.07 0.00 - 0.11 K/uL    NRBC (Absolute) 0.00 K/uL   COMP METABOLIC PANEL   Result Value Ref Range    Sodium 142 135 - 145 mmol/L    Potassium 3.7 3.6 - 5.5 mmol/L    Chloride 106 96 - 112 mmol/L    Co2 22 20 - 33 mmol/L    Anion Gap 14.0 7.0 - 16.0    Glucose 134 (H) 65 - 99 mg/dL    Bun 7 (L) 8 - 22 mg/dL    Creatinine 0.69 0.50 - 1.40 mg/dL    Calcium 9.2 8.5 - 10.5 mg/dL    AST(SGOT) 87 (H) 12 - 45 U/L    ALT(SGPT) 90 (H) 2 - 50 U/L    Alkaline Phosphatase 127 (H) 30 - 99 U/L    Total Bilirubin 0.3 0.1 - 1.5 mg/dL    Albumin 4.0 3.2 - 4.9 g/dL    Total Protein 7.4 6.0 - 8.2 g/dL    Globulin 3.4 1.9 - 3.5 g/dL    A-G Ratio 1.2 g/dL   LIPASE   Result Value Ref Range    Lipase 32 11 - 82 U/L   DIAGNOSTIC ALCOHOL   Result Value Ref Range    Diagnostic Alcohol <10.1 <10.1 mg/dL   Magnesium   Result Value Ref Range    Magnesium 1.8 1.5 - 2.5 mg/dL   Phosphorus   Result Value Ref Range    Phosphorus 2.0 (L) 2.5 - 4.5 mg/dL   ESTIMATED GFR   Result Value Ref Range    GFR (CKD-EPI) 113 >60 mL/min/1.73 m 2      All labs reviewed by me.    RADIOLOGY  CT-TSPINE W/O PLUS RECONS   Final Result         Moderate vertebral height loss of T6 and T7 with superior endplate concavity appears chronic. No acute fracture identified.      No malalignment.      CT-HEAD W/O   Final Result         1. No acute intracranial abnormality. No evidence of acute intracranial hemorrhage or mass  lesion.                     CT-CSPINE WITHOUT PLUS RECONS   Final Result         1. No acute fracture from C1 through T1 is visualized.         DX-THORACIC SPINE-2 VIEWS   Final Result         Mild superior endplate compression deformity of the midthoracic spine, likely T7, slightly worse than prior, concerning for more acute injury.        The radiologist's interpretation of all radiological studies have been reviewed by me.    COURSE & MEDICAL DECISION MAKING  Pertinent Labs & Imaging studies reviewed. (See chart for details)    8:48 PM - Patient seen and examined at bedside. Patient will be treated with Ativan 4 mg, Librium 50 mg, Tylenol 650 mg, NS infusion 1,000 mL, Fovite 1 mg, Theragran 1 tablet, thiamine 100 mg, Librium 25 mg. The patient will receive IV fluids for dehydration and migraines. Ordered Diagnostic Alcohol, Lipase, CMP, CBC w/diff, Estimated GFR, Magnesium, CT-Head w/o, DX-Thoracic Spine-2 Views, CT-CSpine w/o plus recons to evaluate his symptoms. The differential diagnoses include but are not limited to: alcohol withdrawal vs seizure vs intercranial hemorrhage vs cervical injury.      10:35 PM - Ordered CT-TSpine w/o Plus Recons. Patient treated with phosphorus 250 mg.    11:17 PM - Patient treated with Keppra 1500 mg.    There was a improved response to IV fluids after patient reevaluation.     00:25 AM notified by nurse the patient had some shaking and urinary incontinence.  And seemed postictal.    Reexamined the patient at 00 50 a.m. he still has a tremor to his tongue and hand.    Discussed the case with Dr. Gross hospitalist is agreed to consult on hospitalization    Medical Decision Making: At this point time I do think the patient most likely is alcohol withdrawal and has had a seizure.  May have an underlying seizure disorder as he has been started on Keppra.  Given the fact the patient had another seizure despite being treated with benzodiazepines I think he would benefit from  hospitalization and careful monitoring.    DISPOSITION:  Patient will be hospitalized by Dr. Gross in guarded condition.       FINAL IMPRESSION  1. Alcohol withdrawal syndrome with complication (HCC)    2. Seizure (HCC)          Tobias NEWTON (Scribe), am scribing for, and in the presence of, Umberto Roberts M.D.    Electronically signed by: Tobias Edwards ([email or web address]), 6/12/2022    Umberto NEWTON M.D. personally performed the services described in this documentation, as scribed by Tobias Edwards in my presence, and it is both accurate and complete.    The note accurately reflects work and decisions made by me.  Umberto Roberts M.D.  6/13/2022  12:56 AM

## 2022-06-13 NOTE — ED TRIAGE NOTES
Chief Complaint   Patient presents with   • Seizure     Pt BIB EMS from home for above complaint. Pt states he has been having seizures x1 month. Pt reports having 3 seizures today, he believes his last seizure he experienced a GLF hitting his head on a side table. No obvious signs of trauma, -thinners. Pt had incontinent episode during seizure and is complaining of N/V and 8/10 head pain. Pt is currently post-ictal and disoriented to situation. Chart up for ERP.

## 2022-06-13 NOTE — ED NOTES
Pt medicated per MAR with morning meds. Pt complaining of 7/10 headache, MD contacted for orders.

## 2022-06-13 NOTE — PROGRESS NOTES
Hospital Medicine Daily Progress Note    Date of Service  6/13/2022    Chief Complaint  Donal Carpio is a 52 y.o. male admitted 6/12/2022 with alcohol withdrawal seizures.     Hospital Course  52 y.o. male who presented 6/12/2022 with several seizure episodes today.  Patient only recently began drinking alcohol heavily about 7 months ago when his daughter got into a car accident.  His last drink was about 24 hours ago.  Recently discharged from [facility] about 4 days ago for alcohol withdrawal. In the ED, patient found to be tachycardic.  CT head negative.  Found to have 2 seizures in ED to which she was loaded with Keppra and started on CIWA protocol. Has had >10 admissions in the last year for the same complaint, previous work up with MRI and by neurology were unremarkable.     Interval Problem Update  Continue librium and CIWA, score this morning 14. Patient reports he is still feeling very tired and having back pain after his seizures in the ER. Pain meds ordered prn. Reports he has been taking his keppra but does not like to take it as it makes him extremely dizzy requesting change of medications, will change to depakote.     I have personally seen and examined the patient at bedside. I discussed the plan of care with patient and bedside RN.    Consultants/Specialty  N/A    Code Status  Full Code    Disposition  Patient is not medically cleared for discharge.   Anticipate discharge to to home with close outpatient follow-up.  I have placed the appropriate orders for post-discharge needs.    Review of Systems  Review of Systems   Constitutional: Positive for malaise/fatigue. Negative for chills and fever.   Respiratory: Negative for shortness of breath.    Cardiovascular: Negative for chest pain.   Gastrointestinal: Negative for abdominal pain, nausea and vomiting.   Genitourinary: Negative for dysuria.   Musculoskeletal: Negative for myalgias.   Skin: Negative for rash.   Neurological: Positive for tremors  and seizures. Negative for dizziness and headaches.   Psychiatric/Behavioral: Positive for memory loss and substance abuse. Negative for depression.   All other systems reviewed and are negative.       Physical Exam  Temp:  [36.4 °C (97.5 °F)-36.7 °C (98 °F)] 36.7 °C (98 °F)  Pulse:  [] 79  Resp:  [15-29] 15  BP: (145-171)/() 147/86  SpO2:  [94 %-97 %] 94 %    Physical Exam  Vitals and nursing note reviewed.   Constitutional:       General: He is not in acute distress.     Appearance: Normal appearance.   HENT:      Head: Normocephalic and atraumatic.   Eyes:      Conjunctiva/sclera: Conjunctivae normal.      Pupils: Pupils are equal, round, and reactive to light.   Cardiovascular:      Rate and Rhythm: Regular rhythm. Tachycardia present.      Pulses: Normal pulses.   Pulmonary:      Effort: Pulmonary effort is normal. No respiratory distress.      Breath sounds: Normal breath sounds. No wheezing.   Abdominal:      General: Abdomen is flat. There is no distension.      Palpations: Abdomen is soft.      Tenderness: There is no abdominal tenderness.   Musculoskeletal:         General: No swelling. Normal range of motion.      Cervical back: Normal range of motion and neck supple.   Skin:     General: Skin is warm and dry.      Findings: No rash.   Neurological:      General: No focal deficit present.      Mental Status: He is alert and oriented to person, place, and time.      Cranial Nerves: No cranial nerve deficit.      Comments: +tremor   Psychiatric:         Mood and Affect: Mood normal.         Behavior: Behavior normal.         Fluids    Intake/Output Summary (Last 24 hours) at 6/13/2022 1331  Last data filed at 6/12/2022 2324  Gross per 24 hour   Intake 1000 ml   Output --   Net 1000 ml       Laboratory  Recent Labs     06/12/22 2117   WBC 9.3   RBC 5.13   HEMOGLOBIN 14.9   HEMATOCRIT 44.1   MCV 86.0   MCH 29.0   MCHC 33.8   RDW 55.8*   PLATELETCT 229   MPV 8.7*     Recent Labs     06/12/22 2117    SODIUM 142   POTASSIUM 3.7   CHLORIDE 106   CO2 22   GLUCOSE 134*   BUN 7*   CREATININE 0.69   CALCIUM 9.2                   Imaging  CT-TSPINE W/O PLUS RECONS   Final Result         Moderate vertebral height loss of T6 and T7 with superior endplate concavity appears chronic. No acute fracture identified.      No malalignment.      CT-HEAD W/O   Final Result         1. No acute intracranial abnormality. No evidence of acute intracranial hemorrhage or mass lesion.                     CT-CSPINE WITHOUT PLUS RECONS   Final Result         1. No acute fracture from C1 through T1 is visualized.         DX-THORACIC SPINE-2 VIEWS   Final Result         Mild superior endplate compression deformity of the midthoracic spine, likely T7, slightly worse than prior, concerning for more acute injury.           Assessment/Plan  * Alcohol withdrawal syndrome with complication (HCC)- (present on admission)  Assessment & Plan  [facility] protocol  librium  Multivitamins, thiamine  Counseled on benefits of alcohol cessation      Seizure disorder (HCC)- (present on admission)  Assessment & Plan  Loaded with Keppra   Reports he has severe dizziness with keppra, requested medication change  -start depakote   He has had MRI and neurology evaluation on previous admission which were unremarkable    Transaminitis- (present on admission)  Assessment & Plan  Elevated AST/ALT due to alcohol use   Continue to monitor     Hypertension  Assessment & Plan  Restart prn       VTE prophylaxis: heparin ppx

## 2022-06-13 NOTE — ED NOTES
Pt transported to XRAY.    VN cued into room for q2h rounding.  Pt resting comfortably in bed.  NADN.  Pt on room air. VN will continue to follow and be available as needed.

## 2022-06-13 NOTE — ASSESSMENT & PLAN NOTE
"Loaded with Keppra   Reports he has severe dizziness with keppra, requested medication change  -start depakote   He has had MRI and neurology evaluation on previous admission which were unremarkable\"    Follow up with Neurology  "

## 2022-06-14 PROCEDURE — 700102 HCHG RX REV CODE 250 W/ 637 OVERRIDE(OP): Performed by: INTERNAL MEDICINE

## 2022-06-14 PROCEDURE — A9270 NON-COVERED ITEM OR SERVICE: HCPCS | Performed by: INTERNAL MEDICINE

## 2022-06-14 PROCEDURE — 700102 HCHG RX REV CODE 250 W/ 637 OVERRIDE(OP): Performed by: EMERGENCY MEDICINE

## 2022-06-14 PROCEDURE — 99232 SBSQ HOSP IP/OBS MODERATE 35: CPT | Performed by: INTERNAL MEDICINE

## 2022-06-14 PROCEDURE — 770020 HCHG ROOM/CARE - TELE (206)

## 2022-06-14 PROCEDURE — 700111 HCHG RX REV CODE 636 W/ 250 OVERRIDE (IP): Performed by: STUDENT IN AN ORGANIZED HEALTH CARE EDUCATION/TRAINING PROGRAM

## 2022-06-14 PROCEDURE — 700101 HCHG RX REV CODE 250: Performed by: INTERNAL MEDICINE

## 2022-06-14 PROCEDURE — 700111 HCHG RX REV CODE 636 W/ 250 OVERRIDE (IP): Performed by: NURSE PRACTITIONER

## 2022-06-14 PROCEDURE — 700105 HCHG RX REV CODE 258: Performed by: INTERNAL MEDICINE

## 2022-06-14 PROCEDURE — A9270 NON-COVERED ITEM OR SERVICE: HCPCS | Performed by: EMERGENCY MEDICINE

## 2022-06-14 RX ORDER — ONDANSETRON 2 MG/ML
4 INJECTION INTRAMUSCULAR; INTRAVENOUS EVERY 6 HOURS PRN
Status: DISCONTINUED | OUTPATIENT
Start: 2022-06-14 | End: 2022-06-20 | Stop reason: HOSPADM

## 2022-06-14 RX ADMIN — RIVAROXABAN 10 MG: 10 TABLET, FILM COATED ORAL at 17:40

## 2022-06-14 RX ADMIN — LORAZEPAM 3 MG: 2 TABLET ORAL at 23:55

## 2022-06-14 RX ADMIN — OXYCODONE 5 MG: 5 TABLET ORAL at 17:40

## 2022-06-14 RX ADMIN — CHLORDIAZEPOXIDE HYDROCHLORIDE 25 MG: 25 CAPSULE ORAL at 12:17

## 2022-06-14 RX ADMIN — OXYCODONE 5 MG: 5 TABLET ORAL at 05:08

## 2022-06-14 RX ADMIN — DIVALPROEX SODIUM 500 MG: 500 TABLET, DELAYED RELEASE ORAL at 17:40

## 2022-06-14 RX ADMIN — ONDANSETRON 4 MG: 2 INJECTION INTRAMUSCULAR; INTRAVENOUS at 11:50

## 2022-06-14 RX ADMIN — ONDANSETRON 4 MG: 2 INJECTION INTRAMUSCULAR; INTRAVENOUS at 05:17

## 2022-06-14 RX ADMIN — LORAZEPAM 2 MG: 2 TABLET ORAL at 02:26

## 2022-06-14 RX ADMIN — OXYCODONE HYDROCHLORIDE 10 MG: 10 TABLET ORAL at 21:01

## 2022-06-14 RX ADMIN — OXYCODONE HYDROCHLORIDE 10 MG: 10 TABLET ORAL at 11:46

## 2022-06-14 RX ADMIN — LORAZEPAM 3 MG: 2 TABLET ORAL at 05:08

## 2022-06-14 RX ADMIN — HEPARIN SODIUM 5000 UNITS: 5000 INJECTION, SOLUTION INTRAVENOUS; SUBCUTANEOUS at 05:14

## 2022-06-14 RX ADMIN — CHLORDIAZEPOXIDE HYDROCHLORIDE 25 MG: 25 CAPSULE ORAL at 00:29

## 2022-06-14 RX ADMIN — ONDANSETRON 4 MG: 2 INJECTION INTRAMUSCULAR; INTRAVENOUS at 00:29

## 2022-06-14 RX ADMIN — CHLORDIAZEPOXIDE HYDROCHLORIDE 25 MG: 25 CAPSULE ORAL at 17:40

## 2022-06-14 RX ADMIN — OXYCODONE HYDROCHLORIDE 10 MG: 10 TABLET ORAL at 02:27

## 2022-06-14 RX ADMIN — LORAZEPAM 3 MG: 2 TABLET ORAL at 21:02

## 2022-06-14 RX ADMIN — FOLIC ACID 1 MG: 1 TABLET ORAL at 05:09

## 2022-06-14 RX ADMIN — CHLORDIAZEPOXIDE HYDROCHLORIDE 25 MG: 25 CAPSULE ORAL at 23:55

## 2022-06-14 RX ADMIN — CHLORDIAZEPOXIDE HYDROCHLORIDE 25 MG: 25 CAPSULE ORAL at 05:08

## 2022-06-14 RX ADMIN — LORAZEPAM 2 MG: 2 TABLET ORAL at 10:19

## 2022-06-14 RX ADMIN — POTASSIUM PHOSPHATE, MONOBASIC AND POTASSIUM PHOSPHATE, DIBASIC 30 MMOL: 224; 236 INJECTION, SOLUTION, CONCENTRATE INTRAVENOUS at 12:17

## 2022-06-14 RX ADMIN — LORAZEPAM 3 MG: 2 TABLET ORAL at 16:42

## 2022-06-14 RX ADMIN — DIVALPROEX SODIUM 500 MG: 500 TABLET, DELAYED RELEASE ORAL at 05:09

## 2022-06-14 RX ADMIN — THERA TABS 1 TABLET: TAB at 05:14

## 2022-06-14 RX ADMIN — LORAZEPAM 2 MG: 2 TABLET ORAL at 00:29

## 2022-06-14 RX ADMIN — THIAMINE HCL TAB 100 MG 100 MG: 100 TAB at 05:08

## 2022-06-14 ASSESSMENT — LIFESTYLE VARIABLES
TOTAL SCORE: VERY MILD ITCHING, PINS AND NEEDLES SENSATION, BURNING OR NUMBNESS
VISUAL DISTURBANCES: NOT PRESENT
PAROXYSMAL SWEATS: NO SWEAT VISIBLE
TOTAL SCORE: 13
AUDITORY DISTURBANCES: NOT PRESENT
ANXIETY: MODERATELY ANXIOUS OR GUARDED, SO ANXIETY IS INFERRED
VISUAL DISTURBANCES: NOT PRESENT
NAUSEA AND VOMITING: NO NAUSEA AND NO VOMITING
TREMOR: *
AUDITORY DISTURBANCES: NOT PRESENT
AGITATION: SOMEWHAT MORE THAN NORMAL ACTIVITY
VISUAL DISTURBANCES: NOT PRESENT
TOTAL SCORE: 15
ANXIETY: NO ANXIETY (AT EASE)
PAROXYSMAL SWEATS: *
ORIENTATION AND CLOUDING OF SENSORIUM: ORIENTED AND CAN DO SERIAL ADDITIONS
VISUAL DISTURBANCES: NOT PRESENT
TREMOR: TREMOR NOT VISIBLE BUT CAN BE FELT, FINGERTIP TO FINGERTIP
TOTAL SCORE: 14
TOTAL SCORE: MILD ITCHING, PINS AND NEEDLES SENSATION, BURNING OR NUMBNESS
NAUSEA AND VOMITING: MILD NAUSEA WITH NO VOMITING
TOTAL SCORE: 15
HEADACHE, FULLNESS IN HEAD: MODERATE
ORIENTATION AND CLOUDING OF SENSORIUM: CANNOT DO SERIAL ADDITIONS OR IS UNCERTAIN ABOUT DATE
AGITATION: NORMAL ACTIVITY
TREMOR: TREMOR NOT VISIBLE BUT CAN BE FELT, FINGERTIP TO FINGERTIP
AGITATION: SOMEWHAT MORE THAN NORMAL ACTIVITY
HEADACHE, FULLNESS IN HEAD: MODERATELY SEVERE
TOTAL SCORE: 13
PAROXYSMAL SWEATS: *
ANXIETY: MODERATELY ANXIOUS OR GUARDED, SO ANXIETY IS INFERRED
AGITATION: NORMAL ACTIVITY
TOTAL SCORE: VERY MILD ITCHING, PINS AND NEEDLES SENSATION, BURNING OR NUMBNESS
PAROXYSMAL SWEATS: NO SWEAT VISIBLE
ORIENTATION AND CLOUDING OF SENSORIUM: CANNOT DO SERIAL ADDITIONS OR IS UNCERTAIN ABOUT DATE
PAROXYSMAL SWEATS: BARELY PERCEPTIBLE SWEATING. PALMS MOIST
ANXIETY: *
TOTAL SCORE: VERY MILD ITCHING, PINS AND NEEDLES SENSATION, BURNING OR NUMBNESS
AUDITORY DISTURBANCES: NOT PRESENT
ANXIETY: NO ANXIETY (AT EASE)
TREMOR: *
VISUAL DISTURBANCES: NOT PRESENT
PAROXYSMAL SWEATS: *
ORIENTATION AND CLOUDING OF SENSORIUM: CANNOT DO SERIAL ADDITIONS OR IS UNCERTAIN ABOUT DATE
PAROXYSMAL SWEATS: *
PAROXYSMAL SWEATS: BARELY PERCEPTIBLE SWEATING. PALMS MOIST
NAUSEA AND VOMITING: MILD NAUSEA WITH NO VOMITING
AUDITORY DISTURBANCES: NOT PRESENT
ANXIETY: MODERATELY ANXIOUS OR GUARDED, SO ANXIETY IS INFERRED
VISUAL DISTURBANCES: NOT PRESENT
NAUSEA AND VOMITING: MILD NAUSEA WITH NO VOMITING
TOTAL SCORE: MILD ITCHING, PINS AND NEEDLES SENSATION, BURNING OR NUMBNESS
ORIENTATION AND CLOUDING OF SENSORIUM: ORIENTED AND CAN DO SERIAL ADDITIONS
TOTAL SCORE: 14
HEADACHE, FULLNESS IN HEAD: MODERATE
VISUAL DISTURBANCES: NOT PRESENT
HEADACHE, FULLNESS IN HEAD: MODERATELY SEVERE
AUDITORY DISTURBANCES: NOT PRESENT
TOTAL SCORE: MILD ITCHING, PINS AND NEEDLES SENSATION, BURNING OR NUMBNESS
AGITATION: SOMEWHAT MORE THAN NORMAL ACTIVITY
TREMOR: TREMOR NOT VISIBLE BUT CAN BE FELT, FINGERTIP TO FINGERTIP
TREMOR: TREMOR NOT VISIBLE BUT CAN BE FELT, FINGERTIP TO FINGERTIP
TOTAL SCORE: MILD ITCHING, PINS AND NEEDLES SENSATION, BURNING OR NUMBNESS
AUDITORY DISTURBANCES: NOT PRESENT
PAROXYSMAL SWEATS: BARELY PERCEPTIBLE SWEATING. PALMS MOIST
TOTAL SCORE: VERY MILD ITCHING, PINS AND NEEDLES SENSATION, BURNING OR NUMBNESS
TOTAL SCORE: 7
HEADACHE, FULLNESS IN HEAD: MODERATELY SEVERE
NAUSEA AND VOMITING: MILD NAUSEA WITH NO VOMITING
AUDITORY DISTURBANCES: NOT PRESENT
VISUAL DISTURBANCES: NOT PRESENT
NAUSEA AND VOMITING: MILD NAUSEA WITH NO VOMITING
TREMOR: TREMOR NOT VISIBLE BUT CAN BE FELT, FINGERTIP TO FINGERTIP
AGITATION: NORMAL ACTIVITY
TREMOR: *
TOTAL SCORE: 6
VISUAL DISTURBANCES: NOT PRESENT
ORIENTATION AND CLOUDING OF SENSORIUM: CANNOT DO SERIAL ADDITIONS OR IS UNCERTAIN ABOUT DATE
ORIENTATION AND CLOUDING OF SENSORIUM: CANNOT DO SERIAL ADDITIONS OR IS UNCERTAIN ABOUT DATE
HEADACHE, FULLNESS IN HEAD: MODERATELY SEVERE
ORIENTATION AND CLOUDING OF SENSORIUM: ORIENTED AND CAN DO SERIAL ADDITIONS
ANXIETY: *
PAROXYSMAL SWEATS: *
TOTAL SCORE: 15
HEADACHE, FULLNESS IN HEAD: MODERATE
HEADACHE, FULLNESS IN HEAD: MODERATELY SEVERE
SUBSTANCE_ABUSE: 1
AGITATION: NORMAL ACTIVITY
AGITATION: MODERATELY FIDGETY AND RESTLESS
NAUSEA AND VOMITING: MILD NAUSEA WITH NO VOMITING
VISUAL DISTURBANCES: NOT PRESENT
TOTAL SCORE: 15
NAUSEA AND VOMITING: MILD NAUSEA WITH NO VOMITING
PAROXYSMAL SWEATS: NO SWEAT VISIBLE
AUDITORY DISTURBANCES: NOT PRESENT
ORIENTATION AND CLOUDING OF SENSORIUM: CANNOT DO SERIAL ADDITIONS OR IS UNCERTAIN ABOUT DATE
NAUSEA AND VOMITING: MILD NAUSEA WITH NO VOMITING
ANXIETY: *
AUDITORY DISTURBANCES: NOT PRESENT
ORIENTATION AND CLOUDING OF SENSORIUM: CANNOT DO SERIAL ADDITIONS OR IS UNCERTAIN ABOUT DATE
TREMOR: *
TOTAL SCORE: VERY MILD ITCHING, PINS AND NEEDLES SENSATION, BURNING OR NUMBNESS
NAUSEA AND VOMITING: MILD NAUSEA WITH NO VOMITING
AGITATION: SOMEWHAT MORE THAN NORMAL ACTIVITY
TOTAL SCORE: VERY MILD ITCHING, PINS AND NEEDLES SENSATION, BURNING OR NUMBNESS
AGITATION: SOMEWHAT MORE THAN NORMAL ACTIVITY
TOTAL SCORE: 16
VISUAL DISTURBANCES: NOT PRESENT
NAUSEA AND VOMITING: MILD NAUSEA WITH NO VOMITING
HEADACHE, FULLNESS IN HEAD: MODERATELY SEVERE
TREMOR: *
ANXIETY: NO ANXIETY (AT EASE)
ORIENTATION AND CLOUDING OF SENSORIUM: CANNOT DO SERIAL ADDITIONS OR IS UNCERTAIN ABOUT DATE
HEADACHE, FULLNESS IN HEAD: MODERATE
TREMOR: MODERATE TREMOR WITH ARMS EXTENDED
AGITATION: NORMAL ACTIVITY
HEADACHE, FULLNESS IN HEAD: MODERATELY SEVERE
ANXIETY: MODERATELY ANXIOUS OR GUARDED, SO ANXIETY IS INFERRED
ANXIETY: MODERATELY ANXIOUS OR GUARDED, SO ANXIETY IS INFERRED

## 2022-06-14 ASSESSMENT — ENCOUNTER SYMPTOMS
NECK PAIN: 1
BRUISES/BLEEDS EASILY: 0
HEADACHES: 0
VOMITING: 0
BLURRED VISION: 0
MYALGIAS: 0
DOUBLE VISION: 0
DIZZINESS: 0
TREMORS: 1
SHORTNESS OF BREATH: 0
BACK PAIN: 1
ABDOMINAL PAIN: 0
FEVER: 0
POLYDIPSIA: 0
SEIZURES: 1
NAUSEA: 1
CHILLS: 0

## 2022-06-14 ASSESSMENT — PAIN SCALES - WONG BAKER: WONGBAKER_NUMERICALRESPONSE: HURTS EVEN MORE

## 2022-06-14 NOTE — CARE PLAN
The patient is Stable - Low risk of patient condition declining or worsening    Shift Goals  Clinical Goals: Seizure monitoring, neurostability  Patient Goals: Rest and recovery    Progress made toward(s) clinical / shift goals:      Problem: Pain - Standard  Goal: Alleviation of pain or a reduction in pain to the patient’s comfort goal  Outcome: Progressing     Problem: Knowledge Deficit - Standard  Goal: Patient and family/care givers will demonstrate understanding of plan of care, disease process/condition, diagnostic tests and medications  Outcome: Progressing     Problem: Optimal Care for Alcohol Withdrawal  Goal: Optimal Care for the alcohol withdrawal patient  Outcome: Progressing     Problem: Seizure Precautions  Goal: Implementation of seizure precautions  Outcome: Met       Patient is not progressing towards the following goals: NA

## 2022-06-14 NOTE — CARE PLAN
The patient is Watcher - Medium risk of patient condition declining or worsening    Shift Goals  Clinical Goals: CIWA protocol, monitor for seizures  Patient Goals: Rest    Progress made toward(s) clinical / shift goals: Seizure precautions in place (i.e. padded side rails, suction and supplemental oxygen available at bedside).      Problem: Optimal Care for Alcohol Withdrawal  Goal: Optimal Care for the alcohol withdrawal patient  Outcome: Progressing   CIWA protocol in use. Pt's signs and symptoms assessed per protocol. Appropriate PRNs available.      Patient is not progressing towards the following goals:

## 2022-06-14 NOTE — PROGRESS NOTES
"Pt reported feeling like he had a seizure around 0200 (6/14). He reported symptoms of dizziness, headache, and \"feeling out of it\". Orientation status seems to be unchanged compared to previous encounters. Plan is to medicate per CIWA protocol, provide PRN pain medications, and continue seizure precautions. Hospitalist, Adry Almeida, notified.   "

## 2022-06-14 NOTE — CARE PLAN
The patient is Stable - Low risk of patient condition declining or worsening    Shift Goals  Clinical Goals: Sz monitoring, CIWA protocol  Patient Goals: Rest and medication    Progress made toward(s) clinical / shift goals:      Problem: Optimal Care for Alcohol Withdrawal  Goal: Optimal Care for the alcohol withdrawal patient  Outcome: Progressing     Problem: Risk for Aspiration  Goal: Patient's risk for aspiration will be absent or decrease  Outcome: Progressing     Problem: Psychosocial  Goal: Spiritual and cultural needs incorporated into hospitalization  Outcome: Met       Patient is not progressing towards the following goals:      Problem: Psychosocial  Goal: Patient's level of anxiety will decrease  Outcome: Not Progressing

## 2022-06-14 NOTE — PROGRESS NOTES
[facility] Medicine Daily Progress Note    Date of Service  6/14/2022    Chief Complaint  Donal Carpio is a 52 y.o. male admitted 6/12/2022 with alcohol withdrawal seizures.     Hospital Course  52 y.o. male who presented 6/12/2022 with several seizure episodes today.  Patient only recently began drinking alcohol heavily about 7 months ago when his daughter got into a car accident.  His last drink was about 24 hours ago.  Recently discharged from [facility] about 4 days ago for alcohol withdrawal. In the ED, patient found to be tachycardic.  CT head negative.  Found to have 2 seizures in ED to which she was loaded with Keppra and started on CIWA protocol. Has had >10 admissions in the last year for the same complaint, previous work up with MRI and by neurology were unremarkable.     Interval Problem Update  Continue librium and CIWA, score this morning 14. Patient reports he is still feeling very tired and having back pain after his seizures in the ER. Pain meds ordered prn. Reports he has been taking his keppra but does not like to take it as it makes him extremely dizzy requesting change of medications, will change to depakote.     06/14-no major overnight events.  Patient continues to feel nauseous back pain neck pain and in general not feeling good.  Patient CIWA continue to stay high and he is being medicated per the CIWA protocol    I have personally seen and examined the patient at bedside. I discussed the plan of care with patient and bedside RN.    Consultants/Specialty  N/A    Code Status  Full Code    Disposition  Patient is not medically cleared for discharge.   Anticipate discharge to to home with close outpatient follow-up.  I have placed the appropriate orders for post-discharge needs.    Review of Systems  Review of Systems   Constitutional: Negative for chills and fever.   HENT: Negative for hearing loss and tinnitus.    Eyes: Negative for blurred vision and double vision.   Respiratory: Negative  for shortness of breath.    Cardiovascular: Negative for chest pain.   Gastrointestinal: Positive for nausea. Negative for abdominal pain and vomiting.   Genitourinary: Negative for dysuria.   Musculoskeletal: Positive for back pain and neck pain. Negative for myalgias.   Skin: Negative for rash.   Neurological: Positive for tremors and seizures. Negative for dizziness and headaches.   Endo/Heme/Allergies: Negative for polydipsia. Does not bruise/bleed easily.   Psychiatric/Behavioral: Positive for substance abuse.        Physical Exam  Temp:  [36.4 °C (97.5 °F)-37.3 °C (99.1 °F)] 37.1 °C (98.8 °F)  Pulse:  [72-96] 86  Resp:  [16-20] 18  BP: (126-153)/(81-93) 144/85  SpO2:  [93 %-96 %] 93 %    Physical Exam  Vitals and nursing note reviewed.   Constitutional:       General: He is not in acute distress.     Appearance: Normal appearance.   HENT:      Head: Normocephalic and atraumatic.      Mouth/Throat:      Mouth: Mucous membranes are moist.      Pharynx: Oropharynx is clear.   Eyes:      Conjunctiva/sclera: Conjunctivae normal.      Pupils: Pupils are equal, round, and reactive to light.   Cardiovascular:      Rate and Rhythm: Normal rate and regular rhythm.      Pulses: Normal pulses.      Heart sounds: Normal heart sounds.   Pulmonary:      Effort: Pulmonary effort is normal.      Breath sounds: Normal breath sounds.   Abdominal:      General: Abdomen is flat.      Palpations: Abdomen is soft.   Musculoskeletal:         General: No swelling. Normal range of motion.      Cervical back: Normal range of motion and neck supple.   Skin:     General: Skin is warm and dry.      Findings: No rash.   Neurological:      General: No focal deficit present.      Mental Status: He is alert and oriented to person, place, and time.      Cranial Nerves: No cranial nerve deficit.      Comments: +tremor   Psychiatric:         Mood and Affect: Mood normal.         Behavior: Behavior normal.         Fluids    Intake/Output Summary  (Last 24 hours) at 6/14/2022 1822  Last data filed at 6/14/2022 0802  Gross per 24 hour   Intake 200 ml   Output --   Net 200 ml       Laboratory  Recent Labs     06/12/22 2117   WBC 9.3   RBC 5.13   HEMOGLOBIN 14.9   HEMATOCRIT 44.1   MCV 86.0   MCH 29.0   MCHC 33.8   RDW 55.8*   PLATELETCT 229   MPV 8.7*     Recent Labs     06/12/22 2117   SODIUM 142   POTASSIUM 3.7   CHLORIDE 106   CO2 22   GLUCOSE 134*   BUN 7*   CREATININE 0.69   CALCIUM 9.2                   Imaging  CT-TSPINE W/O PLUS RECONS   Final Result         Moderate vertebral height loss of T6 and T7 with superior endplate concavity appears chronic. No acute fracture identified.      No malalignment.      CT-HEAD W/O   Final Result         1. No acute intracranial abnormality. No evidence of acute intracranial hemorrhage or mass lesion.                     CT-CSPINE WITHOUT PLUS RECONS   Final Result         1. No acute fracture from C1 through T1 is visualized.         DX-THORACIC SPINE-2 VIEWS   Final Result         Mild superior endplate compression deformity of the midthoracic spine, likely T7, slightly worse than prior, concerning for more acute injury.           Assessment/Plan  * Alcohol withdrawal syndrome with complication (HCC)- (present on admission)  Assessment & Plan  CIWA protocol  librium  Multivitamins, thiamine  Counseled on benefits of alcohol cessation      Transaminitis- (present on admission)  Assessment & Plan  Elevated AST/ALT due to alcohol use   Continue to monitor     Hypertension  Assessment & Plan  Restart prn    Seizure disorder (HCC)- (present on admission)  Assessment & Plan  Loaded with Keppra   Reports he has severe dizziness with keppra, requested medication change  -start depakote   He has had MRI and neurology evaluation on previous admission which were unremarkable       VTE prophylaxis: heparin ppx

## 2022-06-15 PROBLEM — K70.10 ALCOHOLIC HEPATITIS WITHOUT ASCITES: Status: ACTIVE | Noted: 2022-06-13

## 2022-06-15 LAB
ALBUMIN SERPL BCP-MCNC: 3.8 G/DL (ref 3.2–4.9)
ALBUMIN/GLOB SERPL: 1.2 G/DL
ALP SERPL-CCNC: 111 U/L (ref 30–99)
ALT SERPL-CCNC: 71 U/L (ref 2–50)
ANION GAP SERPL CALC-SCNC: 12 MMOL/L (ref 7–16)
AST SERPL-CCNC: 73 U/L (ref 12–45)
BILIRUB SERPL-MCNC: 0.3 MG/DL (ref 0.1–1.5)
BUN SERPL-MCNC: 9 MG/DL (ref 8–22)
CALCIUM SERPL-MCNC: 8.9 MG/DL (ref 8.5–10.5)
CHLORIDE SERPL-SCNC: 101 MMOL/L (ref 96–112)
CO2 SERPL-SCNC: 24 MMOL/L (ref 20–33)
CREAT SERPL-MCNC: 0.75 MG/DL (ref 0.5–1.4)
ERYTHROCYTE [DISTWIDTH] IN BLOOD BY AUTOMATED COUNT: 55.8 FL (ref 35.9–50)
GFR SERPLBLD CREATININE-BSD FMLA CKD-EPI: 110 ML/MIN/1.73 M 2
GLOBULIN SER CALC-MCNC: 3.2 G/DL (ref 1.9–3.5)
GLUCOSE SERPL-MCNC: 85 MG/DL (ref 65–99)
HAV IGM SERPL QL IA: NORMAL
HBV CORE IGM SER QL: NORMAL
HBV SURFACE AG SER QL: NORMAL
HCT VFR BLD AUTO: 42.1 % (ref 42–52)
HCV AB SER QL: NORMAL
HGB BLD-MCNC: 13.9 G/DL (ref 14–18)
MAGNESIUM SERPL-MCNC: 2 MG/DL (ref 1.5–2.5)
MCH RBC QN AUTO: 29.2 PG (ref 27–33)
MCHC RBC AUTO-ENTMCNC: 33 G/DL (ref 33.7–35.3)
MCV RBC AUTO: 88.4 FL (ref 81.4–97.8)
PHOSPHATE SERPL-MCNC: 3.4 MG/DL (ref 2.5–4.5)
PLATELET # BLD AUTO: 209 K/UL (ref 164–446)
PMV BLD AUTO: 9.2 FL (ref 9–12.9)
POTASSIUM SERPL-SCNC: 4 MMOL/L (ref 3.6–5.5)
PROT SERPL-MCNC: 7 G/DL (ref 6–8.2)
RBC # BLD AUTO: 4.76 M/UL (ref 4.7–6.1)
SODIUM SERPL-SCNC: 137 MMOL/L (ref 135–145)
WBC # BLD AUTO: 8 K/UL (ref 4.8–10.8)

## 2022-06-15 PROCEDURE — 99233 SBSQ HOSP IP/OBS HIGH 50: CPT | Performed by: INTERNAL MEDICINE

## 2022-06-15 PROCEDURE — 80053 COMPREHEN METABOLIC PANEL: CPT

## 2022-06-15 PROCEDURE — 700102 HCHG RX REV CODE 250 W/ 637 OVERRIDE(OP): Performed by: EMERGENCY MEDICINE

## 2022-06-15 PROCEDURE — 700111 HCHG RX REV CODE 636 W/ 250 OVERRIDE (IP): Performed by: INTERNAL MEDICINE

## 2022-06-15 PROCEDURE — 83735 ASSAY OF MAGNESIUM: CPT

## 2022-06-15 PROCEDURE — 700102 HCHG RX REV CODE 250 W/ 637 OVERRIDE(OP): Performed by: INTERNAL MEDICINE

## 2022-06-15 PROCEDURE — A9270 NON-COVERED ITEM OR SERVICE: HCPCS | Performed by: EMERGENCY MEDICINE

## 2022-06-15 PROCEDURE — 84100 ASSAY OF PHOSPHORUS: CPT

## 2022-06-15 PROCEDURE — 0002A HCHG PFIZER COVID ADMIN 2ND DOSE: CPT

## 2022-06-15 PROCEDURE — A9270 NON-COVERED ITEM OR SERVICE: HCPCS | Performed by: INTERNAL MEDICINE

## 2022-06-15 PROCEDURE — 80074 ACUTE HEPATITIS PANEL: CPT

## 2022-06-15 PROCEDURE — 85027 COMPLETE CBC AUTOMATED: CPT

## 2022-06-15 PROCEDURE — 770020 HCHG ROOM/CARE - TELE (206)

## 2022-06-15 PROCEDURE — 36415 COLL VENOUS BLD VENIPUNCTURE: CPT

## 2022-06-15 PROCEDURE — 0052A: CPT

## 2022-06-15 PROCEDURE — 91305 HCHG RX REV CODE 636 W/ 250 OVERRIDE (IP): CPT | Performed by: INTERNAL MEDICINE

## 2022-06-15 PROCEDURE — 91300 HCHG RX REV CODE 636 W/ 250 OVERRIDE (IP): CPT | Performed by: INTERNAL MEDICINE

## 2022-06-15 RX ORDER — NICOTINE 21 MG/24HR
14 PATCH, TRANSDERMAL 24 HOURS TRANSDERMAL
Status: DISCONTINUED | OUTPATIENT
Start: 2022-06-15 | End: 2022-06-19

## 2022-06-15 RX ADMIN — CHLORDIAZEPOXIDE HYDROCHLORIDE 25 MG: 25 CAPSULE ORAL at 05:15

## 2022-06-15 RX ADMIN — LORAZEPAM 2 MG: 2 TABLET ORAL at 09:13

## 2022-06-15 RX ADMIN — LORAZEPAM 2 MG: 2 TABLET ORAL at 11:29

## 2022-06-15 RX ADMIN — THERA TABS 1 TABLET: TAB at 05:15

## 2022-06-15 RX ADMIN — FOLIC ACID 1 MG: 1 TABLET ORAL at 05:14

## 2022-06-15 RX ADMIN — RIVAROXABAN 10 MG: 10 TABLET, FILM COATED ORAL at 17:55

## 2022-06-15 RX ADMIN — LORAZEPAM 2 MG: 2 TABLET ORAL at 20:43

## 2022-06-15 RX ADMIN — CHLORDIAZEPOXIDE HYDROCHLORIDE 25 MG: 25 CAPSULE ORAL at 11:38

## 2022-06-15 RX ADMIN — LORAZEPAM 2 MG: 2 TABLET ORAL at 14:49

## 2022-06-15 RX ADMIN — BNT162B2 0.3 ML: 0.23 INJECTION, SUSPENSION INTRAMUSCULAR at 17:56

## 2022-06-15 RX ADMIN — LORAZEPAM 3 MG: 2 TABLET ORAL at 05:14

## 2022-06-15 RX ADMIN — DIVALPROEX SODIUM 500 MG: 500 TABLET, DELAYED RELEASE ORAL at 05:14

## 2022-06-15 RX ADMIN — LORAZEPAM 3 MG: 2 TABLET ORAL at 01:47

## 2022-06-15 RX ADMIN — THIAMINE HCL TAB 100 MG 100 MG: 100 TAB at 05:14

## 2022-06-15 RX ADMIN — ACETAMINOPHEN 500 MG: 500 TABLET, FILM COATED ORAL at 11:28

## 2022-06-15 RX ADMIN — OXYCODONE HYDROCHLORIDE 10 MG: 10 TABLET ORAL at 09:13

## 2022-06-15 RX ADMIN — DIVALPROEX SODIUM 500 MG: 500 TABLET, DELAYED RELEASE ORAL at 17:55

## 2022-06-15 RX ADMIN — LORAZEPAM 2 MG: 2 TABLET ORAL at 17:55

## 2022-06-15 RX ADMIN — CHLORDIAZEPOXIDE HYDROCHLORIDE 25 MG: 25 CAPSULE ORAL at 17:55

## 2022-06-15 RX ADMIN — NICOTINE 14 MG: 14 PATCH TRANSDERMAL at 17:55

## 2022-06-15 RX ADMIN — LORAZEPAM 2 MG: 2 TABLET ORAL at 23:46

## 2022-06-15 RX ADMIN — OXYCODONE HYDROCHLORIDE 10 MG: 10 TABLET ORAL at 14:49

## 2022-06-15 RX ADMIN — OXYCODONE HYDROCHLORIDE 10 MG: 10 TABLET ORAL at 05:14

## 2022-06-15 RX ADMIN — OXYCODONE HYDROCHLORIDE 10 MG: 10 TABLET ORAL at 01:47

## 2022-06-15 ASSESSMENT — ENCOUNTER SYMPTOMS
BACK PAIN: 1
NAUSEA: 1
BLURRED VISION: 0
ABDOMINAL PAIN: 0
DOUBLE VISION: 0
SEIZURES: 1
BRUISES/BLEEDS EASILY: 0
SHORTNESS OF BREATH: 0
TREMORS: 1
FEVER: 0
VOMITING: 0
HEADACHES: 0
CHILLS: 0
MYALGIAS: 0
DIZZINESS: 0
POLYDIPSIA: 0
NECK PAIN: 1

## 2022-06-15 ASSESSMENT — LIFESTYLE VARIABLES
HEADACHE, FULLNESS IN HEAD: MODERATELY SEVERE
TOTAL SCORE: 15
NAUSEA AND VOMITING: MILD NAUSEA WITH NO VOMITING
VISUAL DISTURBANCES: NOT PRESENT
TOTAL SCORE: VERY MILD ITCHING, PINS AND NEEDLES SENSATION, BURNING OR NUMBNESS
AUDITORY DISTURBANCES: NOT PRESENT
NAUSEA AND VOMITING: MILD NAUSEA WITH NO VOMITING
VISUAL DISTURBANCES: NOT PRESENT
HEADACHE, FULLNESS IN HEAD: MODERATELY SEVERE
PAROXYSMAL SWEATS: *
HEADACHE, FULLNESS IN HEAD: MODERATELY SEVERE
AGITATION: SOMEWHAT MORE THAN NORMAL ACTIVITY
TREMOR: TREMOR NOT VISIBLE BUT CAN BE FELT, FINGERTIP TO FINGERTIP
ORIENTATION AND CLOUDING OF SENSORIUM: CANNOT DO SERIAL ADDITIONS OR IS UNCERTAIN ABOUT DATE
ANXIETY: MODERATELY ANXIOUS OR GUARDED, SO ANXIETY IS INFERRED
PAROXYSMAL SWEATS: BARELY PERCEPTIBLE SWEATING. PALMS MOIST
ANXIETY: MILDLY ANXIOUS
NAUSEA AND VOMITING: NO NAUSEA AND NO VOMITING
AUDITORY DISTURBANCES: NOT PRESENT
NAUSEA AND VOMITING: NO NAUSEA AND NO VOMITING
AUDITORY DISTURBANCES: NOT PRESENT
AUDITORY DISTURBANCES: NOT PRESENT
ANXIETY: MILDLY ANXIOUS
TOTAL SCORE: VERY MILD ITCHING, PINS AND NEEDLES SENSATION, BURNING OR NUMBNESS
TREMOR: MODERATE TREMOR WITH ARMS EXTENDED
PAROXYSMAL SWEATS: *
PAROXYSMAL SWEATS: *
TOTAL SCORE: MILD ITCHING, PINS AND NEEDLES SENSATION, BURNING OR NUMBNESS
TREMOR: MODERATE TREMOR WITH ARMS EXTENDED
ORIENTATION AND CLOUDING OF SENSORIUM: ORIENTED AND CAN DO SERIAL ADDITIONS
TOTAL SCORE: 15
VISUAL DISTURBANCES: NOT PRESENT
VISUAL DISTURBANCES: NOT PRESENT
TOTAL SCORE: 13
TOTAL SCORE: 13
HEADACHE, FULLNESS IN HEAD: SEVERE
HEADACHE, FULLNESS IN HEAD: MODERATELY SEVERE
AUDITORY DISTURBANCES: NOT PRESENT
TREMOR: MODERATE TREMOR WITH ARMS EXTENDED
TOTAL SCORE: 12
ANXIETY: MODERATELY ANXIOUS OR GUARDED, SO ANXIETY IS INFERRED
TREMOR: MODERATE TREMOR WITH ARMS EXTENDED
AUDITORY DISTURBANCES: VERY MILD HARSHNESS OR ABILITY TO FRIGHTEN
PAROXYSMAL SWEATS: NO SWEAT VISIBLE
NAUSEA AND VOMITING: MILD NAUSEA WITH NO VOMITING
TREMOR: TREMOR NOT VISIBLE BUT CAN BE FELT, FINGERTIP TO FINGERTIP
AUDITORY DISTURBANCES: NOT PRESENT
ANXIETY: MILDLY ANXIOUS
ORIENTATION AND CLOUDING OF SENSORIUM: CANNOT DO SERIAL ADDITIONS OR IS UNCERTAIN ABOUT DATE
TREMOR: MODERATE TREMOR WITH ARMS EXTENDED
ANXIETY: MILDLY ANXIOUS
AUDITORY DISTURBANCES: NOT PRESENT
AGITATION: SOMEWHAT MORE THAN NORMAL ACTIVITY
VISUAL DISTURBANCES: NOT PRESENT
VISUAL DISTURBANCES: NOT PRESENT
AGITATION: SOMEWHAT MORE THAN NORMAL ACTIVITY
NAUSEA AND VOMITING: MILD NAUSEA WITH NO VOMITING
ANXIETY: MILDLY ANXIOUS
AGITATION: SOMEWHAT MORE THAN NORMAL ACTIVITY
AUDITORY DISTURBANCES: NOT PRESENT
AGITATION: SOMEWHAT MORE THAN NORMAL ACTIVITY
ORIENTATION AND CLOUDING OF SENSORIUM: CANNOT DO SERIAL ADDITIONS OR IS UNCERTAIN ABOUT DATE
TOTAL SCORE: 13
TOTAL SCORE: VERY MILD ITCHING, PINS AND NEEDLES SENSATION, BURNING OR NUMBNESS
ORIENTATION AND CLOUDING OF SENSORIUM: ORIENTED AND CAN DO SERIAL ADDITIONS
ORIENTATION AND CLOUDING OF SENSORIUM: ORIENTED AND CAN DO SERIAL ADDITIONS
TOTAL SCORE: VERY MILD ITCHING, PINS AND NEEDLES SENSATION, BURNING OR NUMBNESS
TREMOR: TREMOR NOT VISIBLE BUT CAN BE FELT, FINGERTIP TO FINGERTIP
AGITATION: SOMEWHAT MORE THAN NORMAL ACTIVITY
HEADACHE, FULLNESS IN HEAD: MODERATE
ORIENTATION AND CLOUDING OF SENSORIUM: CANNOT DO SERIAL ADDITIONS OR IS UNCERTAIN ABOUT DATE
HEADACHE, FULLNESS IN HEAD: SEVERE
TOTAL SCORE: MILD ITCHING, PINS AND NEEDLES SENSATION, BURNING OR NUMBNESS
AGITATION: SOMEWHAT MORE THAN NORMAL ACTIVITY
TREMOR: MODERATE TREMOR WITH ARMS EXTENDED
AGITATION: SOMEWHAT MORE THAN NORMAL ACTIVITY
VISUAL DISTURBANCES: NOT PRESENT
VISUAL DISTURBANCES: NOT PRESENT
NAUSEA AND VOMITING: MILD NAUSEA WITH NO VOMITING
TOTAL SCORE: 12
TOTAL SCORE: 15
ANXIETY: MILDLY ANXIOUS
HEADACHE, FULLNESS IN HEAD: SEVERE
ANXIETY: MODERATELY ANXIOUS OR GUARDED, SO ANXIETY IS INFERRED
VISUAL DISTURBANCES: NOT PRESENT
SUBSTANCE_ABUSE: 1
HEADACHE, FULLNESS IN HEAD: MODERATELY SEVERE
AGITATION: SOMEWHAT MORE THAN NORMAL ACTIVITY
ANXIETY: MODERATELY ANXIOUS OR GUARDED, SO ANXIETY IS INFERRED
VISUAL DISTURBANCES: NOT PRESENT
AUDITORY DISTURBANCES: NOT PRESENT
TREMOR: TREMOR NOT VISIBLE BUT CAN BE FELT, FINGERTIP TO FINGERTIP
TOTAL SCORE: VERY MILD ITCHING, PINS AND NEEDLES SENSATION, BURNING OR NUMBNESS
ORIENTATION AND CLOUDING OF SENSORIUM: ORIENTED AND CAN DO SERIAL ADDITIONS
AUDITORY DISTURBANCES: NOT PRESENT
TOTAL SCORE: VERY MILD ITCHING, PINS AND NEEDLES SENSATION, BURNING OR NUMBNESS
ORIENTATION AND CLOUDING OF SENSORIUM: CANNOT DO SERIAL ADDITIONS OR IS UNCERTAIN ABOUT DATE
AUDITORY DISTURBANCES: NOT PRESENT
NAUSEA AND VOMITING: MILD NAUSEA WITH NO VOMITING
NAUSEA AND VOMITING: MILD NAUSEA WITH NO VOMITING
PAROXYSMAL SWEATS: *
PAROXYSMAL SWEATS: *
AGITATION: SOMEWHAT MORE THAN NORMAL ACTIVITY
TOTAL SCORE: 13
TOTAL SCORE: VERY MILD ITCHING, PINS AND NEEDLES SENSATION, BURNING OR NUMBNESS
ORIENTATION AND CLOUDING OF SENSORIUM: CANNOT DO SERIAL ADDITIONS OR IS UNCERTAIN ABOUT DATE
TOTAL SCORE: VERY MILD ITCHING, PINS AND NEEDLES SENSATION, BURNING OR NUMBNESS
VISUAL DISTURBANCES: NOT PRESENT
TOTAL SCORE: 15
ANXIETY: MODERATELY ANXIOUS OR GUARDED, SO ANXIETY IS INFERRED
NAUSEA AND VOMITING: MILD NAUSEA WITH NO VOMITING
AGITATION: SOMEWHAT MORE THAN NORMAL ACTIVITY
HEADACHE, FULLNESS IN HEAD: MODERATELY SEVERE
HEADACHE, FULLNESS IN HEAD: SEVERE
TOTAL SCORE: 15
ANXIETY: MODERATELY ANXIOUS OR GUARDED, SO ANXIETY IS INFERRED
AGITATION: SOMEWHAT MORE THAN NORMAL ACTIVITY
HEADACHE, FULLNESS IN HEAD: MODERATELY SEVERE
PAROXYSMAL SWEATS: NO SWEAT VISIBLE
TREMOR: TREMOR NOT VISIBLE BUT CAN BE FELT, FINGERTIP TO FINGERTIP
PAROXYSMAL SWEATS: BARELY PERCEPTIBLE SWEATING. PALMS MOIST
VISUAL DISTURBANCES: NOT PRESENT
PAROXYSMAL SWEATS: NO SWEAT VISIBLE
ORIENTATION AND CLOUDING OF SENSORIUM: ORIENTED AND CAN DO SERIAL ADDITIONS
PAROXYSMAL SWEATS: *
ORIENTATION AND CLOUDING OF SENSORIUM: CANNOT DO SERIAL ADDITIONS OR IS UNCERTAIN ABOUT DATE
NAUSEA AND VOMITING: MILD NAUSEA WITH NO VOMITING
TOTAL SCORE: 15
TREMOR: TREMOR NOT VISIBLE BUT CAN BE FELT, FINGERTIP TO FINGERTIP
PAROXYSMAL SWEATS: NO SWEAT VISIBLE
NAUSEA AND VOMITING: NO NAUSEA AND NO VOMITING

## 2022-06-15 ASSESSMENT — COGNITIVE AND FUNCTIONAL STATUS - GENERAL
SUGGESTED CMS G CODE MODIFIER MOBILITY: CK
MOBILITY SCORE: 17
TURNING FROM BACK TO SIDE WHILE IN FLAT BAD: A LITTLE
CLIMB 3 TO 5 STEPS WITH RAILING: A LITTLE
WALKING IN HOSPITAL ROOM: A LITTLE
DRESSING REGULAR UPPER BODY CLOTHING: A LITTLE
DRESSING REGULAR LOWER BODY CLOTHING: A LITTLE
DAILY ACTIVITIY SCORE: 21
HELP NEEDED FOR BATHING: A LITTLE
SUGGESTED CMS G CODE MODIFIER DAILY ACTIVITY: CJ
MOVING FROM LYING ON BACK TO SITTING ON SIDE OF FLAT BED: A LITTLE
STANDING UP FROM CHAIR USING ARMS: A LOT
MOVING TO AND FROM BED TO CHAIR: A LITTLE

## 2022-06-15 ASSESSMENT — PAIN SCALES - WONG BAKER
WONGBAKER_NUMERICALRESPONSE: HURTS A LITTLE MORE
WONGBAKER_NUMERICALRESPONSE: HURTS A LITTLE MORE

## 2022-06-15 ASSESSMENT — PAIN DESCRIPTION - PAIN TYPE: TYPE: ACUTE PAIN

## 2022-06-15 NOTE — CARE PLAN
Problem: Pain - Standard  Goal: Alleviation of pain or a reduction in pain to the patient’s comfort goal  Outcome: Progressing     Problem: Knowledge Deficit - Standard  Goal: Patient and family/care givers will demonstrate understanding of plan of care, disease process/condition, diagnostic tests and medications  Outcome: Progressing     Problem: Optimal Care for Alcohol Withdrawal  Goal: Optimal Care for the alcohol withdrawal patient  Outcome: Progressing     Problem: Lifestyle Changes  Goal: Patient's ability to identify lifestyle changes and available resources to help reduce recurrence of condition will improve  Outcome: Progressing     Problem: Psychosocial  Goal: Patient's level of anxiety will decrease  Outcome: Progressing     Problem: Risk for Aspiration  Goal: Patient's risk for aspiration will be absent or decrease  Outcome: Progressing     Problem: Fall Risk  Goal: Patient will remain free from falls  Outcome: Progressing   The patient is Watcher - Medium risk of patient condition declining or worsening    Shift Goals  Clinical Goals: Sz monitoring, CIWA protocol  Patient Goals: Rest and medication    Progress made toward(s) clinical / shift goals:  .      Patient is not progressing towards the following goals:

## 2022-06-15 NOTE — DISCHARGE PLANNING
Case Management Discharge Planning    Admission Date: 6/12/2022  GMLOS: 3.4  ALOS: 2    6-Clicks ADL Score: 21  6-Clicks Mobility Score: 17  PT and/or OT Eval ordered: yes  Post-acute Referrals Ordered: yes  Post-acute Choice Obtained: no  Has referral(s) been sent to post-acute provider:  no      Anticipated Discharge Dispo: Discharge Disposition: Discharged to home/self care (01)  Discharge Address: [address] [place] [address] [place]  [address]  Discharge Contact Phone Number: [phone]    DME Needed: unknown    Action(s) Taken: Met with patient at bedside yesterday.  He stated he has been living in [place] for one year and originally from the [facility].  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  He stated he drinks one beer a day and does not have a problem with alcohol.  His last drink was 2 days ago.  He does not want his mom to know he is in the hospital.  His insurance is [employer] from the [facility].  His PCP is Kuldip Martinez in [place], [place].  He said that he does not have problems with getting medications and that [facility] supplied him with medications at the prior admission.    Medically Clear: No    Next Steps: Provide transitional care coordination.  Review PT evaluation.    Barriers to Discharge: Medical clearance          Care Transition Team Assessment    Information Source  Orientation Level: Oriented X4  Information Given By: Patient  Who is responsible for making decisions for patient? : Patient    Readmission Evaluation  Is this a readmission?: Yes - unplanned readmission    Elopement Risk  Legal Hold: No  Ambulatory or Self Mobile in Wheelchair: Yes  Disoriented: No  Psychiatric Symptoms: None  History of Wandering: No  Elopement this Admit: No  Vocalizing Wanting to Leave: No  Displays Behaviors, Body Language Wanting to Leave: No-Not at Risk for Elopement  Elopement Risk: Not at Risk for Elopement         Discharge Preparedness  What is your plan after discharge?: Home with  help  What are your discharge supports?: Other (comment)  Prior Functional Level: Ambulatory, Drives Self, Independent with Activities of Daily Living, Independent with Medication Management  Difficulity with ADLs: Bathing, Dressing, Toileting, Walking  Difficulity with IADLs: Cooking, Driving, Keeping track of finances, Laundry, Managing medication, Shopping    Functional Assesment  Prior Functional Level: Ambulatory, Drives Self, Independent with Activities of Daily Living, Independent with Medication Management    Finances  Financial Barriers to Discharge: No  Prescription Coverage: Yes              Advance Directive  Advance Directive?: None    Domestic Abuse  Have you ever been the victim of abuse or violence?: No    Psychological Assessment  History of Substance Abuse: Alcohol    Discharge Risks or Barriers  Discharge risks or barriers?: Substance abuse, Other (comment)  Patient risk factors: Cognitive / sensory / physical deficit, Multiple ED visits, Readmission, Substance abuse (Has [employer] insurance from [place])    Anticipated Discharge Information  Discharge Disposition: Discharged to home/self care (01)  Discharge Address: [address] [place] [address]  Discharge Contact Phone Number: [phone]

## 2022-06-16 PROCEDURE — 700102 HCHG RX REV CODE 250 W/ 637 OVERRIDE(OP): Performed by: INTERNAL MEDICINE

## 2022-06-16 PROCEDURE — A9270 NON-COVERED ITEM OR SERVICE: HCPCS | Performed by: INTERNAL MEDICINE

## 2022-06-16 PROCEDURE — 770020 HCHG ROOM/CARE - TELE (206)

## 2022-06-16 PROCEDURE — 97162 PT EVAL MOD COMPLEX 30 MIN: CPT

## 2022-06-16 PROCEDURE — 99232 SBSQ HOSP IP/OBS MODERATE 35: CPT | Performed by: INTERNAL MEDICINE

## 2022-06-16 PROCEDURE — 700102 HCHG RX REV CODE 250 W/ 637 OVERRIDE(OP): Performed by: EMERGENCY MEDICINE

## 2022-06-16 PROCEDURE — A9270 NON-COVERED ITEM OR SERVICE: HCPCS | Performed by: EMERGENCY MEDICINE

## 2022-06-16 RX ORDER — DIPHENHYDRAMINE HCL 25 MG
25 TABLET ORAL EVERY 8 HOURS PRN
Status: DISCONTINUED | OUTPATIENT
Start: 2022-06-16 | End: 2022-06-20 | Stop reason: HOSPADM

## 2022-06-16 RX ADMIN — LORAZEPAM 2 MG: 2 TABLET ORAL at 18:05

## 2022-06-16 RX ADMIN — OXYCODONE HYDROCHLORIDE 10 MG: 10 TABLET ORAL at 00:27

## 2022-06-16 RX ADMIN — NICOTINE 14 MG: 14 PATCH TRANSDERMAL at 05:15

## 2022-06-16 RX ADMIN — RIVAROXABAN 10 MG: 10 TABLET, FILM COATED ORAL at 18:04

## 2022-06-16 RX ADMIN — LORAZEPAM 1 MG: 1 TABLET ORAL at 13:56

## 2022-06-16 RX ADMIN — LORAZEPAM 1 MG: 1 TABLET ORAL at 20:26

## 2022-06-16 RX ADMIN — OXYCODONE HYDROCHLORIDE 10 MG: 10 TABLET ORAL at 18:04

## 2022-06-16 RX ADMIN — THIAMINE HCL TAB 100 MG 100 MG: 100 TAB at 05:15

## 2022-06-16 RX ADMIN — DIPHENHYDRAMINE HYDROCHLORIDE 25 MG: 25 TABLET ORAL at 15:27

## 2022-06-16 RX ADMIN — DIVALPROEX SODIUM 500 MG: 500 TABLET, DELAYED RELEASE ORAL at 18:04

## 2022-06-16 RX ADMIN — ACETAMINOPHEN 500 MG: 500 TABLET, FILM COATED ORAL at 18:03

## 2022-06-16 RX ADMIN — OXYCODONE HYDROCHLORIDE 10 MG: 10 TABLET ORAL at 04:22

## 2022-06-16 RX ADMIN — LORAZEPAM 1 MG: 1 TABLET ORAL at 02:13

## 2022-06-16 RX ADMIN — OXYCODONE HYDROCHLORIDE 10 MG: 10 TABLET ORAL at 13:56

## 2022-06-16 RX ADMIN — FOLIC ACID 1 MG: 1 TABLET ORAL at 05:15

## 2022-06-16 RX ADMIN — LORAZEPAM 1 MG: 1 TABLET ORAL at 06:02

## 2022-06-16 RX ADMIN — LORAZEPAM 1 MG: 1 TABLET ORAL at 09:40

## 2022-06-16 RX ADMIN — THERA TABS 1 TABLET: TAB at 05:15

## 2022-06-16 RX ADMIN — OXYCODONE HYDROCHLORIDE 10 MG: 10 TABLET ORAL at 09:40

## 2022-06-16 RX ADMIN — ACETAMINOPHEN 500 MG: 500 TABLET, FILM COATED ORAL at 09:40

## 2022-06-16 RX ADMIN — DIVALPROEX SODIUM 500 MG: 500 TABLET, DELAYED RELEASE ORAL at 05:15

## 2022-06-16 ASSESSMENT — COGNITIVE AND FUNCTIONAL STATUS - GENERAL
STANDING UP FROM CHAIR USING ARMS: A LOT
TURNING FROM BACK TO SIDE WHILE IN FLAT BAD: A LITTLE
SUGGESTED CMS G CODE MODIFIER MOBILITY: CL
MOBILITY SCORE: 14
MOVING TO AND FROM BED TO CHAIR: A LOT
WALKING IN HOSPITAL ROOM: A LITTLE
CLIMB 3 TO 5 STEPS WITH RAILING: A LOT
MOVING FROM LYING ON BACK TO SITTING ON SIDE OF FLAT BED: A LOT

## 2022-06-16 ASSESSMENT — LIFESTYLE VARIABLES
VISUAL DISTURBANCES: NOT PRESENT
VISUAL DISTURBANCES: NOT PRESENT
AGITATION: NORMAL ACTIVITY
VISUAL DISTURBANCES: NOT PRESENT
ANXIETY: MODERATELY ANXIOUS OR GUARDED, SO ANXIETY IS INFERRED
NAUSEA AND VOMITING: NO NAUSEA AND NO VOMITING
HEADACHE, FULLNESS IN HEAD: VERY MILD
TOTAL SCORE: 8
PAROXYSMAL SWEATS: NO SWEAT VISIBLE
AUDITORY DISTURBANCES: NOT PRESENT
ORIENTATION AND CLOUDING OF SENSORIUM: ORIENTED AND CAN DO SERIAL ADDITIONS
NAUSEA AND VOMITING: MILD NAUSEA WITH NO VOMITING
ANXIETY: NO ANXIETY (AT EASE)
TREMOR: MODERATE TREMOR WITH ARMS EXTENDED
TOTAL SCORE: 12
TOTAL SCORE: 8
TOTAL SCORE: 10
TREMOR: NO TREMOR
HEADACHE, FULLNESS IN HEAD: MODERATE
AUDITORY DISTURBANCES: NOT PRESENT
ANXIETY: MILDLY ANXIOUS
PAROXYSMAL SWEATS: NO SWEAT VISIBLE
ORIENTATION AND CLOUDING OF SENSORIUM: ORIENTED AND CAN DO SERIAL ADDITIONS
PAROXYSMAL SWEATS: NO SWEAT VISIBLE
ANXIETY: MILDLY ANXIOUS
AGITATION: SOMEWHAT MORE THAN NORMAL ACTIVITY
TREMOR: TREMOR NOT VISIBLE BUT CAN BE FELT, FINGERTIP TO FINGERTIP
AUDITORY DISTURBANCES: NOT PRESENT
VISUAL DISTURBANCES: NOT PRESENT
AUDITORY DISTURBANCES: NOT PRESENT
AGITATION: NORMAL ACTIVITY
AUDITORY DISTURBANCES: NOT PRESENT
NAUSEA AND VOMITING: NO NAUSEA AND NO VOMITING
ORIENTATION AND CLOUDING OF SENSORIUM: ORIENTED AND CAN DO SERIAL ADDITIONS
TOTAL SCORE: MODERATE ITCHING, PINS AND NEEDLES SENSATION, BURNING OR NUMBNESS
AGITATION: NORMAL ACTIVITY
AGITATION: SOMEWHAT MORE THAN NORMAL ACTIVITY
ANXIETY: MILDLY ANXIOUS
NAUSEA AND VOMITING: MILD NAUSEA WITH NO VOMITING
HEADACHE, FULLNESS IN HEAD: SEVERE
AGITATION: SOMEWHAT MORE THAN NORMAL ACTIVITY
HEADACHE, FULLNESS IN HEAD: SEVERE
HEADACHE, FULLNESS IN HEAD: SEVERE
ORIENTATION AND CLOUDING OF SENSORIUM: ORIENTED AND CAN DO SERIAL ADDITIONS
ORIENTATION AND CLOUDING OF SENSORIUM: ORIENTED AND CAN DO SERIAL ADDITIONS
TOTAL SCORE: 10
TOTAL SCORE: 8
VISUAL DISTURBANCES: NOT PRESENT
AUDITORY DISTURBANCES: NOT PRESENT
ORIENTATION AND CLOUDING OF SENSORIUM: ORIENTED AND CAN DO SERIAL ADDITIONS
SUBSTANCE_ABUSE: 1
HEADACHE, FULLNESS IN HEAD: MODERATELY SEVERE
VISUAL DISTURBANCES: NOT PRESENT
TREMOR: TREMOR NOT VISIBLE BUT CAN BE FELT, FINGERTIP TO FINGERTIP
NAUSEA AND VOMITING: MILD NAUSEA WITH NO VOMITING
TREMOR: TREMOR NOT VISIBLE BUT CAN BE FELT, FINGERTIP TO FINGERTIP
TOTAL SCORE: MILD ITCHING, PINS AND NEEDLES SENSATION, BURNING OR NUMBNESS
TREMOR: MODERATE TREMOR WITH ARMS EXTENDED
NAUSEA AND VOMITING: MILD NAUSEA WITH NO VOMITING
PAROXYSMAL SWEATS: BARELY PERCEPTIBLE SWEATING. PALMS MOIST
ANXIETY: MILDLY ANXIOUS
TOTAL SCORE: VERY MILD ITCHING, PINS AND NEEDLES SENSATION, BURNING OR NUMBNESS

## 2022-06-16 ASSESSMENT — ENCOUNTER SYMPTOMS
SHORTNESS OF BREATH: 0
MYALGIAS: 0
CHILLS: 0
DIZZINESS: 0
TREMORS: 1
NAUSEA: 1
BLURRED VISION: 0
POLYDIPSIA: 0
NECK PAIN: 1
VOMITING: 0
FEVER: 0
ABDOMINAL PAIN: 0
SEIZURES: 1
DOUBLE VISION: 0
HEADACHES: 0
BRUISES/BLEEDS EASILY: 0
BACK PAIN: 1

## 2022-06-16 ASSESSMENT — GAIT ASSESSMENTS
GAIT LEVEL OF ASSIST: MINIMAL ASSIST
DEVIATION: STEP TO;BRADYKINETIC
ASSISTIVE DEVICE: FRONT WHEEL WALKER
DISTANCE (FEET): 2

## 2022-06-16 ASSESSMENT — PAIN DESCRIPTION - PAIN TYPE: TYPE: ACUTE PAIN

## 2022-06-16 NOTE — THERAPY
Physical Therapy   Initial Evaluation     Patient Name: Donal Carpio  Age:  52 y.o., Sex:  male  Medical Record #: [id number]  Today's Date: 6/16/2022     Precautions: Fall Risk  Comments: hx daily EtOH use    Assessment  Patient is a 52 y.o. male presenting with EtOH withdrawal and seizures. PMH includes chronic EtOH use and seizures with pt reporting daily falls that he states are not due to EtOH consumption. PT eval completed. Pt willing to participate in eval, however pt was slightly lethargic throughout and not fully alert. At SPV level for bed mobility and SBA for initial STS with FWW. Pt mobility limited by reports of dizziness upon standing and pt requesting to sit down quickly. Pt able to ambulate 2 ft briefly with Min A and FWW, however distance was limited again by reports of dizziness. Pt also noted pain in B LE's during muscle testing that was not consistent with his diagnosis or PMH. Recommend post acute placement at this time as pt is not capable to DC home alone due to impaired functional transfers, decreased activity tolerance, and decreased functional mobility. Will follow while in the acute setting.     Plan  Recommend Physical Therapy 4 times per week until therapy goals are met for the following treatments:  Bed Mobility, Gait Training, Neuro Re-Education / Balance, Self Care/Home Evaluation, Therapeutic Activities, and Therapeutic Exercises  DC Equipment Recommendations: Unable to determine at this time  Discharge Recommendations: Recommend post-acute placement for additional physical therapy services prior to discharge home          06/16/22 1019   Precautions   Precautions Fall Risk   Comments hx daily EtOH use   Vitals   Patient BP Position Sitting (after transfers/attempted walking)   Blood Pressure (!) 151/94   O2 Delivery Device None - Room Air   Pain 0 - 10 Group   Location Back;Leg   Description Aching   Therapist Pain Assessment During Activity  (pt reports leg pain bilaterally when  completing muscle testing)   Prior Living Situation   Prior Services Home-Independent   Housing / Facility 1 Story Apartment / Condo   Steps Into Home 0   Bathroom Set up Bathtub / Shower Combination   Equipment Owned None   Lives with - Patient's Self Care Capacity Alone and Unable to Care For Self   Comments Pt reports that he has seizures and falls daily. pt notes that he does not feel safe living alone and wants to look for alternative housing   Prior Level of Functional Mobility   Bed Mobility Independent   Transfer Status Independent   Ambulation Independent   Distance Ambulation (Feet)   (household distances)   Assistive Devices Used None   History of Falls   History of Falls Yes   Date of Last Fall   (pt reports falling daily)   Cognition    Cognition / Consciousness X   Speech/ Communication Delayed Responses   Level of Consciousness Alert   Safety Awareness Impaired   Attention Impaired   Comments pt pleasant and participatory, however he appeared slightly lethargic and was not able to become fully alert despite moving throughout eval   Active ROM Lower Body    Active ROM Lower Body  X   Comments limited by LE weakness   Strength Lower Body   Lower Body Strength  X   Comments grossly 3+/5   Balance Assessment   Sitting Balance (Static) Fair   Sitting Balance (Dynamic) Fair   Standing Balance (Static) Fair -   Standing Balance (Dynamic) Poor +   Weight Shift Sitting Fair   Weight Shift Standing Poor   Comments FWW in standing   Gait Analysis   Gait Level Of Assist Minimal Assist   Assistive Device Front Wheel Walker   Distance (Feet) 2   # of Times Distance was Traveled 2   Deviation Step To;Bradykinetic   Weight Bearing Status no restrictions   Comments distance limited by pt reports of severe dizziness, followed by emesis. attempted again and pt reported same symptoms and sat down   Bed Mobility    Supine to Sit Supervised   Scooting Supervised   Rolling Supervised   Comments HOB elevated   Functional  Mobility   Sit to Stand Standby Assist   Bed, Chair, Wheelchair Transfer Minimal Assist   Mobility FWW in room   Comments pt able to complete initial STS with VC's for hand placement and sequencing, however pt required assistance when transferring to chair due to onset of dizziness   Short Term Goals    Short Term Goal # 1 pt will be able to move supine<>eob with HOB flat and spv in 6 tx to improve bed mobility.   Short Term Goal # 2 pt will be able to complete all functional transfers with fww and spv in 6 tx to increase functional independence.   Short Term Goal # 3 pt will be able to ambulate 150 ft with fww and spv in 6 tx to increase household mobility.   Anticipated Discharge Equipment and Recommendations   DC Equipment Recommendations Unable to determine at this time   Discharge Recommendations Recommend post-acute placement for additional physical therapy services prior to discharge home

## 2022-06-16 NOTE — PROGRESS NOTES
Hospital Medicine Daily Progress Note    Date of Service  6/15/2022    Chief Complaint  Donal Carpio is a 52 y.o. male admitted 6/12/2022 with alcohol withdrawal seizures.     Hospital Course  52 y.o. male who presented 6/12/2022 with several seizure episodes today.  Patient only recently began drinking alcohol heavily about 7 months ago when his daughter got into a car accident.  His last drink was about 24 hours ago.  Recently discharged from [facility] about 4 days ago for alcohol withdrawal. In the ED, patient found to be tachycardic.  CT head negative.  Found to have 2 seizures in ED to which she was loaded with Keppra and started on CIWA protocol. Has had >10 admissions in the last year for the same complaint, previous work up with MRI and by neurology were unremarkable.   Continue librium and CIWA, score this morning 14. Patient reports he is still feeling very tired and having back pain after his seizures in the ER. Pain meds ordered prn. Reports he has been taking his keppra but does not like to take it as it makes him extremely dizzy requesting change of medications, will change to depakote.     06/14-no major overnight events.  Patient continues to feel nauseous back pain neck pain and in general not feeling good.  Patient CIWA continue to stay high and he is being medicated per the CIWA protocol  Interval Problem Update  6/15. Still withdrawing. Tremulous. CIWA 14. Ordered PT. Has had multiple and fequent hospitalizations for the same problem.    I have personally seen and examined the patient at bedside. I discussed the plan of care with patient and bedside RN.    Consultants/Specialty  N/A    Code Status  Full Code    Disposition  Patient is not medically cleared for discharge.   Anticipate discharge to to home with close outpatient follow-up.  I have placed the appropriate orders for post-discharge needs.    Review of Systems  Review of Systems   Constitutional: Negative for chills and fever.    HENT: Negative for hearing loss and tinnitus.    Eyes: Negative for blurred vision and double vision.   Respiratory: Negative for shortness of breath.    Cardiovascular: Negative for chest pain.   Gastrointestinal: Positive for nausea. Negative for abdominal pain and vomiting.   Genitourinary: Negative for dysuria.   Musculoskeletal: Positive for back pain and neck pain. Negative for myalgias.   Skin: Negative for rash.   Neurological: Positive for tremors and seizures. Negative for dizziness and headaches.   Endo/Heme/Allergies: Negative for polydipsia. Does not bruise/bleed easily.   Psychiatric/Behavioral: Positive for substance abuse.        Physical Exam  Temp:  [36.1 °C (97 °F)-37.1 °C (98.8 °F)] 36.1 °C (97 °F)  Pulse:  [82-97] 87  Resp:  [16-20] 16  BP: (113-144)/(56-85) 113/64  SpO2:  [93 %-96 %] 94 %    Physical Exam  Vitals and nursing note reviewed.   Constitutional:       General: He is not in acute distress.     Appearance: Normal appearance.   HENT:      Head: Normocephalic and atraumatic.      Mouth/Throat:      Mouth: Mucous membranes are moist.      Pharynx: Oropharynx is clear.   Eyes:      Conjunctiva/sclera: Conjunctivae normal.      Pupils: Pupils are equal, round, and reactive to light.   Cardiovascular:      Rate and Rhythm: Normal rate and regular rhythm.      Pulses: Normal pulses.      Heart sounds: Normal heart sounds.   Pulmonary:      Effort: Pulmonary effort is normal.      Breath sounds: Normal breath sounds.   Abdominal:      General: Abdomen is flat.      Palpations: Abdomen is soft.   Musculoskeletal:         General: No swelling. Normal range of motion.      Cervical back: Normal range of motion and neck supple.   Skin:     General: Skin is warm and dry.      Findings: No rash.   Neurological:      General: No focal deficit present.      Mental Status: He is alert and oriented to person, place, and time.      Cranial Nerves: No cranial nerve deficit.      Coordination:  "Coordination abnormal (Tremulous).      Gait: Gait abnormal.      Comments: +tremor   Psychiatric:         Mood and Affect: Mood normal.         Behavior: Behavior normal.         Fluids    Intake/Output Summary (Last 24 hours) at 6/15/2022 1707  Last data filed at 6/15/2022 1628  Gross per 24 hour   Intake 1119.8 ml   Output 854 ml   Net 265.8 ml       Laboratory  Recent Labs     06/12/22  2117 06/15/22  0222   WBC 9.3 8.0   RBC 5.13 4.76   HEMOGLOBIN 14.9 13.9*   HEMATOCRIT 44.1 42.1   MCV 86.0 88.4   MCH 29.0 29.2   MCHC 33.8 33.0*   RDW 55.8* 55.8*   PLATELETCT 229 209   MPV 8.7* 9.2     Recent Labs     06/12/22  2117 06/15/22  0222   SODIUM 142 137   POTASSIUM 3.7 4.0   CHLORIDE 106 101   CO2 22 24   GLUCOSE 134* 85   BUN 7* 9   CREATININE 0.69 0.75   CALCIUM 9.2 8.9                   Imaging  CT-TSPINE W/O PLUS RECONS   Final Result         Moderate vertebral height loss of T6 and T7 with superior endplate concavity appears chronic. No acute fracture identified.      No malalignment.      CT-HEAD W/O   Final Result         1. No acute intracranial abnormality. No evidence of acute intracranial hemorrhage or mass lesion.                     CT-CSPINE WITHOUT PLUS RECONS   Final Result         1. No acute fracture from C1 through T1 is visualized.         DX-THORACIC SPINE-2 VIEWS   Final Result         Mild superior endplate compression deformity of the midthoracic spine, likely T7, slightly worse than prior, concerning for more acute injury.           Assessment/Plan  * Alcohol withdrawal syndrome with complication (HCC)- (present on admission)  Assessment & Plan  CIWA protocol  librium  Multivitamins, thiamine  Counseled on benefits of alcohol cessation\"    Still withdrawing  Continue CIWA protocol  PT ordered  Alcohol cessation enxouraged      Alcoholic hepatitis without ascites- (present on admission)  Assessment & Plan  Elevated AST/ALT due to alcohol use   Continue to monitor\"    Mild elevation  Alcohol " "cessation encouraged  Hepatitis panel, viral    Hypertension  Assessment & Plan  Restart prn\"    Vitals:    06/15/22 1628   BP: 113/64   Pulse: 87   Resp: 16   Temp: 36.1 °C (97 °F)   SpO2: 94%     Stable.    Seizure disorder (HCC)- (present on admission)  Assessment & Plan  Loaded with Keppra   Reports he has severe dizziness with keppra, requested medication change  -start depakote   He has had MRI and neurology evaluation on previous admission which were unremarkable\"    Follow up with Neurology       VTE prophylaxis: heparin ppx        "

## 2022-06-16 NOTE — DISCHARGE PLANNING
Case Management Discharge Planning    Admission Date: 6/12/2022  GMLOS: 3.4  ALOS: 3    6-Clicks ADL Score: 21  6-Clicks Mobility Score: 14  PT and/or OT Eval ordered: yes  Post-acute Referrals Ordered: yes  Post-acute Choice Obtained: no  Has referral(s) been sent to post-acute provider:  no      Anticipated Discharge Dispo: Discharge Disposition: D/T to IP rehab facility w/planned hosp IP readmit (90)  Discharge Address: [address] [place] [id number]  Discharge Contact Phone Number: [phone]        Action(s) Taken: Spoke with CASPER Wong CM with [facility] and provided update that patient requiring post acute placement.    Pt stated he is agreeable to facility for PT.  He can stay with his parents after rehab facility for several weeks [address] [place] [id number].  His step father is Tobias Ruiz [phone] who is 79 yoa and in good health.  His mother is Luciana Ruiz and she is 88 yoa.    Medically Clear: no    Next Steps: Review OT notes.  Follow up with Judith with [facility].  [phone].    Barriers to Discharge: Medical clearance

## 2022-06-16 NOTE — CARE PLAN
The patient is Stable - Low risk of patient condition declining or worsening    Shift Goals  Clinical Goals: Monitor for seizure, CIWA  Patient Goals: eat and sleep    Progress made toward(s) clinical / shift goals:  patient CIWA scores reducing gradually. Still presents with se symptoms sporadically. Able to make needs known.       Problem: Pain - Standard  Goal: Alleviation of pain or a reduction in pain to the patient’s comfort goal  Outcome: Progressing     Problem: Optimal Care for Alcohol Withdrawal  Goal: Optimal Care for the alcohol withdrawal patient  Outcome: Progressing     Problem: Lifestyle Changes  Goal: Patient's ability to identify lifestyle changes and available resources to help reduce recurrence of condition will improve  Outcome: Progressing       Patient is not progressing towards the following goals:

## 2022-06-16 NOTE — PROGRESS NOTES
Patient put call light on at 0402am. Whe this RN entered room patient was talking to someone on his cell phone. Patient asked if I could give him a minute and come back. At 0407am patient put call light on again and stated he just had a seizure and pissed himself. Myself and x1 staff assisted patient with change of bed and clothes. Ciwa score at this time 10, patient informed no protocol meds were currently available. Patient then stated he has back pain can he get a pain pill. Patient medicated per MAR. Report from tele monitoring states no change in tele monitoring, ectopy, or artifact, in the last 20 minutes.

## 2022-06-17 ENCOUNTER — APPOINTMENT (OUTPATIENT)
Dept: RADIOLOGY | Facility: MEDICAL CENTER | Age: 52
DRG: 894 | End: 2022-06-17
Attending: INTERNAL MEDICINE
Payer: COMMERCIAL

## 2022-06-17 PROBLEM — R11.2 NAUSEA AND VOMITING: Status: ACTIVE | Noted: 2022-06-17

## 2022-06-17 LAB
ALBUMIN SERPL BCP-MCNC: 4 G/DL (ref 3.2–4.9)
ALBUMIN SERPL BCP-MCNC: 4 G/DL (ref 3.2–4.9)
ALBUMIN/GLOB SERPL: 1.1 G/DL
ALP SERPL-CCNC: 101 U/L (ref 30–99)
ALP SERPL-CCNC: 91 U/L (ref 30–99)
ALT SERPL-CCNC: 50 U/L (ref 2–50)
ALT SERPL-CCNC: 61 U/L (ref 2–50)
ANION GAP SERPL CALC-SCNC: 12 MMOL/L (ref 7–16)
AST SERPL-CCNC: 45 U/L (ref 12–45)
AST SERPL-CCNC: 58 U/L (ref 12–45)
BILIRUB CONJ SERPL-MCNC: <0.2 MG/DL (ref 0.1–0.5)
BILIRUB INDIRECT SERPL-MCNC: NORMAL MG/DL (ref 0–1)
BILIRUB SERPL-MCNC: 0.3 MG/DL (ref 0.1–1.5)
BILIRUB SERPL-MCNC: 0.3 MG/DL (ref 0.1–1.5)
BUN SERPL-MCNC: 11 MG/DL (ref 8–22)
CALCIUM SERPL-MCNC: 9 MG/DL (ref 8.5–10.5)
CHLORIDE SERPL-SCNC: 99 MMOL/L (ref 96–112)
CO2 SERPL-SCNC: 25 MMOL/L (ref 20–33)
CREAT SERPL-MCNC: 0.96 MG/DL (ref 0.5–1.4)
ERYTHROCYTE [DISTWIDTH] IN BLOOD BY AUTOMATED COUNT: 57.9 FL (ref 35.9–50)
GFR SERPLBLD CREATININE-BSD FMLA CKD-EPI: 96 ML/MIN/1.73 M 2
GLOBULIN SER CALC-MCNC: 3.5 G/DL (ref 1.9–3.5)
GLUCOSE SERPL-MCNC: 81 MG/DL (ref 65–99)
HCT VFR BLD AUTO: 41.4 % (ref 42–52)
HCT VFR BLD AUTO: 46.8 % (ref 42–52)
HGB BLD-MCNC: 13.6 G/DL (ref 14–18)
HGB BLD-MCNC: 15.1 G/DL (ref 14–18)
LIPASE SERPL-CCNC: 13 U/L (ref 11–82)
MCH RBC QN AUTO: 29 PG (ref 27–33)
MCHC RBC AUTO-ENTMCNC: 32.3 G/DL (ref 33.7–35.3)
MCV RBC AUTO: 90 FL (ref 81.4–97.8)
PLATELET # BLD AUTO: 192 K/UL (ref 164–446)
PMV BLD AUTO: 9.3 FL (ref 9–12.9)
POTASSIUM SERPL-SCNC: 4.6 MMOL/L (ref 3.6–5.5)
PROT SERPL-MCNC: 7.4 G/DL (ref 6–8.2)
PROT SERPL-MCNC: 7.5 G/DL (ref 6–8.2)
RBC # BLD AUTO: 5.2 M/UL (ref 4.7–6.1)
SODIUM SERPL-SCNC: 136 MMOL/L (ref 135–145)
WBC # BLD AUTO: 7.4 K/UL (ref 4.8–10.8)

## 2022-06-17 PROCEDURE — 97165 OT EVAL LOW COMPLEX 30 MIN: CPT

## 2022-06-17 PROCEDURE — A9270 NON-COVERED ITEM OR SERVICE: HCPCS | Performed by: INTERNAL MEDICINE

## 2022-06-17 PROCEDURE — 74018 RADEX ABDOMEN 1 VIEW: CPT

## 2022-06-17 PROCEDURE — 36415 COLL VENOUS BLD VENIPUNCTURE: CPT

## 2022-06-17 PROCEDURE — 700102 HCHG RX REV CODE 250 W/ 637 OVERRIDE(OP): Performed by: INTERNAL MEDICINE

## 2022-06-17 PROCEDURE — 770020 HCHG ROOM/CARE - TELE (206)

## 2022-06-17 PROCEDURE — 85027 COMPLETE CBC AUTOMATED: CPT

## 2022-06-17 PROCEDURE — 700102 HCHG RX REV CODE 250 W/ 637 OVERRIDE(OP): Performed by: EMERGENCY MEDICINE

## 2022-06-17 PROCEDURE — 80076 HEPATIC FUNCTION PANEL: CPT

## 2022-06-17 PROCEDURE — A9270 NON-COVERED ITEM OR SERVICE: HCPCS | Performed by: EMERGENCY MEDICINE

## 2022-06-17 PROCEDURE — 85018 HEMOGLOBIN: CPT

## 2022-06-17 PROCEDURE — 83690 ASSAY OF LIPASE: CPT

## 2022-06-17 PROCEDURE — 80053 COMPREHEN METABOLIC PANEL: CPT

## 2022-06-17 PROCEDURE — 85014 HEMATOCRIT: CPT

## 2022-06-17 PROCEDURE — 99233 SBSQ HOSP IP/OBS HIGH 50: CPT | Performed by: INTERNAL MEDICINE

## 2022-06-17 PROCEDURE — 99223 1ST HOSP IP/OBS HIGH 75: CPT | Performed by: PHYSICAL MEDICINE & REHABILITATION

## 2022-06-17 PROCEDURE — 700111 HCHG RX REV CODE 636 W/ 250 OVERRIDE (IP): Performed by: NURSE PRACTITIONER

## 2022-06-17 RX ORDER — AMLODIPINE BESYLATE 5 MG/1
5 TABLET ORAL
Status: DISCONTINUED | OUTPATIENT
Start: 2022-06-17 | End: 2022-06-20 | Stop reason: HOSPADM

## 2022-06-17 RX ADMIN — METOPROLOL TARTRATE 25 MG: 25 TABLET, FILM COATED ORAL at 17:45

## 2022-06-17 RX ADMIN — OXYCODONE HYDROCHLORIDE 10 MG: 10 TABLET ORAL at 17:53

## 2022-06-17 RX ADMIN — OXYCODONE 5 MG: 5 TABLET ORAL at 20:55

## 2022-06-17 RX ADMIN — LORAZEPAM 2 MG: 2 TABLET ORAL at 08:27

## 2022-06-17 RX ADMIN — LORAZEPAM 1 MG: 1 TABLET ORAL at 00:11

## 2022-06-17 RX ADMIN — AMLODIPINE BESYLATE 5 MG: 5 TABLET ORAL at 17:45

## 2022-06-17 RX ADMIN — DIVALPROEX SODIUM 500 MG: 500 TABLET, DELAYED RELEASE ORAL at 17:45

## 2022-06-17 RX ADMIN — LORAZEPAM 1 MG: 1 TABLET ORAL at 17:52

## 2022-06-17 RX ADMIN — DIVALPROEX SODIUM 500 MG: 500 TABLET, DELAYED RELEASE ORAL at 04:34

## 2022-06-17 RX ADMIN — ONDANSETRON 4 MG: 2 INJECTION INTRAMUSCULAR; INTRAVENOUS at 20:55

## 2022-06-17 RX ADMIN — RIVAROXABAN 10 MG: 10 TABLET, FILM COATED ORAL at 17:45

## 2022-06-17 RX ADMIN — ACETAMINOPHEN 500 MG: 500 TABLET, FILM COATED ORAL at 08:27

## 2022-06-17 RX ADMIN — LORAZEPAM 1 MG: 1 TABLET ORAL at 11:58

## 2022-06-17 RX ADMIN — LORAZEPAM 1 MG: 1 TABLET ORAL at 04:34

## 2022-06-17 RX ADMIN — OXYCODONE 5 MG: 5 TABLET ORAL at 00:11

## 2022-06-17 RX ADMIN — ONDANSETRON 4 MG: 2 INJECTION INTRAMUSCULAR; INTRAVENOUS at 14:20

## 2022-06-17 RX ADMIN — OXYCODONE HYDROCHLORIDE 10 MG: 10 TABLET ORAL at 04:34

## 2022-06-17 RX ADMIN — LORAZEPAM 1 MG: 1 TABLET ORAL at 20:54

## 2022-06-17 RX ADMIN — OXYCODONE HYDROCHLORIDE 10 MG: 10 TABLET ORAL at 11:59

## 2022-06-17 RX ADMIN — NICOTINE 14 MG: 14 PATCH TRANSDERMAL at 04:34

## 2022-06-17 ASSESSMENT — PAIN DESCRIPTION - PAIN TYPE
TYPE: ACUTE PAIN

## 2022-06-17 ASSESSMENT — LIFESTYLE VARIABLES
NAUSEA AND VOMITING: MILD NAUSEA WITH NO VOMITING
VISUAL DISTURBANCES: NOT PRESENT
PAROXYSMAL SWEATS: BARELY PERCEPTIBLE SWEATING. PALMS MOIST
AGITATION: NORMAL ACTIVITY
HEADACHE, FULLNESS IN HEAD: MODERATELY SEVERE
TOTAL SCORE: MILD ITCHING, PINS AND NEEDLES SENSATION, BURNING OR NUMBNESS
HEADACHE, FULLNESS IN HEAD: MODERATELY SEVERE
AUDITORY DISTURBANCES: NOT PRESENT
TOTAL SCORE: 9
NAUSEA AND VOMITING: MILD NAUSEA WITH NO VOMITING
TOTAL SCORE: MODERATE ITCHING, PINS AND NEEDLES SENSATION, BURNING OR NUMBNESS
TREMOR: TREMOR NOT VISIBLE BUT CAN BE FELT, FINGERTIP TO FINGERTIP
HEADACHE, FULLNESS IN HEAD: MODERATELY SEVERE
ORIENTATION AND CLOUDING OF SENSORIUM: ORIENTED AND CAN DO SERIAL ADDITIONS
TOTAL SCORE: 8
AGITATION: NORMAL ACTIVITY
TREMOR: NO TREMOR
AUDITORY DISTURBANCES: NOT PRESENT
AUDITORY DISTURBANCES: NOT PRESENT
ORIENTATION AND CLOUDING OF SENSORIUM: ORIENTED AND CAN DO SERIAL ADDITIONS
TOTAL SCORE: MILD ITCHING, PINS AND NEEDLES SENSATION, BURNING OR NUMBNESS
VISUAL DISTURBANCES: NOT PRESENT
ANXIETY: MILDLY ANXIOUS
ANXIETY: MILDLY ANXIOUS
AGITATION: NORMAL ACTIVITY
TREMOR: NO TREMOR
SUBSTANCE_ABUSE: 1
ANXIETY: MILDLY ANXIOUS
ANXIETY: MILDLY ANXIOUS
AGITATION: NORMAL ACTIVITY
TOTAL SCORE: 8
TOTAL SCORE: MILD ITCHING, PINS AND NEEDLES SENSATION, BURNING OR NUMBNESS
NAUSEA AND VOMITING: *
TREMOR: TREMOR NOT VISIBLE BUT CAN BE FELT, FINGERTIP TO FINGERTIP
VISUAL DISTURBANCES: NOT PRESENT
TOTAL SCORE: 14
ANXIETY: NO ANXIETY (AT EASE)
PAROXYSMAL SWEATS: BARELY PERCEPTIBLE SWEATING. PALMS MOIST
HEADACHE, FULLNESS IN HEAD: MODERATE
TOTAL SCORE: MILD ITCHING, PINS AND NEEDLES SENSATION, BURNING OR NUMBNESS
ORIENTATION AND CLOUDING OF SENSORIUM: ORIENTED AND CAN DO SERIAL ADDITIONS
TOTAL SCORE: 9
PAROXYSMAL SWEATS: NO SWEAT VISIBLE
ORIENTATION AND CLOUDING OF SENSORIUM: ORIENTED AND CAN DO SERIAL ADDITIONS
ORIENTATION AND CLOUDING OF SENSORIUM: ORIENTED AND CAN DO SERIAL ADDITIONS
AUDITORY DISTURBANCES: NOT PRESENT
VISUAL DISTURBANCES: NOT PRESENT
HEADACHE, FULLNESS IN HEAD: MODERATELY SEVERE
NAUSEA AND VOMITING: MILD NAUSEA WITH NO VOMITING
TREMOR: TREMOR NOT VISIBLE BUT CAN BE FELT, FINGERTIP TO FINGERTIP
NAUSEA AND VOMITING: NO NAUSEA AND NO VOMITING
TREMOR: *
VISUAL DISTURBANCES: NOT PRESENT
PAROXYSMAL SWEATS: NO SWEAT VISIBLE
AUDITORY DISTURBANCES: NOT PRESENT
HEADACHE, FULLNESS IN HEAD: MODERATELY SEVERE
NAUSEA AND VOMITING: MILD NAUSEA WITH NO VOMITING
PAROXYSMAL SWEATS: BARELY PERCEPTIBLE SWEATING. PALMS MOIST
VISUAL DISTURBANCES: NOT PRESENT
AGITATION: NORMAL ACTIVITY
PAROXYSMAL SWEATS: NO SWEAT VISIBLE
AGITATION: NORMAL ACTIVITY
TOTAL SCORE: 8
ORIENTATION AND CLOUDING OF SENSORIUM: ORIENTED AND CAN DO SERIAL ADDITIONS
ANXIETY: MILDLY ANXIOUS
TOTAL SCORE: MILD ITCHING, PINS AND NEEDLES SENSATION, BURNING OR NUMBNESS
AUDITORY DISTURBANCES: NOT PRESENT

## 2022-06-17 ASSESSMENT — ENCOUNTER SYMPTOMS
BLURRED VISION: 0
NAUSEA: 1
NECK PAIN: 1
HEADACHES: 0
SHORTNESS OF BREATH: 0
BACK PAIN: 1
VOMITING: 0
CHILLS: 0
MYALGIAS: 0
SEIZURES: 1
FEVER: 0
POLYDIPSIA: 0
BRUISES/BLEEDS EASILY: 0
DIZZINESS: 0
DOUBLE VISION: 0
ABDOMINAL PAIN: 0
TREMORS: 1

## 2022-06-17 ASSESSMENT — ACTIVITIES OF DAILY LIVING (ADL): TOILETING: INDEPENDENT

## 2022-06-17 ASSESSMENT — COGNITIVE AND FUNCTIONAL STATUS - GENERAL
DAILY ACTIVITIY SCORE: 21
SUGGESTED CMS G CODE MODIFIER DAILY ACTIVITY: CJ
TOILETING: A LITTLE
HELP NEEDED FOR BATHING: A LITTLE
DRESSING REGULAR LOWER BODY CLOTHING: A LITTLE

## 2022-06-17 NOTE — DISCHARGE PLANNING
Case Management Discharge Planning    Admission Date: 6/12/2022  GMLOS: 3.4  ALOS: 4    6-Clicks ADL Score: 21  6-Clicks Mobility Score: 14  PT and/or OT Eval ordered: yes  Post-acute Referrals Ordered: yes  Post-acute Choice Obtained: yes  Has referral(s) been sent to post-acute provider:  yes      Anticipated Discharge Dispo: Discharge Disposition: D/T to IP rehab facility w/planned hosp IP readmit (90)  Discharge Address: [address] [place] [address] [place]  [address]  Discharge Contact Phone Number: [phone]    DME Needed: n/a    Action(s) Taken:   Discussed during IDT rounds. RN stated that CIWA Is 14.  CM called [facility] ([phone]) [facility] about pt's discharge planning. Explained to her that pt will need to go to acute rehab and our [facility] is not contracted with them . Gloria said that [facility] can write a one time contract with [facility]. Gloria said that it is easier for pt to go to acute rehab here in [place] because pt's family is from here.     Notified Tanvir Milian Sharon and Sonia at [facility] admission dept.     Escalations Completed: Yes    Medically Clear: No -CIWA 14    Next Steps: follow up with [facility] [phone]    Barriers to Discharge:   Medical clearance  [facility] one time contract with [facility]    Is the patient up for discharge tomorrow: no

## 2022-06-17 NOTE — PROGRESS NOTES
Hospital Medicine Daily Progress Note    Date of Service  6/17/2022    Chief Complaint  Donal Carpio is a 52 y.o. male admitted 6/12/2022 with alcohol withdrawal seizures.     Hospital Course  52 y.o. male who presented 6/12/2022 with several seizure episodes today.  Patient only recently began drinking alcohol heavily about 7 months ago when his daughter got into a car accident.  His last drink was about 24 hours ago.  Recently discharged from [facility] about 4 days ago for alcohol withdrawal. In the ED, patient found to be tachycardic.  CT head negative.  Found to have 2 seizures in ED to which she was loaded with Keppra and started on CIWA protocol. Has had >10 admissions in the last year for the same complaint, previous work up with MRI and by neurology were unremarkable.   Continue librium and CIWA, score this morning 14. Patient reports he is still feeling very tired and having back pain after his seizures in the ER. Pain meds ordered prn. Reports he has been taking his keppra but does not like to take it as it makes him extremely dizzy requesting change of medications, will change to depakote.     06/14-no major overnight events.  Patient continues to feel nauseous back pain neck pain and in general not feeling good.  Patient CIWA continue to stay high and he is being medicated per the CIWA protocol  Interval Problem Update  6/15. Still withdrawing. Tremulous. CIWA 14. Ordered PT. Has had multiple and fequent hospitalizations for the same problem.  6/16. CIWA 10. Patient still very weak. PT/OT ordered.  6/17. Ataxic though unclear if he is malingering. Still scoring on CIWA 14. Later he is nauseous and complains of substernal pain. Aside from alcohol he also smokes or takes marijuana    I have personally seen and examined the patient at bedside. I discussed the plan of care with patient and bedside RN.    Consultants/Specialty  N/A    Code Status  Full Code    Disposition  Patient is not medically  cleared for discharge.   Anticipate discharge to to home with close outpatient follow-up.  I have placed the appropriate orders for post-discharge needs.    Review of Systems  Review of Systems   Constitutional: Negative for chills and fever.   HENT: Negative for hearing loss and tinnitus.    Eyes: Negative for blurred vision and double vision.   Respiratory: Negative for shortness of breath.    Cardiovascular: Negative for chest pain.   Gastrointestinal: Positive for nausea. Negative for abdominal pain and vomiting.   Genitourinary: Negative for dysuria.   Musculoskeletal: Positive for back pain and neck pain. Negative for myalgias.   Skin: Negative for rash.   Neurological: Positive for tremors and seizures. Negative for dizziness and headaches.   Endo/Heme/Allergies: Negative for polydipsia. Does not bruise/bleed easily.   Psychiatric/Behavioral: Positive for substance abuse.        Physical Exam  Temp:  [35.9 °C (96.6 °F)-36.6 °C (97.8 °F)] 35.9 °C (96.6 °F)  Pulse:  [] 120  Resp:  [16-19] 18  BP: (128-140)/() 133/106  SpO2:  [89 %-94 %] 92 %    Physical Exam  Vitals and nursing note reviewed.   Constitutional:       General: He is not in acute distress.     Appearance: Normal appearance.   HENT:      Head: Normocephalic and atraumatic.      Mouth/Throat:      Mouth: Mucous membranes are moist.      Pharynx: Oropharynx is clear.   Eyes:      Conjunctiva/sclera: Conjunctivae normal.      Pupils: Pupils are equal, round, and reactive to light.   Cardiovascular:      Rate and Rhythm: Normal rate and regular rhythm.      Pulses: Normal pulses.      Heart sounds: Normal heart sounds.   Pulmonary:      Effort: Pulmonary effort is normal.      Breath sounds: Normal breath sounds.   Abdominal:      General: Abdomen is flat.      Palpations: Abdomen is soft.      Comments: Nausea   Musculoskeletal:         General: No swelling. Normal range of motion.      Cervical back: Normal range of motion and neck  supple.   Skin:     General: Skin is warm and dry.      Findings: No rash.   Neurological:      General: No focal deficit present.      Mental Status: He is alert and oriented to person, place, and time.      Cranial Nerves: No cranial nerve deficit.      Motor: Weakness present.      Coordination: Coordination abnormal (Tremulous).      Gait: Gait abnormal.      Comments: +tremor   Psychiatric:         Mood and Affect: Mood normal.         Behavior: Behavior normal.         Fluids    Intake/Output Summary (Last 24 hours) at 6/17/2022 1407  Last data filed at 6/16/2022 1700  Gross per 24 hour   Intake 240 ml   Output --   Net 240 ml       Laboratory  Recent Labs     06/15/22  0222 06/17/22  0515   WBC 8.0 7.4   RBC 4.76 5.20   HEMOGLOBIN 13.9* 15.1   HEMATOCRIT 42.1 46.8   MCV 88.4 90.0   MCH 29.2 29.0   MCHC 33.0* 32.3*   RDW 55.8* 57.9*   PLATELETCT 209 192   MPV 9.2 9.3     Recent Labs     06/15/22  0222 06/17/22  0515   SODIUM 137 136   POTASSIUM 4.0 4.6   CHLORIDE 101 99   CO2 24 25   GLUCOSE 85 81   BUN 9 11   CREATININE 0.75 0.96   CALCIUM 8.9 9.0                   Imaging  CT-TSPINE W/O PLUS RECONS   Final Result         Moderate vertebral height loss of T6 and T7 with superior endplate concavity appears chronic. No acute fracture identified.      No malalignment.      CT-HEAD W/O   Final Result         1. No acute intracranial abnormality. No evidence of acute intracranial hemorrhage or mass lesion.                     CT-CSPINE WITHOUT PLUS RECONS   Final Result         1. No acute fracture from C1 through T1 is visualized.         DX-THORACIC SPINE-2 VIEWS   Final Result         Mild superior endplate compression deformity of the midthoracic spine, likely T7, slightly worse than prior, concerning for more acute injury.      [id number] VIEW    (Results Pending)        Assessment/Plan  * Alcohol withdrawal syndrome with complication (HCC)- (present on admission)  Assessment & Plan  JAMESON  "protocol  librium  Multivitamins, thiamine  Counseled on benefits of alcohol cessation\"    Still withdrawing  Continue CIWA protocol  Alcohol cessation enxouraged  Should be done withdrawing tomorrow (day no 5)  May have chronic ataxia; skilled or rehab recommended.      Nausea and vomiting  Assessment & Plan  Alcoholic gastritis or gastroparesis, cannabis syndrome  If still vomiting, NPO or clears  PRN Zofran  KUB  Avoid narcotics    Alcoholic hepatitis without ascites- (present on admission)  Assessment & Plan  Elevated AST/ALT due to alcohol use   Continue to monitor\"    Mild elevation  Alcohol cessation encouraged  Hepatitis panel, viral. Came back NONREACTIVE    Hypertension  Assessment & Plan  Restart prn\"    Vitals:    06/17/22 1145   BP: (!) 133/106   Pulse: (!) 120   Resp: 18   Temp:    SpO2: 92%     Stable.  Resume amlodipine  Added BB    Seizure disorder (HCC)- (present on admission)  Assessment & Plan  Loaded with Keppra   Reports he has severe dizziness with keppra, requested medication change  -start depakote   He has had MRI and neurology evaluation on previous admission which were unremarkable\"    Follow up with Neurology       VTE prophylaxis: heparin ppx        "

## 2022-06-17 NOTE — DISCHARGE PLANNING
"Follow up for post acute services per CM,  \" I talked to Gloria SHIRLEY CM at [facility] who said that if [facility] accepts pt then they will write a contract with you. Please call Gloria at [phone].\"     Please have therapy update notes Sunday or Monday to review for insurance consideration for IRF with [facility].   "

## 2022-06-17 NOTE — DISCHARGE PLANNING
[facility] Transitional Care Coordination    Referral from: Dr. Hutchinson    Insurance Provider on Facesheet: [place].  Unfortunately, [facility] is not a benefit.  Anticipate post acute services in [place].    Potential Rehab Diagnosis: Debility    Chart review indicates patient may have on going medical management and may have therapy needs to possibly meet inpatient rehab facility criteria with the goal of returning to community.    D/C support will need to be verified: S.O.    Physiatry consultation forwarded per protocol to contribute to POC only.  Would appreciate an OT eval once appropriate.  TCC will not follow.      Thank you for the referral.

## 2022-06-17 NOTE — ASSESSMENT & PLAN NOTE
Alcoholic gastritis or gastroparesis, cannabis syndrome  If still vomiting, NPO or clears  PRN Zofran  KUB came back constipation  Tapering down narcotics

## 2022-06-17 NOTE — THERAPY
"Occupational Therapy   Initial Evaluation     Patient Name: Donal Carpio  Age:  52 y.o., Sex:  male  Medical Record #: [id number]  Today's Date: 6/17/2022     Precautions: Fall Risk  Comments: EOT c/o dizziness    Assessment  Patient is 52 y.o. male admitted for seizure activity PMHx: alcohol abuse and trauma. This admission pt is dx w/seizure alcohol abuse and HTN. At present pt has c/o dizziness and generalized pain. At this time pt appears to have functional strength and coordination but has c/o dizziness w/all activity; pts vitals were stable through out session and had no observed nystagmus. At this time anticipate pt will improve in this setting, recommend daily OOB activity up to chair for all meals and walking to the bathroom w/staff. OT will follow in this setting.     Plan  Recommend Occupational Therapy 3 times per week until therapy goals are met for the following treatments:  Adaptive Equipment, Self Care/Activities of Daily Living, Therapeutic Activities and Therapeutic Exercises.    DC Equipment Recommendations: Unable to determine at this time  Discharge Recommendations: Recommend home health for continued occupational therapy services     Subjective  \"I just get so dizzy\"      Objective     06/17/22 1545   Charge Group   OT Evaluation OT Evaluation Low   Total Time Spent   OT Time Spent Yes   OT Evaluation (Minutes) 21   OT Total Time Spent (Calculated) 21   Initial Contact Note    Initial Contact Note Order Received and Verified, Occupational Therapy Evaluation in Progress with Full Report to Follow.   Prior Living Situation   Prior Services Home-Independent   Housing / Facility 1 Story Apartment / Condo   Steps Into Home 0   Bathroom Set up Bathtub / Shower Combination   Equipment Owned None   Lives with - Patient's Self Care Capacity Alone and Able to Care For Self   Comments pt has been having increased difficulty caring for himself d/t ETOH   Prior Level of ADL Function   Self Feeding Independent "   Grooming / Hygiene Independent   Bathing Independent   Dressing Independent   Toileting Independent   Prior Level of IADL Function   Medication Management Independent   Laundry Independent   Kitchen Mobility Independent   Finances Independent   Home Management Independent   Shopping Independent   Prior Level Of Mobility Independent Without Device in Community   Precautions   Precautions Fall Risk   Comments EOTH c/o dizziness   Pain 0 - 10 Group   Location Generalized   Therapist Pain Assessment During Activity;Nurse Notified;5   Cognition    Cognition / Consciousness X   Level of Consciousness Alert   Safety Awareness Impaired   Comments pleasant and cooperative but w/self limiting bx   Passive ROM Upper Body   Passive ROM Upper Body WDL   Active ROM Upper Body   Active ROM Upper Body  WDL   Strength Upper Body   Upper Body Strength  WDL   Upper Body Muscle Tone   Upper Body Muscle Tone  WDL   Neurological Concerns   Neurological Concerns No   Coordination Upper Body   Coordination WDL   Balance Assessment   Sitting Balance (Static) Fair   Sitting Balance (Dynamic) Fair   Standing Balance (Static) Fair   Standing Balance (Dynamic) Fair   Weight Shift Sitting Fair   Weight Shift Standing Fair   Comments w/fww had c/o dizziness but VSS no nystagmus observed   Bed Mobility    Supine to Sit Supervised   Sit to Supine Supervised   Scooting Supervised   ADL Assessment   Grooming Standby Assist;Seated   Upper Body Dressing Supervision   Lower Body Dressing Contact Guard Assist   Toileting   (declined)   Comments declined standing ADLs reports too dizzy and feels unsteady   How much help from another person does the patient currently need...   6 Clicks Daily Activity Score 21   Functional Mobility   Sit to Stand Supervised   Bed, Chair, Wheelchair Transfer Supervised   Mobility EOB sit>stand w/fww side steps then BTB   Visual Perception   Visual Perception  Not Tested   Edema / Skin Assessment   Edema / Skin  Not  Assessed   Activity Tolerance   Comments c/o dizziness and fatigue   Patient / Family Goals   Patient / Family Goal #1 to get better   Short Term Goals   Short Term Goal # 1 pt will complete grooming standing at sink w/spv   Short Term Goal # 2 pt will complete toilet txf w/spv   Short Term Goal # 3 pt will complete FB dressing w/spv   Education Group   Role of Occupational Therapist Patient Response Patient;Acceptance;Explanation;Demonstration   Problem List   Problem List Decreased Functional Mobility;Safety Awareness Deficits / Cognition;Decreased Activity Tolerance;Impaired Postural Control / Balance   Anticipated Discharge Equipment and Recommendations   DC Equipment Recommendations Unable to determine at this time   Discharge Recommendations Recommend home health for continued occupational therapy services   Interdisciplinary Plan of Care Collaboration   IDT Collaboration with  Nursing   Patient Position at End of Therapy In Bed;Call Light within Reach;Tray Table within Reach;Phone within Reach;Bed Alarm On   Collaboration Comments RN aware of OT eval and pts efforts   Session Information   Date / Session Number  6/17 #1 (1/3, 6/23)   Priority 2

## 2022-06-17 NOTE — CONSULTS
Physical Medicine and Rehabilitation Consultation              Date of initial consultation: 2022  Requesting provider: Mendoza Hutchinson MD   Consulting provider: Smiley Arango D.O.  Reason for consultation: assess for acute inpatient rehab appropriateness  LOS: 4 Day(s)    Chief complaint: seizure activity     HPI: The patient is a 52 y.o.  male with a past medical history of alcohol abuse;  who presented on 2022  8:25 PM after multiple episodes of seizures. Per documentation, patient started drinking alcohol heavily about 7 months ago when his daughter was involved in a car accident. Patient had not been drinking for 4 days and went into alcohol w/d seizures. Patient was evaluated in the ED, loaded with Keppra and has now been transitioned to PO depakote. Patient continues to require ativan per CIWA protocol. Patient has been started on librium and thiamine supplments. CT head obtianed on  showed no acute process. Patient has been evaluated by therapies, he is functioning at a Min A level with FWW x2 feet.     Patient seen and examined at bedside. Patient reports he feels scared and wants valium. Patient's nurses recently provided patient with medications. Patient is fearful about what will happen to him.   Otherwise, denies HA, lighteadedness, CP, SOB. Reports he generally feels uncomfortable all over.     Social Hx:  Patient lives alone in 1 story apartment, no stairs to enter. Lives in [place], is visiting [place] on a work trip.   0 CHARITY  At prior level of function patient was independent but had frequent falls.       Employment: works in [employer] sales   Tobacco: denies  Alcohol: regular heavy alcohol use   Drugs: denies     THERAPY:  Restrictions: fall risk   PT: Functional mobility    PT Note: Min A with FWW x2 ft , SBA for sit to stand, Min A trnasfers     OT: ADLs  No OT note    SLP:   No SLP NOte    IMAGIN/12 CT Head      IMPRESSION:        1. No acute intracranial abnormality. No  "evidence of acute intracranial hemorrhage or mass lesion.    PROCEDURES:  None     PMH:  Past Medical History:   Diagnosis Date   • Peptic ulcer    • Seizure disorder (HCC)    • Ulcer of abdomen wall (HCC)        PSH:  Past Surgical History:   Procedure Laterality Date   • APPENDECTOMY         FHX:  No family history on file.    Medications:  Current Facility-Administered Medications   Medication Dose   • diphenhydrAMINE (BENADRYL) tablet/capsule 25 mg  25 mg   • nicotine (NICODERM) 14 MG/24HR 14 mg  14 mg   • MD ALERT...Covid-19 Vaccine Order     • ondansetron (ZOFRAN) syringe/vial injection 4 mg  4 mg   • rivaroxaban (XARELTO) tablet 10 mg  10 mg   • acetaminophen (TYLENOL) tablet 500 mg  500 mg   • oxyCODONE immediate-release (ROXICODONE) tablet 5 mg  5 mg   • oxyCODONE immediate release (ROXICODONE) tablet 10 mg  10 mg   • divalproex (DEPAKOTE) delayed-release tablet 500 mg  500 mg   • LORazepam (ATIVAN) tablet 0.5 mg  0.5 mg   • LORazepam (ATIVAN) tablet 1 mg  1 mg    Or   • LORazepam (ATIVAN) injection 0.5 mg  0.5 mg   • LORazepam (ATIVAN) tablet 2 mg  2 mg    Or   • LORazepam (ATIVAN) injection 1 mg  1 mg   • LORazepam (ATIVAN) tablet 3 mg  3 mg    Or   • LORazepam (ATIVAN) injection 1.5 mg  1.5 mg   • LORazepam (ATIVAN) tablet 4 mg  4 mg    Or   • LORazepam (ATIVAN) injection 2 mg  2 mg       Allergies:  Allergies   Allergen Reactions   • Penicillins Unspecified     Unknown childhood reaction        Physical Exam:  Vitals: /82   Pulse (!) 117   Temp 35.9 °C (96.6 °F) (Temporal)   Resp 18   Ht 1.753 m (5' 9\")   Wt 86.9 kg (191 lb 9.3 oz)   SpO2 93%   Gen: NAD, laying comfortably in bed, no family in room   Head:  NC/AT  Eyes/ Nose/ Mouth: PERRLA, moist mucous membranes  Cardio: RRR, good distal perfusion, warm extremities  Pulm: normal respiratory effort, no cyanosis, breathing comfortably on RA   Abd: Soft NTND,   Ext: No peripheral edema. No calf tenderness. No clubbing.  Mood: expresses feeling " scared     Mental status:  A&Ox4 (person, place, date, situation) answers questions appropriately follows commands  Speech: fluent, no aphasia or dysarthria    CRANIAL NERVES:  2,3: visual acuity grossly intact, PERRL  3,4,6: EOMI bilaterally, no nystagmus or diplopia  5: sensation intact to light touch bilaterally and symmetric  7: no facial asymmetry  8: hearing grossly intact      Motor:      Upper Extremity  Myotome R L   Shoulder flexion C5 5/5 5/5   Elbow flexion C5 5/5 5/5   Wrist extension C6 5/5 5/5   Elbow extension C7 5/5 5/5   Finger flexion C8 5/5 5/5   Finger abduction T1 5/5 5/5     Lower Extremity Myotome R L   Hip flexion L2 5/5 5/5   Knee extension L3 5/5 5/5   Ankle dorsiflexion L4 5/5 5/5   Toe extension L5 5/5 5/5   Ankle plantarflexion S1 5/5 5/5       Negative Pronator drift bilaterally    Sensory:   intact to light touch through out b/l upper and lower extremities       DTRs: 2+ in bilateral  biceps, 2+ in bilateral patellar tendons  No clonus at bilateral ankles  Negative babinski b/l  Negative Ramesh b/l     Tone: no spasticity noted, no cogwheeling noted    Coordination:   intact finger to nose bilaterally  intact fine motor with fingers bilaterally      Labs: Reviewed and significant for   Recent Labs     06/15/22  0222 06/17/22  0515   RBC 4.76 5.20   HEMOGLOBIN 13.9* 15.1   HEMATOCRIT 42.1 46.8   PLATELETCT 209 192     Recent Labs     06/15/22  0222 06/17/22  0515   SODIUM 137 136   POTASSIUM 4.0 4.6   CHLORIDE 101 99   CO2 24 25   GLUCOSE 85 81   BUN 9 11   CREATININE 0.75 0.96   CALCIUM 8.9 9.0     Recent Results (from the past 24 hour(s))   CBC WITHOUT DIFFERENTIAL    Collection Time: 06/17/22  5:15 AM   Result Value Ref Range    WBC 7.4 4.8 - 10.8 K/uL    RBC 5.20 4.70 - 6.10 M/uL    Hemoglobin 15.1 14.0 - 18.0 g/dL    Hematocrit 46.8 42.0 - 52.0 %    MCV 90.0 81.4 - 97.8 fL    MCH 29.0 27.0 - 33.0 pg    MCHC 32.3 (L) 33.7 - 35.3 g/dL    RDW 57.9 (H) 35.9 - 50.0 fL    Platelet  Count 192 164 - 446 K/uL    MPV 9.3 9.0 - 12.9 fL   Comp Metabolic Panel    Collection Time: 06/17/22  5:15 AM   Result Value Ref Range    Sodium 136 135 - 145 mmol/L    Potassium 4.6 3.6 - 5.5 mmol/L    Chloride 99 96 - 112 mmol/L    Co2 25 20 - 33 mmol/L    Anion Gap 12.0 7.0 - 16.0    Glucose 81 65 - 99 mg/dL    Bun 11 8 - 22 mg/dL    Creatinine 0.96 0.50 - 1.40 mg/dL    Calcium 9.0 8.5 - 10.5 mg/dL    AST(SGOT) 58 (H) 12 - 45 U/L    ALT(SGPT) 61 (H) 2 - 50 U/L    Alkaline Phosphatase 101 (H) 30 - 99 U/L    Total Bilirubin 0.3 0.1 - 1.5 mg/dL    Albumin 4.0 3.2 - 4.9 g/dL    Total Protein 7.5 6.0 - 8.2 g/dL    Globulin 3.5 1.9 - 3.5 g/dL    A-G Ratio 1.1 g/dL   ESTIMATED GFR    Collection Time: 06/17/22  5:15 AM   Result Value Ref Range    GFR (CKD-EPI) 96 >60 mL/min/1.73 m 2         ASSESSMENT:  Patient is a 52 y.o. male admitted with seizures secondary to EtOH w/d     [facility] Code / Diagnosis to Support: 0003.9 - Neurologic Conditions: Other Neurologic    Rehabilitation: Impaired ADLs and mobility  Patient is a good candidate for inpatient rehab based on needs for PT, OT, [facility]       Additional Recommendations:  Seizures secondary to alcohol w/d  - loaded with keppra, now on oral depakote  - patient continues to require ativan per CIWA protocol   - conitnue with PT/OT, patient functioning at a Min A level, but is making significant gains    Dispo  - patient is functioning below his level of baseline, would benefit form a short stay at [facility] level  - unfortunatealy patient's insurance is out of network with [facility], CM to assist with working with [facility] for short stay rehab once medically cleared from alcohol w/d   - support rehab placement closer to home in [place] where patient would have DC support   - PM&R to sign off at this time       Medical Complexity:  Seizures  Alcohol abuse  Alcohol w/d  HTN       DVT PPX: xarelto       Thank you for allowing  us to participate in the care of this patient.     Patient was seen for 88 minutes on unit/floor of which > 50% of time was spent on counseling and coordination of care regarding the above, including prognosis, risk reduction, benefits of treatment, and options for next stage of care.    Smiley Arango D.O.   Physical Medicine and Rehabilitation     Please note that this dictation was created using voice recognition software. I have made every reasonable attempt to correct obvious errors, but there may be errors of grammar and possibly content that I did not discover before finalizing the note.

## 2022-06-18 PROBLEM — K59.09 OTHER CONSTIPATION: Status: ACTIVE | Noted: 2022-06-18

## 2022-06-18 PROBLEM — K59.03 DRUG-INDUCED CONSTIPATION: Status: ACTIVE | Noted: 2022-06-18

## 2022-06-18 LAB
ALBUMIN SERPL BCP-MCNC: 4 G/DL (ref 3.2–4.9)
ALBUMIN/GLOB SERPL: 1.2 G/DL
ALP SERPL-CCNC: 85 U/L (ref 30–99)
ALT SERPL-CCNC: 47 U/L (ref 2–50)
ANION GAP SERPL CALC-SCNC: 13 MMOL/L (ref 7–16)
AST SERPL-CCNC: 41 U/L (ref 12–45)
BILIRUB SERPL-MCNC: 0.3 MG/DL (ref 0.1–1.5)
BUN SERPL-MCNC: 13 MG/DL (ref 8–22)
CALCIUM SERPL-MCNC: 8.9 MG/DL (ref 8.5–10.5)
CHLORIDE SERPL-SCNC: 102 MMOL/L (ref 96–112)
CO2 SERPL-SCNC: 24 MMOL/L (ref 20–33)
CREAT SERPL-MCNC: 0.95 MG/DL (ref 0.5–1.4)
GFR SERPLBLD CREATININE-BSD FMLA CKD-EPI: 97 ML/MIN/1.73 M 2
GLOBULIN SER CALC-MCNC: 3.3 G/DL (ref 1.9–3.5)
GLUCOSE SERPL-MCNC: 90 MG/DL (ref 65–99)
POTASSIUM SERPL-SCNC: 4.4 MMOL/L (ref 3.6–5.5)
PROT SERPL-MCNC: 7.3 G/DL (ref 6–8.2)
SODIUM SERPL-SCNC: 139 MMOL/L (ref 135–145)

## 2022-06-18 PROCEDURE — 99232 SBSQ HOSP IP/OBS MODERATE 35: CPT | Performed by: INTERNAL MEDICINE

## 2022-06-18 PROCEDURE — A9270 NON-COVERED ITEM OR SERVICE: HCPCS | Performed by: EMERGENCY MEDICINE

## 2022-06-18 PROCEDURE — 700102 HCHG RX REV CODE 250 W/ 637 OVERRIDE(OP): Performed by: EMERGENCY MEDICINE

## 2022-06-18 PROCEDURE — 770020 HCHG ROOM/CARE - TELE (206)

## 2022-06-18 PROCEDURE — 700111 HCHG RX REV CODE 636 W/ 250 OVERRIDE (IP): Performed by: EMERGENCY MEDICINE

## 2022-06-18 PROCEDURE — A9270 NON-COVERED ITEM OR SERVICE: HCPCS | Performed by: INTERNAL MEDICINE

## 2022-06-18 PROCEDURE — 700111 HCHG RX REV CODE 636 W/ 250 OVERRIDE (IP): Performed by: NURSE PRACTITIONER

## 2022-06-18 PROCEDURE — 36415 COLL VENOUS BLD VENIPUNCTURE: CPT

## 2022-06-18 PROCEDURE — 80053 COMPREHEN METABOLIC PANEL: CPT

## 2022-06-18 PROCEDURE — 700102 HCHG RX REV CODE 250 W/ 637 OVERRIDE(OP): Performed by: INTERNAL MEDICINE

## 2022-06-18 RX ORDER — POLYETHYLENE GLYCOL 3350 17 G/17G
1 POWDER, FOR SOLUTION ORAL
Status: DISCONTINUED | OUTPATIENT
Start: 2022-06-18 | End: 2022-06-20 | Stop reason: HOSPADM

## 2022-06-18 RX ORDER — OXYCODONE HYDROCHLORIDE 5 MG/1
5 TABLET ORAL EVERY 6 HOURS PRN
Status: DISCONTINUED | OUTPATIENT
Start: 2022-06-18 | End: 2022-06-20 | Stop reason: HOSPADM

## 2022-06-18 RX ORDER — AMOXICILLIN 250 MG
1 CAPSULE ORAL DAILY
Status: DISCONTINUED | OUTPATIENT
Start: 2022-06-18 | End: 2022-06-20 | Stop reason: HOSPADM

## 2022-06-18 RX ORDER — BUTALBITAL, ACETAMINOPHEN AND CAFFEINE 50; 325; 40 MG/1; MG/1; MG/1
1 TABLET ORAL EVERY 6 HOURS PRN
Status: DISCONTINUED | OUTPATIENT
Start: 2022-06-18 | End: 2022-06-20 | Stop reason: HOSPADM

## 2022-06-18 RX ADMIN — LORAZEPAM 0.5 MG: 1 TABLET ORAL at 17:00

## 2022-06-18 RX ADMIN — LORAZEPAM 1 MG: 1 TABLET ORAL at 22:48

## 2022-06-18 RX ADMIN — OXYCODONE HYDROCHLORIDE 10 MG: 10 TABLET ORAL at 04:46

## 2022-06-18 RX ADMIN — BUTALBITAL, ACETAMINOPHEN, AND CAFFEINE 1 TABLET: 50; 325; 40 TABLET ORAL at 22:48

## 2022-06-18 RX ADMIN — LORAZEPAM 1 MG: 1 TABLET ORAL at 00:44

## 2022-06-18 RX ADMIN — METOPROLOL TARTRATE 25 MG: 25 TABLET, FILM COATED ORAL at 04:46

## 2022-06-18 RX ADMIN — OXYCODONE 5 MG: 5 TABLET ORAL at 16:59

## 2022-06-18 RX ADMIN — LORAZEPAM 1 MG: 1 TABLET ORAL at 04:46

## 2022-06-18 RX ADMIN — DIVALPROEX SODIUM 500 MG: 500 TABLET, DELAYED RELEASE ORAL at 04:46

## 2022-06-18 RX ADMIN — OXYCODONE HYDROCHLORIDE 10 MG: 10 TABLET ORAL at 08:58

## 2022-06-18 RX ADMIN — LORAZEPAM 1 MG: 1 TABLET ORAL at 08:58

## 2022-06-18 RX ADMIN — SENNOSIDES AND DOCUSATE SODIUM 1 TABLET: 50; 8.6 TABLET ORAL at 16:58

## 2022-06-18 RX ADMIN — RIVAROXABAN 10 MG: 10 TABLET, FILM COATED ORAL at 16:59

## 2022-06-18 RX ADMIN — ONDANSETRON 4 MG: 2 INJECTION INTRAMUSCULAR; INTRAVENOUS at 04:52

## 2022-06-18 RX ADMIN — LORAZEPAM 1.5 MG: 2 INJECTION INTRAMUSCULAR; INTRAVENOUS at 21:42

## 2022-06-18 RX ADMIN — OXYCODONE 5 MG: 5 TABLET ORAL at 00:43

## 2022-06-18 RX ADMIN — DIPHENHYDRAMINE HYDROCHLORIDE 25 MG: 25 TABLET ORAL at 22:52

## 2022-06-18 RX ADMIN — NICOTINE 14 MG: 14 PATCH TRANSDERMAL at 04:45

## 2022-06-18 RX ADMIN — OXYCODONE 5 MG: 5 TABLET ORAL at 21:14

## 2022-06-18 RX ADMIN — BUTALBITAL, ACETAMINOPHEN, AND CAFFEINE 1 TABLET: 50; 325; 40 TABLET ORAL at 16:59

## 2022-06-18 RX ADMIN — METOPROLOL TARTRATE 25 MG: 25 TABLET, FILM COATED ORAL at 16:58

## 2022-06-18 RX ADMIN — DIVALPROEX SODIUM 500 MG: 500 TABLET, DELAYED RELEASE ORAL at 16:59

## 2022-06-18 RX ADMIN — AMLODIPINE BESYLATE 5 MG: 5 TABLET ORAL at 16:59

## 2022-06-18 ASSESSMENT — LIFESTYLE VARIABLES
TOTAL SCORE: 3
AGITATION: NORMAL ACTIVITY
TOTAL SCORE: 8
PAROXYSMAL SWEATS: BARELY PERCEPTIBLE SWEATING. PALMS MOIST
ANXIETY: *
PAROXYSMAL SWEATS: NO SWEAT VISIBLE
PAROXYSMAL SWEATS: NO SWEAT VISIBLE
ORIENTATION AND CLOUDING OF SENSORIUM: ORIENTED AND CAN DO SERIAL ADDITIONS
TREMOR: NO TREMOR
NAUSEA AND VOMITING: NO NAUSEA AND NO VOMITING
ORIENTATION AND CLOUDING OF SENSORIUM: ORIENTED AND CAN DO SERIAL ADDITIONS
ORIENTATION AND CLOUDING OF SENSORIUM: ORIENTED AND CAN DO SERIAL ADDITIONS
AUDITORY DISTURBANCES: NOT PRESENT
TOTAL SCORE: MILD ITCHING, PINS AND NEEDLES SENSATION, BURNING OR NUMBNESS
NAUSEA AND VOMITING: NO NAUSEA AND NO VOMITING
AGITATION: NORMAL ACTIVITY
ORIENTATION AND CLOUDING OF SENSORIUM: ORIENTED AND CAN DO SERIAL ADDITIONS
VISUAL DISTURBANCES: NOT PRESENT
AGITATION: *
HEADACHE, FULLNESS IN HEAD: MODERATELY SEVERE
AGITATION: MODERATELY FIDGETY AND RESTLESS
NAUSEA AND VOMITING: MILD NAUSEA WITH NO VOMITING
TOTAL SCORE: 9
VISUAL DISTURBANCES: MODERATE SENSITIVITY
ANXIETY: *
TREMOR: NO TREMOR
TOTAL SCORE: MILD ITCHING, PINS AND NEEDLES SENSATION, BURNING OR NUMBNESS
AUDITORY DISTURBANCES: NOT PRESENT
ANXIETY: *
TREMOR: NO TREMOR
TOTAL SCORE: MILD ITCHING, PINS AND NEEDLES SENSATION, BURNING OR NUMBNESS
PAROXYSMAL SWEATS: BARELY PERCEPTIBLE SWEATING. PALMS MOIST
HEADACHE, FULLNESS IN HEAD: SEVERE
VISUAL DISTURBANCES: NOT PRESENT
ANXIETY: NO ANXIETY (AT EASE)
SUBSTANCE_ABUSE: 1
AUDITORY DISTURBANCES: NOT PRESENT
TOTAL SCORE: MILD ITCHING, PINS AND NEEDLES SENSATION, BURNING OR NUMBNESS
TREMOR: NO TREMOR
HEADACHE, FULLNESS IN HEAD: MODERATE
HEADACHE, FULLNESS IN HEAD: NOT PRESENT
VISUAL DISTURBANCES: NOT PRESENT
VISUAL DISTURBANCES: NOT PRESENT
TREMOR: NO TREMOR
TREMOR: TREMOR NOT VISIBLE BUT CAN BE FELT, FINGERTIP TO FINGERTIP
TOTAL SCORE: MILD ITCHING, PINS AND NEEDLES SENSATION, BURNING OR NUMBNESS
TOTAL SCORE: MODERATE ITCHING, PINS AND NEEDLES SENSATION, BURNING OR NUMBNESS
TREMOR: NO TREMOR
ANXIETY: MILDLY ANXIOUS
TOTAL SCORE: 23
HEADACHE, FULLNESS IN HEAD: MODERATE
AGITATION: NORMAL ACTIVITY
HEADACHE, FULLNESS IN HEAD: SEVERE
PAROXYSMAL SWEATS: BARELY PERCEPTIBLE SWEATING. PALMS MOIST
VISUAL DISTURBANCES: NOT PRESENT
ORIENTATION AND CLOUDING OF SENSORIUM: ORIENTED AND CAN DO SERIAL ADDITIONS
ANXIETY: *
AUDITORY DISTURBANCES: NOT PRESENT
NAUSEA AND VOMITING: NO NAUSEA AND NO VOMITING
AUDITORY DISTURBANCES: NOT PRESENT
NAUSEA AND VOMITING: MILD NAUSEA WITH NO VOMITING
ORIENTATION AND CLOUDING OF SENSORIUM: ORIENTED AND CAN DO SERIAL ADDITIONS
PAROXYSMAL SWEATS: BARELY PERCEPTIBLE SWEATING. PALMS MOIST
TOTAL SCORE: 10
NAUSEA AND VOMITING: NO NAUSEA AND NO VOMITING
TOTAL SCORE: 7
ORIENTATION AND CLOUDING OF SENSORIUM: ORIENTED AND CAN DO SERIAL ADDITIONS
HEADACHE, FULLNESS IN HEAD: VERY SEVERE
AGITATION: NORMAL ACTIVITY
ANXIETY: MILDLY ANXIOUS
VISUAL DISTURBANCES: NOT PRESENT
PAROXYSMAL SWEATS: NO SWEAT VISIBLE
AUDITORY DISTURBANCES: NOT PRESENT
TOTAL SCORE: 10
AUDITORY DISTURBANCES: NOT PRESENT
AGITATION: NORMAL ACTIVITY
NAUSEA AND VOMITING: MILD NAUSEA WITH NO VOMITING

## 2022-06-18 ASSESSMENT — ENCOUNTER SYMPTOMS
BLURRED VISION: 0
CHILLS: 0
VOMITING: 0
BRUISES/BLEEDS EASILY: 0
MYALGIAS: 0
SEIZURES: 1
BACK PAIN: 1
DOUBLE VISION: 0
HEADACHES: 0
ABDOMINAL PAIN: 0
FEVER: 0
POLYDIPSIA: 0
TREMORS: 1
DIZZINESS: 0
NECK PAIN: 1
NAUSEA: 1
SHORTNESS OF BREATH: 0

## 2022-06-18 ASSESSMENT — PAIN DESCRIPTION - PAIN TYPE
TYPE: ACUTE PAIN

## 2022-06-18 ASSESSMENT — FIBROSIS 4 INDEX: FIB4 SCORE: 1.56

## 2022-06-18 NOTE — CARE PLAN
The patient is Stable - Low risk of patient condition declining or worsening    Shift Goals  Clinical Goals: Monitor CIWA score, pain management  Patient Goals: Pain medication  Family Goals: KATALINA    Progress made toward(s) clinical / shift goals:    Problem: Knowledge Deficit - Standard  Goal: Patient and family/care givers will demonstrate understanding of plan of care, disease process/condition, diagnostic tests and medications  Outcome: Progressing     Problem: Optimal Care for Alcohol Withdrawal  Goal: Optimal Care for the alcohol withdrawal patient  Outcome: Progressing     Problem: Lifestyle Changes  Goal: Patient's ability to identify lifestyle changes and available resources to help reduce recurrence of condition will improve  Outcome: Progressing     Problem: Psychosocial  Goal: Patient's level of anxiety will decrease  Outcome: Progressing     Problem: Fall Risk  Goal: Patient will remain free from falls  Outcome: Progressing       Patient is not progressing towards the following goals:

## 2022-06-18 NOTE — CARE PLAN
The patient is Stable - Low risk of patient condition declining or worsening    Shift Goals  Clinical Goals: CIWA protocol, monitor for seizures  Patient Goals: Pain meds    Progress made toward(s) clinical / shift goals:      Problem: Optimal Care for Alcohol Withdrawal  Goal: Optimal Care for the alcohol withdrawal patient  Outcome: Progressing   CIWA protocol in use. Pt's signs and symptoms assessed per protocol. Appropriate PRNs available. Seizure precautions in place (i.e. padded side rails, suction and supplemental oxygen available at bedside).     Patient is not progressing towards the following goals:

## 2022-06-18 NOTE — DISCHARGE PLANNING
Follow up for post acute services Current documentation does not support therapy need for IRF level of care. Anticipate outpatient follow up when medically cleared. Per OT note Contact guard for LBD does not have the 2 discipline requirement.

## 2022-06-18 NOTE — PROGRESS NOTES
[facility] Medicine Daily Progress Note    Date of Service  6/18/2022    Chief Complaint  Donal Carpio is a 52 y.o. male admitted 6/12/2022 with alcohol withdrawal seizures.     Hospital Course  52 y.o. male who presented 6/12/2022 with several seizure episodes today.  Patient only recently began drinking alcohol heavily about 7 months ago when his daughter got into a car accident.  His last drink was about 24 hours ago.  Recently discharged from [facility] about 4 days ago for alcohol withdrawal. In the ED, patient found to be tachycardic.  CT head negative.  Found to have 2 seizures in ED to which she was loaded with Keppra and started on CIWA protocol. Has had >10 admissions in the last year for the same complaint, previous work up with MRI and by neurology were unremarkable.   Continue librium and CIWA, score this morning 14. Patient reports he is still feeling very tired and having back pain after his seizures in the ER. Pain meds ordered prn. Reports he has been taking his keppra but does not like to take it as it makes him extremely dizzy requesting change of medications, will change to depakote.     06/14-no major overnight events.  Patient continues to feel nauseous back pain neck pain and in general not feeling good.  Patient CIWA continue to stay high and he is being medicated per the CIWA protocol  Interval Problem Update  6/15. Still withdrawing. Tremulous. CIWA 14. Ordered PT. Has had multiple and fequent hospitalizations for the same problem.  6/16. CIWA 10. Patient still very weak. PT/OT ordered.  6/17. Ataxic though unclear if he is malingering. Still scoring on CIWA 14. Later he is nauseous and complains of substernal pain. Aside from alcohol he also smokes or takes marijuana  6/18. Complained of abdominal pain and nausea yesterday. KUB came back constipation. Day number 5, odd behavior and appears exaggerating to fall backward. May have baseline tremor and gait issues. Complains of  headache.    I have personally seen and examined the patient at bedside. I discussed the plan of care with patient and bedside RN.    Consultants/Specialty  N/A    Code Status  Full Code    Disposition  Patient is not medically cleared for discharge.   Anticipate discharge to to home with close outpatient follow-up.  I have placed the appropriate orders for post-discharge needs.    Review of Systems  Review of Systems   Constitutional: Negative for chills and fever.   HENT: Negative for hearing loss and tinnitus.    Eyes: Negative for blurred vision and double vision.   Respiratory: Negative for shortness of breath.    Cardiovascular: Negative for chest pain.   Gastrointestinal: Positive for nausea. Negative for abdominal pain and vomiting.   Genitourinary: Negative for dysuria.   Musculoskeletal: Positive for back pain and neck pain. Negative for myalgias.   Skin: Negative for rash.   Neurological: Positive for tremors and seizures. Negative for dizziness and headaches.   Endo/Heme/Allergies: Negative for polydipsia. Does not bruise/bleed easily.   Psychiatric/Behavioral: Positive for substance abuse.        Physical Exam  Temp:  [36.1 °C (96.9 °F)-36.7 °C (98 °F)] 36.1 °C (96.9 °F)  Pulse:  [] 73  Resp:  [16-18] 18  BP: (100-137)/(66-83) 112/70  SpO2:  [80 %-100 %] 100 %    Physical Exam  Vitals and nursing note reviewed.   Constitutional:       General: He is not in acute distress.     Appearance: Normal appearance.   HENT:      Head: Normocephalic and atraumatic.      Mouth/Throat:      Mouth: Mucous membranes are moist.      Pharynx: Oropharynx is clear.   Eyes:      Conjunctiva/sclera: Conjunctivae normal.      Pupils: Pupils are equal, round, and reactive to light.   Cardiovascular:      Rate and Rhythm: Normal rate and regular rhythm.      Pulses: Normal pulses.      Heart sounds: Normal heart sounds.   Pulmonary:      Effort: Pulmonary effort is normal.      Breath sounds: Normal breath sounds.    Abdominal:      General: Abdomen is flat.      Palpations: Abdomen is soft.      Comments: Nausea   Musculoskeletal:         General: No swelling. Normal range of motion.      Cervical back: Normal range of motion and neck supple.   Skin:     General: Skin is warm and dry.      Findings: No rash.   Neurological:      General: No focal deficit present.      Mental Status: He is alert and oriented to person, place, and time.      Cranial Nerves: No cranial nerve deficit.      Motor: Weakness present.      Coordination: Coordination abnormal (Tremulous).      Gait: Gait abnormal.      Comments: +tremor  Pain out of proportion to exam   Psychiatric:         Mood and Affect: Mood normal.         Behavior: Behavior normal.         Fluids    Intake/Output Summary (Last 24 hours) at 6/18/2022 1328  Last data filed at 6/17/2022 2000  Gross per 24 hour   Intake --   Output 200 ml   Net -200 ml       Laboratory  Recent Labs     06/17/22  0515 06/17/22  1607   WBC 7.4  --    RBC 5.20  --    HEMOGLOBIN 15.1 13.6*   HEMATOCRIT 46.8 41.4*   MCV 90.0  --    MCH 29.0  --    MCHC 32.3*  --    RDW 57.9*  --    PLATELETCT 192  --    MPV 9.3  --      Recent Labs     06/17/22  0515 06/18/22  0254   SODIUM 136 139   POTASSIUM 4.6 4.4   CHLORIDE 99 102   CO2 25 24   GLUCOSE 81 90   BUN 11 13   CREATININE 0.96 0.95   CALCIUM 9.0 8.9                   Imaging  [id number] VIEW   Final Result      1. Moderate stool in the ascending colon.   2. Stable postsurgical changes of the right lower quadrant abdominal wall soft tissues.   3. The remainder of the abdomen radiography is within normal limits.      CT-TSPINE W/O PLUS RECONS   Final Result         Moderate vertebral height loss of T6 and T7 with superior endplate concavity appears chronic. No acute fracture identified.      No malalignment.      CT-HEAD W/O   Final Result         1. No acute intracranial abnormality. No evidence of acute intracranial hemorrhage or mass lesion.           "           CT-CSPINE WITHOUT PLUS RECONS   Final Result         1. No acute fracture from C1 through T1 is visualized.         DX-THORACIC SPINE-2 VIEWS   Final Result         Mild superior endplate compression deformity of the midthoracic spine, likely T7, slightly worse than prior, concerning for more acute injury.           Assessment/Plan  * Alcohol withdrawal syndrome with complication (HCC)- (present on admission)  Assessment & Plan  [facility] protocol  librium  Multivitamins, thiamine  Counseled on benefits of alcohol cessation\"    Still withdrawing  Continue [facility] protocol  Alcohol cessation enxouraged  Should be done withdrawing tomorrow (day no 5)  May have chronic ataxia; skilled or rehab recommended.      Drug-induced constipation  Assessment & Plan  Ordered bowel regimen  Taper narcotics.    Nausea and vomiting  Assessment & Plan  Alcoholic gastritis or gastroparesis, cannabis syndrome  If still vomiting, NPO or clears  PRN Zofran  KUB came back constipation  Tapering down narcotics    Alcoholic hepatitis without ascites- (present on admission)  Assessment & Plan  Elevated AST/ALT due to alcohol use   Continue to monitor\"    Mild elevation  Alcohol cessation encouraged  Hepatitis panel, viral. Came back NONREACTIVE    Hypertension  Assessment & Plan  Restart prn\"    Vitals:    06/18/22 0645   BP: 112/70   Pulse: 73   Resp: 18   Temp: 36.1 °C (96.9 °F)   SpO2: 100%     Stable.  Resumed amlodipine  Added BB  Normalized now    Seizure disorder (HCC)- (present on admission)  Assessment & Plan  Loaded with Keppra   Reports he has severe dizziness with keppra, requested medication change  -start depakote   He has had MRI and neurology evaluation on previous admission which were unremarkable\"    Follow up with Neurology       VTE prophylaxis: heparin ppx        "

## 2022-06-19 PROCEDURE — 97116 GAIT TRAINING THERAPY: CPT | Performed by: PHYSICAL THERAPIST

## 2022-06-19 PROCEDURE — A9270 NON-COVERED ITEM OR SERVICE: HCPCS

## 2022-06-19 PROCEDURE — 770001 HCHG ROOM/CARE - MED/SURG/GYN PRIV*

## 2022-06-19 PROCEDURE — 700111 HCHG RX REV CODE 636 W/ 250 OVERRIDE (IP): Performed by: NURSE PRACTITIONER

## 2022-06-19 PROCEDURE — A9270 NON-COVERED ITEM OR SERVICE: HCPCS | Performed by: INTERNAL MEDICINE

## 2022-06-19 PROCEDURE — 700102 HCHG RX REV CODE 250 W/ 637 OVERRIDE(OP): Performed by: INTERNAL MEDICINE

## 2022-06-19 PROCEDURE — 99232 SBSQ HOSP IP/OBS MODERATE 35: CPT | Performed by: INTERNAL MEDICINE

## 2022-06-19 PROCEDURE — A9270 NON-COVERED ITEM OR SERVICE: HCPCS | Performed by: EMERGENCY MEDICINE

## 2022-06-19 PROCEDURE — 700102 HCHG RX REV CODE 250 W/ 637 OVERRIDE(OP): Performed by: EMERGENCY MEDICINE

## 2022-06-19 PROCEDURE — 700101 HCHG RX REV CODE 250

## 2022-06-19 PROCEDURE — 700102 HCHG RX REV CODE 250 W/ 637 OVERRIDE(OP)

## 2022-06-19 PROCEDURE — 97110 THERAPEUTIC EXERCISES: CPT | Performed by: PHYSICAL THERAPIST

## 2022-06-19 PROCEDURE — 97530 THERAPEUTIC ACTIVITIES: CPT | Performed by: PHYSICAL THERAPIST

## 2022-06-19 RX ORDER — LORAZEPAM 0.5 MG/1
0.5 TABLET ORAL 2 TIMES DAILY PRN
Status: DISCONTINUED | OUTPATIENT
Start: 2022-06-19 | End: 2022-06-20 | Stop reason: HOSPADM

## 2022-06-19 RX ORDER — LIDOCAINE 50 MG/G
1 PATCH TOPICAL EVERY 24 HOURS
Status: DISCONTINUED | OUTPATIENT
Start: 2022-06-19 | End: 2022-06-20 | Stop reason: HOSPADM

## 2022-06-19 RX ORDER — NICOTINE 21 MG/24HR
21 PATCH, TRANSDERMAL 24 HOURS TRANSDERMAL
Status: DISCONTINUED | OUTPATIENT
Start: 2022-06-19 | End: 2022-06-20 | Stop reason: HOSPADM

## 2022-06-19 RX ADMIN — ONDANSETRON 4 MG: 2 INJECTION INTRAMUSCULAR; INTRAVENOUS at 13:59

## 2022-06-19 RX ADMIN — METOPROLOL TARTRATE 25 MG: 25 TABLET, FILM COATED ORAL at 04:31

## 2022-06-19 RX ADMIN — OXYCODONE 5 MG: 5 TABLET ORAL at 11:24

## 2022-06-19 RX ADMIN — LIDOCAINE 1 PATCH: 50 PATCH CUTANEOUS at 03:00

## 2022-06-19 RX ADMIN — ACETAMINOPHEN 500 MG: 500 TABLET, FILM COATED ORAL at 11:24

## 2022-06-19 RX ADMIN — OXYCODONE 5 MG: 5 TABLET ORAL at 04:30

## 2022-06-19 RX ADMIN — AMLODIPINE BESYLATE 5 MG: 5 TABLET ORAL at 18:10

## 2022-06-19 RX ADMIN — RIVAROXABAN 10 MG: 10 TABLET, FILM COATED ORAL at 18:10

## 2022-06-19 RX ADMIN — OXYCODONE 5 MG: 5 TABLET ORAL at 18:10

## 2022-06-19 RX ADMIN — POLYETHYLENE GLYCOL 3350 1 PACKET: 17 POWDER, FOR SOLUTION ORAL at 12:22

## 2022-06-19 RX ADMIN — DIPHENHYDRAMINE HYDROCHLORIDE 25 MG: 25 TABLET ORAL at 06:54

## 2022-06-19 RX ADMIN — DIVALPROEX SODIUM 500 MG: 500 TABLET, DELAYED RELEASE ORAL at 04:32

## 2022-06-19 RX ADMIN — SENNOSIDES AND DOCUSATE SODIUM 1 TABLET: 50; 8.6 TABLET ORAL at 04:31

## 2022-06-19 RX ADMIN — DIVALPROEX SODIUM 500 MG: 500 TABLET, DELAYED RELEASE ORAL at 18:10

## 2022-06-19 RX ADMIN — LORAZEPAM 1 MG: 1 TABLET ORAL at 06:54

## 2022-06-19 RX ADMIN — LORAZEPAM 0.5 MG: 1 TABLET ORAL at 02:59

## 2022-06-19 RX ADMIN — NICOTINE TRANSDERMAL SYSTEM 21 MG: 21 PATCH, EXTENDED RELEASE TRANSDERMAL at 04:30

## 2022-06-19 RX ADMIN — METOPROLOL TARTRATE 25 MG: 25 TABLET, FILM COATED ORAL at 18:10

## 2022-06-19 ASSESSMENT — COGNITIVE AND FUNCTIONAL STATUS - GENERAL
WALKING IN HOSPITAL ROOM: A LITTLE
STANDING UP FROM CHAIR USING ARMS: A LITTLE
SUGGESTED CMS G CODE MODIFIER MOBILITY: CK
SUGGESTED CMS G CODE MODIFIER DAILY ACTIVITY: CJ
DAILY ACTIVITIY SCORE: 21
TOILETING: A LITTLE
MOVING FROM LYING ON BACK TO SITTING ON SIDE OF FLAT BED: A LITTLE
DRESSING REGULAR LOWER BODY CLOTHING: A LITTLE
MOBILITY SCORE: 18
CLIMB 3 TO 5 STEPS WITH RAILING: A LOT
MOVING TO AND FROM BED TO CHAIR: A LITTLE
HELP NEEDED FOR BATHING: A LITTLE

## 2022-06-19 ASSESSMENT — LIFESTYLE VARIABLES
NAUSEA AND VOMITING: NO NAUSEA AND NO VOMITING
TREMOR: NO TREMOR
AGITATION: SOMEWHAT MORE THAN NORMAL ACTIVITY
TOTAL SCORE: 7
TREMOR: NO TREMOR
VISUAL DISTURBANCES: NOT PRESENT
SUBSTANCE_ABUSE: 1
ANXIETY: *
TOTAL SCORE: 3
AGITATION: SOMEWHAT MORE THAN NORMAL ACTIVITY
ORIENTATION AND CLOUDING OF SENSORIUM: ORIENTED AND CAN DO SERIAL ADDITIONS
AUDITORY DISTURBANCES: NOT PRESENT
AUDITORY DISTURBANCES: NOT PRESENT
TREMOR: NO TREMOR
NAUSEA AND VOMITING: NO NAUSEA AND NO VOMITING
PAROXYSMAL SWEATS: NO SWEAT VISIBLE
TOTAL SCORE: 8
AUDITORY DISTURBANCES: NOT PRESENT
ORIENTATION AND CLOUDING OF SENSORIUM: ORIENTED AND CAN DO SERIAL ADDITIONS
PAROXYSMAL SWEATS: NO SWEAT VISIBLE
AGITATION: SOMEWHAT MORE THAN NORMAL ACTIVITY
VISUAL DISTURBANCES: NOT PRESENT
HEADACHE, FULLNESS IN HEAD: MILD
PAROXYSMAL SWEATS: NO SWEAT VISIBLE
ANXIETY: MILDLY ANXIOUS
VISUAL DISTURBANCES: NOT PRESENT
HEADACHE, FULLNESS IN HEAD: MILD
ANXIETY: MILDLY ANXIOUS
TOTAL SCORE: MILD ITCHING, PINS AND NEEDLES SENSATION, BURNING OR NUMBNESS
NAUSEA AND VOMITING: NO NAUSEA AND NO VOMITING
AUDITORY DISTURBANCES: NOT PRESENT
TREMOR: NO TREMOR
ANXIETY: *
VISUAL DISTURBANCES: NOT PRESENT
NAUSEA AND VOMITING: NO NAUSEA AND NO VOMITING
ORIENTATION AND CLOUDING OF SENSORIUM: ORIENTED AND CAN DO SERIAL ADDITIONS
TOTAL SCORE: 4
HEADACHE, FULLNESS IN HEAD: MODERATE
HEADACHE, FULLNESS IN HEAD: MILD
TOTAL SCORE: VERY MILD ITCHING, PINS AND NEEDLES SENSATION, BURNING OR NUMBNESS
AGITATION: NORMAL ACTIVITY
PAROXYSMAL SWEATS: NO SWEAT VISIBLE
ORIENTATION AND CLOUDING OF SENSORIUM: ORIENTED AND CAN DO SERIAL ADDITIONS

## 2022-06-19 ASSESSMENT — ENCOUNTER SYMPTOMS
FEVER: 0
VOMITING: 0
BRUISES/BLEEDS EASILY: 0
MYALGIAS: 0
NECK PAIN: 1
DOUBLE VISION: 0
DIZZINESS: 0
BACK PAIN: 1
POLYDIPSIA: 0
HEADACHES: 0
CHILLS: 0
NAUSEA: 1
TREMORS: 1
ABDOMINAL PAIN: 0
SEIZURES: 1
SHORTNESS OF BREATH: 0
BLURRED VISION: 0

## 2022-06-19 ASSESSMENT — PAIN SCALES - WONG BAKER
WONGBAKER_NUMERICALRESPONSE: DOESN'T HURT AT ALL
WONGBAKER_NUMERICALRESPONSE: DOESN'T HURT AT ALL

## 2022-06-19 ASSESSMENT — GAIT ASSESSMENTS
ASSISTIVE DEVICE: FRONT WHEEL WALKER
GAIT LEVEL OF ASSIST: STANDBY ASSIST
DISTANCE (FEET): 8

## 2022-06-19 ASSESSMENT — PAIN DESCRIPTION - PAIN TYPE: TYPE: ACUTE PAIN

## 2022-06-19 NOTE — PROGRESS NOTES
2041- patient stating wanting to leave AMA. Patient removed IV and tele monitor rejecting RN assistance. Notified on call hospitalist.  2050- patient refused to sign AMA paper. Patient heading out.  Charge nurse, Rashid showing him exit when patient dropped to the floor. Patient eager to stay at this time to get help with his headache. Will notify provider.  VS taken at this time. Ativan PRN for CIWA protocol given after getting IV access.     Patient stating he has a headache and woke up not knowing where he was. However patient 10 minutes ago stated he wants to leave AMA. 2 RNs present in room at the time patient requested to leave AMA and patient removed IV after being told we can assist him in removing IV.

## 2022-06-19 NOTE — CARE PLAN
The patient is Stable - Low risk of patient condition declining or worsening    Shift Goals  Clinical Goals: Monitor CIWA, pain management  Patient Goals: pain medication  Family Goals: KATALINA    Progress made toward(s) clinical / shift goals:      Patient is not progressing towards the following goals:   Patient able to sleep after 2300 ativan dose until 0200.   Problem: Pain - Standard  Goal: Alleviation of pain or a reduction in pain to the patient’s comfort goal  Outcome: Not Progressing   Patient reports pain does not improve. Offered tylenol due to PRN med availability patient declined. Patient requesting oxy or ativan. Patient educated on when PRN medications will be available again. Patient reports back pain. Informed on call hospitalist. Lidocaine patch ordered.   Problem: Psychosocial  Goal: Patient's level of anxiety will decrease  Outcome: Not Progressing     Problem: Fall Risk  Goal: Patient will remain free from falls  Outcome: Not Progressing

## 2022-06-19 NOTE — THERAPY
"Physical Therapy   Daily Treatment     Patient Name: Donal Carpio  Age:  52 y.o., Sex:  male  Medical Record #: [id number]  Today's Date: 6/19/2022     Precautions  Precautions: Fall Risk  Comments: pt has had a covid shot within the last 96hours    Assessment    Pt is a 53 y/o male who has been unable to walk in the room 2/2 c/o of dizziness and lower back pain. Pt able to practice sit to std x 3 with VC's for correct sequencing . Pt has good LE strength when tested today ( 4+/5)with B quads and hamstrings. Pt continues to c/o of dizziness and he is able to walk with FWW to chair in room but is unable to walk into hallway 2/2 dizziness and c/o of low back pain. Pt will most likely need post acute rehab and substance abuse counseling .    Plan    Continue current treatment plan.    DC Equipment Recommendations: Front-Wheel Walker  Discharge Recommendations: (P) Recommend post-acute placement for additional physical therapy services prior to discharge home      Subjective  Pt is delayed with responses and states he \" is 1 hour late getting his pain medication\". Pt is impulsive and needs VC's for  correct sequence to get transfer with FWW.  Pt continues to state he is dizzy but then is able to ambulate without LOB to nearb chair.      Objective       06/19/22 1130   Gait Analysis   Gait Level Of Assist Standby Assist   Assistive Device Front Wheel Walker   Distance (Feet) 8   # of Times Distance was Traveled 1   Deviation Shuffled Gait;Step To;Decreased Heel Strike;Decreased Toe Off   # of Stairs Climbed 0   Weight Bearing Status FWB   Skilled Intervention Tactile Cuing;Verbal Cuing;Postural Facilitation   Comments pt given VC's to straighten up , gets upper trunk to neutral with standing and during gait.   Bed Mobility    Supine to Sit Supervised   Sit to Supine Unable to Participate   Scooting   (pt left in chair at bedside post TX)   Skilled Intervention Verbal Cuing   Comments pt able to get to EOB on L with SPV "   Functional Mobility   Sit to Stand Standby Assist   Transfer Method Stand Step   Mobility pt uses FWW for gait in room to chair.   Skilled Intervention Tactile Cuing;Verbal Cuing   Comments pt needs cues for correct hand placement .   Short Term Goals    Goal Outcome # 1 Progressing as expected   Goal Outcome # 2 Goal met   Goal Outcome # 3 Progressing slower than expected   Anticipated Discharge Equipment and Recommendations   DC Equipment Recommendations Front-Wheel Walker   Discharge Recommendations Recommend post-acute placement for additional physical therapy services prior to discharge home   Interdisciplinary Plan of Care Collaboration   IDT Collaboration with  Nursing

## 2022-06-19 NOTE — CARE PLAN
The patient is Stable - Low risk of patient condition declining or worsening    Shift Goals  Clinical Goals: Safety, alcohol cessation education  Patient Goals: pain medication  Family Goals: KATALINA    Progress made toward(s) clinical / shift goals:    Problem: Pain - Standard  Goal: Alleviation of pain or a reduction in pain to the patient’s comfort goal  Outcome: Progressing     Problem: Knowledge Deficit - Standard  Goal: Patient and family/care givers will demonstrate understanding of plan of care, disease process/condition, diagnostic tests and medications  Outcome: Progressing     Problem: Optimal Care for Alcohol Withdrawal  Goal: Optimal Care for the alcohol withdrawal patient  Outcome: Progressing       Patient is not progressing towards the following goals:

## 2022-06-19 NOTE — PROGRESS NOTES
Pt transferred to floor by Neuroscience RN. Report from RN. Pt sitting up in bed, call light in reach, bedside table in reach, bed alarm in place. Pt oriented to unit and plan of care for remainder of shift. Safety precautions in place.

## 2022-06-19 NOTE — DISCHARGE PLANNING
"Case Management Discharge Planning    Admission Date: 6/12/2022  GMLOS: 3.4  ALOS: 6    6-Clicks ADL Score: 21  6-Clicks Mobility Score: 14  PT and/or OT Eval ordered: Yes  Post-acute Referrals Ordered: Yes  Post-acute Choice Obtained: Yes  Has referral(s) been sent to post-acute provider: Yes      Anticipated Discharge Dispo: Discharge Disposition: D/T to IP rehab facility w/planned hosp IP readmit (90)  Discharge Address: [address] [place] [address]  Discharge Contact Phone Number: [phone]    DME Needed: Pending PT/OT eval notes    Action(s) Taken:   Per Dr Hutchinson, Therapy  will be reconsulted to verify recommendations whether Pt needs placement as last recommendation fromn OT is HH .      Per Maicol Wheat on 6/18, \"Follow up for post acute services Current documentation does not support therapy need for IRF level of care. Anticipate outpatient follow up when medically cleared. Per OT note Contact guard for [facility] does not have the 2 discipline requirement. \"      The last CM notes from [facility] \" CM called Gloria ([phone]) Dwayne CM about pt's discharge planning. Explained to her that pt will need to go to acute rehab and our [facility] is not contracted with them . Gloria said that Dwayne can write a one time contract with [facility]. Gloria said that it is easier for pt to go to acute rehab here in [place] because pt's family is from here.\"    Escalations Completed: None    Medically Clear: No    Next Steps:   CM to continue to assist Pt with discharge as needed    Barriers to Discharge:   Pending PT/OT eval notes  Pending medical clearance    Is the patient up for discharge tomorrow: No        "

## 2022-06-19 NOTE — PROGRESS NOTES
4 Eyes Skin Assessment Completed by Eve RN and Bhavesh RN.    Head Bruising  Ears WDL  Nose WDL  Mouth WDL  Neck WDL  Breast/Chest WDL  Shoulder Blades WDL  Spine WDL  (R) Arm/Elbow/Hand Redness and Blanching  (L) Arm/Elbow/Hand Redness and Blanching  Abdomen WDL  Groin WDL  Scrotum/Coccyx/Buttocks bruise to top of sacrum and to the left of sacrum  (R) Leg WDL  (L) Leg WDL  (R) Heel/Foot/Toe WDL  (L) Heel/Foot/Toe WDL          Devices In Places PIV      Interventions In Place Heels Loaded W/Pillows and Pressure Redistribution Mattress    Possible Skin Injury No    Pictures Uploaded Into Epic Yes (of bruise to sacrum)  Wound Consult Placed N/A  RN Wound Prevention Protocol Ordered No

## 2022-06-19 NOTE — PROGRESS NOTES
[facility] Medicine Daily Progress Note    Date of Service  6/19/2022    Chief Complaint  Donal Carpio is a 52 y.o. male admitted 6/12/2022 with alcohol withdrawal seizures.     Hospital Course  52 y.o. male who presented 6/12/2022 with several seizure episodes today.  Patient only recently began drinking alcohol heavily about 7 months ago when his daughter got into a car accident.  His last drink was about 24 hours ago.  Recently discharged from [facility] about 4 days ago for alcohol withdrawal. In the ED, patient found to be tachycardic.  CT head negative.  Found to have 2 seizures in ED to which she was loaded with Keppra and started on CIWA protocol. Has had >10 admissions in the last year for the same complaint, previous work up with MRI and by neurology were unremarkable.   Continue librium and CIWA, score this morning 14. Patient reports he is still feeling very tired and having back pain after his seizures in the ER. Pain meds ordered prn. Reports he has been taking his keppra but does not like to take it as it makes him extremely dizzy requesting change of medications, will change to depakote.     06/14-no major overnight events.  Patient continues to feel nauseous back pain neck pain and in general not feeling good.  Patient CIWA continue to stay high and he is being medicated per the CIWA protocol  Interval Problem Update  6/15. Still withdrawing. Tremulous. CIWA 14. Ordered PT. Has had multiple and fequent hospitalizations for the same problem.  6/16. CIWA 10. Patient still very weak. PT/OT ordered.  6/17. Ataxic though unclear if he is malingering. Still scoring on CIWA 14. Later he is nauseous and complains of substernal pain. Aside from alcohol he also smokes or takes marijuana  6/18. Complained of abdominal pain and nausea yesterday. KUB came back constipation. Day number 5, odd behavior and appears exaggerating to fall backward. May have baseline tremor and gait issues. Complains of  headache.  6/19. No new issues or complaints. Can get irritable. Seems to be asking for pain meds despite him having constipation and my explanation. Repeat OT ordered. Rehab candidate but out of network per rehab evaluator.    I have personally seen and examined the patient at bedside. I discussed the plan of care with patient and bedside RN.    Consultants/Specialty  N/A    Code Status  Full Code    Disposition  Patient is not medically cleared for discharge.   Anticipate discharge to to an inpatient rehabilitation hospital.  I have placed the appropriate orders for post-discharge needs.    Review of Systems  Review of Systems   Constitutional: Negative for chills and fever.   HENT: Negative for hearing loss and tinnitus.    Eyes: Negative for blurred vision and double vision.   Respiratory: Negative for shortness of breath.    Cardiovascular: Negative for chest pain.   Gastrointestinal: Positive for nausea. Negative for abdominal pain and vomiting.   Genitourinary: Negative for dysuria.   Musculoskeletal: Positive for back pain and neck pain. Negative for myalgias.   Skin: Negative for rash.   Neurological: Positive for tremors and seizures. Negative for dizziness and headaches.   Endo/Heme/Allergies: Negative for polydipsia. Does not bruise/bleed easily.   Psychiatric/Behavioral: Positive for substance abuse.        Physical Exam  Temp:  [35.8 °C (96.5 °F)-36.7 °C (98 °F)] 36.2 °C (97.2 °F)  Pulse:  [62-84] 75  Resp:  [18] 18  BP: (115-125)/(67-75) 120/72  SpO2:  [89 %-95 %] 92 %    Physical Exam  Vitals and nursing note reviewed.   Constitutional:       General: He is not in acute distress.     Appearance: Normal appearance.   HENT:      Head: Normocephalic and atraumatic.      Mouth/Throat:      Mouth: Mucous membranes are moist.      Pharynx: Oropharynx is clear.   Eyes:      Conjunctiva/sclera: Conjunctivae normal.      Pupils: Pupils are equal, round, and reactive to light.   Cardiovascular:      Rate and  Rhythm: Normal rate and regular rhythm.      Pulses: Normal pulses.      Heart sounds: Normal heart sounds.   Pulmonary:      Effort: Pulmonary effort is normal.      Breath sounds: Normal breath sounds.   Abdominal:      General: Abdomen is flat.      Palpations: Abdomen is soft.      Comments: Nausea   Musculoskeletal:         General: No swelling. Normal range of motion.      Cervical back: Normal range of motion and neck supple.   Skin:     General: Skin is warm and dry.      Findings: No rash.   Neurological:      General: No focal deficit present.      Mental Status: He is alert and oriented to person, place, and time.      Cranial Nerves: No cranial nerve deficit.      Motor: Weakness present.      Coordination: Coordination abnormal (Tremulous).      Gait: Gait abnormal.      Comments: +tremor  Pain out of proportion to exam   Psychiatric:         Mood and Affect: Mood normal.         Behavior: Behavior normal.         Fluids  No intake or output data in the 24 hours ending 06/19/22 0820    Laboratory  Recent Labs     06/17/22  0515 06/17/22  1607   WBC 7.4  --    RBC 5.20  --    HEMOGLOBIN 15.1 13.6*   HEMATOCRIT 46.8 41.4*   MCV 90.0  --    MCH 29.0  --    MCHC 32.3*  --    RDW 57.9*  --    PLATELETCT 192  --    MPV 9.3  --      Recent Labs     06/17/22  0515 06/18/22  0254   SODIUM 136 139   POTASSIUM 4.6 4.4   CHLORIDE 99 102   CO2 25 24   GLUCOSE 81 90   BUN 11 13   CREATININE 0.96 0.95   CALCIUM 9.0 8.9                   Imaging  [id number] VIEW   Final Result      1. Moderate stool in the ascending colon.   2. Stable postsurgical changes of the right lower quadrant abdominal wall soft tissues.   3. The remainder of the abdomen radiography is within normal limits.      CT-TSPINE W/O PLUS RECONS   Final Result         Moderate vertebral height loss of T6 and T7 with superior endplate concavity appears chronic. No acute fracture identified.      No malalignment.      CT-HEAD W/O   Final Result        "  1. No acute intracranial abnormality. No evidence of acute intracranial hemorrhage or mass lesion.                     CT-CSPINE WITHOUT PLUS RECONS   Final Result         1. No acute fracture from C1 through T1 is visualized.         DX-THORACIC SPINE-2 VIEWS   Final Result         Mild superior endplate compression deformity of the midthoracic spine, likely T7, slightly worse than prior, concerning for more acute injury.           Assessment/Plan  Drug-induced constipation  Assessment & Plan  Ordered bowel regimen  Tapering narcotics.    Nausea and vomiting  Assessment & Plan  Alcoholic gastritis or gastroparesis, cannabis syndrome  If still vomiting, NPO or clears  PRN Zofran  KUB came back constipation  Tapering down narcotics    Alcoholic hepatitis without ascites  Assessment & Plan  Elevated AST/ALT due to alcohol use   Continue to monitor\"    Mild elevation  Alcohol cessation encouraged  Hepatitis panel, viral. Came back NONREACTIVE    Hypertension  Assessment & Plan  Restart prn\"    Vitals:    06/19/22 1134   BP: 134/77   Pulse: 83   Resp: 18   Temp: 36.2 °C (97.2 °F)   SpO2: 95%     Stable.  Resumed amlodipine  Added BB  Normalized now    Seizure disorder (HCC)  Assessment & Plan  Loaded with Keppra   Reports he has severe dizziness with keppra, requested medication change  -start depakote   He has had MRI and neurology evaluation on previous admission which were unremarkable\"    Follow up with Neurology    * Alcohol withdrawal syndrome with complication (HCC)  Assessment & Plan  [facility] protocol  librium  Multivitamins, thiamine  Counseled on benefits of alcohol cessation\"    Still withdrawing  Continue [facility] protocol  Alcohol cessation enxouraged  Should be done withdrawing tomorrow (day no 5)  May have chronic ataxia; skilled or rehab recommended.  OT reeval           VTE prophylaxis: heparin ppx        "

## 2022-06-20 VITALS
BODY MASS INDEX: 28.54 KG/M2 | TEMPERATURE: 100.6 F | HEIGHT: 69 IN | HEART RATE: 98 BPM | WEIGHT: 192.68 LBS | DIASTOLIC BLOOD PRESSURE: 60 MMHG | OXYGEN SATURATION: 91 % | SYSTOLIC BLOOD PRESSURE: 98 MMHG | RESPIRATION RATE: 18 BRPM

## 2022-06-20 PROCEDURE — 700102 HCHG RX REV CODE 250 W/ 637 OVERRIDE(OP)

## 2022-06-20 PROCEDURE — 700102 HCHG RX REV CODE 250 W/ 637 OVERRIDE(OP): Performed by: INTERNAL MEDICINE

## 2022-06-20 PROCEDURE — 700101 HCHG RX REV CODE 250

## 2022-06-20 PROCEDURE — 99239 HOSP IP/OBS DSCHRG MGMT >30: CPT | Performed by: INTERNAL MEDICINE

## 2022-06-20 PROCEDURE — A9270 NON-COVERED ITEM OR SERVICE: HCPCS | Performed by: INTERNAL MEDICINE

## 2022-06-20 PROCEDURE — A9270 NON-COVERED ITEM OR SERVICE: HCPCS

## 2022-06-20 RX ADMIN — OXYCODONE 5 MG: 5 TABLET ORAL at 03:06

## 2022-06-20 RX ADMIN — NICOTINE TRANSDERMAL SYSTEM 21 MG: 21 PATCH, EXTENDED RELEASE TRANSDERMAL at 04:36

## 2022-06-20 RX ADMIN — LIDOCAINE 1 PATCH: 50 PATCH CUTANEOUS at 03:09

## 2022-06-20 RX ADMIN — DIVALPROEX SODIUM 500 MG: 500 TABLET, DELAYED RELEASE ORAL at 04:36

## 2022-06-20 RX ADMIN — ACETAMINOPHEN 500 MG: 500 TABLET, FILM COATED ORAL at 04:36

## 2022-06-20 ASSESSMENT — PAIN SCALES - WONG BAKER: WONGBAKER_NUMERICALRESPONSE: DOESN'T HURT AT ALL
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
## 2022-06-20 NOTE — DISCHARGE PLANNING
DMV Notification form given to Dr Hutchinson to complete.  Pt with history of seizures, on Keppra. No longer should be driving.   Pt has [place] license.  Dr Hutchinson to complete fax then given to CM to fax.

## 2022-06-20 NOTE — PROGRESS NOTES
Upon arrival of shift pt stating he would like to leave AMA because he has a lot of things to get done. Pt refusing to wait for MD or charge RN to speak with him. Writer removed pt IV and walked him to elevators. Pt left with all personal belongings.

## 2022-06-21 NOTE — DISCHARGE PLANNING
DMV alert-pt not to drive due to seizures.      medical evaluation form completed by Dr Hutchinson on 6/21/22. The DMV form is . The form was blank for where to send the completed form.     Per DMV website search, form to be mailed to:  NOTE You may wish to make a copy of the completed  Medical Evaluation for your records. SIGNED  RE[address]. [id number] [facility] [address] [employer]  MEDICAL EVALUATION Medical information is CONFIDENTIAL under Section 1808.    The form has been given to Lv JOHNSON to be mailed. The form has been scanned into our system by Mary Kate WHITMAN.

## 2022-06-28 ENCOUNTER — HOSPITAL ENCOUNTER (INPATIENT)
Facility: MEDICAL CENTER | Age: 52
LOS: 2 days | DRG: 897 | End: 2022-06-30
Attending: EMERGENCY MEDICINE | Admitting: HOSPITALIST
Payer: COMMERCIAL

## 2022-06-28 DIAGNOSIS — G40.909 SEIZURE DISORDER (HCC): ICD-10-CM

## 2022-06-28 DIAGNOSIS — R10.84 GENERALIZED ABDOMINAL PAIN: ICD-10-CM

## 2022-06-28 DIAGNOSIS — Z87.898 HISTORY OF SEIZURE: ICD-10-CM

## 2022-06-28 DIAGNOSIS — F10.229 ALCOHOL INTOXICATION IN ACTIVE ALCOHOLIC WITH COMPLICATION (HCC): ICD-10-CM

## 2022-06-28 DIAGNOSIS — R07.9 CHEST PAIN, UNSPECIFIED TYPE: ICD-10-CM

## 2022-06-28 DIAGNOSIS — F41.9 ANXIETY: ICD-10-CM

## 2022-06-28 DIAGNOSIS — F10.930 ALCOHOL WITHDRAWAL SYNDROME WITHOUT COMPLICATION (HCC): ICD-10-CM

## 2022-06-28 DIAGNOSIS — R00.0 TACHYCARDIA: ICD-10-CM

## 2022-06-28 DIAGNOSIS — R53.81 DEBILITY: ICD-10-CM

## 2022-06-28 DIAGNOSIS — R26.81 UNSTEADY GAIT: ICD-10-CM

## 2022-06-28 PROBLEM — K92.1 MELENA: Status: ACTIVE | Noted: 2022-06-28

## 2022-06-28 LAB
ALBUMIN SERPL BCP-MCNC: 3.9 G/DL (ref 3.2–4.9)
ALBUMIN/GLOB SERPL: 1 G/DL
ALP SERPL-CCNC: 136 U/L (ref 30–99)
ALT SERPL-CCNC: 54 U/L (ref 2–50)
ANION GAP SERPL CALC-SCNC: 13 MMOL/L (ref 7–16)
AST SERPL-CCNC: 71 U/L (ref 12–45)
BASOPHILS # BLD AUTO: 0.7 % (ref 0–1.8)
BASOPHILS # BLD: 0.06 K/UL (ref 0–0.12)
BILIRUB SERPL-MCNC: 0.5 MG/DL (ref 0.1–1.5)
BUN SERPL-MCNC: 9 MG/DL (ref 8–22)
CALCIUM SERPL-MCNC: 9.4 MG/DL (ref 8.5–10.5)
CHLORIDE SERPL-SCNC: 101 MMOL/L (ref 96–112)
CO2 SERPL-SCNC: 20 MMOL/L (ref 20–33)
CREAT SERPL-MCNC: 0.71 MG/DL (ref 0.5–1.4)
EKG IMPRESSION: NORMAL
EKG IMPRESSION: NORMAL
EOSINOPHIL # BLD AUTO: 0.09 K/UL (ref 0–0.51)
EOSINOPHIL NFR BLD: 1.1 % (ref 0–6.9)
ERYTHROCYTE [DISTWIDTH] IN BLOOD BY AUTOMATED COUNT: 50.4 FL (ref 35.9–50)
ETHANOL BLD-MCNC: <10.1 MG/DL
GFR SERPLBLD CREATININE-BSD FMLA CKD-EPI: 112 ML/MIN/1.73 M 2
GLOBULIN SER CALC-MCNC: 3.9 G/DL (ref 1.9–3.5)
GLUCOSE SERPL-MCNC: 119 MG/DL (ref 65–99)
HCT VFR BLD AUTO: 45.2 % (ref 42–52)
HGB BLD-MCNC: 14.5 G/DL (ref 14–18)
HGB BLD-MCNC: 15.5 G/DL (ref 14–18)
IMM GRANULOCYTES # BLD AUTO: 0.07 K/UL (ref 0–0.11)
IMM GRANULOCYTES NFR BLD AUTO: 0.8 % (ref 0–0.9)
LIPASE SERPL-CCNC: 21 U/L (ref 11–82)
LYMPHOCYTES # BLD AUTO: 1.56 K/UL (ref 1–4.8)
LYMPHOCYTES NFR BLD: 18.4 % (ref 22–41)
MCH RBC QN AUTO: 29.2 PG (ref 27–33)
MCHC RBC AUTO-ENTMCNC: 34.3 G/DL (ref 33.7–35.3)
MCV RBC AUTO: 85.3 FL (ref 81.4–97.8)
MONOCYTES # BLD AUTO: 0.63 K/UL (ref 0–0.85)
MONOCYTES NFR BLD AUTO: 7.4 % (ref 0–13.4)
NEUTROPHILS # BLD AUTO: 6.08 K/UL (ref 1.82–7.42)
NEUTROPHILS NFR BLD: 71.6 % (ref 44–72)
NRBC # BLD AUTO: 0 K/UL
NRBC BLD-RTO: 0 /100 WBC
PLATELET # BLD AUTO: 290 K/UL (ref 164–446)
PMV BLD AUTO: 8.5 FL (ref 9–12.9)
POTASSIUM SERPL-SCNC: 3.9 MMOL/L (ref 3.6–5.5)
PROT SERPL-MCNC: 7.8 G/DL (ref 6–8.2)
RBC # BLD AUTO: 5.3 M/UL (ref 4.7–6.1)
SODIUM SERPL-SCNC: 134 MMOL/L (ref 135–145)
TROPONIN T SERPL-MCNC: 7 NG/L (ref 6–19)
WBC # BLD AUTO: 8.5 K/UL (ref 4.8–10.8)

## 2022-06-28 PROCEDURE — 85025 COMPLETE CBC W/AUTO DIFF WBC: CPT

## 2022-06-28 PROCEDURE — 99285 EMERGENCY DEPT VISIT HI MDM: CPT

## 2022-06-28 PROCEDURE — 36415 COLL VENOUS BLD VENIPUNCTURE: CPT

## 2022-06-28 PROCEDURE — 96375 TX/PRO/DX INJ NEW DRUG ADDON: CPT

## 2022-06-28 PROCEDURE — 84484 ASSAY OF TROPONIN QUANT: CPT | Mod: 91

## 2022-06-28 PROCEDURE — 700102 HCHG RX REV CODE 250 W/ 637 OVERRIDE(OP): Performed by: EMERGENCY MEDICINE

## 2022-06-28 PROCEDURE — 99223 1ST HOSP IP/OBS HIGH 75: CPT | Performed by: HOSPITALIST

## 2022-06-28 PROCEDURE — 700111 HCHG RX REV CODE 636 W/ 250 OVERRIDE (IP): Performed by: HOSPITALIST

## 2022-06-28 PROCEDURE — HZ2ZZZZ DETOXIFICATION SERVICES FOR SUBSTANCE ABUSE TREATMENT: ICD-10-PCS | Performed by: HOSPITALIST

## 2022-06-28 PROCEDURE — 700101 HCHG RX REV CODE 250: Performed by: EMERGENCY MEDICINE

## 2022-06-28 PROCEDURE — 700111 HCHG RX REV CODE 636 W/ 250 OVERRIDE (IP): Performed by: EMERGENCY MEDICINE

## 2022-06-28 PROCEDURE — 700105 HCHG RX REV CODE 258: Performed by: HOSPITALIST

## 2022-06-28 PROCEDURE — 85018 HEMOGLOBIN: CPT

## 2022-06-28 PROCEDURE — 96366 THER/PROPH/DIAG IV INF ADDON: CPT

## 2022-06-28 PROCEDURE — A9270 NON-COVERED ITEM OR SERVICE: HCPCS | Performed by: HOSPITALIST

## 2022-06-28 PROCEDURE — 96376 TX/PRO/DX INJ SAME DRUG ADON: CPT

## 2022-06-28 PROCEDURE — 93010 ELECTROCARDIOGRAM REPORT: CPT | Performed by: INTERNAL MEDICINE

## 2022-06-28 PROCEDURE — 80053 COMPREHEN METABOLIC PANEL: CPT

## 2022-06-28 PROCEDURE — 93005 ELECTROCARDIOGRAM TRACING: CPT

## 2022-06-28 PROCEDURE — 93005 ELECTROCARDIOGRAM TRACING: CPT | Performed by: EMERGENCY MEDICINE

## 2022-06-28 PROCEDURE — 96365 THER/PROPH/DIAG IV INF INIT: CPT

## 2022-06-28 PROCEDURE — 700102 HCHG RX REV CODE 250 W/ 637 OVERRIDE(OP): Performed by: HOSPITALIST

## 2022-06-28 PROCEDURE — 82077 ASSAY SPEC XCP UR&BREATH IA: CPT

## 2022-06-28 PROCEDURE — 770001 HCHG ROOM/CARE - MED/SURG/GYN PRIV*

## 2022-06-28 PROCEDURE — 83690 ASSAY OF LIPASE: CPT

## 2022-06-28 PROCEDURE — A9270 NON-COVERED ITEM OR SERVICE: HCPCS | Performed by: EMERGENCY MEDICINE

## 2022-06-28 RX ORDER — CHLORDIAZEPOXIDE HYDROCHLORIDE 25 MG/1
25 CAPSULE, GELATIN COATED ORAL EVERY 6 HOURS
Status: COMPLETED | OUTPATIENT
Start: 2022-06-28 | End: 2022-06-29

## 2022-06-28 RX ORDER — CLONIDINE HYDROCHLORIDE 0.1 MG/1
0.1 TABLET ORAL
Status: DISCONTINUED | OUTPATIENT
Start: 2022-06-28 | End: 2022-06-30 | Stop reason: HOSPADM

## 2022-06-28 RX ORDER — AMOXICILLIN 250 MG
2 CAPSULE ORAL 2 TIMES DAILY
Status: DISCONTINUED | OUTPATIENT
Start: 2022-06-28 | End: 2022-06-29

## 2022-06-28 RX ORDER — LORAZEPAM 1 MG/1
1 TABLET ORAL EVERY 4 HOURS PRN
Status: DISCONTINUED | OUTPATIENT
Start: 2022-06-28 | End: 2022-06-30

## 2022-06-28 RX ORDER — SODIUM BICARBONATE 650 MG/1
1300 TABLET ORAL 2 TIMES DAILY
Status: ON HOLD | COMMUNITY
End: 2022-06-30

## 2022-06-28 RX ORDER — LORAZEPAM 1 MG/1
0.5 TABLET ORAL EVERY 4 HOURS PRN
Status: DISCONTINUED | OUTPATIENT
Start: 2022-06-28 | End: 2022-06-30

## 2022-06-28 RX ORDER — TRAMADOL HYDROCHLORIDE 50 MG/1
50 TABLET ORAL ONCE
Status: COMPLETED | OUTPATIENT
Start: 2022-06-28 | End: 2022-06-28

## 2022-06-28 RX ORDER — OMEPRAZOLE 20 MG/1
20 CAPSULE, DELAYED RELEASE ORAL 2 TIMES DAILY
Status: DISCONTINUED | OUTPATIENT
Start: 2022-06-28 | End: 2022-06-29

## 2022-06-28 RX ORDER — POLYETHYLENE GLYCOL 3350 17 G/17G
1 POWDER, FOR SOLUTION ORAL
Status: DISCONTINUED | OUTPATIENT
Start: 2022-06-28 | End: 2022-06-29

## 2022-06-28 RX ORDER — LORAZEPAM 2 MG/ML
2 INJECTION INTRAMUSCULAR
Status: DISCONTINUED | OUTPATIENT
Start: 2022-06-28 | End: 2022-06-30

## 2022-06-28 RX ORDER — HYDRALAZINE HYDROCHLORIDE 20 MG/ML
10 INJECTION INTRAMUSCULAR; INTRAVENOUS EVERY 4 HOURS PRN
Status: DISCONTINUED | OUTPATIENT
Start: 2022-06-28 | End: 2022-06-30 | Stop reason: HOSPADM

## 2022-06-28 RX ORDER — FOLIC ACID 1 MG/1
1 TABLET ORAL DAILY
Status: DISCONTINUED | OUTPATIENT
Start: 2022-06-28 | End: 2022-06-28

## 2022-06-28 RX ORDER — ONDANSETRON 2 MG/ML
4 INJECTION INTRAMUSCULAR; INTRAVENOUS EVERY 4 HOURS PRN
Status: DISCONTINUED | OUTPATIENT
Start: 2022-06-28 | End: 2022-06-30 | Stop reason: HOSPADM

## 2022-06-28 RX ORDER — LORAZEPAM 2 MG/ML
1 INJECTION INTRAMUSCULAR ONCE
Status: COMPLETED | OUTPATIENT
Start: 2022-06-28 | End: 2022-06-28

## 2022-06-28 RX ORDER — PROMETHAZINE HYDROCHLORIDE 25 MG/1
12.5-25 SUPPOSITORY RECTAL EVERY 4 HOURS PRN
Status: DISCONTINUED | OUTPATIENT
Start: 2022-06-28 | End: 2022-06-30 | Stop reason: HOSPADM

## 2022-06-28 RX ORDER — CHLORDIAZEPOXIDE HYDROCHLORIDE 25 MG/1
25 CAPSULE, GELATIN COATED ORAL EVERY 8 HOURS
Status: DISCONTINUED | OUTPATIENT
Start: 2022-06-29 | End: 2022-06-30 | Stop reason: HOSPADM

## 2022-06-28 RX ORDER — LORAZEPAM 2 MG/ML
1.5 INJECTION INTRAMUSCULAR
Status: DISCONTINUED | OUTPATIENT
Start: 2022-06-28 | End: 2022-06-30

## 2022-06-28 RX ORDER — MAGNESIUM SULFATE HEPTAHYDRATE 40 MG/ML
2 INJECTION, SOLUTION INTRAVENOUS ONCE
Status: COMPLETED | OUTPATIENT
Start: 2022-06-28 | End: 2022-06-28

## 2022-06-28 RX ORDER — ACETAMINOPHEN 325 MG/1
650 TABLET ORAL EVERY 6 HOURS PRN
Status: DISCONTINUED | OUTPATIENT
Start: 2022-06-28 | End: 2022-06-30 | Stop reason: HOSPADM

## 2022-06-28 RX ORDER — SODIUM CHLORIDE 9 MG/ML
INJECTION, SOLUTION INTRAVENOUS CONTINUOUS
Status: DISCONTINUED | OUTPATIENT
Start: 2022-06-28 | End: 2022-06-29

## 2022-06-28 RX ORDER — BISACODYL 10 MG
10 SUPPOSITORY, RECTAL RECTAL
Status: DISCONTINUED | OUTPATIENT
Start: 2022-06-28 | End: 2022-06-29

## 2022-06-28 RX ORDER — PROCHLORPERAZINE EDISYLATE 5 MG/ML
5-10 INJECTION INTRAMUSCULAR; INTRAVENOUS EVERY 4 HOURS PRN
Status: DISCONTINUED | OUTPATIENT
Start: 2022-06-28 | End: 2022-06-30 | Stop reason: HOSPADM

## 2022-06-28 RX ORDER — FOLIC ACID 1 MG/1
1 TABLET ORAL DAILY
Status: DISCONTINUED | OUTPATIENT
Start: 2022-06-29 | End: 2022-06-30 | Stop reason: HOSPADM

## 2022-06-28 RX ORDER — LORAZEPAM 2 MG/1
4 TABLET ORAL
Status: DISCONTINUED | OUTPATIENT
Start: 2022-06-28 | End: 2022-06-30

## 2022-06-28 RX ORDER — NICOTINE 21 MG/24HR
14 PATCH, TRANSDERMAL 24 HOURS TRANSDERMAL
Status: DISCONTINUED | OUTPATIENT
Start: 2022-06-29 | End: 2022-06-30 | Stop reason: HOSPADM

## 2022-06-28 RX ORDER — PROMETHAZINE HYDROCHLORIDE 25 MG/1
12.5-25 TABLET ORAL EVERY 4 HOURS PRN
Status: DISCONTINUED | OUTPATIENT
Start: 2022-06-28 | End: 2022-06-30 | Stop reason: HOSPADM

## 2022-06-28 RX ORDER — LORAZEPAM 2 MG/ML
0.5 INJECTION INTRAMUSCULAR EVERY 4 HOURS PRN
Status: DISCONTINUED | OUTPATIENT
Start: 2022-06-28 | End: 2022-06-30

## 2022-06-28 RX ORDER — LANOLIN ALCOHOL/MO/W.PET/CERES
400 CREAM (GRAM) TOPICAL 2 TIMES DAILY
Status: DISCONTINUED | OUTPATIENT
Start: 2022-06-29 | End: 2022-06-30 | Stop reason: HOSPADM

## 2022-06-28 RX ORDER — GAUZE BANDAGE 2" X 2"
100 BANDAGE TOPICAL DAILY
Status: DISCONTINUED | OUTPATIENT
Start: 2022-06-29 | End: 2022-06-30 | Stop reason: HOSPADM

## 2022-06-28 RX ORDER — LORAZEPAM 2 MG/ML
1 INJECTION INTRAMUSCULAR
Status: DISCONTINUED | OUTPATIENT
Start: 2022-06-28 | End: 2022-06-30

## 2022-06-28 RX ORDER — LEVETIRACETAM 500 MG/1
500 TABLET ORAL 2 TIMES DAILY
Status: ON HOLD | COMMUNITY
End: 2022-06-30 | Stop reason: SDUPTHER

## 2022-06-28 RX ORDER — LEVETIRACETAM 500 MG/1
500 TABLET ORAL 2 TIMES DAILY
Status: DISCONTINUED | OUTPATIENT
Start: 2022-06-28 | End: 2022-06-30 | Stop reason: HOSPADM

## 2022-06-28 RX ORDER — AMLODIPINE BESYLATE 5 MG/1
5 TABLET ORAL DAILY
Status: DISCONTINUED | OUTPATIENT
Start: 2022-06-29 | End: 2022-06-30 | Stop reason: HOSPADM

## 2022-06-28 RX ORDER — ONDANSETRON 4 MG/1
4 TABLET, ORALLY DISINTEGRATING ORAL EVERY 4 HOURS PRN
Status: DISCONTINUED | OUTPATIENT
Start: 2022-06-28 | End: 2022-06-30 | Stop reason: HOSPADM

## 2022-06-28 RX ORDER — GAUZE BANDAGE 2" X 2"
100 BANDAGE TOPICAL DAILY
Status: DISCONTINUED | OUTPATIENT
Start: 2022-06-28 | End: 2022-06-28

## 2022-06-28 RX ORDER — LORAZEPAM 2 MG/1
2 TABLET ORAL
Status: DISCONTINUED | OUTPATIENT
Start: 2022-06-28 | End: 2022-06-30

## 2022-06-28 RX ADMIN — LORAZEPAM 2 MG: 2 TABLET ORAL at 20:54

## 2022-06-28 RX ADMIN — TRAMADOL HYDROCHLORIDE 50 MG: 50 TABLET, COATED ORAL at 16:38

## 2022-06-28 RX ADMIN — CHLORDIAZEPOXIDE HYDROCHLORIDE 25 MG: 25 CAPSULE ORAL at 23:54

## 2022-06-28 RX ADMIN — ONDANSETRON 4 MG: 4 TABLET, ORALLY DISINTEGRATING ORAL at 18:20

## 2022-06-28 RX ADMIN — ACETAMINOPHEN 650 MG: 325 TABLET ORAL at 14:20

## 2022-06-28 RX ADMIN — LORAZEPAM 1 MG: 2 INJECTION INTRAMUSCULAR; INTRAVENOUS at 10:17

## 2022-06-28 RX ADMIN — LIDOCAINE HYDROCHLORIDE 30 ML: 20 SOLUTION OROPHARYNGEAL at 10:57

## 2022-06-28 RX ADMIN — OMEPRAZOLE 20 MG: 20 CAPSULE, DELAYED RELEASE ORAL at 14:20

## 2022-06-28 RX ADMIN — MAGNESIUM SULFATE HEPTAHYDRATE 2 G: 40 INJECTION, SOLUTION INTRAVENOUS at 14:30

## 2022-06-28 RX ADMIN — LORAZEPAM 1 MG: 2 INJECTION INTRAMUSCULAR; INTRAVENOUS at 13:26

## 2022-06-28 RX ADMIN — LEVETIRACETAM 500 MG: 500 TABLET, FILM COATED ORAL at 18:20

## 2022-06-28 RX ADMIN — SODIUM CHLORIDE: 9 INJECTION, SOLUTION INTRAVENOUS at 16:27

## 2022-06-28 RX ADMIN — CHLORDIAZEPOXIDE HYDROCHLORIDE 25 MG: 25 CAPSULE ORAL at 15:39

## 2022-06-28 RX ADMIN — THIAMINE HYDROCHLORIDE: 100 INJECTION, SOLUTION INTRAMUSCULAR; INTRAVENOUS at 10:17

## 2022-06-28 RX ADMIN — THERA TABS 1 TABLET: TAB at 14:20

## 2022-06-28 RX ADMIN — LORAZEPAM 0.5 MG: 2 INJECTION INTRAMUSCULAR; INTRAVENOUS at 16:38

## 2022-06-28 RX ADMIN — LIDOCAINE HYDROCHLORIDE 30 ML: 20 SOLUTION OROPHARYNGEAL at 14:21

## 2022-06-28 RX ADMIN — CHLORDIAZEPOXIDE HYDROCHLORIDE 25 MG: 25 CAPSULE ORAL at 20:55

## 2022-06-28 RX ADMIN — LORAZEPAM 1 MG: 2 INJECTION INTRAMUSCULAR; INTRAVENOUS at 10:57

## 2022-06-28 ASSESSMENT — LIFESTYLE VARIABLES
HAVE PEOPLE ANNOYED YOU BY CRITICIZING YOUR DRINKING: YES
TREMOR: *
AGITATION: SOMEWHAT MORE THAN NORMAL ACTIVITY
ANXIETY: *
HEADACHE, FULLNESS IN HEAD: MILD
AVERAGE NUMBER OF DAYS PER WEEK YOU HAVE A DRINK CONTAINING ALCOHOL: 4
DOES PATIENT WANT TO TALK TO SOMEONE ABOUT QUITTING: YES
TREMOR: *
EVER FELT BAD OR GUILTY ABOUT YOUR DRINKING: YES
TOTAL SCORE: 4
DOES PATIENT WANT TO STOP DRINKING: YES
ON A TYPICAL DAY WHEN YOU DRINK ALCOHOL HOW MANY DRINKS DO YOU HAVE: 4
AGITATION: *
VISUAL DISTURBANCES: NOT PRESENT
ORIENTATION AND CLOUDING OF SENSORIUM: ORIENTED AND CAN DO SERIAL ADDITIONS
TOTAL SCORE: 8
HEADACHE, FULLNESS IN HEAD: MILD
EVER HAD A DRINK FIRST THING IN THE MORNING TO STEADY YOUR NERVES TO GET RID OF A HANGOVER: YES
AUDITORY DISTURBANCES: NOT PRESENT
CONSUMPTION TOTAL: POSITIVE
ORIENTATION AND CLOUDING OF SENSORIUM: ORIENTED AND CAN DO SERIAL ADDITIONS
SUBSTANCE_ABUSE: 1
PAROXYSMAL SWEATS: NO SWEAT VISIBLE
AUDITORY DISTURBANCES: NOT PRESENT
TOTAL SCORE: 4
NAUSEA AND VOMITING: MILD NAUSEA WITH NO VOMITING
TOTAL SCORE: 11
VISUAL DISTURBANCES: NOT PRESENT
HAVE YOU EVER FELT YOU SHOULD CUT DOWN ON YOUR DRINKING: YES
TOTAL SCORE: 4
ANXIETY: *
ALCOHOL_USE: YES
NAUSEA AND VOMITING: MILD NAUSEA WITH NO VOMITING
HOW MANY TIMES IN THE PAST YEAR HAVE YOU HAD 5 OR MORE DRINKS IN A DAY: 60
PAROXYSMAL SWEATS: NO SWEAT VISIBLE

## 2022-06-28 ASSESSMENT — ENCOUNTER SYMPTOMS
PALPITATIONS: 0
EYE DISCHARGE: 0
COUGH: 0
VOMITING: 0
SHORTNESS OF BREATH: 1
HEADACHES: 0
STRIDOR: 0
DIARRHEA: 0
SPEECH CHANGE: 0
ABDOMINAL PAIN: 1
BACK PAIN: 0
FEVER: 0
DIZZINESS: 0
CHILLS: 0
NERVOUS/ANXIOUS: 1
NAUSEA: 0

## 2022-06-28 ASSESSMENT — COGNITIVE AND FUNCTIONAL STATUS - GENERAL
WALKING IN HOSPITAL ROOM: A LITTLE
TOILETING: A LITTLE
DRESSING REGULAR LOWER BODY CLOTHING: A LITTLE
CLIMB 3 TO 5 STEPS WITH RAILING: A LITTLE
STANDING UP FROM CHAIR USING ARMS: A LITTLE
SUGGESTED CMS G CODE MODIFIER MOBILITY: CJ
HELP NEEDED FOR BATHING: A LITTLE
DAILY ACTIVITIY SCORE: 21
SUGGESTED CMS G CODE MODIFIER DAILY ACTIVITY: CJ
MOBILITY SCORE: 21

## 2022-06-28 ASSESSMENT — PATIENT HEALTH QUESTIONNAIRE - PHQ9
1. LITTLE INTEREST OR PLEASURE IN DOING THINGS: NOT AT ALL
SUM OF ALL RESPONSES TO PHQ9 QUESTIONS 1 AND 2: 0
2. FEELING DOWN, DEPRESSED, IRRITABLE, OR HOPELESS: NOT AT ALL

## 2022-06-28 ASSESSMENT — FIBROSIS 4 INDEX: FIB4 SCORE: 1.03

## 2022-06-28 ASSESSMENT — PAIN DESCRIPTION - PAIN TYPE: TYPE: ACUTE PAIN

## 2022-06-28 NOTE — ED TRIAGE NOTES
"Pt BIB [facility] with c/c of anxiety and panic attacks. Pt reporting last drink was Saturday. Pt stating \"I have had 50 seizures in 3 weeks\" Pt given 2.5mg of versed by EMS.  "
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
## 2022-06-28 NOTE — ED PROVIDER NOTES
ED Provider Note    CHIEF COMPLAINT  Chief Complaint   Patient presents with   • Anxiety       HPI  Donal Carpio is a 52 y.o. male who presents anxious, tachycardic, shaky.  He believes he had a withdrawal seizure yesterday.  Patient has had work-up after previous seizures over the past several months, according to his chart includes MRI and CT scan of the head which were negative.  Patient stopped drinking alcohol yesterday, stating he has important job interview tomorrow.  Over the past 24 hours has had intermittent anterior chest discomfort, he states this is unusual for him.  No vomiting although he feels nauseous.  No change in bowel habits.  Patient feels anxious, no suicidal ideation, no homicidal ideation, no hallucination.    REVIEW OF SYSTEMS    Constitutional: Feels shaky.  No fever  Respiratory: Shortness of breath  Cardiac: Denies exertional chest pain.  Anterior chest discomfort over the past 24 hours, denies history of cardiac disease  Gastrointestinal: Nausea.  No vomiting  Musculoskeletal: No acute neck or back pain  Neurologic: Denies headache  Psychiatric: Alcoholism.  Anxiety.     All other systems are negative.       PAST MEDICAL HISTORY  Past Medical History:   Diagnosis Date   • Peptic ulcer    • Seizure disorder (HCC)    • Ulcer of abdomen wall (HCC)        FAMILY HISTORY  No family history on file.    SOCIAL HISTORY  Social History     Socioeconomic History   • Marital status: Single   Tobacco Use   • Smoking status: Never Smoker   • Smokeless tobacco: Never Used   Vaping Use   • Vaping Use: Every day   Substance and Sexual Activity   • Alcohol use: Yes     Comment: pint of whiskey   • Drug use: Yes     Types: Inhaled     Comment: marijuana       SURGICAL HISTORY  Past Surgical History:   Procedure Laterality Date   • APPENDECTOMY         CURRENT MEDICATIONS  Home Medications    **Home medications have not yet been reviewed for this encounter**         ALLERGIES  Allergies   Allergen  Reactions   • Penicillins Unspecified     Unknown childhood reaction        PHYSICAL EXAM  VITAL SIGNS: BP (!) 134/96   Pulse (!) 113   Temp 36.9 °C (98.5 °F) (Temporal)   Resp (!) 37   Wt 87.5 kg (193 lb)   SpO2 95%   BMI 28.50 kg/m²   Constitutional: Appears uncomfortable, he is well-nourished.   HENT: No facial swelling, no tongue bite  Eyes: Anicteric, no conjunctivitis.  Pupils are equal  Cardiovascular: Tachycardic heart rate, Normal rhythm  Pulmonary:  No wheezing, No rales.   Gastrointestinal: Soft, mild epigastric tenderness, No distention  Skin: Warm, Dry, No cyanosis.  No asymmetric edema  Neurologic: Speech is clear, follows commands, facial expression is symmetrical.  Strength and sensation intact  Psychiatric: Anxious.  Cooperative.  Musculoskeletal: No chest wall tenderness    EKG/Labs  Results for orders placed or performed during the hospital encounter of 06/28/22   CBC WITH DIFFERENTIAL   Result Value Ref Range    WBC 8.5 4.8 - 10.8 K/uL    RBC 5.30 4.70 - 6.10 M/uL    Hemoglobin 15.5 14.0 - 18.0 g/dL    Hematocrit 45.2 42.0 - 52.0 %    MCV 85.3 81.4 - 97.8 fL    MCH 29.2 27.0 - 33.0 pg    MCHC 34.3 33.7 - 35.3 g/dL    RDW 50.4 (H) 35.9 - 50.0 fL    Platelet Count 290 164 - 446 K/uL    MPV 8.5 (L) 9.0 - 12.9 fL    Neutrophils-Polys 71.60 44.00 - 72.00 %    Lymphocytes 18.40 (L) 22.00 - 41.00 %    Monocytes 7.40 0.00 - 13.40 %    Eosinophils 1.10 0.00 - 6.90 %    Basophils 0.70 0.00 - 1.80 %    Immature Granulocytes 0.80 0.00 - 0.90 %    Nucleated RBC 0.00 /100 WBC    Neutrophils (Absolute) 6.08 1.82 - 7.42 K/uL    Lymphs (Absolute) 1.56 1.00 - 4.80 K/uL    Monos (Absolute) 0.63 0.00 - 0.85 K/uL    Eos (Absolute) 0.09 0.00 - 0.51 K/uL    Baso (Absolute) 0.06 0.00 - 0.12 K/uL    Immature Granulocytes (abs) 0.07 0.00 - 0.11 K/uL    NRBC (Absolute) 0.00 K/uL   Comp Metabolic Panel   Result Value Ref Range    Sodium 134 (L) 135 - 145 mmol/L    Potassium 3.9 3.6 - 5.5 mmol/L    Chloride 101 96 -  112 mmol/L    Co2 20 20 - 33 mmol/L    Anion Gap 13.0 7.0 - 16.0    Glucose 119 (H) 65 - 99 mg/dL    Bun 9 8 - 22 mg/dL    Creatinine 0.71 0.50 - 1.40 mg/dL    Calcium 9.4 8.5 - 10.5 mg/dL    AST(SGOT) 71 (H) 12 - 45 U/L    ALT(SGPT) 54 (H) 2 - 50 U/L    Alkaline Phosphatase 136 (H) 30 - 99 U/L    Total Bilirubin 0.5 0.1 - 1.5 mg/dL    Albumin 3.9 3.2 - 4.9 g/dL    Total Protein 7.8 6.0 - 8.2 g/dL    Globulin 3.9 (H) 1.9 - 3.5 g/dL    A-G Ratio 1.0 g/dL   LIPASE   Result Value Ref Range    Lipase 21 11 - 82 U/L   TROPONIN   Result Value Ref Range    Troponin T 7 6 - 19 ng/L   DIAGNOSTIC ALCOHOL   Result Value Ref Range    Diagnostic Alcohol <10.1 <10.1 mg/dL   ESTIMATED GFR   Result Value Ref Range    GFR (CKD-EPI) 112 >60 mL/min/1.73 m 2   EKG (NOW)   Result Value Ref Range    Report       [facility] Emergency Dept.    Test Date:  2022  Pt Name:    JERICHO ALBA                  Department: ER  MRN:        [id number]                      Room:       [facility]  Gender:     Male                         Technician: [id number]  :        1970                   Requested By:TOSIN ROJAS  Order #:    [id number]                    Reading MD: TOSIN ROJAS MD    Measurements  Intervals                                Axis  Rate:       112                          P:          0  NH:         117                          QRS:        -30  QRSD:       102                          T:          -17  QT:         356  QTc:        486    Interpretive Statements  SINUS TACHYCARDIA  Baseline artifact  LEFT AXIS DEVIATION  BORDERLINE R WAVE PROGRESSION, ANTERIOR LEADS  NONSPECIFIC T ABNORMALITIES, ANTERIOR LEADS  BORDERLINE PROLONGED QT INTERVAL  Compared to ECG 2022 21:32:43  EKG unchanged  Electronically Signed On 2022 12:30:30 PDT by TOSIN GORMAN MD             COURSE & MEDICAL DECISION MAKING  Pertinent Labs & Imaging studies reviewed. (See chart for details)  Patient with history of  alcoholism, alcohol withdrawal seizures.  He is recently stopped drinking, appears tachycardic and anxious.  He appears hyperadrenergic from the withdrawal.  He was given Ativan, 1 mg twice without relief of his tachycardia.  He was given IV fluids, detox bag, without relief.  He remains anxious in appearance.  A GI cocktail helped his chest discomfort slightly.  He has negative troponin, unchanged EKG, no evidence of acute ischemia.  No evidence of pancreatitis.  With persistent tachycardia, history of alcohol withdrawal seizures, he will be hospitalized for detox of acute alcohol withdrawal.    FINAL IMPRESSION     1. Alcohol withdrawal syndrome without complication (HCC)     2. Tachycardia     3. History of seizure     4. Anxiety     5. Chest pain, unspecified type                     Electronically signed by: Leobardo Anna M.D., 6/28/2022 12:30 PM

## 2022-06-29 LAB
ALBUMIN SERPL BCP-MCNC: 3.2 G/DL (ref 3.2–4.9)
ALBUMIN/GLOB SERPL: 1 G/DL
ALP SERPL-CCNC: 106 U/L (ref 30–99)
ALT SERPL-CCNC: 40 U/L (ref 2–50)
ANION GAP SERPL CALC-SCNC: 11 MMOL/L (ref 7–16)
AST SERPL-CCNC: 54 U/L (ref 12–45)
BILIRUB SERPL-MCNC: 0.2 MG/DL (ref 0.1–1.5)
BUN SERPL-MCNC: 13 MG/DL (ref 8–22)
CALCIUM SERPL-MCNC: 7.8 MG/DL (ref 8.5–10.5)
CHLORIDE SERPL-SCNC: 103 MMOL/L (ref 96–112)
CO2 SERPL-SCNC: 21 MMOL/L (ref 20–33)
CREAT SERPL-MCNC: 0.84 MG/DL (ref 0.5–1.4)
ERYTHROCYTE [DISTWIDTH] IN BLOOD BY AUTOMATED COUNT: 53.1 FL (ref 35.9–50)
GFR SERPLBLD CREATININE-BSD FMLA CKD-EPI: 106 ML/MIN/1.73 M 2
GLOBULIN SER CALC-MCNC: 3.1 G/DL (ref 1.9–3.5)
GLUCOSE SERPL-MCNC: 97 MG/DL (ref 65–99)
HCT VFR BLD AUTO: 40.1 % (ref 42–52)
HCT VFR BLD AUTO: 43.4 % (ref 42–52)
HCT VFR BLD AUTO: 46.2 % (ref 42–52)
HGB BLD-MCNC: 13.5 G/DL (ref 14–18)
HGB BLD-MCNC: 14.4 G/DL (ref 14–18)
HGB BLD-MCNC: 14.6 G/DL (ref 14–18)
INR PPP: 1.11 (ref 0.87–1.13)
MAGNESIUM SERPL-MCNC: 2.3 MG/DL (ref 1.5–2.5)
MCH RBC QN AUTO: 29.3 PG (ref 27–33)
MCHC RBC AUTO-ENTMCNC: 33.7 G/DL (ref 33.7–35.3)
MCV RBC AUTO: 87.2 FL (ref 81.4–97.8)
PHOSPHATE SERPL-MCNC: 4.1 MG/DL (ref 2.5–4.5)
PLATELET # BLD AUTO: 260 K/UL (ref 164–446)
PMV BLD AUTO: 8.4 FL (ref 9–12.9)
POTASSIUM SERPL-SCNC: 3.3 MMOL/L (ref 3.6–5.5)
PROT SERPL-MCNC: 6.3 G/DL (ref 6–8.2)
PROTHROMBIN TIME: 14 SEC (ref 12–14.6)
RBC # BLD AUTO: 4.6 M/UL (ref 4.7–6.1)
SODIUM SERPL-SCNC: 135 MMOL/L (ref 135–145)
TROPONIN T SERPL-MCNC: 7 NG/L (ref 6–19)
WBC # BLD AUTO: 8.7 K/UL (ref 4.8–10.8)

## 2022-06-29 PROCEDURE — 83735 ASSAY OF MAGNESIUM: CPT

## 2022-06-29 PROCEDURE — 85014 HEMATOCRIT: CPT

## 2022-06-29 PROCEDURE — 84100 ASSAY OF PHOSPHORUS: CPT

## 2022-06-29 PROCEDURE — A9270 NON-COVERED ITEM OR SERVICE: HCPCS

## 2022-06-29 PROCEDURE — A9270 NON-COVERED ITEM OR SERVICE: HCPCS | Performed by: GENERAL PRACTICE

## 2022-06-29 PROCEDURE — 85018 HEMOGLOBIN: CPT

## 2022-06-29 PROCEDURE — 700102 HCHG RX REV CODE 250 W/ 637 OVERRIDE(OP)

## 2022-06-29 PROCEDURE — 85027 COMPLETE CBC AUTOMATED: CPT

## 2022-06-29 PROCEDURE — 700105 HCHG RX REV CODE 258: Performed by: HOSPITALIST

## 2022-06-29 PROCEDURE — 80053 COMPREHEN METABOLIC PANEL: CPT

## 2022-06-29 PROCEDURE — A9270 NON-COVERED ITEM OR SERVICE: HCPCS | Performed by: HOSPITALIST

## 2022-06-29 PROCEDURE — 99233 SBSQ HOSP IP/OBS HIGH 50: CPT | Performed by: GENERAL PRACTICE

## 2022-06-29 PROCEDURE — 700102 HCHG RX REV CODE 250 W/ 637 OVERRIDE(OP): Performed by: GENERAL PRACTICE

## 2022-06-29 PROCEDURE — 85610 PROTHROMBIN TIME: CPT

## 2022-06-29 PROCEDURE — 700102 HCHG RX REV CODE 250 W/ 637 OVERRIDE(OP): Performed by: HOSPITALIST

## 2022-06-29 PROCEDURE — 770001 HCHG ROOM/CARE - MED/SURG/GYN PRIV*

## 2022-06-29 PROCEDURE — 700105 HCHG RX REV CODE 258: Performed by: GENERAL PRACTICE

## 2022-06-29 PROCEDURE — 700111 HCHG RX REV CODE 636 W/ 250 OVERRIDE (IP): Performed by: GENERAL PRACTICE

## 2022-06-29 PROCEDURE — C9113 INJ PANTOPRAZOLE SODIUM, VIA: HCPCS | Performed by: GENERAL PRACTICE

## 2022-06-29 RX ORDER — OXYCODONE HYDROCHLORIDE AND ACETAMINOPHEN 5; 325 MG/1; MG/1
1-2 TABLET ORAL EVERY 8 HOURS PRN
Status: DISCONTINUED | OUTPATIENT
Start: 2022-06-29 | End: 2022-06-30 | Stop reason: HOSPADM

## 2022-06-29 RX ORDER — BISACODYL 10 MG
10 SUPPOSITORY, RECTAL RECTAL
Status: DISCONTINUED | OUTPATIENT
Start: 2022-06-29 | End: 2022-06-30 | Stop reason: HOSPADM

## 2022-06-29 RX ORDER — SODIUM CHLORIDE 9 MG/ML
INJECTION, SOLUTION INTRAVENOUS CONTINUOUS
Status: DISCONTINUED | OUTPATIENT
Start: 2022-06-29 | End: 2022-06-30 | Stop reason: HOSPADM

## 2022-06-29 RX ORDER — PANTOPRAZOLE SODIUM 40 MG/10ML
40 INJECTION, POWDER, LYOPHILIZED, FOR SOLUTION INTRAVENOUS 2 TIMES DAILY
Status: DISCONTINUED | OUTPATIENT
Start: 2022-06-29 | End: 2022-06-30 | Stop reason: HOSPADM

## 2022-06-29 RX ORDER — TRAMADOL HYDROCHLORIDE 50 MG/1
50 TABLET ORAL EVERY 6 HOURS PRN
Status: DISCONTINUED | OUTPATIENT
Start: 2022-06-29 | End: 2022-06-29

## 2022-06-29 RX ORDER — AMOXICILLIN 250 MG
2 CAPSULE ORAL 2 TIMES DAILY PRN
Status: DISCONTINUED | OUTPATIENT
Start: 2022-06-29 | End: 2022-06-30 | Stop reason: HOSPADM

## 2022-06-29 RX ORDER — POLYETHYLENE GLYCOL 3350 17 G/17G
1 POWDER, FOR SOLUTION ORAL
Status: DISCONTINUED | OUTPATIENT
Start: 2022-06-29 | End: 2022-06-30 | Stop reason: HOSPADM

## 2022-06-29 RX ADMIN — LEVETIRACETAM 500 MG: 500 TABLET, FILM COATED ORAL at 05:33

## 2022-06-29 RX ADMIN — Medication 100 MG: at 05:33

## 2022-06-29 RX ADMIN — AMLODIPINE BESYLATE 5 MG: 5 TABLET ORAL at 05:32

## 2022-06-29 RX ADMIN — Medication 400 MG: at 16:50

## 2022-06-29 RX ADMIN — LORAZEPAM 2 MG: 2 TABLET ORAL at 02:07

## 2022-06-29 RX ADMIN — CHLORDIAZEPOXIDE HYDROCHLORIDE 25 MG: 25 CAPSULE ORAL at 14:29

## 2022-06-29 RX ADMIN — LORAZEPAM 2 MG: 2 TABLET ORAL at 13:10

## 2022-06-29 RX ADMIN — SENNOSIDES AND DOCUSATE SODIUM 2 TABLET: 50; 8.6 TABLET ORAL at 05:33

## 2022-06-29 RX ADMIN — PANTOPRAZOLE SODIUM 40 MG: 40 INJECTION, POWDER, FOR SOLUTION INTRAVENOUS at 16:50

## 2022-06-29 RX ADMIN — LORAZEPAM 2 MG: 2 TABLET ORAL at 08:32

## 2022-06-29 RX ADMIN — Medication 400 MG: at 05:34

## 2022-06-29 RX ADMIN — SODIUM CHLORIDE: 9 INJECTION, SOLUTION INTRAVENOUS at 02:30

## 2022-06-29 RX ADMIN — PANTOPRAZOLE SODIUM 40 MG: 40 INJECTION, POWDER, FOR SOLUTION INTRAVENOUS at 09:55

## 2022-06-29 RX ADMIN — LEVETIRACETAM 500 MG: 500 TABLET, FILM COATED ORAL at 16:50

## 2022-06-29 RX ADMIN — OXYCODONE HYDROCHLORIDE AND ACETAMINOPHEN 1 TABLET: 5; 325 TABLET ORAL at 10:17

## 2022-06-29 RX ADMIN — CHLORDIAZEPOXIDE HYDROCHLORIDE 25 MG: 25 CAPSULE ORAL at 05:33

## 2022-06-29 RX ADMIN — LORAZEPAM 2 MG: 2 TABLET ORAL at 05:41

## 2022-06-29 RX ADMIN — OXYCODONE HYDROCHLORIDE AND ACETAMINOPHEN 1 TABLET: 5; 325 TABLET ORAL at 15:19

## 2022-06-29 RX ADMIN — OMEPRAZOLE 20 MG: 20 CAPSULE, DELAYED RELEASE ORAL at 05:34

## 2022-06-29 RX ADMIN — SODIUM CHLORIDE: 9 INJECTION, SOLUTION INTRAVENOUS at 09:55

## 2022-06-29 RX ADMIN — NICOTINE 14 MG: 14 PATCH TRANSDERMAL at 05:34

## 2022-06-29 RX ADMIN — FOLIC ACID 1 MG: 1 TABLET ORAL at 05:33

## 2022-06-29 RX ADMIN — THERA TABS 1 TABLET: TAB at 05:33

## 2022-06-29 ASSESSMENT — LIFESTYLE VARIABLES
PAROXYSMAL SWEATS: NO SWEAT VISIBLE
HEADACHE, FULLNESS IN HEAD: NOT PRESENT
NAUSEA AND VOMITING: MILD NAUSEA WITH NO VOMITING
ORIENTATION AND CLOUDING OF SENSORIUM: ORIENTED AND CAN DO SERIAL ADDITIONS
PAROXYSMAL SWEATS: BARELY PERCEPTIBLE SWEATING. PALMS MOIST
NAUSEA AND VOMITING: MILD NAUSEA WITH NO VOMITING
PAROXYSMAL SWEATS: NO SWEAT VISIBLE
TOTAL SCORE: 0
VISUAL DISTURBANCES: NOT PRESENT
PAROXYSMAL SWEATS: BARELY PERCEPTIBLE SWEATING. PALMS MOIST
AUDITORY DISTURBANCES: NOT PRESENT
AGITATION: NORMAL ACTIVITY
TOTAL SCORE: 11
TREMOR: TREMOR NOT VISIBLE BUT CAN BE FELT, FINGERTIP TO FINGERTIP
TOTAL SCORE: 4
TREMOR: *
AUDITORY DISTURBANCES: NOT PRESENT
ORIENTATION AND CLOUDING OF SENSORIUM: ORIENTED AND CAN DO SERIAL ADDITIONS
AGITATION: *
NAUSEA AND VOMITING: NO NAUSEA AND NO VOMITING
VISUAL DISTURBANCES: NOT PRESENT
TREMOR: NO TREMOR
PAROXYSMAL SWEATS: *
VISUAL DISTURBANCES: NOT PRESENT
AUDITORY DISTURBANCES: NOT PRESENT
HEADACHE, FULLNESS IN HEAD: MODERATE
TREMOR: *
ORIENTATION AND CLOUDING OF SENSORIUM: ORIENTED AND CAN DO SERIAL ADDITIONS
ORIENTATION AND CLOUDING OF SENSORIUM: ORIENTED AND CAN DO SERIAL ADDITIONS
TREMOR: *
VISUAL DISTURBANCES: NOT PRESENT
TREMOR: *
HEADACHE, FULLNESS IN HEAD: NOT PRESENT
NAUSEA AND VOMITING: MILD NAUSEA WITH NO VOMITING
HEADACHE, FULLNESS IN HEAD: VERY MILD
AGITATION: NORMAL ACTIVITY
AUDITORY DISTURBANCES: NOT PRESENT
HEADACHE, FULLNESS IN HEAD: MODERATE
AUDITORY DISTURBANCES: NOT PRESENT
AGITATION: NORMAL ACTIVITY
AGITATION: NORMAL ACTIVITY
ANXIETY: *
PAROXYSMAL SWEATS: BARELY PERCEPTIBLE SWEATING. PALMS MOIST
VISUAL DISTURBANCES: NOT PRESENT
NAUSEA AND VOMITING: *
ANXIETY: *
VISUAL DISTURBANCES: NOT PRESENT
ANXIETY: *
ORIENTATION AND CLOUDING OF SENSORIUM: ORIENTED AND CAN DO SERIAL ADDITIONS
ORIENTATION AND CLOUDING OF SENSORIUM: ORIENTED AND CAN DO SERIAL ADDITIONS
ANXIETY: *
ANXIETY: NO ANXIETY (AT EASE)
AGITATION: *
HEADACHE, FULLNESS IN HEAD: VERY MILD
NAUSEA AND VOMITING: *
AUDITORY DISTURBANCES: NOT PRESENT
TOTAL SCORE: 11
ANXIETY: *
TOTAL SCORE: 11
TOTAL SCORE: 12

## 2022-06-29 ASSESSMENT — ENCOUNTER SYMPTOMS
TREMORS: 1
NERVOUS/ANXIOUS: 1

## 2022-06-29 ASSESSMENT — PAIN DESCRIPTION - PAIN TYPE: TYPE: ACUTE PAIN

## 2022-06-29 ASSESSMENT — FIBROSIS 4 INDEX: FIB4 SCORE: 1.64

## 2022-06-29 NOTE — PROGRESS NOTES
Hospital Medicine Daily Progress Note    Date of Service  6/29/2022    Chief Complaint  Donal Carpio is a 52 y.o. male admitted 6/28/2022 with tremors    Hospital Course  This is a 52-year-old male with past medical history of alcohol use disorder with associated seizures and a Wood's esophagus  (EGD 1/2022) who was admitted on 06/28/2022 with alcohol withdrawal.    Patient noted to increase his drinking for the past 6 to 7 months due to his daughter being involved in a car accident.  Patient has had 3 head CTs and brain MRI at an outside facility which were negative. Unfortunately patient continues to consume alcohol and he has history of alcohol induced seizures.  On the last admission in early June 2022, it was reported to the DMV in [place] the patient has active seizure disorder.    Patient is currently on CIWA protocol, Librium, multivitamin, folic and thiamine.    Patient reports he has been having 1 episode of melena daily for the past week, he is not on any NSAIDs or iron supplementation.  He had a EGD in January 2022 with DHA which noted Wood's esophagus.  Patient is currently n.p.o., IV fluids, PPI twice daily, DHA consulted.    Interval Problem Update  Patient is currently on CIWA protocol, Librium, multivitamin, folic and thiamine.    Patient reports he has been having 1 episode of melena daily for the past week, he is not on any NSAIDs or iron supplementation.  He had a EGD in January 2022 with DHA which noted Wood's esophagus.  Patient is currently n.p.o., IV fluids, PPI twice daily, DHA consulted.    I have discussed this patient's plan of care and discharge plan at IDT rounds today with Case Management, Nursing, Nursing leadership, and other members of the IDT team. Dr. Jess Lombardo GI ([facility])    Consultants/Specialty  GI    Code Status  Full Code    Disposition  Patient is not medically cleared for discharge.   Anticipate discharge to to home with close outpatient follow-up.  I have placed  the appropriate orders for post-discharge needs.    Review of Systems  Review of Systems   Gastrointestinal: Positive for melena.   Neurological: Positive for tremors.   Psychiatric/Behavioral: The patient is nervous/anxious.    All other systems reviewed and are negative.       Physical Exam  Temp:  [35.8 °C (96.5 °F)-37.2 °C (99 °F)] 36.5 °C (97.7 °F)  Pulse:  [] 101  Resp:  [18-20] 18  BP: (115-150)/() 129/85  SpO2:  [95 %-97 %] 97 %    Physical Exam  Vitals and nursing note reviewed.   Constitutional:       General: He is not in acute distress.     Appearance: He is ill-appearing.   HENT:      Head: Normocephalic and atraumatic.      Mouth/Throat:      Mouth: Mucous membranes are dry.   Eyes:      Extraocular Movements: Extraocular movements intact.      Conjunctiva/sclera: Conjunctivae normal.      Pupils: Pupils are equal, round, and reactive to light.   Cardiovascular:      Rate and Rhythm: Regular rhythm. Tachycardia present.      Pulses: Normal pulses.      Heart sounds: No murmur heard.    No friction rub. No gallop.   Pulmonary:      Effort: Pulmonary effort is normal. No respiratory distress.      Breath sounds: Normal breath sounds. No wheezing, rhonchi or rales.   Abdominal:      General: Bowel sounds are normal. There is no distension.      Palpations: Abdomen is soft.      Tenderness: There is no abdominal tenderness.   Musculoskeletal:         General: No swelling or tenderness. Normal range of motion.      Cervical back: Normal range of motion and neck supple. No muscular tenderness.      Right lower leg: No edema.      Left lower leg: No edema.   Skin:     General: Skin is warm and dry.      Capillary Refill: Capillary refill takes less than 2 seconds.      Findings: No bruising, erythema or rash.   Neurological:      General: No focal deficit present.      Mental Status: He is alert and oriented to person, place, and time.      Motor: Weakness present.         Fluids    Intake/Output  Summary (Last 24 hours) at 6/29/2022 1247  Last data filed at 6/29/2022 0900  Gross per 24 hour   Intake 300 ml   Output --   Net 300 ml       Laboratory  Recent Labs     06/28/22  1022 06/28/22 2003 06/29/22  0155 06/29/22  1047   WBC 8.5  --  8.7  --    RBC 5.30  --  4.60*  --    HEMOGLOBIN 15.5 14.5 13.5* 14.4   HEMATOCRIT 45.2  --  40.1* 43.4   MCV 85.3  --  87.2  --    MCH 29.2  --  29.3  --    MCHC 34.3  --  33.7  --    RDW 50.4*  --  53.1*  --    PLATELETCT 290  --  260  --    MPV 8.5*  --  8.4*  --      Recent Labs     06/28/22  1133 06/29/22  0155   SODIUM 134* 135   POTASSIUM 3.9 3.3*   CHLORIDE 101 103   CO2 20 21   GLUCOSE 119* 97   BUN 9 13   CREATININE 0.71 0.84   CALCIUM 9.4 7.8*     Recent Labs     06/29/22  0155   INR 1.11               Imaging  No orders to display        Assessment/Plan  * Alcohol intoxication in active alcoholic with complication (HCC)- (present on admission)  Assessment & Plan  CIWA  Start librium   Monitor neurostatus  Cessation encouraged  Seizure precautions    Melena  Assessment & Plan  Monitor CBC  PPI BID  Likely etoh gastritis  Has a hx of peptic ulcer  EGD 1/2022 noted Wood's esophagus  DHA consulted    Tachycardia  Assessment & Plan  IV fluids  Treat for EtOH w/d  Monitor vitals    Chest pain  Assessment & Plan  Troponin negative x2   EKG not reflecting ACS  Concern of GI related    Hypertension- (present on admission)  Assessment & Plan  Monitor vitals  Amlodipine 5mg daily  As needed clonidine 0.1mg prn and hydralazine prn    Seizure disorder (HCC)- (present on admission)  Assessment & Plan  On outpatient Keppra but likely EtOH withdrawal  Seizure precautions      LFT elevation- (present on admission)  Assessment & Plan  Likely EtOH  Monitor CMP       VTE prophylaxis: SCDs/TEDs and pharmacologic prophylaxis contraindicated due to melena    I have performed a physical exam and reviewed and updated ROS and Plan today (6/29/2022). In review of yesterday's note  (6/28/2022), there are no changes except as documented above.

## 2022-06-29 NOTE — DISCHARGE PLANNING
Case Management Discharge Planning    Admission Date: 6/28/2022  GMLOS: 3.4  ALOS: 1    6-Clicks ADL Score: 21  6-Clicks Mobility Score: 21      Anticipated Discharge Dispo:          Action(s) Taken:  Chart review: 52 y.o. male active alcoholic with a history of prior peptic ulcer disease, prior seizure disorder (likely alcohol related) who presented 6/28/2022 with significant tremors after stopping alcohol 1 day ago with some chest discomfort - substernal and acid reflux.  Plan: CIWA, Librium, Monitor neurostatus, IV fluids, PPI.         Medically Clear: No    Next Steps: Pending clinical coure    Barriers to Discharge: Medical clearance    Is the patient up for discharge tomorrow: No

## 2022-06-29 NOTE — PROGRESS NOTES
4 Eyes Skin Assessment Completed by CASPER Becker and CASPER Castellano.    Head Scab L and R forehead, from previous fall   Ears WDL  Nose WDL  Mouth WDL  Neck WDL  Breast/Chest WDL  Shoulder Blades WDL  Spine WDL  (R) Arm/Elbow/Hand WDL  (L) Arm/Elbow/Hand Dry scab on elbow from previous fall  Abdomen WDL  Groin WDL  Scrotum/Coccyx/Buttocks WDL  (R) Leg Dry, flaky   (L) Leg dry, flaky   (R) Heel/Foot/Toe WDL  (L) Heel/Foot/Toe WDL          Devices In Places none      Interventions In Place N/A    Possible Skin Injury No    Pictures Uploaded Into Epic N/A  Wound Consult Placed N/A  RN Wound Prevention Protocol Ordered No

## 2022-06-29 NOTE — PROGRESS NOTES
Pt reports he fell coming out of the bathroom. It was unwitnessed  notified nursing staff. Pt was placed back in bed fall risk measures in place. Bed alarm on, Call light within reach, pt educated on calling for assistance and educated on not trying to get out of bed on own due to safety risk. Provider notified.

## 2022-06-29 NOTE — HOSPITAL COURSE
This is a 52-year-old male with past medical history of alcohol use disorder with associated seizures and a Wood's esophagus  (EGD 1/2022) who was admitted on 06/28/2022 with alcohol withdrawal.     Patient noted to increase his drinking for the past 6 to 7 months due to his daughter being involved in a car accident.  Patient has had 3 head CTs and brain MRI at an outside facility which were negative. Unfortunately patient continues to consume alcohol and he has history of alcohol induced seizures.  On the last admission in early June 2022, it was reported to the DMV in [place] the patient has active seizure disorder.     Patient is currently on [facility] protocol, Librium, multivitamin, folic and thiamine. His symptoms resolved on day of discharge     Patient reports he has been having 1 episode of melena daily for the past week, he is not on any NSAIDs or iron supplementation.  He had a EGD in January 2022 with DHA which noted Wood's esophagus.  Patient is currently n.p.o., IV fluids, PPI twice daily, DHA consulted, patient is s/p EGD with no evidence of acute bleeding, did note Wood's esophagus which was seen on the EGD in January. Patient to continue with PPI.     Earlier in the admission, patient reported chest pain, troponin x2 negative EKG with no evidence of acute ischemia, bradycardia, AV block, tachycardia or any abnormal heart rhythm.  Lipase was normal.    All medications printed out for the patient due to his [employer] insurance, unable to set the patient up with home health due to his [employer] insurance.

## 2022-06-29 NOTE — CONSULTS
Gastroenterology Consult Note     Date of Consult: 6/29/2022    Requesting Physician: Dr. Landrum     Reason for consult: Melena    History of Present Illness:   This is a 52-year-old male with chronic alcohol use and GERD who was consulted for possible melena.  Patient's nurse was present at bedside.  Patient reports that he drinks 1/2 pint of whiskey daily for about 6 months and his last drink was on Sunday.  He started drinking heavily after his daughter passed away.  Patient states that he has been having melena for 1.5 weeks and has been taking Nexium for GERD.  He does not take NSAIDs aspirin, Pepto-Bismol or iron supplements.  Per patient's nurse, he has been having brown stool without evidence of melena or hematochezia.  Patient has some blood in his mouth which he states was from his fall earlier today.  However, his nurse witnessed him hitting his mouth.  Patient also tells me that he has been in the hospital for 8 months.  His hemoglobin has been stable at 14.  Last EGD was done in 1/2022 for hematemesis which showed salmon-colored mucosa concerning for Wood's esophagus.      Past Medical History:  Past Medical History:   Diagnosis Date   • Peptic ulcer    • Seizure disorder (HCC)    • Ulcer of abdomen wall (HCC)         Past Surgical History:   Past Surgical History:   Procedure Laterality Date   • APPENDECTOMY          Allergies  Penicillins     Social History:   Social History     Socioeconomic History   • Marital status: Single     Spouse name: Not on file   • Number of children: Not on file   • Years of education: Not on file   • Highest education level: Not on file   Occupational History   • Not on file   Tobacco Use   • Smoking status: Never Smoker   • Smokeless tobacco: Never Used   Vaping Use   • Vaping Use: Every day   Substance and Sexual Activity   • Alcohol use: Yes     Comment: pint of whiskey   • Drug use: Yes     Types: Inhaled     Comment:  marijuana   • Sexual activity: Not on file   Other Topics Concern   • Not on file   Social History Narrative   • Not on file     Social Determinants of Health     Financial Resource Strain: Not on file   Food Insecurity: Not on file   Transportation Needs: Not on file   Physical Activity: Not on file   Stress: Not on file   Social Connections: Not on file   Intimate Partner Violence: Not on file   Housing Stability: Not on file        Family History:   No family history on file.    Home Medications:  Home Medications    Medication Sig Taking? Last Dose Authorizing Provider   sodium bicarbonate (SODIUM BICARBONATE) 650 MG Tab Take 1,300 mg by mouth 2 times a day.   Physician Outpatient   levETIRAcetam (KEPPRA) 500 MG Tab Take 500 mg by mouth 2 times a day.   Physician Outpatient   multivitamin (THERAGRAN) Tab Take 1 Tablet by mouth every day.  6/12/2022 at Unknown time Christiano Chan M.D.   folic acid (FOLVITE) 1 MG Tab Take 1 Tablet by mouth every day.  6/12/2022 at Unknown time Christiano Chan M.D.   nicotine (NICODERM) 21 MG/24HR PATCH 24 HR Place 1 Patch on the skin every 24 hours for 30 days.  unknown at Unknown time Christiano Chan M.D.   thiamine (THIAMINE) 100 MG tablet Take 1 Tablet by mouth every day for 30 days.  6/12/2022 at Unknown time Christiano Chan M.D.   amLODIPine (NORVASC) 5 MG Tab Take 1 Tablet by mouth every day.  6/12/2022 at Unknown time Christiano Chan M.D.   Chlorpheniramine Maleate (ALLERGY PO) Take 6 Tablets by mouth every day. Unknown allergy medication  6/12/2022 at Unknown time Physician Outpatient   Esomeprazole Magnesium 20 MG Pack Take 20 mg by mouth every morning before breakfast.  6/12/2022 at Unknown time Physician Outpatient         Review of Systems:  General: No fevers, chills, unintentional, weight loss.  HEENT: No scleral icterus, gum bleed, dysphagia, odynophagia.  Cardiology: No chest pain, palpitations, orthopnea.  Respiratory: No dyspnea, cough,  wheezing.  Gastrointestinal: No abdominal pain, nausea, vomiting, changes in bowel habits, rectal bleed.  Genitourinary: No hematuria, dysuria, urgency.  Neurologic: No changes in memory, confusion, gait instability.  Skin: No ecchymosis, jaundice, telangiectasia.    Physical Exam:  Vitals:    06/19/22 1630 06/19/22 1810 06/19/22 1904 06/20/22 0354   BP: 109/75  104/47 (!) 98/60   Pulse: 76  74 98   Resp: 16 17 18 18   Temp: 36.1 °C (96.9 °F)  36.2 °C (97.1 °F) (!) 38.1 °C (100.6 °F)   TempSrc: Temporal  Temporal Axillary   SpO2: 95%  94% 91%   Weight:       Height:         General: No acute cardiopulmonary distress.  Head: Normocephalic.  EENT: Scleral anicterus. Mucosa moist.   Respiratory: Breath sounds present. Symmetrical rise of anterior thorax.  Cardiovascular: Normal S1, S2.  Gastrointestinal: Soft, nontender, nondistended. Normoactive bowel sounds. No guarding.  Neurologic: Alert and oriented. Upper extremity tremors.  Skin: Warm and dry.      LABS:  Lab Results   Component Value Date/Time    SODIUM 135 06/29/2022 01:55 AM    POTASSIUM 3.3 (L) 06/29/2022 01:55 AM    CHLORIDE 103 06/29/2022 01:55 AM    CO2 21 06/29/2022 01:55 AM    GLUCOSE 97 06/29/2022 01:55 AM    BUN 13 06/29/2022 01:55 AM    CREATININE 0.84 06/29/2022 01:55 AM      Lab Results   Component Value Date/Time    WBC 8.7 06/29/2022 01:55 AM    RBC 4.60 (L) 06/29/2022 01:55 AM    HEMOGLOBIN 14.4 06/29/2022 10:47 AM    HEMATOCRIT 43.4 06/29/2022 10:47 AM    MCV 87.2 06/29/2022 01:55 AM    MCH 29.3 06/29/2022 01:55 AM    MCHC 33.7 06/29/2022 01:55 AM    MPV 8.4 (L) 06/29/2022 01:55 AM    NEUTSPOLYS 71.60 06/28/2022 10:22 AM    LYMPHOCYTES 18.40 (L) 06/28/2022 10:22 AM    MONOCYTES 7.40 06/28/2022 10:22 AM    EOSINOPHILS 1.10 06/28/2022 10:22 AM    BASOPHILS 0.70 06/28/2022 10:22 AM        Lab Results   Component Value Date/Time    PROTHROMBTM 14.0 06/29/2022 01:55 AM    INR 1.11 06/29/2022 01:55 AM      Recent Labs     06/28/22  1133  06/29/22  0155   ASTSGOT 71* 54*   ALTSGPT 54* 40   TBILIRUBIN 0.5 0.2   GLOBULIN 3.9* 3.1   INR  --  1.11       IMAGING: Reviewed personally and summarized in HPI.        ASSESSMENT:   Melena  Chronic alcohol use  Acute liver injury from alcohol use  Alcohol withdrawal       PLAN:   This is a 52-year-old male with chronic alcohol use who is evaluated for melena.  Patient states that he has been having melena for 1.5 weeks.  He drinks 1/2 pint of vodka for 6 months. During his hospital stay, no overt GI bleed.  Hemoglobin is stable at 13-14.  Continue Protonix IV BID.  Transfuse if hemoglobin <7.  Alcohol cessation advised.    EGD is tentatively scheduled tomorrow if patient remains stable.  The risks, benefits and alternatives of the procedure were discussed with the patient, including but limited to bowel perforation, pain and anesthesia side effects.  He verbalized understanding and agreed to proceed.      Thank you for the consultation. Please call with any questions or concerns.    Jess Lombardo D.O.  Gastroenterology  [facility]  [phone]

## 2022-06-29 NOTE — CARE PLAN
The patient is Watcher - Medium risk of patient condition declining or worsening    Shift Goals  Clinical Goals: CIWA    Summary: pt arrived to unit from ED at 1720. Seizure precautions in place. Monitor CIWA. Tachycardia. Up self. Continent.

## 2022-06-29 NOTE — CARE PLAN
The patient is Stable - Low risk of patient condition declining or worsening    Shift Goals  Clinical Goals: free of seizures, anxiety, and pain  Patient Goals: sleep with comfort  Family Goals: KATALINA    Progress made toward(s) clinical / shift goals:    Problem: Knowledge Deficit - Standard  Goal: Patient and family/care givers will demonstrate understanding of plan of care, disease process/condition, diagnostic tests and medications  Outcome: Progressing       Patient is not progressing towards the following goals:      Problem: Optimal Care for Alcohol Withdrawal  Goal: Optimal Care for the alcohol withdrawal patient  Outcome: Not Progressing     Problem: Seizure Precautions  Goal: Implementation of seizure precautions  Outcome: Not Progressing     Problem: Psychosocial  Goal: Patient's level of anxiety will decrease  Outcome: Not Progressing     Problem: Pain - Standard  Goal: Alleviation of pain or a reduction in pain to the patient’s comfort goal  Outcome: Not Progressing

## 2022-06-30 ENCOUNTER — ANESTHESIA EVENT (OUTPATIENT)
Dept: SURGERY | Facility: MEDICAL CENTER | Age: 52
DRG: 897 | End: 2022-06-30
Payer: COMMERCIAL

## 2022-06-30 ENCOUNTER — ANESTHESIA (OUTPATIENT)
Dept: SURGERY | Facility: MEDICAL CENTER | Age: 52
DRG: 897 | End: 2022-06-30
Payer: COMMERCIAL

## 2022-06-30 VITALS
BODY MASS INDEX: 29.43 KG/M2 | SYSTOLIC BLOOD PRESSURE: 137 MMHG | OXYGEN SATURATION: 99 % | TEMPERATURE: 97.8 F | WEIGHT: 199.3 LBS | HEART RATE: 74 BPM | RESPIRATION RATE: 18 BRPM | DIASTOLIC BLOOD PRESSURE: 84 MMHG

## 2022-06-30 LAB
ANION GAP SERPL CALC-SCNC: 11 MMOL/L (ref 7–16)
BUN SERPL-MCNC: 7 MG/DL (ref 8–22)
CALCIUM SERPL-MCNC: 7.9 MG/DL (ref 8.5–10.5)
CHLORIDE SERPL-SCNC: 108 MMOL/L (ref 96–112)
CO2 SERPL-SCNC: 17 MMOL/L (ref 20–33)
CREAT SERPL-MCNC: 0.6 MG/DL (ref 0.5–1.4)
GFR SERPLBLD CREATININE-BSD FMLA CKD-EPI: 118 ML/MIN/1.73 M 2
GLUCOSE SERPL-MCNC: 74 MG/DL (ref 65–99)
HCT VFR BLD AUTO: 42 % (ref 42–52)
HGB BLD-MCNC: 13.4 G/DL (ref 14–18)
PATHOLOGY CONSULT NOTE: NORMAL
POTASSIUM SERPL-SCNC: 3.8 MMOL/L (ref 3.6–5.5)
SODIUM SERPL-SCNC: 136 MMOL/L (ref 135–145)

## 2022-06-30 PROCEDURE — 700105 HCHG RX REV CODE 258: Performed by: STUDENT IN AN ORGANIZED HEALTH CARE EDUCATION/TRAINING PROGRAM

## 2022-06-30 PROCEDURE — 160035 HCHG PACU - 1ST 60 MINS PHASE I: Performed by: INTERNAL MEDICINE

## 2022-06-30 PROCEDURE — 88305 TISSUE EXAM BY PATHOLOGIST: CPT

## 2022-06-30 PROCEDURE — 160048 HCHG OR STATISTICAL LEVEL 1-5: Performed by: INTERNAL MEDICINE

## 2022-06-30 PROCEDURE — 160002 HCHG RECOVERY MINUTES (STAT): Performed by: INTERNAL MEDICINE

## 2022-06-30 PROCEDURE — A9270 NON-COVERED ITEM OR SERVICE: HCPCS

## 2022-06-30 PROCEDURE — 160009 HCHG ANES TIME/MIN: Performed by: INTERNAL MEDICINE

## 2022-06-30 PROCEDURE — 80048 BASIC METABOLIC PNL TOTAL CA: CPT

## 2022-06-30 PROCEDURE — 0DB38ZX EXCISION OF LOWER ESOPHAGUS, VIA NATURAL OR ARTIFICIAL OPENING ENDOSCOPIC, DIAGNOSTIC: ICD-10-PCS | Performed by: INTERNAL MEDICINE

## 2022-06-30 PROCEDURE — 700111 HCHG RX REV CODE 636 W/ 250 OVERRIDE (IP): Performed by: GENERAL PRACTICE

## 2022-06-30 PROCEDURE — 160203 HCHG ENDO MINUTES - 1ST 30 MINS LEVEL 4: Performed by: INTERNAL MEDICINE

## 2022-06-30 PROCEDURE — A9270 NON-COVERED ITEM OR SERVICE: HCPCS | Performed by: GENERAL PRACTICE

## 2022-06-30 PROCEDURE — A9270 NON-COVERED ITEM OR SERVICE: HCPCS | Performed by: HOSPITALIST

## 2022-06-30 PROCEDURE — 85018 HEMOGLOBIN: CPT

## 2022-06-30 PROCEDURE — C9113 INJ PANTOPRAZOLE SODIUM, VIA: HCPCS | Performed by: GENERAL PRACTICE

## 2022-06-30 PROCEDURE — 700102 HCHG RX REV CODE 250 W/ 637 OVERRIDE(OP): Performed by: HOSPITALIST

## 2022-06-30 PROCEDURE — 99239 HOSP IP/OBS DSCHRG MGMT >30: CPT | Performed by: GENERAL PRACTICE

## 2022-06-30 PROCEDURE — 700111 HCHG RX REV CODE 636 W/ 250 OVERRIDE (IP): Performed by: STUDENT IN AN ORGANIZED HEALTH CARE EDUCATION/TRAINING PROGRAM

## 2022-06-30 PROCEDURE — 00731 ANES UPR GI NDSC PX NOS: CPT | Performed by: STUDENT IN AN ORGANIZED HEALTH CARE EDUCATION/TRAINING PROGRAM

## 2022-06-30 PROCEDURE — 85014 HEMATOCRIT: CPT

## 2022-06-30 PROCEDURE — 700102 HCHG RX REV CODE 250 W/ 637 OVERRIDE(OP)

## 2022-06-30 PROCEDURE — 700102 HCHG RX REV CODE 250 W/ 637 OVERRIDE(OP): Performed by: GENERAL PRACTICE

## 2022-06-30 PROCEDURE — 700105 HCHG RX REV CODE 258: Performed by: INTERNAL MEDICINE

## 2022-06-30 RX ORDER — LEVETIRACETAM 500 MG/1
500 TABLET ORAL 2 TIMES DAILY
Qty: 60 TABLET | Refills: 0 | Status: SHIPPED | OUTPATIENT
Start: 2022-06-30 | End: 2022-07-30

## 2022-06-30 RX ORDER — LABETALOL HYDROCHLORIDE 5 MG/ML
5 INJECTION, SOLUTION INTRAVENOUS
Status: DISCONTINUED | OUTPATIENT
Start: 2022-06-30 | End: 2022-06-30 | Stop reason: HOSPADM

## 2022-06-30 RX ORDER — IPRATROPIUM BROMIDE AND ALBUTEROL SULFATE 2.5; .5 MG/3ML; MG/3ML
3 SOLUTION RESPIRATORY (INHALATION)
Status: DISCONTINUED | OUTPATIENT
Start: 2022-06-30 | End: 2022-06-30 | Stop reason: HOSPADM

## 2022-06-30 RX ORDER — MEPERIDINE HYDROCHLORIDE 25 MG/ML
12.5 INJECTION INTRAMUSCULAR; INTRAVENOUS; SUBCUTANEOUS
Status: DISCONTINUED | OUTPATIENT
Start: 2022-06-30 | End: 2022-06-30 | Stop reason: HOSPADM

## 2022-06-30 RX ORDER — SODIUM CHLORIDE, SODIUM LACTATE, POTASSIUM CHLORIDE, CALCIUM CHLORIDE 600; 310; 30; 20 MG/100ML; MG/100ML; MG/100ML; MG/100ML
INJECTION, SOLUTION INTRAVENOUS
Status: DISCONTINUED | OUTPATIENT
Start: 2022-06-30 | End: 2022-06-30 | Stop reason: SURG

## 2022-06-30 RX ORDER — OXYCODONE HYDROCHLORIDE AND ACETAMINOPHEN 5; 325 MG/1; MG/1
1 TABLET ORAL EVERY 8 HOURS PRN
Qty: 9 TABLET | Refills: 0 | Status: SHIPPED | OUTPATIENT
Start: 2022-06-30 | End: 2022-07-03

## 2022-06-30 RX ORDER — HYDROMORPHONE HYDROCHLORIDE 1 MG/ML
0.1 INJECTION, SOLUTION INTRAMUSCULAR; INTRAVENOUS; SUBCUTANEOUS
Status: DISCONTINUED | OUTPATIENT
Start: 2022-06-30 | End: 2022-06-30 | Stop reason: HOSPADM

## 2022-06-30 RX ORDER — OXYCODONE HCL 5 MG/5 ML
5 SOLUTION, ORAL ORAL
Status: DISCONTINUED | OUTPATIENT
Start: 2022-06-30 | End: 2022-06-30 | Stop reason: HOSPADM

## 2022-06-30 RX ORDER — OXYCODONE HCL 5 MG/5 ML
10 SOLUTION, ORAL ORAL
Status: DISCONTINUED | OUTPATIENT
Start: 2022-06-30 | End: 2022-06-30 | Stop reason: HOSPADM

## 2022-06-30 RX ORDER — ONDANSETRON 2 MG/ML
4 INJECTION INTRAMUSCULAR; INTRAVENOUS
Status: DISCONTINUED | OUTPATIENT
Start: 2022-06-30 | End: 2022-06-30 | Stop reason: HOSPADM

## 2022-06-30 RX ORDER — HALOPERIDOL 5 MG/ML
1 INJECTION INTRAMUSCULAR
Status: DISCONTINUED | OUTPATIENT
Start: 2022-06-30 | End: 2022-06-30 | Stop reason: HOSPADM

## 2022-06-30 RX ORDER — DIPHENHYDRAMINE HYDROCHLORIDE 50 MG/ML
12.5 INJECTION INTRAMUSCULAR; INTRAVENOUS
Status: DISCONTINUED | OUTPATIENT
Start: 2022-06-30 | End: 2022-06-30 | Stop reason: HOSPADM

## 2022-06-30 RX ORDER — OMEPRAZOLE 40 MG/1
40 CAPSULE, DELAYED RELEASE ORAL DAILY
Qty: 30 CAPSULE | Refills: 0 | Status: SHIPPED | OUTPATIENT
Start: 2022-06-30 | End: 2022-07-30

## 2022-06-30 RX ORDER — FOLIC ACID 1 MG/1
1 TABLET ORAL DAILY
Qty: 30 TABLET | Refills: 0 | Status: SHIPPED | OUTPATIENT
Start: 2022-06-30 | End: 2022-07-30

## 2022-06-30 RX ORDER — LANOLIN ALCOHOL/MO/W.PET/CERES
100 CREAM (GRAM) TOPICAL DAILY
Qty: 30 TABLET | Refills: 0 | Status: SHIPPED | OUTPATIENT
Start: 2022-06-30 | End: 2022-07-30

## 2022-06-30 RX ORDER — SODIUM CHLORIDE, SODIUM LACTATE, POTASSIUM CHLORIDE, CALCIUM CHLORIDE 600; 310; 30; 20 MG/100ML; MG/100ML; MG/100ML; MG/100ML
INJECTION, SOLUTION INTRAVENOUS CONTINUOUS
Status: ACTIVE | OUTPATIENT
Start: 2022-06-30 | End: 2022-06-30

## 2022-06-30 RX ORDER — HYDROMORPHONE HYDROCHLORIDE 1 MG/ML
0.4 INJECTION, SOLUTION INTRAMUSCULAR; INTRAVENOUS; SUBCUTANEOUS
Status: DISCONTINUED | OUTPATIENT
Start: 2022-06-30 | End: 2022-06-30 | Stop reason: HOSPADM

## 2022-06-30 RX ORDER — HYDROMORPHONE HYDROCHLORIDE 1 MG/ML
0.2 INJECTION, SOLUTION INTRAMUSCULAR; INTRAVENOUS; SUBCUTANEOUS
Status: DISCONTINUED | OUTPATIENT
Start: 2022-06-30 | End: 2022-06-30 | Stop reason: HOSPADM

## 2022-06-30 RX ORDER — HYDRALAZINE HYDROCHLORIDE 20 MG/ML
5 INJECTION INTRAMUSCULAR; INTRAVENOUS
Status: DISCONTINUED | OUTPATIENT
Start: 2022-06-30 | End: 2022-06-30 | Stop reason: HOSPADM

## 2022-06-30 RX ORDER — METOPROLOL TARTRATE 1 MG/ML
1 INJECTION, SOLUTION INTRAVENOUS
Status: DISCONTINUED | OUTPATIENT
Start: 2022-06-30 | End: 2022-06-30 | Stop reason: HOSPADM

## 2022-06-30 RX ADMIN — FOLIC ACID 1 MG: 1 TABLET ORAL at 04:24

## 2022-06-30 RX ADMIN — PANTOPRAZOLE SODIUM 40 MG: 40 INJECTION, POWDER, FOR SOLUTION INTRAVENOUS at 04:24

## 2022-06-30 RX ADMIN — Medication 100 MG: at 04:24

## 2022-06-30 RX ADMIN — SODIUM CHLORIDE, POTASSIUM CHLORIDE, SODIUM LACTATE AND CALCIUM CHLORIDE: 600; 310; 30; 20 INJECTION, SOLUTION INTRAVENOUS at 06:30

## 2022-06-30 RX ADMIN — SODIUM CHLORIDE, POTASSIUM CHLORIDE, SODIUM LACTATE AND CALCIUM CHLORIDE: 600; 310; 30; 20 INJECTION, SOLUTION INTRAVENOUS at 10:35

## 2022-06-30 RX ADMIN — MIDAZOLAM 2 MG: 1 INJECTION INTRAMUSCULAR; INTRAVENOUS at 10:36

## 2022-06-30 RX ADMIN — Medication 400 MG: at 04:25

## 2022-06-30 RX ADMIN — CHLORDIAZEPOXIDE HYDROCHLORIDE 25 MG: 25 CAPSULE ORAL at 04:24

## 2022-06-30 RX ADMIN — THERA TABS 1 TABLET: TAB at 04:24

## 2022-06-30 RX ADMIN — PROPOFOL 50 MG: 10 INJECTION, EMULSION INTRAVENOUS at 10:36

## 2022-06-30 RX ADMIN — OXYCODONE HYDROCHLORIDE AND ACETAMINOPHEN 2 TABLET: 5; 325 TABLET ORAL at 02:06

## 2022-06-30 RX ADMIN — LEVETIRACETAM 500 MG: 500 TABLET, FILM COATED ORAL at 04:24

## 2022-06-30 RX ADMIN — NICOTINE 14 MG: 14 PATCH TRANSDERMAL at 04:24

## 2022-06-30 ASSESSMENT — LIFESTYLE VARIABLES
AGITATION: NORMAL ACTIVITY
HEADACHE, FULLNESS IN HEAD: NOT PRESENT
PAROXYSMAL SWEATS: NO SWEAT VISIBLE
TREMOR: NO TREMOR
AGITATION: NORMAL ACTIVITY
TOTAL SCORE: 0
VISUAL DISTURBANCES: NOT PRESENT
TOTAL SCORE: 0
HEADACHE, FULLNESS IN HEAD: NOT PRESENT
TOTAL SCORE: 0
VISUAL DISTURBANCES: NOT PRESENT
HEADACHE, FULLNESS IN HEAD: NOT PRESENT
NAUSEA AND VOMITING: NO NAUSEA AND NO VOMITING
ORIENTATION AND CLOUDING OF SENSORIUM: ORIENTED AND CAN DO SERIAL ADDITIONS
VISUAL DISTURBANCES: NOT PRESENT
ORIENTATION AND CLOUDING OF SENSORIUM: ORIENTED AND CAN DO SERIAL ADDITIONS
NAUSEA AND VOMITING: NO NAUSEA AND NO VOMITING
TREMOR: NO TREMOR
AGITATION: NORMAL ACTIVITY
AUDITORY DISTURBANCES: NOT PRESENT
ANXIETY: NO ANXIETY (AT EASE)
AUDITORY DISTURBANCES: NOT PRESENT
PAROXYSMAL SWEATS: NO SWEAT VISIBLE
ANXIETY: NO ANXIETY (AT EASE)
ORIENTATION AND CLOUDING OF SENSORIUM: ORIENTED AND CAN DO SERIAL ADDITIONS
TREMOR: NO TREMOR
PAROXYSMAL SWEATS: NO SWEAT VISIBLE
ANXIETY: NO ANXIETY (AT EASE)
NAUSEA AND VOMITING: NO NAUSEA AND NO VOMITING
AUDITORY DISTURBANCES: NOT PRESENT

## 2022-06-30 ASSESSMENT — PAIN SCALES - GENERAL: PAIN_LEVEL: 4

## 2022-06-30 ASSESSMENT — PAIN DESCRIPTION - PAIN TYPE
TYPE: ACUTE PAIN;CHRONIC PAIN
TYPE: ACUTE PAIN

## 2022-06-30 NOTE — FACE TO FACE
Face to Face Note  -  Durable Medical Equipment    Savannah Landrum D.O. - NPI: [id number]  I certify that this patient is under my care and that they have had a durable medical equipment(DME)face to face encounter by myself that meets the physician DME face-to-face encounter requirements with this patient on:    Date of encounter:   Patient:                    MRN:                       YOB: 2020  Donal Carpio  [id number]  1970     The encounter with the patient was in whole, or in part, for the following medical condition, which is the primary reason for durable medical equipment:  Other - Debility    I certify that, based on my findings, the following durable medical equipment is medically necessary:  Walkers.    HOME O2 Saturation Measurements:(Values must be present for Home Oxygen orders)         ,     ,         My Clinical findings support the need for the above equipment due to:  Abnormal Gait    Supporting Symptoms: N/A     ------------------------------------------------------------------------------------------------------------------    Face to Face Supporting Documentation - Home Health    The encounter with this patient was in whole or in part the primary reason for home health admission.    Date of encounter:   Patient:                    MRN:                       YOB: 2020  Donal Carpio  [id number]  1970     Home health to see patient for:  Skilled Nursing care for assessment, interventions & education, Home health aide, Physical Therapy evaluation and treatment and Occupational therapy evaluation and treatment    Skilled need for:  Recent Deterioration of Health Status Debility    Skilled nursing interventions to include:  Comment: PT/OT/HH    Homebound evidenced status by:  Need the aid of supportive devices such as crutches, canes, wheelchairs or walkers. Leaving home must require a considerable and taxing effort. There must exist a normal  inability to leave the home.    Community Physician to provide follow up care: Pcp Pt States None     Optional Interventions    Wound information & treatment:    Home Infusion Therapy orders:    Line/Drain/Airway:    I certify the face to face encounter for this home care referral meets the CMS requirements and the encounter/clinical assessment with the patient was, in whole, or in part, for the medical condition(s) listed above, which is the primary reason for home health care. Based on my clinical findings: the service(s) are medically necessary, support the need for home health care, and the homebound criteria are met.  I certify that this patient has had a face to face encounter by myself.  Savannah Landrum D.O. - NPI: [id number]    *Debility, frailty and advanced age in the absence of an acute deterioration or exacerbation of a condition do not qualify a patient for home health.

## 2022-06-30 NOTE — DISCHARGE PLANNING
Case Management Discharge Planning    Admission Date: 6/28/2022  GMLOS: 3.4  ALOS: 2    6-Clicks ADL Score: 21  6-Clicks Mobility Score: 21      Anticipated Discharge Dispo:      DME Needed: FW walker    Action(s) Taken: Called to [facility] [phone] option 4.  Message left for [facility]   requesting call back for assitance with HHC and DME.         Medically Clear: No    Next Steps: Follow up with [facility] for DME and HHC.     Barriers to Discharge: Medical clearance        06/30  Spoke with patient at bedside.  2 patient identifier used to confirm ID. Patient reports he lives in a motel.  His sister will be able to assist him if he needs anything. Discussed order for walker.  Patient chose [facility].  Spoke with the bedside RN to arrange collection      Follow up call fron [facility].  Spoke with Dr Chua.  She reports patient cannot get HHC outside of [facility].  Dr Landrum advised and she reports patient okay to dc without HHC.    Spoke with patient about changing his insurance provider if he will be remaining in [place].

## 2022-06-30 NOTE — ANESTHESIA TIME REPORT
Anesthesia Start and Stop Event Times     Date Time Event    6/30/2022 1009 Ready for Procedure     1035 Anesthesia Start     1057 Anesthesia Stop        Responsible Staff  06/30/22    Name Role Begin End    Nahid Lynn M.D. Anesth 1035 1057        Overtime Reason:  no overtime (within assigned shift)    Comments:

## 2022-06-30 NOTE — DISCHARGE SUMMARY
Discharge Summary    CHIEF COMPLAINT ON ADMISSION  Chief Complaint   Patient presents with   • Anxiety       Reason for Admission  Anxity     Admission Date  6/28/2022    CODE STATUS  Full Code    HPI & HOSPITAL COURSE  This is a 52-year-old male with past medical history of alcohol use disorder with associated seizures and a Wood's esophagus  (EGD 1/2022) who was admitted on 06/28/2022 with alcohol withdrawal.     Patient noted to increase his drinking for the past 6 to 7 months due to his daughter being involved in a car accident.  Patient has had 3 head CTs and brain MRI at an outside facility which were negative. Unfortunately patient continues to consume alcohol and he has history of alcohol induced seizures.  On the last admission in early June 2022, it was reported to the [facility]V in [place] the patient has active seizure disorder.     Patient is currently on [facility] protocol, Librium, multivitamin, folic and thiamine. His symptoms resolved on day of discharge     Patient reports he has been having 1 episode of melena daily for the past week, he is not on any NSAIDs or iron supplementation.  He had a EGD in January 2022 with [facility] which noted Wood's esophagus.  Patient is currently n.p.o., IV fluids, PPI twice daily, [facility] consulted, patient is s/p EGD with no evidence of acute bleeding, did note Wood's esophagus which was seen on the EGD in January. Patient to continue with PPI.     Earlier in the admission, patient reported chest pain, troponin x2 negative EKG with no evidence of acute ischemia, bradycardia, AV block, tachycardia or any abnormal heart rhythm.  Lipase was normal.    All medications printed out for the patient due to his [employer] insurance, unable to set the patient up with home health due to his [employer] insurance.    Therefore, he is discharged in good and stable condition to home with close outpatient follow-up.    The patient met 2-midnight criteria for an inpatient stay at the time of  discharge.    Discharge Date  06/30/2022    FOLLOW UP ITEMS POST DISCHARGE  Primary care physician  Gastroenterology      DISCHARGE DIAGNOSES  Principal Problem:    Alcohol intoxication in active alcoholic with complication (HCC) POA: Yes  Active Problems:    Melena POA: Unknown    LFT elevation POA: Yes    Seizure disorder (HCC) POA: Yes    Hypertension POA: Yes    Chest pain POA: Unknown    Tachycardia POA: Unknown  Resolved Problems:    * No resolved hospital problems. *      FOLLOW UP  [facility] ([facility]) - Primary Care  [address] [address]  [phone]  Follow up      [facility]  [address] [id number] [phone]        [facility] - [facility]  [address] [facility] [address]  [phone]          MEDICATIONS ON DISCHARGE     Medication List      START taking these medications      Instructions   omeprazole 40 MG delayed-release capsule  Commonly known as: PRILOSEC   Take 1 Capsule by mouth every day for 30 days.  Dose: 40 mg     oxyCODONE-acetaminophen 5-325 MG Tabs  Commonly known as: PERCOCET   Take 1 Tablet by mouth every 8 hours as needed for Severe Pain for up to 3 days.  Dose: 1 Tablet        CONTINUE taking these medications      Instructions   folic acid 1 MG Tabs  Commonly known as: FOLVITE   Take 1 Tablet by mouth every day for 30 days.  Dose: 1 mg     levETIRAcetam 500 MG Tabs  Commonly known as: KEPPRA   Take 1 Tablet by mouth 2 times a day for 30 days.  Dose: 500 mg     multivitamin Tabs   Take 1 Tablet by mouth every day for 30 days.  Dose: 1 Tablet     nicotine 21 MG/24HR Pt24  Commonly known as: NICODERM   Place 1 Patch on the skin every 24 hours for 30 days.  Dose: 1 Patch     thiamine 100 MG tablet  Commonly known as: THIAMINE   Take 1 Tablet by mouth every day for 30 days.  Dose: 100 mg        STOP taking these medications    ALLERGY PO     amLODIPine 5 MG Tabs  Commonly known as:  NORVASC     Esomeprazole Magnesium 20 MG Pack     sodium bicarbonate 650 MG Tabs  Commonly known as: SODIUM BICARBONATE            Allergies  Allergies   Allergen Reactions   • Penicillins Unspecified     Unknown childhood reaction        DIET  Orders Placed This Encounter   Procedures   • Diet Order Diet: Regular     Standing Status:   Standing     Number of Occurrences:   1     Order Specific Question:   Diet:     Answer:   Regular [1]       ACTIVITY  As tolerated.  Weight bearing as tolerated    CONSULTATIONS  Gastroenterology    PROCEDURES  EGD    LABORATORY  Lab Results   Component Value Date    SODIUM 136 06/30/2022    POTASSIUM 3.8 06/30/2022    CHLORIDE 108 06/30/2022    CO2 17 (L) 06/30/2022    GLUCOSE 74 06/30/2022    BUN 7 (L) 06/30/2022    CREATININE 0.60 06/30/2022        Lab Results   Component Value Date    WBC 8.7 06/29/2022    HEMOGLOBIN 13.4 (L) 06/30/2022    HEMATOCRIT 42.0 06/30/2022    PLATELETCT 260 06/29/2022      No orders to display     Total time of the discharge process exceeds 45 minutes.

## 2022-06-30 NOTE — ANESTHESIA POSTPROCEDURE EVALUATION
Patient: Donal Carpio    Procedure Summary     Date: 06/30/22 Room / Location: [facility] ROOM 26 / SURGERY SAME DAY [facility]    Anesthesia Start: 1035 Anesthesia Stop: 1057    Procedures:       ESOPHAGOGASTRODUODENOSCOPY (N/A Esophagus)      GASTROSCOPY, WITH BIOPSY (N/A Esophagus) Diagnosis: (Gastropathy, possible barretts)    Surgeons: Jess Lombardo D.O. Responsible Provider: Nahid Lynn M.D.    Anesthesia Type: general ASA Status: 4          Final Anesthesia Type: general  Last vitals  BP   Blood Pressure: 137/84    Temp   36.6 °C (97.8 °F)    Pulse   74   Resp   18    SpO2   99 %      Anesthesia Post Evaluation    Patient location during evaluation: PACU  Patient participation: complete - patient participated  Level of consciousness: awake and alert  Pain score: 4    Airway patency: patent  Anesthetic complications: no  Cardiovascular status: hemodynamically stable  Respiratory status: acceptable  Hydration status: euvolemic    PONV: none          No complications documented.     Nurse Pain Score: 4 (NPRS)

## 2022-06-30 NOTE — CARE PLAN
The patient is Stable - Low risk of patient condition declining or worsening    Shift Goals  Clinical Goals: CIWA monitoring  Patient Goals: sleep  Family Goals: KATALINA    Progress made toward(s) clinical / shift goals:      Problem: Optimal Care for Alcohol Withdrawal  Goal: Optimal Care for the alcohol withdrawal patient  Outcome: Progressing   CIWA 0 overnight. Pt slept comfortably all night.     Problem: Seizure Precautions  Goal: Implementation of seizure precautions  Outcome: Progressing     Problem: Lifestyle Changes  Goal: Patient's ability to identify lifestyle changes and available resources to help reduce recurrence of condition will improve  Outcome: Progressing       Patient is not progressing towards the following goals:

## 2022-06-30 NOTE — PROCEDURES
Procedure Performed: Esophagogastroduodenoscopy (EGD) with esophageal biopsies     Indication for Procedure: Melena        Procedure Date: 6/30/2022    Performing Physician: Jess Lombardo D.O.    Informed Consent: Before the procedure was performed, the risks, benefits, and complications of the procedure were explained in layman's terms to the patient and understood by the patient. The risks included, but were not limited to the risks of anesthesia/sedation, bleeding, pneumothorax, perforation, adverse reaction to medication, acute respiratory failure and possible death. The patient fully understood the risks and benefits and requested the procedure be performed.       Consent Obtained: From the patient.            Anesthesia: General. Please see anesthesia records.     Findings:   1. Esophagus  -Distal short segment mucosa with irregular tongues suggestive short segment Wood's. Biopsies taken.  2. Stomach:   -Diffuse antral gastropathy.  3. Duodenum:   -Visualized portions of the 1st and 2nd duodenum appeared normal.    Procedure Summary: Timeout was performed. The patient was placed in the left lateral decubitus position. An Olympus adult gastroscope was gently passed into the oropharynx and esophagus and advanced into the 2nd part of duodenum. The scope was then drawn back into the stomach. Retroflexion was done to visualize the fundus and cardia of the stomach. The gastroscope was then withdrawn while carefully inspecting the esophageal mucosa. Distal esophageal biopsies taken.  Continuous monitoring of pulse oximetry, heart rhythm and blood pressure were done before, during and after the procedure. The patient left the endoscopy lab in stable condition.    Estimated Blood Loss, if any: Minimal     Unusual Events or Complications: None     Specimen(s), if any: Esophageal biopsies     Disposition:   -No bleeding seen anywhere on exam.  -Distal esophageal biopsies taken for Wood's esophagus evaluation.  -PPI  therapy daily for acid reflux.  -Abstain from alcohol use.  -Follow-up with GI as an outpatient for GERD. Patient states that he lives in [place] and will follow-up in [place].  -Discussed plan with the patient recovery.  -Resume diet as tolerated.

## 2022-06-30 NOTE — ANESTHESIA PREPROCEDURE EVALUATION
Case: [id number] Date/Time: 06/30/22 1145    Procedure: ESOPHAGOGASTRODUODENOSCOPY (N/A Esophagus)    Anesthesia type: MAC    Location: [facility] ROOM 26 / SURGERY SAME DAY [facility]    Surgeons: Jess Lombardo D.O.          Relevant Problems   NEURO   (positive) Alcohol withdrawal seizure, uncomplicated (HCC)   (positive) Seizure disorder (HCC)      CARDIAC   (positive) Hypertension         (positive) Alcoholic hepatitis without ascites       Physical Exam    Airway   Mallampati: II  TM distance: >3 FB  Neck ROM: full       Cardiovascular - normal exam  Rhythm: regular  Rate: normal  (-) murmur     Dental - normal exam           Pulmonary - normal exam  Breath sounds clear to auscultation     Abdominal    Neurological - normal exam                 Anesthesia Plan    ASA 4   ASA physical status 4 criteria: acute alcohol or substance abuse withdrawal    Plan - general       Airway plan will be ETT          Induction: intravenous    Postoperative Plan: Postoperative administration of opioids is intended.    Pertinent diagnostic labs and testing reviewed    Informed Consent:    Anesthetic plan and risks discussed with patient.    Use of blood products discussed with: patient whom consented to blood products.

## 2022-06-30 NOTE — OR NURSING
1055 - Pt to PACU 6 from OR.  Bedside report from anesthesiologist and RN.  Attached to monitoring, Oral airway in place with oxygen via mask.  Breathing calm and unlabored.     1108: ALMAS DC'd. Patient reporting abdominal pain. Also states bed is very uncomfortable. Dr Lombardo at bedside to update patient.  VSS, on room air.      1115 - Report to bedside RN Casey, pt stable to go back to floor.      1123- pt transported to S1 by RN x2

## 2022-06-30 NOTE — PROGRESS NOTES
Pt discharge lounge orders placed. Pt sent to discharge lounge with paper prescriptions, all patient belongings, and front wheel walker.

## 2022-06-30 NOTE — DISCHARGE PLANNING
Received Choice form at [id number]  Agency/Facility Name: [facility]  Referral sent per Choice form @ [id number]      Normal rate, regular rhythm.  Heart sounds S1, S2.

## 2022-06-30 NOTE — CARE PLAN
The patient is Stable - Low risk of patient condition declining or worsening    Shift Goals  Clinical Goals: free of seizures, anxiety, and pain  Patient Goals: sleep with comfort  Family Goals: KATALINA    Progress made toward(s) clinical / shift goals:    Problem: Knowledge Deficit - Standard  Goal: Patient and family/care givers will demonstrate understanding of plan of care, disease process/condition, diagnostic tests and medications  Outcome: Progressing     Problem: Optimal Care for Alcohol Withdrawal  Goal: Optimal Care for the alcohol withdrawal patient  Outcome: Progressing     Problem: Seizure Precautions  Goal: Implementation of seizure precautions  Outcome: Progressing     Problem: Lifestyle Changes  Goal: Patient's ability to identify lifestyle changes and available resources to help reduce recurrence of condition will improve  Outcome: Progressing     Problem: Psychosocial  Goal: Patient's level of anxiety will decrease  Outcome: Progressing  Goal: Spiritual and cultural needs incorporated into hospitalization  Outcome: Progressing     Problem: Risk for Aspiration  Goal: Patient's risk for aspiration will be absent or decrease  Outcome: Progressing     Problem: Pain - Standard  Goal: Alleviation of pain or a reduction in pain to the patient’s comfort goal  Outcome: Progressing     Problem: Fall Risk  Goal: Patient will remain free from falls  Outcome: Progressing       Patient is not progressing towards the following goals:

## 2022-06-30 NOTE — DISCHARGE INSTRUCTIONS
Please continue taking all your medications as you were, you been provided with a prescription for all your medications, I am unable to fill them for you as your insurance is with [employer].  Unfortunately because of your insurance you have to follow-up with physicians in [place], if you are currently residing in [place] and intend to stay here for the time being, we recommend to change her insurance.  Please refrain from any alcohol use.  We have provided outpatient referrals for physical therapy and Occupational Therapy.  We will provide you with a walker.  Please take care and be well!    Alcohol Intoxication  Alcohol intoxication happens when you cannot think clearly or function well (get impaired) after drinking alcohol. This can happen after just one drink. The effect that alcohol has on how you think and function depends on:  How much alcohol you drank.  Your age, your weight, and whether you are a man or a woman.  How often you drink alcohol.  If you have other medical problems.  Alcohol intoxication can range from mild to very bad. It can be dangerous, especially if you:  Drink a large amount of alcohol in a short time (binge drink).  For women, binge drinking is having four or more drinks at one time.  For men, binge drinking is having five or more drinks at one time.  Take certain drugs or medicines.  If you or anyone around you seems intoxicated:  Tell someone.  Get help from someone.  Follow these instructions at home:  Eating and drinking    Ask your doctor if alcohol is safe for you.  If your doctor says that alcohol is safe for you, limit how much you drink to no more than 1 drink a day for women who are not pregnant and 2 drinks a day for men. One drink equals one of these:  12 oz of beer.  5 oz of wine.  1½ oz of hard liquor.  Do not drink alcohol if:  Your doctor tells you not to drink.  You are pregnant, may be pregnant, or are planning to get pregnant.  You are under the legal drinking age (21  years old in the [place]  You are taking medicines that you should not take with alcohol.  Alcohol causes your medical problem to get worse.  You have to drive or do activities that need you to be alert.  You have substance use disorder. This is when using alcohol again and again causes problems with your health, your relationships, or with what you need to do at work, home, or school.  Be sure to eat before you drink alcohol. Avoid drinking when you have an empty stomach.  Make sure you have enough fluid in your body (stay hydrated). To do this:  Drink enough fluid to keep your pee (urine) pale yellow.  Avoid caffeine, which may be in coffee, tea, and some sodas. Caffeine can make you thirsty.  Try not to drink more than one drink an hour.  If you are having more than one drink, have a drink without alcohol (such as water) between your drinks.  General instructions    Take over-the-counter and prescription medicines only as told by your doctor.  Do not drive after drinking any amount of alcohol. Plan for a designated  or another way to go home.  Have someone you trust stay with you while you are intoxicated. Youshould not be left alone.  Keep all follow-up visits as told by your doctor. This is important.  Contact a doctor if:  You do not feel better after a few days.  You have problems at work, at school, or at home due to drinking.  Get help right away if:  You have any of the following:  Moderate or very bad trouble with:  Movement (coordination).  Talking.  Memory.  Paying attention to things.  Trouble staying awake.  Being very confused.  Jerky movements that you cannot control (seizure).  Light-headedness.  Fainting.  Throwing up (vomiting) blood. The blood may be bright red or look like coffee grounds.  Blood in your poop (stool). The blood may:  Be bright red.  Make your poop black and tarry and make it smell bad.  Feeling shaky when you try to stop drinking.  Thoughts about hurting yourself or  others.  If you ever feel like you may hurt yourself or others, or have thoughts about taking your own life, get help right away. You can go to your nearest emergency department or call:  Your local emergency services (911 in the [place]  A suicide crisis helpline, such as the National Suicide Prevention Lifeline at 1-212.202.6748. This is open 24 hours a day.  Summary  Alcohol intoxication happens when you cannot think clearly or function well (get impaired) after drinking alcohol. This can happen after just one drink.  If your doctor says that alcohol is safe for you, limit how much you drink to no more than 1 drink a day for women who are not pregnant and 2 drinks a day for men.  Contact a doctor if you have problems at work, at school, or at home due to drinking.  Get help right away if you have thoughts about hurting yourself or others.  This information is not intended to replace advice given to you by your health care provider. Make sure you discuss any questions you have with your health care provider.  Document Released: 06/05/2009 Document Revised: 04/09/2019 Document Reviewed: 04/09/2019  [employer] Patient Education © 2020 [employer]    Acetaminophen; Oxycodone tablets  What is this medicine?  ACETAMINOPHEN; OXYCODONE (a set a CEZAR nikhil fen; ox i KOE done) is a pain reliever. It is used to treat moderate to severe pain.  This medicine may be used for other purposes; ask your health care provider or pharmacist if you have questions.  COMMON BRAND NAME(S): Endocet, Magnacet, Nalocet, Narvox, Percocet, Perloxx, Primalev, Primlev, Roxicet, Xolox  What should I tell my health care provider before I take this medicine?  They need to know if you have any of these conditions:  brain tumor  Crohn's disease, inflammatory bowel disease, or ulcerative colitis  drug abuse or addiction  head injury  heart or circulation problems  if you often drink alcohol  kidney disease or problems going to the bathroom  liver  disease  lung disease, asthma, or breathing problems  an unusual or allergic reaction to acetaminophen, oxycodone, other opioid analgesics, other medicines, foods, dyes, or preservatives  pregnant or trying to get pregnant  breast-feeding  How should I use this medicine?  Take this medicine by mouth with a full glass of water. Follow the directions on the prescription label. You can take it with or without food. If it upsets your stomach, take it with food. Take your medicine at regular intervals. Do not take it more often than directed.  A special MedGuide will be given to you by the pharmacist with each prescription and refill. Be sure to read this information carefully each time.  Talk to your pediatrician regarding the use of this medicine in children. Special care may be needed.  Overdosage: If you think you have taken too much of this medicine contact a poison control center or emergency room at once.  NOTE: This medicine is only for you. Do not share this medicine with others.  What if I miss a dose?  If you miss a dose, take it as soon as you can. If it is almost time for your next dose, take only that dose. Do not take double or extra doses.  What may interact with this medicine?  This medicine may interact with the following medications:  alcohol  antihistamines for allergy, cough and cold  antiviral medicines for HIV or AIDS  atropine  certain antibiotics like clarithromycin, erythromycin, linezolid, rifampin  certain medicines for anxiety or sleep  certain medicines for bladder problems like oxybutynin, tolterodine  certain medicines for depression like amitriptyline, fluoxetine, sertraline  certain medicines for fungal infections like ketoconazole, itraconazole, voriconazole  certain medicines for migraine headache like almotriptan, eletriptan, frovatriptan, naratriptan, rizatriptan, sumatriptan, zolmitriptan  certain medicines for nausea or vomiting like dolasetron, ondansetron, palonosetron  certain  medicines for Parkinson's disease like benztropine, trihexyphenidyl  certain medicines for seizures like phenobarbital, phenytoin, primidone  certain medicines for stomach problems like dicyclomine, hyoscyamine  certain medicines for travel sickness like scopolamine  diuretics  general anesthetics like halothane, isoflurane, methoxyflurane, propofol  ipratropium  local anesthetics like lidocaine, pramoxine, tetracaine  MAOIs like Carbex, Eldepryl, Marplan, Nardil, and Parnate  medicines that relax muscles for surgery  methylene blue  nilotinib  other medicines with acetaminophen  other narcotic medicines for pain or cough  phenothiazines like chlorpromazine, mesoridazine, prochlorperazine, thioridazine  This list may not describe all possible interactions. Give your health care provider a list of all the medicines, herbs, non-prescription drugs, or dietary supplements you use. Also tell them if you smoke, drink alcohol, or use illegal drugs. Some items may interact with your medicine.  What should I watch for while using this medicine?  Tell your doctor or health care professional if your pain does not go away, if it gets worse, or if you have new or a different type of pain. You may develop tolerance to the medicine. Tolerance means that you will need a higher dose of the medication for pain relief. Tolerance is normal and is expected if you take this medicine for a long time.  Do not suddenly stop taking your medicine because you may develop a severe reaction. Your body becomes used to the medicine. This does NOT mean you are addicted. Addiction is a behavior related to getting and using a drug for a non-medical reason. If you have pain, you have a medical reason to take pain medicine. Your doctor will tell you how much medicine to take. If your doctor wants you to stop the medicine, the dose will be slowly lowered over time to avoid any side effects.  There are different types of narcotic medicines (opiates). If  you take more than one type at the same time or if you are taking another medicine that also causes drowsiness, you may have more side effects. Give your health care provider a list of all medicines you use. Your doctor will tell you how much medicine to take. Do not take more medicine than directed. Call emergency for help if you have problems breathing or unusual sleepiness.  Do not take other medicines that contain acetaminophen with this medicine. Always read labels carefully. If you have questions, ask your doctor or pharmacist.  If you take too much acetaminophen get medical help right away. Too much acetaminophen can be very dangerous and cause liver damage. Even if you do not have symptoms, it is important to get help right away.  You may get drowsy or dizzy. Do not drive, use machinery, or do anything that needs mental alertness until you know how this medicine affects you. Do not stand or sit up quickly, especially if you are an older patient. This reduces the risk of dizzy or fainting spells. Alcohol may interfere with the effect of this medicine. Avoid alcoholic drinks.  The medicine will cause constipation. Try to have a bowel movement at least every 2 to 3 days. If you do not have a bowel movement for 3 days, call your doctor or health care professional.  Your mouth may get dry. Chewing sugarless gum or sucking hard candy, and drinking plenty or water may help. Contact your doctor if the problem does not go away or is severe.  What side effects may I notice from receiving this medicine?  Side effects that you should report to your doctor or health care professional as soon as possible:  allergic reactions like skin rash, itching or hives, swelling of the face, lips, or tongue  breathing problems  confusion  redness, blistering, peeling or loosening of the skin, including inside the mouth  signs and symptoms of liver injury like dark yellow or brown urine; general ill feeling or flu-like symptoms;  light-colored stools; loss of appetite; nausea; right upper belly pain; unusually weak or tired; yellowing of the eyes or skin  signs and symptoms of low blood pressure like dizziness; feeling faint or lightheaded, falls; unusually weak or tired  trouble passing urine or change in the amount of urine  Side effects that usually do not require medical attention (report to your doctor or health care professional if they continue or are bothersome):  constipation  dry mouth  nausea, vomiting  tiredness  This list may not describe all possible side effects. Call your doctor for medical advice about side effects. You may report side effects to FDA at 4-489-NWD-8884.  Where should I keep my medicine?  Keep out of the reach of children. This medicine can be abused. Keep your medicine in a safe place to protect it from theft. Do not share this medicine with anyone. Selling or giving away this medicine is dangerous and against the law.  Store at room temperature between 20 and 25 degrees C (68 and 77 degrees F).  This medicine may cause harm and death if it is taken by other adults, children, or pets. Return medicine that has not been used to an official disposal site. Contact the [facility] at 1-231.627.2035 or your [facility] government to find a site. If you cannot return the medicine, flush it down the toilet. Do not use the medicine after the expiration date.  NOTE: This sheet is a summary. It may not cover all possible information. If you have questions about this medicine, talk to your doctor, pharmacist, or health care provider.  © 2020 [employer] (2018-04-24 15:46:38)  Discharge Instructions    Discharged to home by taxi with self. Discharged via wheelchair, hospital escort: Yes.  Special equipment needed: Not Applicable    Be sure to schedule a follow-up appointment with your primary care doctor or any specialists as instructed.     Discharge Plan:   Diet Plan: Discussed  Activity Level: Discussed  Confirmed  Follow up Appointment: Patient to Call and Schedule Appointment  Confirmed Symptoms Management: Discussed  Medication Reconciliation Updated: Yes    I understand that a diet low in cholesterol, fat, and sodium is recommended for good health. Unless I have been given specific instructions below for another diet, I accept this instruction as my diet prescription.   Other diet:     Special Instructions: None    Is patient discharged on Warfarin / Coumadin?   No     Depression / Suicide Risk    As you are discharged from this [facility] facility, it is important to learn how to keep safe from harming yourself.    Recognize the warning signs:  Abrupt changes in personality, positive or negative- including increase in energy   Giving away possessions  Change in eating patterns- significant weight changes-  positive or negative  Change in sleeping patterns- unable to sleep or sleeping all the time   Unwillingness or inability to communicate  Depression  Unusual sadness, discouragement and loneliness  Talk of wanting to die  Neglect of personal appearance   Rebelliousness- reckless behavior  Withdrawal from people/activities they love  Confusion- inability to concentrate     If you or a loved one observes any of these behaviors or has concerns about self-harm, here's what you can do:  Talk about it- your feelings and reasons for harming yourself  Remove any means that you might use to hurt yourself (examples: pills, rope, extension cords, firearm)  Get professional help from the community (Mental Health, Substance Abuse, psychological counseling)  Do not be alone:Call your Safe Contact- someone whom you trust who will be there for you.  Call your local CRISIS HOTLINE 753-0529 or 579-537-8242  Call your local Children's Mobile Crisis Response Team [place] ([phone] 181-0239 or www.[employer]Sem[employer]  Call the toll free National Suicide Prevention Hotlines   National Suicide Prevention Lifeline 187-089-JYPX  (0251)  [facility] 800-SUICIDE (622-9961)

## 2022-07-07 ENCOUNTER — ANESTHESIA (OUTPATIENT)
Dept: SURGERY | Facility: MEDICAL CENTER | Age: 52
DRG: 353 | End: 2022-07-07
Payer: COMMERCIAL

## 2022-07-07 ENCOUNTER — APPOINTMENT (OUTPATIENT)
Dept: RADIOLOGY | Facility: MEDICAL CENTER | Age: 52
DRG: 353 | End: 2022-07-07
Attending: SURGERY
Payer: COMMERCIAL

## 2022-07-07 ENCOUNTER — HOSPITAL ENCOUNTER (INPATIENT)
Facility: MEDICAL CENTER | Age: 52
LOS: 1 days | DRG: 353 | End: 2022-07-09
Attending: EMERGENCY MEDICINE | Admitting: SURGERY
Payer: COMMERCIAL

## 2022-07-07 ENCOUNTER — ANESTHESIA EVENT (OUTPATIENT)
Dept: SURGERY | Facility: MEDICAL CENTER | Age: 52
DRG: 353 | End: 2022-07-07
Payer: COMMERCIAL

## 2022-07-07 DIAGNOSIS — K27.9 PEPTIC ULCER: ICD-10-CM

## 2022-07-07 DIAGNOSIS — M54.50 LOW BACK PAIN WITHOUT SCIATICA, UNSPECIFIED BACK PAIN LATERALITY, UNSPECIFIED CHRONICITY: ICD-10-CM

## 2022-07-07 DIAGNOSIS — S31.109A PENETRATING ABDOMINAL TRAUMA, INITIAL ENCOUNTER: ICD-10-CM

## 2022-07-07 DIAGNOSIS — G40.909 SEIZURE DISORDER (HCC): ICD-10-CM

## 2022-07-07 DIAGNOSIS — T14.90XA TRAUMA: ICD-10-CM

## 2022-07-07 DIAGNOSIS — M54.2 NECK PAIN: ICD-10-CM

## 2022-07-07 PROBLEM — Z72.0 NICOTINE USE: Status: ACTIVE | Noted: 2022-07-07

## 2022-07-07 PROBLEM — K21.9 GASTROESOPHAGEAL REFLUX DISEASE WITHOUT ESOPHAGITIS: Status: ACTIVE | Noted: 2022-07-07

## 2022-07-07 PROBLEM — J30.2 SEASONAL ALLERGIES: Status: ACTIVE | Noted: 2022-07-07

## 2022-07-07 PROBLEM — Z78.9 ALCOHOL USE: Status: ACTIVE | Noted: 2022-07-07

## 2022-07-07 PROBLEM — S31.119A: Status: ACTIVE | Noted: 2022-07-07

## 2022-07-07 PROBLEM — E87.20 LACTIC ACIDOSIS: Status: ACTIVE | Noted: 2022-07-07

## 2022-07-07 LAB
ABO + RH BLD: NORMAL
ABO GROUP BLD: NORMAL
ALBUMIN SERPL BCP-MCNC: 3.8 G/DL (ref 3.2–4.9)
ALBUMIN/GLOB SERPL: 0.9 G/DL
ALP SERPL-CCNC: 125 U/L (ref 30–99)
ALT SERPL-CCNC: 96 U/L (ref 2–50)
ANION GAP SERPL CALC-SCNC: 18 MMOL/L (ref 7–16)
APTT PPP: 28.8 SEC (ref 24.7–36)
AST SERPL-CCNC: 107 U/L (ref 12–45)
BILIRUB SERPL-MCNC: 0.3 MG/DL (ref 0.1–1.5)
BLD GP AB SCN SERPL QL: NORMAL
BUN SERPL-MCNC: 3 MG/DL (ref 8–22)
CALCIUM SERPL-MCNC: 8.7 MG/DL (ref 8.5–10.5)
CFT BLD TEG: 8.4 MIN (ref 4.6–9.1)
CFT P HPASE BLD TEG: 6.7 MIN (ref 4.3–8.3)
CHLORIDE SERPL-SCNC: 102 MMOL/L (ref 96–112)
CLOT ANGLE BLD TEG: 72 DEGREES (ref 63–78)
CLOT LYSIS 30M P MA LENFR BLD TEG: 1.5 % (ref 0–2.6)
CO2 SERPL-SCNC: 18 MMOL/L (ref 20–33)
CREAT SERPL-MCNC: 0.79 MG/DL (ref 0.5–1.4)
CT.EXTRINSIC BLD ROTEM: 1.3 MIN (ref 0.8–2.1)
ERYTHROCYTE [DISTWIDTH] IN BLOOD BY AUTOMATED COUNT: 50.6 FL (ref 35.9–50)
ETHANOL BLD-MCNC: 14.4 MG/DL
GFR SERPLBLD CREATININE-BSD FMLA CKD-EPI: 108 ML/MIN/1.73 M 2
GLOBULIN SER CALC-MCNC: 4.1 G/DL (ref 1.9–3.5)
GLUCOSE SERPL-MCNC: 133 MG/DL (ref 65–99)
HCT VFR BLD AUTO: 45.1 % (ref 42–52)
HGB BLD-MCNC: 15.3 G/DL (ref 14–18)
INR PPP: 1.03 (ref 0.87–1.13)
LACTATE SERPL-SCNC: 3.2 MMOL/L (ref 0.5–2)
LACTATE SERPL-SCNC: 3.7 MMOL/L (ref 0.5–2)
LACTATE SERPL-SCNC: 6.4 MMOL/L (ref 0.5–2)
MCF BLD TEG: 59.6 MM (ref 52–69)
MCF.PLATELET INHIB BLD ROTEM: 23.9 MM (ref 15–32)
MCH RBC QN AUTO: 29 PG (ref 27–33)
MCHC RBC AUTO-ENTMCNC: 33.9 G/DL (ref 33.7–35.3)
MCV RBC AUTO: 85.6 FL (ref 81.4–97.8)
PA AA BLD-ACNC: 0 % (ref 0–11)
PA ADP BLD-ACNC: 19.6 % (ref 0–17)
PLATELET # BLD AUTO: 346 K/UL (ref 164–446)
PMV BLD AUTO: 8.6 FL (ref 9–12.9)
POTASSIUM SERPL-SCNC: 3.2 MMOL/L (ref 3.6–5.5)
PROT SERPL-MCNC: 7.9 G/DL (ref 6–8.2)
PROTHROMBIN TIME: 13.4 SEC (ref 12–14.6)
RBC # BLD AUTO: 5.27 M/UL (ref 4.7–6.1)
RH BLD: NORMAL
SODIUM SERPL-SCNC: 138 MMOL/L (ref 135–145)
TEG ALGORITHM TGALG: ABNORMAL
WBC # BLD AUTO: 7.3 K/UL (ref 4.8–10.8)

## 2022-07-07 PROCEDURE — 83605 ASSAY OF LACTIC ACID: CPT

## 2022-07-07 PROCEDURE — 700101 HCHG RX REV CODE 250: Performed by: ANESTHESIOLOGY

## 2022-07-07 PROCEDURE — 700102 HCHG RX REV CODE 250 W/ 637 OVERRIDE(OP): Performed by: ANESTHESIOLOGY

## 2022-07-07 PROCEDURE — G0390 TRAUMA RESPONS W/HOSP CRITI: HCPCS

## 2022-07-07 PROCEDURE — 700102 HCHG RX REV CODE 250 W/ 637 OVERRIDE(OP): Performed by: SURGERY

## 2022-07-07 PROCEDURE — 86900 BLOOD TYPING SEROLOGIC ABO: CPT

## 2022-07-07 PROCEDURE — A9270 NON-COVERED ITEM OR SERVICE: HCPCS | Performed by: SURGERY

## 2022-07-07 PROCEDURE — 85347 COAGULATION TIME ACTIVATED: CPT

## 2022-07-07 PROCEDURE — 160002 HCHG RECOVERY MINUTES (STAT): Performed by: SURGERY

## 2022-07-07 PROCEDURE — 36415 COLL VENOUS BLD VENIPUNCTURE: CPT

## 2022-07-07 PROCEDURE — 85730 THROMBOPLASTIN TIME PARTIAL: CPT

## 2022-07-07 PROCEDURE — 85576 BLOOD PLATELET AGGREGATION: CPT | Mod: 91

## 2022-07-07 PROCEDURE — A9270 NON-COVERED ITEM OR SERVICE: HCPCS | Performed by: ANESTHESIOLOGY

## 2022-07-07 PROCEDURE — 96375 TX/PRO/DX INJ NEW DRUG ADDON: CPT

## 2022-07-07 PROCEDURE — 700105 HCHG RX REV CODE 258: Performed by: ANESTHESIOLOGY

## 2022-07-07 PROCEDURE — 700111 HCHG RX REV CODE 636 W/ 250 OVERRIDE (IP)

## 2022-07-07 PROCEDURE — 74018 RADEX ABDOMEN 1 VIEW: CPT

## 2022-07-07 PROCEDURE — 00790 ANES IPER UPR ABD NOS: CPT | Performed by: ANESTHESIOLOGY

## 2022-07-07 PROCEDURE — 80053 COMPREHEN METABOLIC PANEL: CPT

## 2022-07-07 PROCEDURE — 96374 THER/PROPH/DIAG INJ IV PUSH: CPT

## 2022-07-07 PROCEDURE — 0WQF0ZZ REPAIR ABDOMINAL WALL, OPEN APPROACH: ICD-10-PCS | Performed by: SURGERY

## 2022-07-07 PROCEDURE — 160036 HCHG PACU - EA ADDL 30 MINS PHASE I: Performed by: SURGERY

## 2022-07-07 PROCEDURE — 160028 HCHG SURGERY MINUTES - 1ST 30 MINS LEVEL 3: Performed by: SURGERY

## 2022-07-07 PROCEDURE — 86901 BLOOD TYPING SEROLOGIC RH(D): CPT

## 2022-07-07 PROCEDURE — 700105 HCHG RX REV CODE 258: Performed by: SURGERY

## 2022-07-07 PROCEDURE — 71045 X-RAY EXAM CHEST 1 VIEW: CPT

## 2022-07-07 PROCEDURE — 700102 HCHG RX REV CODE 250 W/ 637 OVERRIDE(OP): Performed by: SPECIALIST

## 2022-07-07 PROCEDURE — 160048 HCHG OR STATISTICAL LEVEL 1-5: Performed by: SURGERY

## 2022-07-07 PROCEDURE — 700111 HCHG RX REV CODE 636 W/ 250 OVERRIDE (IP): Performed by: SURGERY

## 2022-07-07 PROCEDURE — 85027 COMPLETE CBC AUTOMATED: CPT

## 2022-07-07 PROCEDURE — 700111 HCHG RX REV CODE 636 W/ 250 OVERRIDE (IP): Performed by: EMERGENCY MEDICINE

## 2022-07-07 PROCEDURE — 700111 HCHG RX REV CODE 636 W/ 250 OVERRIDE (IP): Performed by: SPECIALIST

## 2022-07-07 PROCEDURE — 160009 HCHG ANES TIME/MIN: Performed by: SURGERY

## 2022-07-07 PROCEDURE — A9270 NON-COVERED ITEM OR SERVICE: HCPCS | Performed by: SPECIALIST

## 2022-07-07 PROCEDURE — 49320 DIAG LAPARO SEPARATE PROC: CPT | Performed by: SURGERY

## 2022-07-07 PROCEDURE — 160039 HCHG SURGERY MINUTES - EA ADDL 1 MIN LEVEL 3: Performed by: SURGERY

## 2022-07-07 PROCEDURE — 85610 PROTHROMBIN TIME: CPT

## 2022-07-07 PROCEDURE — 99291 CRITICAL CARE FIRST HOUR: CPT

## 2022-07-07 PROCEDURE — 160035 HCHG PACU - 1ST 60 MINS PHASE I: Performed by: SURGERY

## 2022-07-07 PROCEDURE — 700111 HCHG RX REV CODE 636 W/ 250 OVERRIDE (IP): Performed by: ANESTHESIOLOGY

## 2022-07-07 PROCEDURE — 96376 TX/PRO/DX INJ SAME DRUG ADON: CPT

## 2022-07-07 PROCEDURE — 82077 ASSAY SPEC XCP UR&BREATH IA: CPT

## 2022-07-07 PROCEDURE — 700101 HCHG RX REV CODE 250: Performed by: SURGERY

## 2022-07-07 PROCEDURE — 20102 EXPL PENTRG WND ABD/FLNK/BK: CPT | Performed by: SURGERY

## 2022-07-07 PROCEDURE — 85384 FIBRINOGEN ACTIVITY: CPT

## 2022-07-07 PROCEDURE — G0378 HOSPITAL OBSERVATION PER HR: HCPCS

## 2022-07-07 PROCEDURE — 99219 PR INITIAL OBSERVATION CARE,LEVL II: CPT | Mod: 57 | Performed by: SURGERY

## 2022-07-07 PROCEDURE — 86850 RBC ANTIBODY SCREEN: CPT

## 2022-07-07 RX ORDER — ONDANSETRON 2 MG/ML
INJECTION INTRAMUSCULAR; INTRAVENOUS
Status: COMPLETED | OUTPATIENT
Start: 2022-07-07 | End: 2022-07-07

## 2022-07-07 RX ORDER — HYDROMORPHONE HYDROCHLORIDE 1 MG/ML
0.4 INJECTION, SOLUTION INTRAMUSCULAR; INTRAVENOUS; SUBCUTANEOUS
Status: DISCONTINUED | OUTPATIENT
Start: 2022-07-07 | End: 2022-07-07

## 2022-07-07 RX ORDER — MEPERIDINE HYDROCHLORIDE 25 MG/ML
12.5 INJECTION INTRAMUSCULAR; INTRAVENOUS; SUBCUTANEOUS
Status: DISCONTINUED | OUTPATIENT
Start: 2022-07-07 | End: 2022-07-07

## 2022-07-07 RX ORDER — ONDANSETRON 2 MG/ML
4 INJECTION INTRAMUSCULAR; INTRAVENOUS
Status: DISCONTINUED | OUTPATIENT
Start: 2022-07-07 | End: 2022-07-07 | Stop reason: HOSPADM

## 2022-07-07 RX ORDER — NICOTINE 21 MG/24HR
14 PATCH, TRANSDERMAL 24 HOURS TRANSDERMAL
Status: DISCONTINUED | OUTPATIENT
Start: 2022-07-07 | End: 2022-07-09 | Stop reason: HOSPADM

## 2022-07-07 RX ORDER — HALOPERIDOL 5 MG/ML
1 INJECTION INTRAMUSCULAR
Status: DISCONTINUED | OUTPATIENT
Start: 2022-07-07 | End: 2022-07-07 | Stop reason: HOSPADM

## 2022-07-07 RX ORDER — SODIUM CHLORIDE, SODIUM LACTATE, POTASSIUM CHLORIDE, CALCIUM CHLORIDE 600; 310; 30; 20 MG/100ML; MG/100ML; MG/100ML; MG/100ML
INJECTION, SOLUTION INTRAVENOUS ONCE
Status: COMPLETED | OUTPATIENT
Start: 2022-07-07 | End: 2022-07-07

## 2022-07-07 RX ORDER — BUPIVACAINE HYDROCHLORIDE AND EPINEPHRINE 5; 5 MG/ML; UG/ML
INJECTION, SOLUTION EPIDURAL; INTRACAUDAL; PERINEURAL
Status: DISCONTINUED | OUTPATIENT
Start: 2022-07-07 | End: 2022-07-07 | Stop reason: HOSPADM

## 2022-07-07 RX ORDER — HYDRALAZINE HYDROCHLORIDE 20 MG/ML
5 INJECTION INTRAMUSCULAR; INTRAVENOUS
Status: DISCONTINUED | OUTPATIENT
Start: 2022-07-07 | End: 2022-07-07 | Stop reason: HOSPADM

## 2022-07-07 RX ORDER — OXYCODONE HYDROCHLORIDE 5 MG/1
5 TABLET ORAL
Status: DISCONTINUED | OUTPATIENT
Start: 2022-07-07 | End: 2022-07-09 | Stop reason: HOSPADM

## 2022-07-07 RX ORDER — ACETAMINOPHEN 325 MG/1
650 TABLET ORAL EVERY 6 HOURS
Status: DISCONTINUED | OUTPATIENT
Start: 2022-07-07 | End: 2022-07-09 | Stop reason: HOSPADM

## 2022-07-07 RX ORDER — LORAZEPAM 2 MG/ML
INJECTION INTRAMUSCULAR
Status: COMPLETED
Start: 2022-07-07 | End: 2022-07-07

## 2022-07-07 RX ORDER — OXYCODONE HCL 5 MG/5 ML
5 SOLUTION, ORAL ORAL
Status: COMPLETED | OUTPATIENT
Start: 2022-07-07 | End: 2022-07-07

## 2022-07-07 RX ORDER — LORATADINE 10 MG/1
10 TABLET ORAL DAILY
Status: DISCONTINUED | OUTPATIENT
Start: 2022-07-08 | End: 2022-07-09 | Stop reason: HOSPADM

## 2022-07-07 RX ORDER — DIPHENHYDRAMINE HCL 25 MG
50 TABLET ORAL ONCE
Status: COMPLETED | OUTPATIENT
Start: 2022-07-07 | End: 2022-07-07

## 2022-07-07 RX ORDER — LEVETIRACETAM 500 MG/1
500 TABLET ORAL 2 TIMES DAILY
Status: DISCONTINUED | OUTPATIENT
Start: 2022-07-07 | End: 2022-07-08

## 2022-07-07 RX ORDER — OMEPRAZOLE 20 MG/1
20 CAPSULE, DELAYED RELEASE ORAL DAILY
Status: DISCONTINUED | OUTPATIENT
Start: 2022-07-08 | End: 2022-07-09 | Stop reason: HOSPADM

## 2022-07-07 RX ORDER — HYDROMORPHONE HYDROCHLORIDE 1 MG/ML
0.5 INJECTION, SOLUTION INTRAMUSCULAR; INTRAVENOUS; SUBCUTANEOUS
Status: DISCONTINUED | OUTPATIENT
Start: 2022-07-07 | End: 2022-07-08

## 2022-07-07 RX ORDER — ENEMA 19; 7 G/133ML; G/133ML
1 ENEMA RECTAL
Status: DISCONTINUED | OUTPATIENT
Start: 2022-07-07 | End: 2022-07-09 | Stop reason: HOSPADM

## 2022-07-07 RX ORDER — HYDROMORPHONE HYDROCHLORIDE 1 MG/ML
0.2 INJECTION, SOLUTION INTRAMUSCULAR; INTRAVENOUS; SUBCUTANEOUS
Status: DISCONTINUED | OUTPATIENT
Start: 2022-07-07 | End: 2022-07-07

## 2022-07-07 RX ORDER — ACETAMINOPHEN 325 MG/1
650 TABLET ORAL EVERY 6 HOURS PRN
Status: DISCONTINUED | OUTPATIENT
Start: 2022-07-12 | End: 2022-07-09 | Stop reason: HOSPADM

## 2022-07-07 RX ORDER — ONDANSETRON 4 MG/1
4 TABLET, ORALLY DISINTEGRATING ORAL EVERY 4 HOURS PRN
Status: DISCONTINUED | OUTPATIENT
Start: 2022-07-07 | End: 2022-07-09 | Stop reason: HOSPADM

## 2022-07-07 RX ORDER — IBUPROFEN 800 MG/1
800 TABLET ORAL 3 TIMES DAILY PRN
Status: DISCONTINUED | OUTPATIENT
Start: 2022-07-10 | End: 2022-07-09 | Stop reason: HOSPADM

## 2022-07-07 RX ORDER — BISACODYL 10 MG
10 SUPPOSITORY, RECTAL RECTAL
Status: DISCONTINUED | OUTPATIENT
Start: 2022-07-07 | End: 2022-07-09 | Stop reason: HOSPADM

## 2022-07-07 RX ORDER — AMOXICILLIN 250 MG
1 CAPSULE ORAL NIGHTLY
Status: DISCONTINUED | OUTPATIENT
Start: 2022-07-07 | End: 2022-07-09 | Stop reason: HOSPADM

## 2022-07-07 RX ORDER — GABAPENTIN 100 MG/1
100 CAPSULE ORAL 3 TIMES DAILY
Status: DISCONTINUED | OUTPATIENT
Start: 2022-07-07 | End: 2022-07-09 | Stop reason: HOSPADM

## 2022-07-07 RX ORDER — HYDROMORPHONE HYDROCHLORIDE 1 MG/ML
0.6 INJECTION, SOLUTION INTRAMUSCULAR; INTRAVENOUS; SUBCUTANEOUS
Status: DISCONTINUED | OUTPATIENT
Start: 2022-07-07 | End: 2022-07-07

## 2022-07-07 RX ORDER — LABETALOL HYDROCHLORIDE 5 MG/ML
5 INJECTION, SOLUTION INTRAVENOUS
Status: DISCONTINUED | OUTPATIENT
Start: 2022-07-07 | End: 2022-07-07 | Stop reason: HOSPADM

## 2022-07-07 RX ORDER — ALBUTEROL SULFATE 2.5 MG/3ML
2.5 SOLUTION RESPIRATORY (INHALATION)
Status: DISCONTINUED | OUTPATIENT
Start: 2022-07-07 | End: 2022-07-07 | Stop reason: HOSPADM

## 2022-07-07 RX ORDER — DIPHENHYDRAMINE HYDROCHLORIDE 50 MG/ML
12.5 INJECTION INTRAMUSCULAR; INTRAVENOUS
Status: DISCONTINUED | OUTPATIENT
Start: 2022-07-07 | End: 2022-07-07 | Stop reason: HOSPADM

## 2022-07-07 RX ORDER — METAXALONE 400 MG/1
400 TABLET ORAL 3 TIMES DAILY
Status: DISCONTINUED | OUTPATIENT
Start: 2022-07-07 | End: 2022-07-08

## 2022-07-07 RX ORDER — DOCUSATE SODIUM 100 MG/1
100 CAPSULE, LIQUID FILLED ORAL 2 TIMES DAILY
Status: DISCONTINUED | OUTPATIENT
Start: 2022-07-07 | End: 2022-07-09 | Stop reason: HOSPADM

## 2022-07-07 RX ORDER — AMOXICILLIN 250 MG
1 CAPSULE ORAL
Status: DISCONTINUED | OUTPATIENT
Start: 2022-07-07 | End: 2022-07-09 | Stop reason: HOSPADM

## 2022-07-07 RX ORDER — POLYETHYLENE GLYCOL 3350 17 G/17G
1 POWDER, FOR SOLUTION ORAL 2 TIMES DAILY
Status: DISCONTINUED | OUTPATIENT
Start: 2022-07-07 | End: 2022-07-09 | Stop reason: HOSPADM

## 2022-07-07 RX ORDER — SODIUM CHLORIDE, SODIUM GLUCONATE, SODIUM ACETATE, POTASSIUM CHLORIDE AND MAGNESIUM CHLORIDE 526; 502; 368; 37; 30 MG/100ML; MG/100ML; MG/100ML; MG/100ML; MG/100ML
500 INJECTION, SOLUTION INTRAVENOUS CONTINUOUS
Status: DISCONTINUED | OUTPATIENT
Start: 2022-07-07 | End: 2022-07-07 | Stop reason: HOSPADM

## 2022-07-07 RX ORDER — NICOTINE 21 MG/24HR
14 PATCH, TRANSDERMAL 24 HOURS TRANSDERMAL
Status: DISCONTINUED | OUTPATIENT
Start: 2022-07-08 | End: 2022-07-07

## 2022-07-07 RX ORDER — OXYCODONE HCL 5 MG/5 ML
10 SOLUTION, ORAL ORAL
Status: COMPLETED | OUTPATIENT
Start: 2022-07-07 | End: 2022-07-07

## 2022-07-07 RX ORDER — SODIUM CHLORIDE, SODIUM LACTATE, POTASSIUM CHLORIDE, CALCIUM CHLORIDE 600; 310; 30; 20 MG/100ML; MG/100ML; MG/100ML; MG/100ML
INJECTION, SOLUTION INTRAVENOUS
Status: DISCONTINUED | OUTPATIENT
Start: 2022-07-07 | End: 2022-07-07 | Stop reason: HOSPADM

## 2022-07-07 RX ORDER — SUCCINYLCHOLINE CHLORIDE 20 MG/ML
INJECTION INTRAMUSCULAR; INTRAVENOUS PRN
Status: DISCONTINUED | OUTPATIENT
Start: 2022-07-07 | End: 2022-07-07 | Stop reason: HOSPADM

## 2022-07-07 RX ORDER — OXYCODONE HYDROCHLORIDE 10 MG/1
10 TABLET ORAL
Status: DISCONTINUED | OUTPATIENT
Start: 2022-07-07 | End: 2022-07-09

## 2022-07-07 RX ORDER — KETOROLAC TROMETHAMINE 30 MG/ML
15 INJECTION, SOLUTION INTRAMUSCULAR; INTRAVENOUS EVERY 6 HOURS
Status: DISCONTINUED | OUTPATIENT
Start: 2022-07-07 | End: 2022-07-09 | Stop reason: HOSPADM

## 2022-07-07 RX ORDER — ONDANSETRON 2 MG/ML
4 INJECTION INTRAMUSCULAR; INTRAVENOUS EVERY 4 HOURS PRN
Status: DISCONTINUED | OUTPATIENT
Start: 2022-07-07 | End: 2022-07-09 | Stop reason: HOSPADM

## 2022-07-07 RX ORDER — SULFAMETHOXAZOLE AND TRIMETHOPRIM 800; 160 MG/1; MG/1
1 TABLET ORAL EVERY 12 HOURS
Status: DISCONTINUED | OUTPATIENT
Start: 2022-07-07 | End: 2022-07-09 | Stop reason: HOSPADM

## 2022-07-07 RX ORDER — LEVETIRACETAM 500 MG/5ML
1000 INJECTION, SOLUTION, CONCENTRATE INTRAVENOUS ONCE
Status: COMPLETED | OUTPATIENT
Start: 2022-07-07 | End: 2022-07-07

## 2022-07-07 RX ADMIN — OXYCODONE HYDROCHLORIDE 10 MG: 10 TABLET ORAL at 19:14

## 2022-07-07 RX ADMIN — CEFEPIME 2 G: 2 INJECTION, POWDER, FOR SOLUTION INTRAVENOUS at 13:14

## 2022-07-07 RX ADMIN — SULFAMETHOXAZOLE AND TRIMETHOPRIM 1 TABLET: 800; 160 TABLET ORAL at 21:02

## 2022-07-07 RX ADMIN — LEVETIRACETAM 1000 MG: 100 INJECTION, SOLUTION INTRAVENOUS at 20:55

## 2022-07-07 RX ADMIN — FENTANYL CITRATE 50 MCG: 50 INJECTION, SOLUTION INTRAMUSCULAR; INTRAVENOUS at 13:09

## 2022-07-07 RX ADMIN — SODIUM CHLORIDE, POTASSIUM CHLORIDE, SODIUM LACTATE AND CALCIUM CHLORIDE: 600; 310; 30; 20 INJECTION, SOLUTION INTRAVENOUS at 21:46

## 2022-07-07 RX ADMIN — SUCCINYLCHOLINE CHLORIDE 200 MG: 20 INJECTION, SOLUTION INTRAMUSCULAR; INTRAVENOUS; PARENTERAL at 13:32

## 2022-07-07 RX ADMIN — METAXALONE 400 MG: 400 TABLET ORAL at 21:31

## 2022-07-07 RX ADMIN — FENTANYL CITRATE 50 MCG: 50 INJECTION, SOLUTION INTRAMUSCULAR; INTRAVENOUS at 14:58

## 2022-07-07 RX ADMIN — ROCURONIUM BROMIDE 50 MG: 10 INJECTION, SOLUTION INTRAVENOUS at 13:42

## 2022-07-07 RX ADMIN — SUGAMMADEX 200 MG: 100 INJECTION, SOLUTION INTRAVENOUS at 14:32

## 2022-07-07 RX ADMIN — GABAPENTIN 100 MG: 100 CAPSULE ORAL at 21:27

## 2022-07-07 RX ADMIN — SENNOSIDES AND DOCUSATE SODIUM 1 TABLET: 50; 8.6 TABLET ORAL at 21:27

## 2022-07-07 RX ADMIN — FENTANYL CITRATE 50 MCG: 50 INJECTION, SOLUTION INTRAMUSCULAR; INTRAVENOUS at 14:21

## 2022-07-07 RX ADMIN — DIPHENHYDRAMINE HYDROCHLORIDE 50 MG: 25 TABLET ORAL at 23:01

## 2022-07-07 RX ADMIN — SODIUM CHLORIDE, POTASSIUM CHLORIDE, SODIUM LACTATE AND CALCIUM CHLORIDE: 600; 310; 30; 20 INJECTION, SOLUTION INTRAVENOUS at 13:30

## 2022-07-07 RX ADMIN — KETOROLAC TROMETHAMINE 15 MG: 30 INJECTION, SOLUTION INTRAMUSCULAR; INTRAVENOUS at 19:15

## 2022-07-07 RX ADMIN — DOCUSATE SODIUM 100 MG: 100 CAPSULE, LIQUID FILLED ORAL at 21:28

## 2022-07-07 RX ADMIN — NICOTINE 14 MG: 14 PATCH TRANSDERMAL at 21:28

## 2022-07-07 RX ADMIN — FENTANYL CITRATE 50 MCG: 50 INJECTION, SOLUTION INTRAMUSCULAR; INTRAVENOUS at 15:09

## 2022-07-07 RX ADMIN — FENTANYL CITRATE 100 MCG: 50 INJECTION, SOLUTION INTRAMUSCULAR; INTRAVENOUS at 13:32

## 2022-07-07 RX ADMIN — POLYETHYLENE GLYCOL 3350 1 PACKET: 17 POWDER, FOR SOLUTION ORAL at 21:27

## 2022-07-07 RX ADMIN — HYDROMORPHONE HYDROCHLORIDE 0.5 MG: 1 INJECTION, SOLUTION INTRAMUSCULAR; INTRAVENOUS; SUBCUTANEOUS at 22:04

## 2022-07-07 RX ADMIN — PROPOFOL 200 MG: 10 INJECTION, EMULSION INTRAVENOUS at 13:32

## 2022-07-07 RX ADMIN — LORATADINE 10 MG: 10 TABLET ORAL at 22:39

## 2022-07-07 RX ADMIN — FENTANYL CITRATE 50 MCG: 50 INJECTION, SOLUTION INTRAMUSCULAR; INTRAVENOUS at 14:33

## 2022-07-07 RX ADMIN — LEVETIRACETAM 500 MG: 500 TABLET, FILM COATED ORAL at 21:02

## 2022-07-07 RX ADMIN — ACETAMINOPHEN 650 MG: 325 TABLET, FILM COATED ORAL at 21:41

## 2022-07-07 RX ADMIN — LORAZEPAM 2 MG: 2 INJECTION INTRAMUSCULAR; INTRAVENOUS at 14:51

## 2022-07-07 RX ADMIN — HALOPERIDOL LACTATE 1 MG: 5 INJECTION, SOLUTION INTRAMUSCULAR at 15:03

## 2022-07-07 RX ADMIN — OXYCODONE HYDROCHLORIDE 10 MG: 5 SOLUTION ORAL at 15:49

## 2022-07-07 RX ADMIN — ONDANSETRON 4 MG: 2 INJECTION INTRAMUSCULAR; INTRAVENOUS at 20:02

## 2022-07-07 RX ADMIN — ONDANSETRON 4 MG: 2 INJECTION INTRAMUSCULAR; INTRAVENOUS at 13:11

## 2022-07-07 ASSESSMENT — PAIN DESCRIPTION - PAIN TYPE
TYPE: SURGICAL PAIN
TYPE: SURGICAL PAIN
TYPE: ACUTE PAIN
TYPE: SURGICAL PAIN
TYPE: SURGICAL PAIN

## 2022-07-07 ASSESSMENT — PAIN SCALES - GENERAL: PAIN_LEVEL: 3

## 2022-07-07 ASSESSMENT — FIBROSIS 4 INDEX: FIB4 SCORE: 3.85

## 2022-07-07 NOTE — ASSESSMENT & PLAN NOTE
Chronic condition treated with keppra.  Resumed maintenance medication on admission.   MRI pending.  EEG WNL  Prosper Grewal D.O. Neurology

## 2022-07-07 NOTE — ANESTHESIA PROCEDURE NOTES
Airway    Date/Time: 7/7/2022 1:33 PM  Performed by: David Bell M.D.  Authorized by: David Bell M.D.     Location:  OR  Urgency:  Elective  Indications for Airway Management:  Anesthesia      Spontaneous Ventilation: absent    Sedation Level:  Deep  Preoxygenated: Yes    Patient Position:  Sniffing  Final Airway Type:  Endotracheal airway  Final Endotracheal Airway:  ETT  Cuffed: Yes    Technique Used for Successful ETT Placement:  Direct laryngoscopy    Insertion Site:  Oral  Blade Type:  [place]  Laryngoscope Blade/Videolaryngoscope Blade Size:  3  ETT Size (mm):  7.5  Measured from:  Teeth  ETT to Teeth (cm):  22  Placement Verified by: auscultation and capnometry    Cormack-Lehane Classification:  Grade I - full view of glottis  Number of Attempts at Approach:  1
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
## 2022-07-07 NOTE — OR NURSING
Lab called with critical result; Lactic acid 6.4, critical result read back to lab to confirm, anesthesiologist alerted of critical result.

## 2022-07-07 NOTE — ANESTHESIA PREPROCEDURE EVALUATION
Case: [id number] Date/Time: 07/07/22 1345    Procedures:       DIAGNOSTIC LAPAROSCOPY      LAPAROTOMY, EXPLORATORY    Location: [facility] OR 01 / SURGERY [facility]    Surgeons: Nahid Russ D.O.          Relevant Problems   NEURO   (positive) Seizure disorder (HCC)       Physical Exam    Airway   Mallampati: II  TM distance: >3 FB  Neck ROM: full       Cardiovascular - normal exam  Rhythm: regular  Rate: normal  (-) murmur     Dental - normal exam           Pulmonary - normal exam  Breath sounds clear to auscultation     Abdominal    Neurological - normal exam                 Anesthesia Plan    ASA 4       Plan - general       Airway plan will be ETT          Induction: intravenous    Postoperative Plan: Postoperative administration of opioids is intended.    Pertinent diagnostic labs and testing reviewed    Informed Consent:    Anesthetic plan and risks discussed with patient.    Use of blood products discussed with: patient whom consented to blood products.

## 2022-07-07 NOTE — ANESTHESIA TIME REPORT
Anesthesia Start and Stop Event Times     Date Time Event    7/7/2022 1330 Anesthesia Start     1332 Ready for Procedure     1445 Anesthesia Stop        Responsible Staff  07/07/22    Name Role Begin End    David Bell M.D. Anesth 1330 1445        Overtime Reason:  no overtime (within assigned shift)    Comments: MAYA 10th CALL
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
## 2022-07-08 ENCOUNTER — APPOINTMENT (OUTPATIENT)
Dept: RADIOLOGY | Facility: MEDICAL CENTER | Age: 52
DRG: 353 | End: 2022-07-08
Attending: REGISTERED NURSE
Payer: COMMERCIAL

## 2022-07-08 PROBLEM — M54.2 NECK PAIN: Status: ACTIVE | Noted: 2022-07-08

## 2022-07-08 PROBLEM — M54.9 BACK PAIN: Status: ACTIVE | Noted: 2022-07-08

## 2022-07-08 PROBLEM — S31.119A: Status: RESOLVED | Noted: 2022-07-07 | Resolved: 2022-07-08

## 2022-07-08 LAB
AMPHET UR QL SCN: NEGATIVE
ANION GAP SERPL CALC-SCNC: 11 MMOL/L (ref 7–16)
BARBITURATES UR QL SCN: NEGATIVE
BASOPHILS # BLD AUTO: 0.2 % (ref 0–1.8)
BASOPHILS # BLD: 0.03 K/UL (ref 0–0.12)
BENZODIAZ UR QL SCN: POSITIVE
BUN SERPL-MCNC: 7 MG/DL (ref 8–22)
BZE UR QL SCN: NEGATIVE
CALCIUM SERPL-MCNC: 8.2 MG/DL (ref 8.5–10.5)
CANNABINOIDS UR QL SCN: POSITIVE
CHLORIDE SERPL-SCNC: 103 MMOL/L (ref 96–112)
CO2 SERPL-SCNC: 22 MMOL/L (ref 20–33)
CREAT SERPL-MCNC: 0.73 MG/DL (ref 0.5–1.4)
EOSINOPHIL # BLD AUTO: 0 K/UL (ref 0–0.51)
EOSINOPHIL NFR BLD: 0 % (ref 0–6.9)
ERYTHROCYTE [DISTWIDTH] IN BLOOD BY AUTOMATED COUNT: 52.7 FL (ref 35.9–50)
GFR SERPLBLD CREATININE-BSD FMLA CKD-EPI: 111 ML/MIN/1.73 M 2
GLUCOSE SERPL-MCNC: 207 MG/DL (ref 65–99)
HCT VFR BLD AUTO: 42.5 % (ref 42–52)
HGB BLD-MCNC: 14.1 G/DL (ref 14–18)
IMM GRANULOCYTES # BLD AUTO: 0.06 K/UL (ref 0–0.11)
IMM GRANULOCYTES NFR BLD AUTO: 0.5 % (ref 0–0.9)
LACTATE SERPL-SCNC: 3.4 MMOL/L (ref 0.5–2)
LYMPHOCYTES # BLD AUTO: 0.78 K/UL (ref 1–4.8)
LYMPHOCYTES NFR BLD: 6.1 % (ref 22–41)
MCH RBC QN AUTO: 29.6 PG (ref 27–33)
MCHC RBC AUTO-ENTMCNC: 33.2 G/DL (ref 33.7–35.3)
MCV RBC AUTO: 89.1 FL (ref 81.4–97.8)
METHADONE UR QL SCN: NEGATIVE
MONOCYTES # BLD AUTO: 0.78 K/UL (ref 0–0.85)
MONOCYTES NFR BLD AUTO: 6.1 % (ref 0–13.4)
NEUTROPHILS # BLD AUTO: 11.21 K/UL (ref 1.82–7.42)
NEUTROPHILS NFR BLD: 87.1 % (ref 44–72)
NRBC # BLD AUTO: 0 K/UL
NRBC BLD-RTO: 0 /100 WBC
OPIATES UR QL SCN: NEGATIVE
OXYCODONE UR QL SCN: POSITIVE
PCP UR QL SCN: NEGATIVE
PLATELET # BLD AUTO: 309 K/UL (ref 164–446)
PMV BLD AUTO: 8.9 FL (ref 9–12.9)
POTASSIUM SERPL-SCNC: 4 MMOL/L (ref 3.6–5.5)
PROPOXYPH UR QL SCN: NEGATIVE
RBC # BLD AUTO: 4.77 M/UL (ref 4.7–6.1)
SODIUM SERPL-SCNC: 136 MMOL/L (ref 135–145)
WBC # BLD AUTO: 12.9 K/UL (ref 4.8–10.8)

## 2022-07-08 PROCEDURE — 700111 HCHG RX REV CODE 636 W/ 250 OVERRIDE (IP): Performed by: SURGERY

## 2022-07-08 PROCEDURE — 99223 1ST HOSP IP/OBS HIGH 75: CPT | Performed by: STUDENT IN AN ORGANIZED HEALTH CARE EDUCATION/TRAINING PROGRAM

## 2022-07-08 PROCEDURE — 85025 COMPLETE CBC W/AUTO DIFF WBC: CPT

## 2022-07-08 PROCEDURE — 72040 X-RAY EXAM NECK SPINE 2-3 VW: CPT

## 2022-07-08 PROCEDURE — A9270 NON-COVERED ITEM OR SERVICE: HCPCS | Performed by: REGISTERED NURSE

## 2022-07-08 PROCEDURE — 95816 EEG AWAKE AND DROWSY: CPT | Mod: 26 | Performed by: PSYCHIATRY & NEUROLOGY

## 2022-07-08 PROCEDURE — 80048 BASIC METABOLIC PNL TOTAL CA: CPT

## 2022-07-08 PROCEDURE — 80307 DRUG TEST PRSMV CHEM ANLYZR: CPT

## 2022-07-08 PROCEDURE — 96376 TX/PRO/DX INJ SAME DRUG ADON: CPT

## 2022-07-08 PROCEDURE — 700102 HCHG RX REV CODE 250 W/ 637 OVERRIDE(OP): Performed by: STUDENT IN AN ORGANIZED HEALTH CARE EDUCATION/TRAINING PROGRAM

## 2022-07-08 PROCEDURE — 700102 HCHG RX REV CODE 250 W/ 637 OVERRIDE(OP): Performed by: SURGERY

## 2022-07-08 PROCEDURE — 700102 HCHG RX REV CODE 250 W/ 637 OVERRIDE(OP): Performed by: REGISTERED NURSE

## 2022-07-08 PROCEDURE — A9270 NON-COVERED ITEM OR SERVICE: HCPCS | Performed by: SURGERY

## 2022-07-08 PROCEDURE — 95816 EEG AWAKE AND DROWSY: CPT | Performed by: PSYCHIATRY & NEUROLOGY

## 2022-07-08 PROCEDURE — 36415 COLL VENOUS BLD VENIPUNCTURE: CPT

## 2022-07-08 PROCEDURE — 96375 TX/PRO/DX INJ NEW DRUG ADDON: CPT

## 2022-07-08 PROCEDURE — 83605 ASSAY OF LACTIC ACID: CPT

## 2022-07-08 PROCEDURE — 4A10X4Z MONITORING OF CENTRAL NERVOUS ELECTRICAL ACTIVITY, EXTERNAL APPROACH: ICD-10-PCS | Performed by: PSYCHIATRY & NEUROLOGY

## 2022-07-08 PROCEDURE — 99233 SBSQ HOSP IP/OBS HIGH 50: CPT | Performed by: REGISTERED NURSE

## 2022-07-08 PROCEDURE — 99024 POSTOP FOLLOW-UP VISIT: CPT | Performed by: SURGERY

## 2022-07-08 PROCEDURE — 72100 X-RAY EXAM L-S SPINE 2/3 VWS: CPT

## 2022-07-08 PROCEDURE — 770001 HCHG ROOM/CARE - MED/SURG/GYN PRIV*

## 2022-07-08 PROCEDURE — 700102 HCHG RX REV CODE 250 W/ 637 OVERRIDE(OP): Performed by: SPECIALIST

## 2022-07-08 PROCEDURE — 72070 X-RAY EXAM THORAC SPINE 2VWS: CPT

## 2022-07-08 PROCEDURE — A9270 NON-COVERED ITEM OR SERVICE: HCPCS | Performed by: STUDENT IN AN ORGANIZED HEALTH CARE EDUCATION/TRAINING PROGRAM

## 2022-07-08 PROCEDURE — A9270 NON-COVERED ITEM OR SERVICE: HCPCS | Performed by: SPECIALIST

## 2022-07-08 RX ORDER — LEVETIRACETAM 500 MG/1
500 TABLET ORAL ONCE
Status: COMPLETED | OUTPATIENT
Start: 2022-07-08 | End: 2022-07-08

## 2022-07-08 RX ORDER — HYDROXYZINE HYDROCHLORIDE 10 MG/1
10 TABLET, FILM COATED ORAL ONCE
Status: COMPLETED | OUTPATIENT
Start: 2022-07-08 | End: 2022-07-08

## 2022-07-08 RX ORDER — LORAZEPAM 2 MG/ML
1-2 INJECTION INTRAMUSCULAR
Status: DISCONTINUED | OUTPATIENT
Start: 2022-07-08 | End: 2022-07-09 | Stop reason: HOSPADM

## 2022-07-08 RX ORDER — HYDRALAZINE HYDROCHLORIDE 20 MG/ML
10 INJECTION INTRAMUSCULAR; INTRAVENOUS EVERY 6 HOURS PRN
Status: DISCONTINUED | OUTPATIENT
Start: 2022-07-08 | End: 2022-07-09 | Stop reason: HOSPADM

## 2022-07-08 RX ORDER — LEVETIRACETAM 500 MG/1
1000 TABLET ORAL 2 TIMES DAILY
Status: DISCONTINUED | OUTPATIENT
Start: 2022-07-08 | End: 2022-07-09 | Stop reason: HOSPADM

## 2022-07-08 RX ORDER — HYDROXYZINE HYDROCHLORIDE 10 MG/1
10 TABLET, FILM COATED ORAL
Status: COMPLETED | OUTPATIENT
Start: 2022-07-08 | End: 2022-07-08

## 2022-07-08 RX ORDER — METAXALONE 800 MG/1
800 TABLET ORAL 3 TIMES DAILY
Status: DISCONTINUED | OUTPATIENT
Start: 2022-07-09 | End: 2022-07-08

## 2022-07-08 RX ORDER — METAXALONE 800 MG/1
800 TABLET ORAL 3 TIMES DAILY
Status: DISCONTINUED | OUTPATIENT
Start: 2022-07-08 | End: 2022-07-09 | Stop reason: HOSPADM

## 2022-07-08 RX ADMIN — KETOROLAC TROMETHAMINE 15 MG: 30 INJECTION, SOLUTION INTRAMUSCULAR; INTRAVENOUS at 01:10

## 2022-07-08 RX ADMIN — ACETAMINOPHEN 650 MG: 325 TABLET, FILM COATED ORAL at 15:16

## 2022-07-08 RX ADMIN — HYDROMORPHONE HYDROCHLORIDE 0.5 MG: 1 INJECTION, SOLUTION INTRAMUSCULAR; INTRAVENOUS; SUBCUTANEOUS at 07:01

## 2022-07-08 RX ADMIN — GABAPENTIN 100 MG: 100 CAPSULE ORAL at 18:04

## 2022-07-08 RX ADMIN — GABAPENTIN 100 MG: 100 CAPSULE ORAL at 12:05

## 2022-07-08 RX ADMIN — KETOROLAC TROMETHAMINE 15 MG: 30 INJECTION, SOLUTION INTRAMUSCULAR; INTRAVENOUS at 20:43

## 2022-07-08 RX ADMIN — OXYCODONE HYDROCHLORIDE 10 MG: 10 TABLET ORAL at 21:10

## 2022-07-08 RX ADMIN — HYDROXYZINE HYDROCHLORIDE 10 MG: 10 TABLET ORAL at 22:54

## 2022-07-08 RX ADMIN — METAXALONE 400 MG: 400 TABLET ORAL at 12:06

## 2022-07-08 RX ADMIN — METAXALONE 800 MG: 800 TABLET ORAL at 22:52

## 2022-07-08 RX ADMIN — ONDANSETRON 4 MG: 2 INJECTION INTRAMUSCULAR; INTRAVENOUS at 00:42

## 2022-07-08 RX ADMIN — SULFAMETHOXAZOLE AND TRIMETHOPRIM 1 TABLET: 800; 160 TABLET ORAL at 20:43

## 2022-07-08 RX ADMIN — LEVETIRACETAM 1000 MG: 500 TABLET, FILM COATED ORAL at 18:03

## 2022-07-08 RX ADMIN — HYDROMORPHONE HYDROCHLORIDE 0.5 MG: 1 INJECTION, SOLUTION INTRAMUSCULAR; INTRAVENOUS; SUBCUTANEOUS at 01:09

## 2022-07-08 RX ADMIN — OXYCODONE HYDROCHLORIDE 10 MG: 10 TABLET ORAL at 13:33

## 2022-07-08 RX ADMIN — ACETAMINOPHEN 650 MG: 325 TABLET, FILM COATED ORAL at 01:11

## 2022-07-08 RX ADMIN — SULFAMETHOXAZOLE AND TRIMETHOPRIM 1 TABLET: 800; 160 TABLET ORAL at 06:33

## 2022-07-08 RX ADMIN — OXYCODONE HYDROCHLORIDE 10 MG: 10 TABLET ORAL at 04:18

## 2022-07-08 RX ADMIN — ACETAMINOPHEN 650 MG: 325 TABLET, FILM COATED ORAL at 20:42

## 2022-07-08 RX ADMIN — OXYCODONE HYDROCHLORIDE 10 MG: 10 TABLET ORAL at 01:18

## 2022-07-08 RX ADMIN — DOCUSATE SODIUM 100 MG: 100 CAPSULE, LIQUID FILLED ORAL at 06:25

## 2022-07-08 RX ADMIN — OXYCODONE HYDROCHLORIDE 10 MG: 10 TABLET ORAL at 07:07

## 2022-07-08 RX ADMIN — LEVETIRACETAM 500 MG: 500 TABLET, FILM COATED ORAL at 06:27

## 2022-07-08 RX ADMIN — DOCUSATE SODIUM 100 MG: 100 CAPSULE, LIQUID FILLED ORAL at 18:04

## 2022-07-08 RX ADMIN — OXYCODONE HYDROCHLORIDE 10 MG: 10 TABLET ORAL at 18:03

## 2022-07-08 RX ADMIN — ACETAMINOPHEN 650 MG: 325 TABLET, FILM COATED ORAL at 08:32

## 2022-07-08 RX ADMIN — OMEPRAZOLE 20 MG: 20 CAPSULE, DELAYED RELEASE ORAL at 06:27

## 2022-07-08 RX ADMIN — POLYETHYLENE GLYCOL 3350 1 PACKET: 17 POWDER, FOR SOLUTION ORAL at 06:27

## 2022-07-08 RX ADMIN — HYDROMORPHONE HYDROCHLORIDE 0.5 MG: 1 INJECTION, SOLUTION INTRAMUSCULAR; INTRAVENOUS; SUBCUTANEOUS at 04:14

## 2022-07-08 RX ADMIN — METAXALONE 400 MG: 400 TABLET ORAL at 18:04

## 2022-07-08 RX ADMIN — LEVETIRACETAM 500 MG: 500 TABLET, FILM COATED ORAL at 14:15

## 2022-07-08 RX ADMIN — GABAPENTIN 100 MG: 100 CAPSULE ORAL at 06:25

## 2022-07-08 RX ADMIN — OXYCODONE HYDROCHLORIDE 10 MG: 10 TABLET ORAL at 10:04

## 2022-07-08 RX ADMIN — ONDANSETRON 4 MG: 2 INJECTION INTRAMUSCULAR; INTRAVENOUS at 08:34

## 2022-07-08 RX ADMIN — MAGNESIUM HYDROXIDE 30 ML: 400 SUSPENSION ORAL at 06:26

## 2022-07-08 RX ADMIN — KETOROLAC TROMETHAMINE 15 MG: 30 INJECTION, SOLUTION INTRAMUSCULAR; INTRAVENOUS at 08:32

## 2022-07-08 RX ADMIN — POLYETHYLENE GLYCOL 3350 1 PACKET: 17 POWDER, FOR SOLUTION ORAL at 18:04

## 2022-07-08 RX ADMIN — KETOROLAC TROMETHAMINE 15 MG: 30 INJECTION, SOLUTION INTRAMUSCULAR; INTRAVENOUS at 15:15

## 2022-07-08 RX ADMIN — METAXALONE 400 MG: 400 TABLET ORAL at 06:26

## 2022-07-08 RX ADMIN — RIVAROXABAN 10 MG: 10 TABLET, FILM COATED ORAL at 18:04

## 2022-07-08 RX ADMIN — HYDROXYZINE HYDROCHLORIDE 10 MG: 10 TABLET ORAL at 04:10

## 2022-07-08 ASSESSMENT — ENCOUNTER SYMPTOMS
SEIZURES: 1
FOCAL WEAKNESS: 0
BLURRED VISION: 0
ABDOMINAL PAIN: 1
DOUBLE VISION: 0
BACK PAIN: 1
DIZZINESS: 0
CARDIOVASCULAR NEGATIVE: 1
PSYCHIATRIC NEGATIVE: 1
VOMITING: 0
MYALGIAS: 1
RESPIRATORY NEGATIVE: 1
EYES NEGATIVE: 1
NAUSEA: 1
NECK PAIN: 1
FALLS: 1

## 2022-07-08 ASSESSMENT — PATIENT HEALTH QUESTIONNAIRE - PHQ9
2. FEELING DOWN, DEPRESSED, IRRITABLE, OR HOPELESS: NOT AT ALL
SUM OF ALL RESPONSES TO PHQ9 QUESTIONS 1 AND 2: 0
1. LITTLE INTEREST OR PLEASURE IN DOING THINGS: NOT AT ALL
2. FEELING DOWN, DEPRESSED, IRRITABLE, OR HOPELESS: NOT AT ALL
1. LITTLE INTEREST OR PLEASURE IN DOING THINGS: NOT AT ALL
SUM OF ALL RESPONSES TO PHQ9 QUESTIONS 1 AND 2: 0

## 2022-07-08 ASSESSMENT — COPD QUESTIONNAIRES
HAVE YOU SMOKED AT LEAST 100 CIGARETTES IN YOUR ENTIRE LIFE: YES
DO YOU EVER COUGH UP ANY MUCUS OR PHLEGM?: NO/ONLY WITH OCCASIONAL COLDS OR INFECTIONS
COPD SCREENING SCORE: 3
DURING THE PAST 4 WEEKS HOW MUCH DID YOU FEEL SHORT OF BREATH: NONE/LITTLE OF THE TIME

## 2022-07-08 ASSESSMENT — LIFESTYLE VARIABLES
ON A TYPICAL DAY WHEN YOU DRINK ALCOHOL HOW MANY DRINKS DO YOU HAVE: 1
HAVE YOU EVER FELT YOU SHOULD CUT DOWN ON YOUR DRINKING: YES
TOTAL SCORE: 3
HAVE PEOPLE ANNOYED YOU BY CRITICIZING YOUR DRINKING: YES
ALCOHOL_USE: NO
CONSUMPTION TOTAL: POSITIVE
EVER FELT BAD OR GUILTY ABOUT YOUR DRINKING: YES
TOTAL SCORE: 3
AVERAGE NUMBER OF DAYS PER WEEK YOU HAVE A DRINK CONTAINING ALCOHOL: 2
TOTAL SCORE: 3
HOW MANY TIMES IN THE PAST YEAR HAVE YOU HAD 5 OR MORE DRINKS IN A DAY: 3
EVER HAD A DRINK FIRST THING IN THE MORNING TO STEADY YOUR NERVES TO GET RID OF A HANGOVER: NO

## 2022-07-08 ASSESSMENT — PAIN DESCRIPTION - PAIN TYPE
TYPE: ACUTE PAIN

## 2022-07-08 NOTE — RESPIRATORY CARE
Respiratory Trauma Red Note    Intubation No    No respiratory interventions required at this time. See trauma narrator notes for full details

## 2022-07-08 NOTE — OR NURSING
Patient recovered well post op. A&Ox4 VSS, room air. Surgical sites CDI. Surgical pain managed w/ oxycodone, fentanyl. Patient able to drink fluids without Nausea and vomiting. PT belongings at the bedside. Spouse updated via patient with patients own phone. Report called to Forrest on CDU.
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
## 2022-07-08 NOTE — PROGRESS NOTES
Assuming care of pt. Pt arrival to unit via bed. All medical devices intact, pt A&Ox4, does complain of pain 8/10 (will notify provider, also note that the patient was recently given several pain medications). Abd sites are dry and intact, tender to light palpation. Pt provided with education regarding; room devices, call light, medications, plan of care, shift goals, fall prevention, bathroom use, post surgical symptoms, narcotic symptoms. Bed in lowest position, call light within reach, personal possessions within reach, urinal within reach, bed alarm on. Pt has no further questions at this time, will continue to monitor.

## 2022-07-08 NOTE — PROGRESS NOTES
There was concern of a seizure in the PACU. Plan is to give 1 gram Keppra IV and start home dose Keppra 500 mg PO BID starting tonight. Plan for neuro hospitalist consult in AM per Dr. Russ.    Discussed with him pain control, and discussed that the goal is for the pain to be tolerable given his high tolerance to pain medication. We will start on multimodal pain medication. He also requested nicotine replacement patch.     He informed me he has [employer] insurance and has been trying to get Medicaid so he can establish with an outpatient neurologist to adjust him seizure medications due to his frequency of seizures.
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
## 2022-07-08 NOTE — PROGRESS NOTES
"Rounding on pt, similar pain complaint from previously 8/10 abd site. Patient states that previous tx was able to get his pain \"down to a 6\", which he agrees is tolerable.    "

## 2022-07-08 NOTE — PROCEDURES
VIDEO ELECTROENCEPHALOGRAM REPORT      Referring provider:     DOS: 07/08/22 (total recording of 25 minutes).     INDICATION:  Donal Carpio 52 y.o. male presenting with seizure     CURRENT ANTIEPILEPTIC REGIMEN: LEV    TECHNIQUE: 30 channel video electroencephalogram (EEG) was performed in accordance with the international 10-20 system. The study was reviewed in bipolar and referential montages. The recording examined the patient during   awake and drowsy states    DESCRIPTION OF THE RECORD:  During the wakefulness, the background showed a symmetrical 9 Hz alpha activity posteriorly with amplitude of 70 mV. Excessive fast frequency was noted. There was reactivity to eye closure/opening.  A normal anterior-posterior gradient was noted with faster beta frequencies seen anteriorly.  During drowsiness, theta/delta frequencies were seen.    ACTIVATION PROCEDURES:     hyperventilation was not performed    Intermittent Photic stimulation was performed in a stepwise fashion from 1 to 30 Hz and elicited a normal response (photic driving), most noticeable in the posterior leads.    ICTAL AND/OR INTERICTAL FINDINGS:   No focal or generalized epileptiform activity noted. No regional slowing was seen during this routine study.  No clinical events or seizures were reported or recorded during the study.     EKG: sampling of the EKG recording demonstrated sinus tachycardia.     EVENTS: none     INTERPRETATION:    This is a normal video EEG recording in the awake and drowsy states. The excessive fast frequency is likely related to medication effect.   Note: A normal EEG does not rule out epilepsy.  If the clinical suspicion remains high for seizures, a prolonged recording to capture clinical or subclinical events may be helpful.    Roscoe Adams MD  Diplomate in Neurology&Epilepsy  Office: [phone]  Fax: [phone]

## 2022-07-09 ENCOUNTER — APPOINTMENT (OUTPATIENT)
Dept: RADIOLOGY | Facility: MEDICAL CENTER | Age: 52
DRG: 353 | End: 2022-07-09
Attending: NURSE PRACTITIONER
Payer: COMMERCIAL

## 2022-07-09 VITALS
TEMPERATURE: 97.7 F | OXYGEN SATURATION: 94 % | HEART RATE: 77 BPM | SYSTOLIC BLOOD PRESSURE: 146 MMHG | HEIGHT: 69 IN | RESPIRATION RATE: 18 BRPM | BODY MASS INDEX: 30.73 KG/M2 | WEIGHT: 207.45 LBS | DIASTOLIC BLOOD PRESSURE: 84 MMHG

## 2022-07-09 PROBLEM — M54.2 NECK PAIN: Status: RESOLVED | Noted: 2022-07-08 | Resolved: 2022-07-09

## 2022-07-09 PROBLEM — M54.9 BACK PAIN: Status: RESOLVED | Noted: 2022-07-08 | Resolved: 2022-07-09

## 2022-07-09 PROBLEM — T14.90XA TRAUMA: Status: RESOLVED | Noted: 2022-07-07 | Resolved: 2022-07-09

## 2022-07-09 PROBLEM — S31.109A PENETRATING ABDOMINAL TRAUMA: Status: RESOLVED | Noted: 2022-07-07 | Resolved: 2022-07-09

## 2022-07-09 PROBLEM — E87.20 LACTIC ACIDOSIS: Status: RESOLVED | Noted: 2022-07-07 | Resolved: 2022-07-09

## 2022-07-09 PROBLEM — Z76.5 DRUG-SEEKING BEHAVIOR: Status: ACTIVE | Noted: 2022-07-09

## 2022-07-09 LAB
ANION GAP SERPL CALC-SCNC: 10 MMOL/L (ref 7–16)
BASOPHILS # BLD AUTO: 0.9 % (ref 0–1.8)
BASOPHILS # BLD: 0.11 K/UL (ref 0–0.12)
BUN SERPL-MCNC: 11 MG/DL (ref 8–22)
CALCIUM SERPL-MCNC: 8.3 MG/DL (ref 8.5–10.5)
CHLORIDE SERPL-SCNC: 105 MMOL/L (ref 96–112)
CO2 SERPL-SCNC: 22 MMOL/L (ref 20–33)
CREAT SERPL-MCNC: 0.85 MG/DL (ref 0.5–1.4)
EOSINOPHIL # BLD AUTO: 0.31 K/UL (ref 0–0.51)
EOSINOPHIL NFR BLD: 2.6 % (ref 0–6.9)
ERYTHROCYTE [DISTWIDTH] IN BLOOD BY AUTOMATED COUNT: 55 FL (ref 35.9–50)
GFR SERPLBLD CREATININE-BSD FMLA CKD-EPI: 106 ML/MIN/1.73 M 2
GLUCOSE SERPL-MCNC: 130 MG/DL (ref 65–99)
HCT VFR BLD AUTO: 42.2 % (ref 42–52)
HGB BLD-MCNC: 13.5 G/DL (ref 14–18)
IMM GRANULOCYTES # BLD AUTO: 0.05 K/UL (ref 0–0.11)
IMM GRANULOCYTES NFR BLD AUTO: 0.4 % (ref 0–0.9)
LYMPHOCYTES # BLD AUTO: 2.88 K/UL (ref 1–4.8)
LYMPHOCYTES NFR BLD: 24 % (ref 22–41)
MCH RBC QN AUTO: 29.3 PG (ref 27–33)
MCHC RBC AUTO-ENTMCNC: 32 G/DL (ref 33.7–35.3)
MCV RBC AUTO: 91.5 FL (ref 81.4–97.8)
MONOCYTES # BLD AUTO: 0.7 K/UL (ref 0–0.85)
MONOCYTES NFR BLD AUTO: 5.8 % (ref 0–13.4)
NEUTROPHILS # BLD AUTO: 7.96 K/UL (ref 1.82–7.42)
NEUTROPHILS NFR BLD: 66.3 % (ref 44–72)
NRBC # BLD AUTO: 0 K/UL
NRBC BLD-RTO: 0 /100 WBC
PLATELET # BLD AUTO: 246 K/UL (ref 164–446)
PMV BLD AUTO: 8.7 FL (ref 9–12.9)
POTASSIUM SERPL-SCNC: 3.7 MMOL/L (ref 3.6–5.5)
RBC # BLD AUTO: 4.61 M/UL (ref 4.7–6.1)
SODIUM SERPL-SCNC: 137 MMOL/L (ref 135–145)
WBC # BLD AUTO: 12 K/UL (ref 4.8–10.8)

## 2022-07-09 PROCEDURE — 700102 HCHG RX REV CODE 250 W/ 637 OVERRIDE(OP): Performed by: NURSE PRACTITIONER

## 2022-07-09 PROCEDURE — 80048 BASIC METABOLIC PNL TOTAL CA: CPT

## 2022-07-09 PROCEDURE — 700117 HCHG RX CONTRAST REV CODE 255: Performed by: NURSE PRACTITIONER

## 2022-07-09 PROCEDURE — A9270 NON-COVERED ITEM OR SERVICE: HCPCS | Performed by: SURGERY

## 2022-07-09 PROCEDURE — 700102 HCHG RX REV CODE 250 W/ 637 OVERRIDE(OP): Performed by: STUDENT IN AN ORGANIZED HEALTH CARE EDUCATION/TRAINING PROGRAM

## 2022-07-09 PROCEDURE — 700111 HCHG RX REV CODE 636 W/ 250 OVERRIDE (IP): Performed by: SURGERY

## 2022-07-09 PROCEDURE — 700102 HCHG RX REV CODE 250 W/ 637 OVERRIDE(OP): Performed by: SPECIALIST

## 2022-07-09 PROCEDURE — 70553 MRI BRAIN STEM W/O & W/DYE: CPT

## 2022-07-09 PROCEDURE — 85025 COMPLETE CBC W/AUTO DIFF WBC: CPT

## 2022-07-09 PROCEDURE — 99221 1ST HOSP IP/OBS SF/LOW 40: CPT | Performed by: NURSE PRACTITIONER

## 2022-07-09 PROCEDURE — A9270 NON-COVERED ITEM OR SERVICE: HCPCS | Performed by: STUDENT IN AN ORGANIZED HEALTH CARE EDUCATION/TRAINING PROGRAM

## 2022-07-09 PROCEDURE — A9270 NON-COVERED ITEM OR SERVICE: HCPCS | Performed by: SPECIALIST

## 2022-07-09 PROCEDURE — A9576 INJ PROHANCE MULTIPACK: HCPCS | Performed by: NURSE PRACTITIONER

## 2022-07-09 PROCEDURE — 99024 POSTOP FOLLOW-UP VISIT: CPT | Performed by: REGISTERED NURSE

## 2022-07-09 PROCEDURE — A9270 NON-COVERED ITEM OR SERVICE: HCPCS | Performed by: NURSE PRACTITIONER

## 2022-07-09 PROCEDURE — 99232 SBSQ HOSP IP/OBS MODERATE 35: CPT | Performed by: STUDENT IN AN ORGANIZED HEALTH CARE EDUCATION/TRAINING PROGRAM

## 2022-07-09 PROCEDURE — 700102 HCHG RX REV CODE 250 W/ 637 OVERRIDE(OP): Performed by: SURGERY

## 2022-07-09 RX ORDER — GABAPENTIN 100 MG/1
100 CAPSULE ORAL 3 TIMES DAILY
Qty: 90 CAPSULE | Refills: 0 | Status: SHIPPED | OUTPATIENT
Start: 2022-07-09 | End: 2022-07-16

## 2022-07-09 RX ORDER — SULFAMETHOXAZOLE AND TRIMETHOPRIM 800; 160 MG/1; MG/1
1 TABLET ORAL EVERY 12 HOURS
Qty: 8 TABLET | Refills: 0 | Status: SHIPPED | OUTPATIENT
Start: 2022-07-09 | End: 2022-07-13

## 2022-07-09 RX ORDER — ACETAMINOPHEN 325 MG/1
650 TABLET ORAL EVERY 6 HOURS
Qty: 80 TABLET | Refills: 0 | Status: SHIPPED | OUTPATIENT
Start: 2022-07-09 | End: 2022-07-16

## 2022-07-09 RX ORDER — OMEPRAZOLE 20 MG/1
20 CAPSULE, DELAYED RELEASE ORAL DAILY
Qty: 30 CAPSULE | Refills: 0 | Status: SHIPPED | OUTPATIENT
Start: 2022-07-10 | End: 2022-07-16

## 2022-07-09 RX ORDER — DIPHENHYDRAMINE HCL 25 MG
50 TABLET ORAL ONCE
Status: COMPLETED | OUTPATIENT
Start: 2022-07-09 | End: 2022-07-09

## 2022-07-09 RX ORDER — LORAZEPAM 1 MG/1
2 TABLET ORAL ONCE
Status: COMPLETED | OUTPATIENT
Start: 2022-07-09 | End: 2022-07-09

## 2022-07-09 RX ORDER — LEVETIRACETAM 1000 MG/1
1000 TABLET ORAL 2 TIMES DAILY
Qty: 60 TABLET | Refills: 1 | Status: SHIPPED | OUTPATIENT
Start: 2022-07-09 | End: 2022-08-08

## 2022-07-09 RX ADMIN — KETOROLAC TROMETHAMINE 15 MG: 30 INJECTION, SOLUTION INTRAMUSCULAR; INTRAVENOUS at 03:08

## 2022-07-09 RX ADMIN — GABAPENTIN 100 MG: 100 CAPSULE ORAL at 12:18

## 2022-07-09 RX ADMIN — METAXALONE 800 MG: 800 TABLET ORAL at 12:18

## 2022-07-09 RX ADMIN — OXYCODONE HYDROCHLORIDE 10 MG: 10 TABLET ORAL at 00:12

## 2022-07-09 RX ADMIN — OXYCODONE HYDROCHLORIDE 10 MG: 10 TABLET ORAL at 06:09

## 2022-07-09 RX ADMIN — NICOTINE 14 MG: 14 PATCH TRANSDERMAL at 06:08

## 2022-07-09 RX ADMIN — OXYCODONE HYDROCHLORIDE 10 MG: 10 TABLET ORAL at 03:08

## 2022-07-09 RX ADMIN — OXYCODONE 5 MG: 5 TABLET ORAL at 09:07

## 2022-07-09 RX ADMIN — SULFAMETHOXAZOLE AND TRIMETHOPRIM 1 TABLET: 800; 160 TABLET ORAL at 06:09

## 2022-07-09 RX ADMIN — ACETAMINOPHEN 650 MG: 325 TABLET, FILM COATED ORAL at 03:07

## 2022-07-09 RX ADMIN — KETOROLAC TROMETHAMINE 15 MG: 30 INJECTION, SOLUTION INTRAMUSCULAR; INTRAVENOUS at 14:25

## 2022-07-09 RX ADMIN — GADOTERIDOL 18 ML: 279.3 INJECTION, SOLUTION INTRAVENOUS at 10:24

## 2022-07-09 RX ADMIN — OXYCODONE 5 MG: 5 TABLET ORAL at 14:25

## 2022-07-09 RX ADMIN — GABAPENTIN 100 MG: 100 CAPSULE ORAL at 06:09

## 2022-07-09 RX ADMIN — LORAZEPAM 2 MG: 1 TABLET ORAL at 09:26

## 2022-07-09 RX ADMIN — METAXALONE 800 MG: 800 TABLET ORAL at 06:09

## 2022-07-09 RX ADMIN — LEVETIRACETAM 1000 MG: 500 TABLET, FILM COATED ORAL at 06:09

## 2022-07-09 RX ADMIN — ACETAMINOPHEN 650 MG: 325 TABLET, FILM COATED ORAL at 09:08

## 2022-07-09 RX ADMIN — DIPHENHYDRAMINE HYDROCHLORIDE 50 MG: 25 TABLET ORAL at 06:31

## 2022-07-09 RX ADMIN — ACETAMINOPHEN 650 MG: 325 TABLET, FILM COATED ORAL at 14:25

## 2022-07-09 RX ADMIN — OMEPRAZOLE 20 MG: 20 CAPSULE, DELAYED RELEASE ORAL at 06:09

## 2022-07-09 RX ADMIN — KETOROLAC TROMETHAMINE 15 MG: 30 INJECTION, SOLUTION INTRAMUSCULAR; INTRAVENOUS at 09:07

## 2022-07-09 ASSESSMENT — ENCOUNTER SYMPTOMS
BACK PAIN: 1
FEVER: 0
BLURRED VISION: 0
FOCAL WEAKNESS: 0
SEIZURES: 1
FALLS: 1
EYES NEGATIVE: 1
DIZZINESS: 1
DIZZINESS: 0
CARDIOVASCULAR NEGATIVE: 1
ABDOMINAL PAIN: 1
VOMITING: 0
DEPRESSION: 1
CHILLS: 0
MUSCULOSKELETAL NEGATIVE: 1
HEADACHES: 1
DOUBLE VISION: 0
MYALGIAS: 1
PSYCHIATRIC NEGATIVE: 1
RESPIRATORY NEGATIVE: 1
NAUSEA: 1
NECK PAIN: 1

## 2022-07-09 ASSESSMENT — LIFESTYLE VARIABLES: SUBSTANCE_ABUSE: 1

## 2022-07-09 ASSESSMENT — PAIN DESCRIPTION - PAIN TYPE
TYPE: ACUTE PAIN

## 2022-07-09 NOTE — ASSESSMENT & PLAN NOTE
- Reports having seizure but does not follow neurology as an outpatient  -Keppra 1 g added  -Neurology consulted and following  -EEG obtain -within normal limits at this time  -MRI of the head pending  -Patient reports having private insurance through [employer] and is currently in the process of switching to Medicaid?
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
## 2022-07-09 NOTE — PROGRESS NOTES
Discussed discharge instruction including medication details and f/u apt. No questions at the time. Tolerated IV removal. Mom at ED  ready for pickup. Patient being escorted by staff member, walking to ED.
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
## 2022-07-09 NOTE — CARE PLAN
The patient is Stable - Low risk of patient condition declining or worsening    Shift Goals  Clinical Goals: paqin management  Patient Goals: pain control    Progress made toward(s) clinical / shift goals:    Problem: Pain - Standard  Goal: Alleviation of pain or a reduction in pain to the patient’s comfort goal  Outcome: Progressing   Patient states feeling more comfortable patient able to sleep after 0100.     Patient is not progressing towards the following goals:

## 2022-07-09 NOTE — PROGRESS NOTES
Neurology Progress Note  Neurohospitalist Service, [facility]    Referring Physician: Abelardo Rowe*      Interval History: No acute events overnight.  Patient reports that he has a severe migraine and that he had multiple seizures last night and this morning even after getting increased doses of Keppra.    RN reports indicate that this is not the case.    Review of systems: In addition to what is detailed in the HPI and/or updated in the interval history, all other systems reviewed and are negative.    Past Medical History, Past Surgical History and Social History reviewed and unchanged from prior    Medications:    Current Facility-Administered Medications:   •  hydrALAZINE (APRESOLINE) injection 10 mg, 10 mg, Intravenous, Q6HRS PRN, Melanie Noonan, P.A.-C.  •  LORazepam (ATIVAN) injection 1-2 mg, 1-2 mg, Intravenous, Once PRN, Kylah Kenny, A.P.R.N.  •  rivaroxaban (XARELTO) tablet 10 mg, 10 mg, Oral, DAILY AT 1800, Kylah Azuleda, A.P.R.N., 10 mg at 07/08/22 1804  •  levETIRAcetam (KEPPRA) tablet 1,000 mg, 1,000 mg, Oral, BID, Prosper Grewal D.O., 1,000 mg at 07/09/22 0609  •  metaxalone (Skelaxin) tablet 800 mg, 800 mg, Oral, TID, Melanie Noonan, P.A.-C., 800 mg at 07/09/22 0609  •  tetanus-dipth-acell pertussis (ADACEL) inj 0.5 mL, 0.5 mL, Intramuscular, Once PRN, Leobardo Marin M.D.  •  Respiratory Therapy Consult, , Nebulization, Continuous RT, Nahid Russ D.O.  •  Pharmacy Consult Request ...Pain Management Review 1 Each, 1 Each, Other, PHARMACY TO DOSE, Nahid Russ D.O.  •  ondansetron (ZOFRAN) syringe/vial injection 4 mg, 4 mg, Intravenous, Q4HRS PRN, Nahid Russ D.O., 4 mg at 07/08/22 0834  •  ondansetron (ZOFRAN ODT) dispertab 4 mg, 4 mg, Oral, Q4HRS PRN, Nahid Russ D.O.  •  docusate sodium (COLACE) capsule 100 mg, 100 mg, Oral, BID, Nahid Russ D.O., 100 mg at 07/08/22 1804  •  senna-docusate (PERICOLACE or SENOKOT S) 8.6-50 MG per tablet 1  Tablet, 1 Tablet, Oral, Nightly, Nahid Russ D.O., 1 Tablet at 07/07/22 2127  •  senna-docusate (PERICOLACE or SENOKOT S) 8.6-50 MG per tablet 1 Tablet, 1 Tablet, Oral, Q24HRS PRN, Nahid Russ D.O.  •  polyethylene glycol/lytes (MIRALAX) PACKET 1 Packet, 1 Packet, Oral, BID, Nahid Russ D.O., 1 Packet at 07/08/22 1804  •  magnesium hydroxide (MILK OF MAGNESIA) suspension 30 mL, 30 mL, Oral, DAILY, JACKLYN BoydOHugo, 30 mL at 07/08/22 0626  •  bisacodyl (DULCOLAX) suppository 10 mg, 10 mg, Rectal, Q24HRS PRN, Nahid Russ D.O.  •  sodium phosphate (Fleet) enema 133 mL, 1 Each, Rectal, Once PRN, Nahid Russ D.O.  •  acetaminophen (Tylenol) tablet 650 mg, 650 mg, Oral, Q6HRS, 650 mg at 07/09/22 0908 **FOLLOWED BY** [START ON 7/12/2022] acetaminophen (Tylenol) tablet 650 mg, 650 mg, Oral, Q6HRS PRN, Nahid Russ D.O.  •  ketorolac (TORADOL) injection 15 mg, 15 mg, Intravenous, Q6HRS, 15 mg at 07/09/22 0907 **FOLLOWED BY** [START ON 7/10/2022] ibuprofen (MOTRIN) tablet 800 mg, 800 mg, Oral, TID PRN, Nahid Russ D.O.  •  oxyCODONE immediate-release (ROXICODONE) tablet 5 mg, 5 mg, Oral, Q3HRS PRN, 5 mg at 07/09/22 0907 **OR** [DISCONTINUED] oxyCODONE immediate release (ROXICODONE) tablet 10 mg, 10 mg, Oral, Q3HRS PRN, 10 mg at 07/09/22 0609 **OR** [DISCONTINUED] HYDROmorphone (Dilaudid) injection 0.5 mg, 0.5 mg, Intravenous, Q3HRS PRN, Nahid Russ D.O., 0.5 mg at 07/08/22 0701  •  sulfamethoxazole-trimethoprim (BACTRIM DS) 800-160 MG tablet 1 Tablet, 1 Tablet, Oral, Q12HRS, Nahid Russ D.O., 1 Tablet at 07/09/22 0609  •  omeprazole (PRILOSEC) capsule 20 mg, 20 mg, Oral, DAILY, Nahid Russ D.O., 20 mg at 07/09/22 0609  •  loratadine (CLARITIN) tablet 10 mg, 10 mg, Oral, DAILY, Melanie Noonan P.A.-C., 10 mg at 07/07/22 2239  •  gabapentin (NEURONTIN) capsule 100 mg, 100 mg, Oral, TID, Melanie Noonan P.A.-C., 100 mg at 07/09/22 0609  •  nicotine (NICODERM) 14 MG/24HR 14 mg, 14 mg, Transdermal,  "Daily-0600, 14 mg at 07/09/22 0608 **AND** Nicotine Replacement Patient Education Materials, , , Once **AND** nicotine polacrilex (NICORETTE) 2 MG piece 2 mg, 2 mg, Oral, Q HOUR PRN, Melanie Noonan P.A.-C.    Physical Examination:   BP (!) 146/84   Pulse 77   Temp 36.5 °C (97.7 °F) (Temporal)   Resp 18   Ht 1.753 m (5' 9\")   Wt 94.1 kg (207 lb 7.3 oz)   SpO2 94%   BMI 30.64 kg/m²       General: Patient is awake and in no acute distress  Neck: There is normal range of motion  CV: Regular rate   Extremities:  Warm, dry, and intact, without peripheral lower extremity edema    NEUROLOGICAL EXAM:     Mental status: Awake, alert and fully oriented  Speech and language: Speech is clear and fluent. The patient is able to name and repeat, and follow commands  Cranial nerve exam: Pupils are equal, round and reactive to light bilaterally. Visual fields are full. There is no nystagmus. Extraocular muscles are intact. Face is symmetric. Sensation in the face is intact to light touch. Palate elevates symmetrically. Tongue is midline.  Motor exam: Power 5/5 throughout all extremities. There is sustained antigravity with no downward drift in bilateral arms and legs.  There is no pronator drift. Tone is normal. No abnormal movements were seen on exam.  Sensory exam: Reacts to tactile and pinprick in all 4 extremities, no neglect to double stim   Deep tendon reflexes:  2+ throughout. Toes down-going bilaterally.  Coordination: No ataxia on bilateral finger-to-nose testing  Gait: Deferred due to patient preference    Objective Data:    Labs:  Lab Results   Component Value Date/Time    PROTHROMBTM 13.4 07/07/2022 01:05 PM    INR 1.03 07/07/2022 01:05 PM      Lab Results   Component Value Date/Time    WBC 12.0 (H) 07/09/2022 04:11 AM    RBC 4.61 (L) 07/09/2022 04:11 AM    HEMOGLOBIN 13.5 (L) 07/09/2022 04:11 AM    HEMATOCRIT 42.2 07/09/2022 04:11 AM    MCV 91.5 07/09/2022 04:11 AM    MCH 29.3 07/09/2022 04:11 AM    MCHC 32.0 (L) " 07/09/2022 04:11 AM    MPV 8.7 (L) 07/09/2022 04:11 AM    NEUTSPOLYS 66.30 07/09/2022 04:11 AM    LYMPHOCYTES 24.00 07/09/2022 04:11 AM    MONOCYTES 5.80 07/09/2022 04:11 AM    EOSINOPHILS 2.60 07/09/2022 04:11 AM    BASOPHILS 0.90 07/09/2022 04:11 AM      Lab Results   Component Value Date/Time    SODIUM 137 07/09/2022 04:11 AM    POTASSIUM 3.7 07/09/2022 04:11 AM    CHLORIDE 105 07/09/2022 04:11 AM    CO2 22 07/09/2022 04:11 AM    GLUCOSE 130 (H) 07/09/2022 04:11 AM    BUN 11 07/09/2022 04:11 AM    CREATININE 0.85 07/09/2022 04:11 AM      No results found for: CHOLSTRLTOT, LDL, HDL, TRIGLYCERIDE    Lab Results   Component Value Date/Time    ALKPHOSPHAT 125 (H) 07/07/2022 01:05 PM    ASTSGOT 107 (H) 07/07/2022 01:05 PM    ALTSGPT 96 (H) 07/07/2022 01:05 PM    TBILIRUBIN 0.3 07/07/2022 01:05 PM        Imaging/Testing:    I interpreted and/or reviewed the patient's neuroimaging    DX-CERVICAL SPINE-2 OR 3 VIEWS   Final Result      Degenerative changes of the cervical spine without obvious fracture or malalignment.      DX-THORACIC SPINE-2 VIEWS   Final Result      Degenerative changes without obvious fracture or malalignment of the thoracic spine      DX-LUMBAR SPINE-2 OR 3 VIEWS   Final Result      No obvious fracture or malalignment of the lumbar spine      DX-CHEST-LIMITED (1 VIEW)   Final Result      1.  No acute cardiac or pulmonary abnormalities are identified.      AA-KRHPBMT-2 VIEW   Final Result         No definite evidence of free air. If there is clinical concern, cross sectional imaging can be obtained.      US-ABORTED US PROCEDURE    (Results Pending)   MR-BRAIN-WITH & W/O    (Results Pending)       Assessment and Plan:    Donal Carpio is a 52 y.o. presenting after seizure episode caused him to fall and land on a sharp kitchen knife and punctured his stomach.  He reports having daily seizures even while taking Keppra 500 mg twice daily.  He also reports that he stopped alcohol use several weeks  ago.  His alcohol level on admission was elevated at 14.  He has reported to me overnight that he had multiple seizures while on an increased dose of Keppra 1 g twice daily, however nursing reports indicate that this was not the case.    Patient is stating that he would like to stay in the hospital for another day while we do more test because he is very nervous about going home and having a seizure.  My suspicion is that the patient has been having seizures related to alcohol use and withdrawal, but he may be over reporting the frequency and extent of the seizures at this time.    MRI brain and EEG appear within normal limits and without any obvious pathology to account for her epilepsy.  For now continue Keppra 1 g twice daily.  Follow-up in neurology epilepsy clinic in 4 to 6 weeks.    IV lorazepam 2 mg as needed for any seizure lasting greater than 5 minutes or seizure clusters.    Please call neurology with any further questions or concerns.      The evaluation of the patient, and recommended management, was discussed with the resident staff. I have performed a physical exam and reviewed and updated ROS and Plan today (7/9/2022).     Prosper Grewal D.O.  Neurohospitalist, Acute Care Services

## 2022-07-09 NOTE — DISCHARGE INSTRUCTIONS
Post Operative Discharge Instructions:    1. DIET: Upon discharge from the hospital you may resume your normal preoperative diet. Depending on how you are feeling and whether you have nausea or not, you may wish to stay with a bland diet for the first few days. However, you can advance this as quickly as you feel ready.    2. ACTIVITIES: After discharge from the hospital, you may resume full routine activities. However, there should be no heavy lifting (greater than 10 pounds) and no strenuous activities until after your follow-up visit. Otherwise, routine activities of daily living are acceptable.    3. DRIVING: You may drive whenever you are off pain medications and are able to perform the activities needed to drive, i.e. turning, bending, twisting, etc.    4. BATHING: You may get the wound wet at any time after leaving the hospital. You may shower, but do not submerge in a bath for at least two weeks. If you have wound dressings, they may come off after 48 hours. If you have skin glue to the wound, this will fall off on its own, do not pick at it. If you have steri strips to the wound, these will fall off on their own, do not pick at them, may trim the edges if needed.    5. BOWEL FUNCTION: A few patients, after this operation, will develop either frequent or loose stools after meals. This usually corrects itself after a few days, to a few weeks. If this occurs, do not worry; it is not unusual and will resolve. Much more common than loose stools, is constipation. The combination of pain medication and decreased activity level can cause constipation in otherwise normal patients. If you feel this is occurring, take a laxative (Milk of Magnesia, Ex-Lax, Senokot, etc.) until the problem has resolved.    6. PAIN MEDICATION: You will be given a prescription for pain medication at discharge. Please take these as directed. It is important to remember not to take medications on an empty stomach as this may cause  nausea. You may also take over the counter acetaminophen and/or NSAIDS (ibuprofen, Aleve, Advil, Motrin) per the package instructions.     7.CALL IF YOU HAVE: (1) Fevers to more than 101F, (2) Unusual chest or leg pain, (3) Drainage or fluid from incision that may be foul smelling, increased tenderness or soreness at the wound or the wound edges are no longer together, redness or swelling at the incision site. Please do not hesitate to call with any other questions.    FOLLOW UP ITEMS POST DISCHARGE  Please call [phone] to schedule PCP appointment for patient.    Required specialty appointments include:       Discharge Instructions per BRENDA Zuniga    -Establish with a PCP and follow-up s/p hospitalization  -Follow-up with surgical team with an established appointment on 7/15  -Follow-up with neurology for management of seizures  -Continue taking Keppra 1 g twice daily and will be managed by the neurology team  -Finish course of oral antibiotics for 4 more days  -Utilize Pepcid for peptic ulcers  -Utilize over-the-counter medication to help manage pain  -Stop drinking alcohol  -Stop smoking    DIET: As tolerated    ACTIVITY: As tolerated    DIAGNOSIS: Seizure activity causing trauma    Return to ER if symptoms persist, chest pain, palpitations, shortness of breath, numbness, tingling, weakness, and high fevers.  Discharge Instructions    Discharged to home by car with relative. Discharged via wheelchair, hospital escort: Yes.  Special equipment needed: Not Applicable    Be sure to schedule a follow-up appointment with your primary care doctor or any specialists as instructed.     Discharge Plan:   Diet Plan: Discussed  Activity Level: Discussed  Confirmed Follow up Appointment: Patient to Call and Schedule Appointment  Confirmed Symptoms Management: Discussed  Medication Reconciliation Updated: Yes    I understand that a diet low in cholesterol, fat, and sodium is recommended for good  health. Unless I have been given specific instructions below for another diet, I accept this instruction as my diet prescription.   Other diet: regular    Special Instructions: None    -Is this patient being discharged with medication to prevent blood clots?  No    Is patient discharged on Warfarin / Coumadin?   No

## 2022-07-09 NOTE — ASSESSMENT & PLAN NOTE
- Patient noted to apparently be well-known to the ER physician due to recurrent complaints of possible seizure and encounters seeking pain medication  -Multiple admission noted  -Patient endorses neck pain despite imaging of the cervical spine, thoracic spine, and lumbar spine negative  -Patient endorses migraine despite having no history of it

## 2022-07-09 NOTE — CONSULTS
"Hospital Medicine Consultation    Date of Service  7/9/2022    Referring Physician  Abelardo Rowe*    Consulting Physician  BRENDA Zuniga    Reason for Consultation  Management of Care  Disposition planning    History of Presenting Illness  Mr. Donal Carpio is a 52 y.o. male who is apparently well-known to the ER department physician due to complaints of seizure as well as prior encounters seeking pain medication, EtOH use, who presented 7/7/2022 with elevated seizure and fell on a knife that he was holding sustaining a right abdominal wound.    Patient reports not remembering the event.  He was unsure if he lost consciousness, but denies any headache, no vision changes, no sensorimotor deficit.  He also denies any other injuries or complaints.  Trauma red was activated of which the trauma team initially took care of the patient.  Surgery, Dr. Russ took the patient to the OR for an exploratory laparotomy of the abdomen.  Abdominal wound was sutured with no other acute pathologies noted.  Patient was admitted into the observation unit for overnight stay and monitoring.    During his course of stay, patient was given Keppra for better control of seizures.  Patient informed surgical team that he has [employer] insurance and has been trying to get Medicaid so he can establish with an outpatient neurologist to manage his seizure medication.  Overnight in the observation unit, patient reports that he had \"seizure-like activity \"despite being given Keppra load and maintenance dose.  Patient also reports neck pain of which imaging of the neck and back was obtained and a c-collar was placed for precaution.    Diagnostic studies of the cervical spine noted degenerative changes of the cervical spine without obvious fracture or malalignment.  Diagnostic imaging of the thoracic spine noted degenerative changes without obvious fracture or malalignment of the thoracic spine.  Diagnostic imaging of " the lumbar spine noted no obvious fracture or malalignment of the lumbar spine.    Neurology, Dr. Grewal was consulted due to recurrent seizure-like activity.  Per neurology, neuro check in place every 4 hours, seizure precaution in place, urine drug screen obtained noting positive benzodiazepine, cannabinoid metabolites, oxycodone.  Keppra dose to 1 g twice daily and added additional dose of 500 mg on 7/8.  Standing order of lorazepam 2 mg needed for seizure cluster lasting greater than 5 minutes.  EEG also obtained which noted normal video EEG recording in the awake and drowsy states.  Noted excessive fast frequency is likely related to medication effect.  MRI of the brain with and without also place, pending results.    Review of Systems  Review of Systems   Constitutional: Positive for malaise/fatigue. Negative for chills and fever.   HENT: Negative.    Eyes: Negative.    Cardiovascular: Negative.    Gastrointestinal: Positive for abdominal pain.   Genitourinary: Negative.    Musculoskeletal: Negative.    Skin: Negative.    Neurological: Positive for dizziness, seizures and headaches.   Endo/Heme/Allergies: Negative.    Psychiatric/Behavioral: Positive for depression and substance abuse.       Past Medical History   has a past medical history of Drug-seeking behavior and Seizure (HCC).    Surgical History   has a past surgical history that includes pr lap,diagnostic abdomen (N/A, 7/7/2022) and wound exploration ortho (Right, 7/7/2022).    Family History  family history is not on file.    Social History   reports that he has quit smoking. He does not have any smokeless tobacco history on file.    Medications  None       Allergies  Allergies   Allergen Reactions   • Penicillins Unspecified     Unknown. Per patient, he knows he has one because his mom told him       Physical Exam  Temp:  [36.2 °C (97.2 °F)-36.7 °C (98 °F)] 36.5 °C (97.7 °F)  Pulse:  [] 77  Resp:  [16-20] 18  BP: (140-165)/(84-98) 146/84  SpO2:   [92 %-94 %] 94 %    Physical Exam  Vitals and nursing note reviewed.   HENT:      Head: Normocephalic.      Mouth/Throat:      Mouth: Mucous membranes are moist.      Pharynx: Oropharynx is clear.   Eyes:      Pupils: Pupils are equal, round, and reactive to light.   Cardiovascular:      Rate and Rhythm: Normal rate and regular rhythm.      Pulses: Normal pulses.      Heart sounds: Normal heart sounds.   Pulmonary:      Effort: Pulmonary effort is normal.      Breath sounds: Normal breath sounds.   Abdominal:      General: Bowel sounds are normal.      Tenderness: There is abdominal tenderness.       Musculoskeletal:         General: Tenderness and signs of injury present.      Cervical back: Normal range of motion and neck supple.   Skin:     General: Skin is dry.      Capillary Refill: Capillary refill takes 2 to 3 seconds.   Neurological:      Mental Status: He is alert. Mental status is at baseline.         Fluids      Laboratory  Recent Labs     07/07/22  1305 07/08/22  0145 07/09/22  0411   WBC 7.3 12.9* 12.0*   RBC 5.27 4.77 4.61*   HEMOGLOBIN 15.3 14.1 13.5*   HEMATOCRIT 45.1 42.5 42.2   MCV 85.6 89.1 91.5   MCH 29.0 29.6 29.3   MCHC 33.9 33.2* 32.0*   RDW 50.6* 52.7* 55.0*   PLATELETCT 346 309 246   MPV 8.6* 8.9* 8.7*     Recent Labs     07/07/22  1305 07/08/22  0145 07/09/22  0411   SODIUM 138 136 137   POTASSIUM 3.2* 4.0 3.7   CHLORIDE 102 103 105   CO2 18* 22 22   GLUCOSE 133* 207* 130*   BUN 3* 7* 11   CREATININE 0.79 0.73 0.85   CALCIUM 8.7 8.2* 8.3*     Recent Labs     07/07/22  1305   APTT 28.8   INR 1.03                 Imaging  DX-CERVICAL SPINE-2 OR 3 VIEWS   Final Result      Degenerative changes of the cervical spine without obvious fracture or malalignment.      DX-THORACIC SPINE-2 VIEWS   Final Result      Degenerative changes without obvious fracture or malalignment of the thoracic spine      DX-LUMBAR SPINE-2 OR 3 VIEWS   Final Result      No obvious fracture or malalignment of the lumbar  spine      DX-CHEST-LIMITED (1 VIEW)   Final Result      1.  No acute cardiac or pulmonary abnormalities are identified.      [id number] VIEW   Final Result         No definite evidence of free air. If there is clinical concern, cross sectional imaging can be obtained.      US-ABORTED US PROCEDURE    (Results Pending)   MR-BRAIN-WITH & W/O    (Results Pending)       Assessment/Plan  Drug-seeking behavior  Assessment & Plan  - Patient noted to apparently be well-known to the ER physician due to recurrent complaints of possible seizure and encounters seeking pain medication  -Multiple admission noted  -Patient endorses neck pain despite imaging of the cervical spine, thoracic spine, and lumbar spine negative  -Patient endorses migraine despite having no history of it      Alcohol use- (present on admission)  Assessment & Plan  - Endorses having EtOH abuse history  -Patient counseled    Seizure disorder (HCC)- (present on admission)  Assessment & Plan  - Reports having seizure but does not follow neurology as an outpatient  -Keppra 1 g added  -Neurology consulted and following  -EEG obtain -within normal limits at this time  -MRI of the head pending  -Patient reports having private insurance through [employer] and is currently in the process of switching to Medicaid?    Penetrating abdominal trauma- (present on admission)  Assessment & Plan  - Reports having a seizure episode and a falling on a paring knife  -Trauma team consulted  -Underwent a exploratory laparotomy on 7/7    DVT Prophylaxis: Xarelto 10mg daily      ========================================================================================================  Please note that this dictation was created using voice recognition software. I have made every reasonable attempt to correct obvious errors, but there may be errors of grammar and possibly content that I did not discover before finalizing the note.    Electronically signed by:  JANESSA Mohr  MSN, APRN, FNP-C  Hospitalist Services  [facility]  [phone]  [email or web address]  07/09/22                  0924

## 2022-07-09 NOTE — ASSESSMENT & PLAN NOTE
- Reports having a seizure episode and a falling on a paring knife  -Trauma team consulted  -Underwent a exploratory laparotomy on 7/7

## 2022-07-09 NOTE — DISCHARGE SUMMARY
"Discharge Summary    CHIEF COMPLAINT ON ADMISSION  No chief complaint on file.      Reason for Admission  trauma red 52M     Admission Date  7/7/2022    CODE STATUS  Full Code    HPI & HOSPITAL COURSE     Mr. Donal Carpio is a 52 y.o. male who is apparently well-known to the ER department physician due to complaints of seizure as well as prior encounters seeking pain medication, EtOH use, who presented 7/7/2022 with elevated seizure and fell on a knife that he was holding sustaining a right abdominal wound.    Patient reports not remembering the event.  He was unsure if he lost consciousness, but denies any headache, no vision changes, no sensorimotor deficit.  He also denies any other injuries or complaints.  Trauma red was activated of which the trauma team initially took care of the patient.  Surgery, Dr. Russ took the patient to the OR for an exploratory laparotomy of the abdomen.  Abdominal wound was sutured with no other acute pathologies noted.  Patient was admitted into the observation unit for overnight stay and monitoring.    During his course of stay, patient was given Keppra for better control of seizures.  Patient informed surgical team that he has [employer] insurance and has been trying to get Medicaid so he can establish with an outpatient neurologist to manage his seizure medication.  Overnight in the observation unit, patient reports that he had \"seizure-like activity \"despite being given Keppra load and maintenance dose.  Patient also reports neck pain of which imaging of the neck and back was obtained and a c-collar was placed for precaution.    Diagnostic studies of the cervical spine noted degenerative changes of the cervical spine without obvious fracture or malalignment.  Diagnostic imaging of the thoracic spine noted degenerative changes without obvious fracture or malalignment of the thoracic spine.  Diagnostic imaging of the lumbar spine noted no obvious fracture or malalignment of the " lumbar spine.    Neurology, Dr. Grewal was consulted due to recurrent seizure-like activity.  Per neurology, neuro check in place every 4 hours, seizure precaution in place, urine drug screen obtained noting positive benzodiazepine, cannabinoid metabolites, oxycodone.  Keppra dose to 1 g twice daily and added additional dose of 500 mg on 7/8.  Standing order of lorazepam 2 mg needed for seizure cluster lasting greater than 5 minutes.  EEG also obtained which noted normal video EEG recording in the awake and drowsy states.  Noted excessive fast frequency is likely related to medication effect.  MRI of the brain with and without also place, pending results.    MRI of the brain noted no acute intracranial abnormality, parenchymal atrophy, mild white matter disease most consistent with microvascular ischemic changes, no evidence of mass lesion, heterotopic gray matter, gross cortical dysplasia or mesial temporal sclerosis.  Discussed with patient MRI results by neurology.  Patient had voiced out concerns of being discharged home and very anxious about having another seizure.  At this time, the entire team that was in his care is very suspicious that his seizures are related to alcohol use and withdrawal and might be overreporting the frequency and extent of his seizures at this time.    Patient seen and examined and discussed staying compliant with seizure medication Keppra 1 g twice daily.  Patient to follow-up with neurology in 4-6 weeks for follow-up of care.  Patient also recommended to follow-up with the surgery with an established appointment on 7/15 for follow-up of abdominal pain.  Discussed pain management with the use of over-the-counter medication.  Patient will be prescribed 4-day course of antibiotics to finish his course due to his abdominal wound.  Patient will also be prescribed Pepcid due to his known peptic ulcer.  Patient highly recommended to establish with a PCP and follow-up s/p hospitalization.   All questions and concerns answered prior to being discharged.  Patient discharged home.      Therefore, he is discharged in good and stable condition to home with close outpatient follow-up.    The patient met 2-midnight criteria for an inpatient stay at the time of discharge.    Discharge Date  07/09/22      FOLLOW UP ITEMS POST DISCHARGE  Please call [phone] to schedule PCP appointment for patient.    Required specialty appointments include:       Discharge Instructions per BRENDA Zuniga    -Establish with a PCP and follow-up s/p hospitalization  -Follow-up with surgical team with an established appointment on 7/15  -Follow-up with neurology for management of seizures  -Continue taking Keppra 1 g twice daily and will be managed by the neurology team  -Finish course of oral antibiotics for 4 more days  -Utilize Pepcid for peptic ulcers  -Utilize over-the-counter medication to help manage pain  -Stop drinking alcohol  -Stop smoking    DIET: As tolerated    ACTIVITY: As tolerated    DIAGNOSIS: Seizure activity causing trauma    Return to ER if symptoms persist, chest pain, palpitations, shortness of breath, numbness, tingling, weakness, and high fevers.      DISCHARGE DIAGNOSES  Active Problems:    Seizure disorder (HCC) POA: Yes    Alcohol use POA: Yes    Peptic ulcer POA: Yes    Nicotine use POA: Yes    Seasonal allergies POA: Yes    Drug-seeking behavior POA: Unknown  Resolved Problems:    Trauma POA: Yes    Penetrating abdominal trauma POA: Yes    Lactic acidosis POA: Yes    Neck pain POA: Unknown    Back pain POA: Yes      FOLLOW UP    Nahid Russ D.O.  [address]  [address]  [place] [id number] [phone]    Schedule an appointment as soon as possible for a visit  In clinic on 7/15    [facility]- NEUROLOGY  [address]  [place] [address]  [phone]  Schedule an appointment as soon as possible for a visit in 3 week(s)      [facility]  [address] [address]  [phone]  Schedule an appointment as soon as possible for a visit in 1 week(s)        MEDICATIONS ON DISCHARGE     Medication List      START taking these medications      Instructions   acetaminophen 325 MG Tabs  Commonly known as: Tylenol   Take 2 Tablets by mouth every 6 hours for 10 days.  Dose: 650 mg     gabapentin 100 MG Caps  Commonly known as: NEURONTIN   Take 1 Capsule by mouth 3 times a day for 30 days.  Dose: 100 mg     levetiracetam 1000 MG tablet  Commonly known as: KEPPRA   Take 1 Tablet by mouth 2 times a day for 30 days.  Dose: 1,000 mg     omeprazole 20 MG delayed-release capsule  Start taking on: July 10, 2022  Commonly known as: PRILOSEC   Take 1 Capsule by mouth every day for 30 days.  Dose: 20 mg     sulfamethoxazole-trimethoprim 800-160 MG tablet  Commonly known as: BACTRIM DS   Take 1 Tablet by mouth every 12 hours for 4 days.  Dose: 1 Tablet            Allergies  Allergies   Allergen Reactions   • Penicillins Unspecified     Unknown. Per patient, he knows he has one because his mom told him       DIET  Orders Placed This Encounter   Procedures   • Diet Order Diet: Regular     Standing Status:   Standing     Number of Occurrences:   1     Order Specific Question:   Diet:     Answer:   Regular [1]       ACTIVITY  As tolerated.  Weight bearing as tolerated    CONSULTATIONS  Neurology  Surgery    PROCEDURES  Exploratory laparotomy on the abdomen    IMAGING  MR-BRAIN-WITH & W/O   Final Result      1.  No acute intracranial abnormality.   2.  Parenchymal atrophy.   3.  Mild white matter disease, most consistent with microvascular ischemic changes.   4.  No evidence of mass lesion, heterotopic gray matter, gross cortical dysplasia or mesial temporal sclerosis.      DX-CERVICAL SPINE-2 OR 3 VIEWS   Final Result      Degenerative changes of the cervical spine without obvious fracture or malalignment.      DX-THORACIC SPINE-2 VIEWS   Final Result       Degenerative changes without obvious fracture or malalignment of the thoracic spine      DX-LUMBAR SPINE-2 OR 3 VIEWS   Final Result      No obvious fracture or malalignment of the lumbar spine      DX-CHEST-LIMITED (1 VIEW)   Final Result      1.  No acute cardiac or pulmonary abnormalities are identified.      FI-ROYUAJO-8 VIEW   Final Result         No definite evidence of free air. If there is clinical concern, cross sectional imaging can be obtained.      US-ABORTED US PROCEDURE    (Results Pending)         LABORATORY  Lab Results   Component Value Date    SODIUM 137 07/09/2022    POTASSIUM 3.7 07/09/2022    CHLORIDE 105 07/09/2022    CO2 22 07/09/2022    GLUCOSE 130 (H) 07/09/2022    BUN 11 07/09/2022    CREATININE 0.85 07/09/2022        Lab Results   Component Value Date    WBC 12.0 (H) 07/09/2022    HEMOGLOBIN 13.5 (L) 07/09/2022    HEMATOCRIT 42.2 07/09/2022    PLATELETCT 246 07/09/2022        Total time of the discharge process exceeds 36 minutes.    ========================================================================================================  Please note that this dictation was created using voice recognition software. I have made every reasonable attempt to correct obvious errors, but there may be errors of grammar and possibly content that I did not discover before finalizing the note.    Electronically signed by:  JANESSA Mohr, MSN, APRN, FNP-C  Hospitalist Services  [facility]  [phone]  [email or web address]  07/09/22                  [phone]

## 2022-07-10 ENCOUNTER — HOSPITAL ENCOUNTER (EMERGENCY)
Facility: MEDICAL CENTER | Age: 52
End: 2022-07-10
Attending: EMERGENCY MEDICINE
Payer: COMMERCIAL

## 2022-07-10 ENCOUNTER — APPOINTMENT (OUTPATIENT)
Dept: RADIOLOGY | Facility: MEDICAL CENTER | Age: 52
End: 2022-07-10
Attending: EMERGENCY MEDICINE
Payer: COMMERCIAL

## 2022-07-10 VITALS
DIASTOLIC BLOOD PRESSURE: 84 MMHG | HEIGHT: 69 IN | RESPIRATION RATE: 18 BRPM | HEART RATE: 93 BPM | SYSTOLIC BLOOD PRESSURE: 130 MMHG | OXYGEN SATURATION: 92 % | TEMPERATURE: 98 F | BODY MASS INDEX: 28.14 KG/M2 | WEIGHT: 190 LBS

## 2022-07-10 DIAGNOSIS — R10.31 RIGHT LOWER QUADRANT ABDOMINAL PAIN: ICD-10-CM

## 2022-07-10 LAB
ALBUMIN SERPL BCP-MCNC: 4.3 G/DL (ref 3.2–4.9)
ALBUMIN/GLOB SERPL: 1.1 G/DL
ALP SERPL-CCNC: 140 U/L (ref 30–99)
ALT SERPL-CCNC: 72 U/L (ref 2–50)
ANION GAP SERPL CALC-SCNC: 15 MMOL/L (ref 7–16)
AST SERPL-CCNC: 68 U/L (ref 12–45)
BASOPHILS # BLD AUTO: 1.4 % (ref 0–1.8)
BASOPHILS # BLD: 0.14 K/UL (ref 0–0.12)
BILIRUB SERPL-MCNC: 0.6 MG/DL (ref 0.1–1.5)
BUN SERPL-MCNC: 7 MG/DL (ref 8–22)
CALCIUM SERPL-MCNC: 9.8 MG/DL (ref 8.5–10.5)
CHLORIDE SERPL-SCNC: 102 MMOL/L (ref 96–112)
CO2 SERPL-SCNC: 20 MMOL/L (ref 20–33)
CREAT SERPL-MCNC: 0.89 MG/DL (ref 0.5–1.4)
EOSINOPHIL # BLD AUTO: 0.39 K/UL (ref 0–0.51)
EOSINOPHIL NFR BLD: 3.8 % (ref 0–6.9)
ERYTHROCYTE [DISTWIDTH] IN BLOOD BY AUTOMATED COUNT: 51.5 FL (ref 35.9–50)
GFR SERPLBLD CREATININE-BSD FMLA CKD-EPI: 104 ML/MIN/1.73 M 2
GLOBULIN SER CALC-MCNC: 3.9 G/DL (ref 1.9–3.5)
GLUCOSE SERPL-MCNC: 102 MG/DL (ref 65–99)
HCT VFR BLD AUTO: 48.3 % (ref 42–52)
HGB BLD-MCNC: 15.9 G/DL (ref 14–18)
IMM GRANULOCYTES # BLD AUTO: 0.08 K/UL (ref 0–0.11)
IMM GRANULOCYTES NFR BLD AUTO: 0.8 % (ref 0–0.9)
LIPASE SERPL-CCNC: 18 U/L (ref 11–82)
LYMPHOCYTES # BLD AUTO: 1.88 K/UL (ref 1–4.8)
LYMPHOCYTES NFR BLD: 18.4 % (ref 22–41)
MCH RBC QN AUTO: 29 PG (ref 27–33)
MCHC RBC AUTO-ENTMCNC: 32.9 G/DL (ref 33.7–35.3)
MCV RBC AUTO: 88 FL (ref 81.4–97.8)
MONOCYTES # BLD AUTO: 0.64 K/UL (ref 0–0.85)
MONOCYTES NFR BLD AUTO: 6.3 % (ref 0–13.4)
NEUTROPHILS # BLD AUTO: 7.1 K/UL (ref 1.82–7.42)
NEUTROPHILS NFR BLD: 69.3 % (ref 44–72)
NRBC # BLD AUTO: 0 K/UL
NRBC BLD-RTO: 0 /100 WBC
PLATELET # BLD AUTO: 320 K/UL (ref 164–446)
PMV BLD AUTO: 8.6 FL (ref 9–12.9)
POTASSIUM SERPL-SCNC: 3.8 MMOL/L (ref 3.6–5.5)
PROT SERPL-MCNC: 8.2 G/DL (ref 6–8.2)
RBC # BLD AUTO: 5.49 M/UL (ref 4.7–6.1)
SODIUM SERPL-SCNC: 137 MMOL/L (ref 135–145)
WBC # BLD AUTO: 10.2 K/UL (ref 4.8–10.8)

## 2022-07-10 PROCEDURE — 96375 TX/PRO/DX INJ NEW DRUG ADDON: CPT

## 2022-07-10 PROCEDURE — 80053 COMPREHEN METABOLIC PANEL: CPT

## 2022-07-10 PROCEDURE — 74177 CT ABD & PELVIS W/CONTRAST: CPT

## 2022-07-10 PROCEDURE — 96374 THER/PROPH/DIAG INJ IV PUSH: CPT

## 2022-07-10 PROCEDURE — 700111 HCHG RX REV CODE 636 W/ 250 OVERRIDE (IP): Performed by: EMERGENCY MEDICINE

## 2022-07-10 PROCEDURE — 700117 HCHG RX CONTRAST REV CODE 255: Performed by: EMERGENCY MEDICINE

## 2022-07-10 PROCEDURE — 96376 TX/PRO/DX INJ SAME DRUG ADON: CPT

## 2022-07-10 PROCEDURE — 36415 COLL VENOUS BLD VENIPUNCTURE: CPT

## 2022-07-10 PROCEDURE — 85025 COMPLETE CBC W/AUTO DIFF WBC: CPT

## 2022-07-10 PROCEDURE — 83690 ASSAY OF LIPASE: CPT

## 2022-07-10 PROCEDURE — 99284 EMERGENCY DEPT VISIT MOD MDM: CPT

## 2022-07-10 RX ORDER — HYDROMORPHONE HYDROCHLORIDE 2 MG/ML
2 INJECTION, SOLUTION INTRAMUSCULAR; INTRAVENOUS; SUBCUTANEOUS ONCE
Status: COMPLETED | OUTPATIENT
Start: 2022-07-10 | End: 2022-07-10

## 2022-07-10 RX ORDER — LEVETIRACETAM 1000 MG/1
1000 TABLET ORAL
Qty: 60 TABLET | Refills: 0 | Status: SHIPPED | OUTPATIENT
Start: 2022-07-10 | End: 2022-07-16

## 2022-07-10 RX ORDER — ONDANSETRON 2 MG/ML
4 INJECTION INTRAMUSCULAR; INTRAVENOUS ONCE
Status: COMPLETED | OUTPATIENT
Start: 2022-07-10 | End: 2022-07-10

## 2022-07-10 RX ORDER — OMEPRAZOLE 20 MG/1
20 CAPSULE, DELAYED RELEASE ORAL DAILY
Qty: 30 CAPSULE | Refills: 0 | Status: SHIPPED | OUTPATIENT
Start: 2022-07-10 | End: 2022-07-16

## 2022-07-10 RX ORDER — ACETAMINOPHEN 500 MG
500-1000 TABLET ORAL EVERY 6 HOURS PRN
Qty: 30 TABLET | Refills: 0 | Status: SHIPPED | OUTPATIENT
Start: 2022-07-10 | End: 2022-07-16

## 2022-07-10 RX ORDER — DIPHENHYDRAMINE HYDROCHLORIDE 50 MG/ML
25 INJECTION INTRAMUSCULAR; INTRAVENOUS ONCE
Status: COMPLETED | OUTPATIENT
Start: 2022-07-10 | End: 2022-07-10

## 2022-07-10 RX ORDER — PROCHLORPERAZINE 25 MG
25 SUPPOSITORY, RECTAL RECTAL EVERY 6 HOURS PRN
Qty: 10 SUPPOSITORY | Refills: 0 | Status: SHIPPED | OUTPATIENT
Start: 2022-07-10 | End: 2022-07-16

## 2022-07-10 RX ORDER — GABAPENTIN 100 MG/1
100 CAPSULE ORAL 3 TIMES DAILY
Qty: 90 CAPSULE | Refills: 0 | Status: SHIPPED | OUTPATIENT
Start: 2022-07-10 | End: 2022-08-09

## 2022-07-10 RX ORDER — KETOROLAC TROMETHAMINE 30 MG/ML
30 INJECTION, SOLUTION INTRAMUSCULAR; INTRAVENOUS ONCE
Status: COMPLETED | OUTPATIENT
Start: 2022-07-10 | End: 2022-07-10

## 2022-07-10 RX ORDER — HYDROMORPHONE HYDROCHLORIDE 1 MG/ML
1 INJECTION, SOLUTION INTRAMUSCULAR; INTRAVENOUS; SUBCUTANEOUS ONCE
Status: COMPLETED | OUTPATIENT
Start: 2022-07-10 | End: 2022-07-10

## 2022-07-10 RX ORDER — OXYCODONE HYDROCHLORIDE AND ACETAMINOPHEN 5; 325 MG/1; MG/1
1 TABLET ORAL EVERY 4 HOURS PRN
Qty: 15 TABLET | Refills: 0 | Status: SHIPPED | OUTPATIENT
Start: 2022-07-10 | End: 2022-07-15

## 2022-07-10 RX ORDER — PROCHLORPERAZINE EDISYLATE 5 MG/ML
10 INJECTION INTRAMUSCULAR; INTRAVENOUS ONCE
Status: COMPLETED | OUTPATIENT
Start: 2022-07-10 | End: 2022-07-10

## 2022-07-10 RX ORDER — SULFAMETHOXAZOLE AND TRIMETHOPRIM 800; 160 MG/1; MG/1
1 TABLET ORAL 2 TIMES DAILY
Qty: 8 TABLET | Refills: 0 | Status: SHIPPED | OUTPATIENT
Start: 2022-07-10 | End: 2022-07-14

## 2022-07-10 RX ADMIN — ONDANSETRON 4 MG: 2 INJECTION INTRAMUSCULAR; INTRAVENOUS at 13:35

## 2022-07-10 RX ADMIN — KETOROLAC TROMETHAMINE 30 MG: 30 INJECTION, SOLUTION INTRAMUSCULAR; INTRAVENOUS at 14:42

## 2022-07-10 RX ADMIN — DIPHENHYDRAMINE HYDROCHLORIDE 25 MG: 50 INJECTION INTRAMUSCULAR; INTRAVENOUS at 14:51

## 2022-07-10 RX ADMIN — IOHEXOL 76 ML: 350 INJECTION, SOLUTION INTRAVENOUS at 14:10

## 2022-07-10 RX ADMIN — HYDROMORPHONE HYDROCHLORIDE 1 MG: 1 INJECTION, SOLUTION INTRAMUSCULAR; INTRAVENOUS; SUBCUTANEOUS at 13:35

## 2022-07-10 RX ADMIN — HYDROMORPHONE HYDROCHLORIDE 2 MG: 2 INJECTION INTRAMUSCULAR; INTRAVENOUS; SUBCUTANEOUS at 16:32

## 2022-07-10 RX ADMIN — ONDANSETRON 4 MG: 2 INJECTION INTRAMUSCULAR; INTRAVENOUS at 12:26

## 2022-07-10 RX ADMIN — PROCHLORPERAZINE EDISYLATE 10 MG: 5 INJECTION INTRAMUSCULAR; INTRAVENOUS at 14:51

## 2022-07-10 RX ADMIN — MORPHINE SULFATE 6 MG: 10 INJECTION INTRAVENOUS at 12:26

## 2022-07-10 ASSESSMENT — FIBROSIS 4 INDEX: FIB4 SCORE: 2.22

## 2022-07-10 ASSESSMENT — PAIN DESCRIPTION - PAIN TYPE: TYPE: SURGICAL PAIN

## 2022-07-10 NOTE — ED PROVIDER NOTES
ED Provider  Scribed for Jorge Burns D.O. by Tino Keller. 7/10/2022  12:13 PM    Means of arrival:EMS  History obtained from:Patient  History limited by: None noted    CHIEF COMPLAINT  Chief Complaint   Patient presents with    Abdominal Pain     Sudden onset abd pain        HPI  Donal Carpio is a 52 y.o. male who presents via EMS for evaluation of abdominal pain onset just prior to arrival. The patient states he also has nausea, cramping, vomiting, loose stools and warmth to the wound, but denies any diarrhea, or fever. He describes being evaluated here 3 days ago after experiencing a seizure and falling on a paring knife resulting in stabbing himself in the abdomen. He notes that he was discharged home after getting his wound repaired. He describes taking a nap today when he heard a popping sound and felt immediate pain and warmth around his wound.    REVIEW OF SYSTEMS  See HPI for further details. All other systems are negative.     PAST MEDICAL HISTORY   has a past medical history of Drug-seeking behavior and Seizure (HCC).    SOCIAL HISTORY  Social History     Tobacco Use    Smoking status: Former Smoker    Smokeless tobacco: Not noted   Vaping Use    Vaping Use: Every day    Substances: Nicotine   Substance and Sexual Activity    Alcohol use: Yes     Alcohol/week: 2.4 oz     Types: 4 Shots of liquor per week    Drug use: Not noted    Sexual activity: Not noted       SURGICAL HISTORY   has a past surgical history that includes lap,diagnostic abdomen (N/A, 7/7/2022) and wound exploration ortho (Right, 7/7/2022).    CURRENT MEDICATIONS  Current Outpatient Medications   Medication Instructions    acetaminophen (TYLENOL) 650 mg, Oral, EVERY 6 HOURS    acetaminophen (TYLENOL) 500-1,000 mg, Oral, EVERY 6 HOURS PRN    gabapentin (NEURONTIN) 100 mg, Oral, 3 TIMES DAILY    gabapentin (NEURONTIN) 100 mg, Oral, 3 TIMES DAILY    levetiracetam (KEPPRA) 1,000 mg, Oral, 2 TIMES DAILY    levetiracetam (KEPPRA)  "1,000 mg, Oral, TWO TIMES DAILY    omeprazole (PRILOSEC) 20 mg, Oral, DAILY    omeprazole (PRILOSEC) 20 mg, Oral, DAILY    sulfamethoxazole-trimethoprim (BACTRIM DS) 800-160 MG tablet 1 Tablet, Oral, EVERY 12 HOURS    sulfamethoxazole-trimethoprim (BACTRIM DS) 800-160 MG tablet 1 Tablet, Oral, 2 TIMES DAILY       ALLERGIES  Allergies   Allergen Reactions    Penicillins Unspecified     Unknown. Per patient, he knows he has one because his mom told him       PHYSICAL EXAM  VITAL SIGNS: BP (!) 163/87   Pulse (!) 118   Temp 36.4 °C (97.6 °F) (Temporal)   Resp (!) 28   Ht 1.753 m (5' 9\")   Wt 86.2 kg (190 lb)   SpO2 98%   BMI 28.06 kg/m²     Constitutional: Alert in no apparent distress.  HENT: No signs of trauma, mucous membranes are moist  Eyes: Conjunctiva normal, Non-icteric.   Neck: Normal range of motion, No tenderness, Supple.  Lymphatic: No lymphadenopathy noted.   Cardiovascular: Regular rate and rhythm, no murmurs.   Thorax & Lungs: Normal breath sounds, No respiratory distress, No wheezing, No chest tenderness.   Abdomen: Abdomen is mildly distended and diffusely tender. Laparoscopic surgical incision showed no signs of infection. Right abdomen puncture scar has no active bleeding. Bowel sounds normal, Soft, No masses, No pulsatile masses. No peritoneal signs.  Skin: Warm, Dry, normal color.   Back: No bony tenderness, No CVA tenderness.   Extremities: No edema, No tenderness, No cyanosis  Musculoskeletal: Good range of motion in all major joints. No tenderness to palpation or major deformities noted.   Neurologic: Alert and oriented x4, Normal motor function, Normal sensory function, No focal deficits noted.   Psychiatric: Affect normal, Judgment normal, Mood normal.         DIAGNOSTIC STUDIES / PROCEDURES    LABS  Results for orders placed or performed during the hospital encounter of 07/10/22   CBC WITH DIFFERENTIAL   Result Value Ref Range    WBC 10.2 4.8 - 10.8 K/uL    RBC 5.49 4.70 - 6.10 M/uL    " Hemoglobin 15.9 14.0 - 18.0 g/dL    Hematocrit 48.3 42.0 - 52.0 %    MCV 88.0 81.4 - 97.8 fL    MCH 29.0 27.0 - 33.0 pg    MCHC 32.9 (L) 33.7 - 35.3 g/dL    RDW 51.5 (H) 35.9 - 50.0 fL    Platelet Count 320 164 - 446 K/uL    MPV 8.6 (L) 9.0 - 12.9 fL    Neutrophils-Polys 69.30 44.00 - 72.00 %    Lymphocytes 18.40 (L) 22.00 - 41.00 %    Monocytes 6.30 0.00 - 13.40 %    Eosinophils 3.80 0.00 - 6.90 %    Basophils 1.40 0.00 - 1.80 %    Immature Granulocytes 0.80 0.00 - 0.90 %    Nucleated RBC 0.00 /100 WBC    Neutrophils (Absolute) 7.10 1.82 - 7.42 K/uL    Lymphs (Absolute) 1.88 1.00 - 4.80 K/uL    Monos (Absolute) 0.64 0.00 - 0.85 K/uL    Eos (Absolute) 0.39 0.00 - 0.51 K/uL    Baso (Absolute) 0.14 (H) 0.00 - 0.12 K/uL    Immature Granulocytes (abs) 0.08 0.00 - 0.11 K/uL    NRBC (Absolute) 0.00 K/uL   COMP METABOLIC PANEL   Result Value Ref Range    Sodium 137 135 - 145 mmol/L    Potassium 3.8 3.6 - 5.5 mmol/L    Chloride 102 96 - 112 mmol/L    Co2 20 20 - 33 mmol/L    Anion Gap 15.0 7.0 - 16.0    Glucose 102 (H) 65 - 99 mg/dL    Bun 7 (L) 8 - 22 mg/dL    Creatinine 0.89 0.50 - 1.40 mg/dL    Calcium 9.8 8.5 - 10.5 mg/dL    AST(SGOT) 68 (H) 12 - 45 U/L    ALT(SGPT) 72 (H) 2 - 50 U/L    Alkaline Phosphatase 140 (H) 30 - 99 U/L    Total Bilirubin 0.6 0.1 - 1.5 mg/dL    Albumin 4.3 3.2 - 4.9 g/dL    Total Protein 8.2 6.0 - 8.2 g/dL    Globulin 3.9 (H) 1.9 - 3.5 g/dL    A-G Ratio 1.1 g/dL   LIPASE   Result Value Ref Range    Lipase 18 11 - 82 U/L   ESTIMATED GFR   Result Value Ref Range    GFR (CKD-EPI) 104 >60 mL/min/1.73 m 2   All labs reviewed by me.    RADIOLOGY  CT-ABDOMEN-PELVIS WITH   Final Result      1.  No evidence of abdominal or pelvic inflammatory change or abscess formation.      2.  Recent surgical change involving the right lower anterolateral abdominal wall with mesh placement involving the peritoneal surface in that area with some stranding attenuation and gas within the overlying subcutaneous fat. No  evidence of abscess    formation.      3.  Fatty liver.        The radiologist's interpretations of all radiological studies have been reviewed by me.    Films have been independently by me      COURSE  Pertinent Labs & Imaging studies reviewed. (See chart for details)    12:13 PM - Patient seen and examined at bedside. Discussed plan of care. The patient will be medicated with Morphine 6 mg injection and Zofran 4 mg injection. Ordered for CT-Abdomen Pelvis w/, CBC w/diff, CMP, and Lipase to evaluate his symptoms.     1:32 PM - The patient will be medicated with Zofran 4 mg injection and Dilaudid 1 mg injection for his symptoms.    2:29 PM - Patient was reevaluated at bedside. Patient states his pain is somewhat improved with pain medications. Discussed lab and radiology results with the patient and informed them that he has no signs of abscess or bleeding at this time.  The patient will be medicated with Toradol 30 mg injection for his symptoms.    2:47 PM - The patient will be medicated with Benadryl 25 mg injection and Compazine 10 mg injection for his symptoms.    3:34 PM - Patient was reevaluated at bedside. Patient states his pain is unchanged with Toradol, will give additional medication. The patient will be medicated with Dilaudid 2 mg injection for his symptoms.    4:12 PM - Patient was reevaluated at bedside. He states that he has not yet received his dilaudid dose.    1710 - Patient was reevaluated at bedside. I discussed plan for discharge and follow up as outlined below. The patient is stable for discharge at this time and will return for any new or worsening symptoms. Patient verbalizes understanding and support with my plan for discharge.      MEDICAL DECISION MAKING  This is a 52 y.o. male who presents via EMS for evaluation of abdominal pain onset just prior to arrival.    Present with a severe abdominal pain.  He is 2 days postop after a knife wound to the abdomen with surgical repair.    There is  concerns for abscess, infection, hematoma and wound dehiscence.  CT was done shows none of these things present.    He was medicated with morphine which did not improve his pain then with a 1 mg of Dilaudid, then with Toradol.  He had minimal pain relief from this.    Was medicated with 2 mg of Dilaudid IV this did seem to improve his pain.    There is no signs of surgical emergency at this time is a pain management issue and the patient has improved pain will be discharged home with pain medication to give this some time to improve.  He was also medicated for his nausea with good results finding with Compazine so Compazine suppository was given.    In prescribing controlled substances to this patient, I certify that I have obtained and reviewed the medical history of Donal Carpio. I have also made a good demetris effort to obtain applicable records from other providers who have treated the patient and records did not demonstrate any increased risk of substance abuse that would prevent me from prescribing controlled substances.     I have conducted a physical exam and documented it. I have reviewed Mr. Carpio’s prescription history as maintained by the [place] Prescription Monitoring Program.     I have assessed the patient’s risk for abuse, dependency, and addiction using the validated Opioid Risk Tool available at https://www.mdcalc.com/hlqwuc-vjeg-mkzt-ort-narcotic-abuse.     Given the above, I believe the benefits of controlled substance therapy outweigh the risks. The reasons for prescribing controlled substances include non-narcotic, oral analgesic alternatives have been inadequate for pain control. Accordingly, I have discussed the risk and benefits, treatment plan, and alternative therapies with the patient.     The patient is referred to a primary physician for blood pressure management, diabetic screening, and for all other preventative health concerns.    DISPOSITION:  Patient will be discharged home in  stable condition.    FOLLOW UP:    Please follow-up with general surgeon as scheduled        OUTPATIENT MEDICATIONS:  Discharge Medication List as of 7/10/2022  4:51 PM        START taking these medications    Details   oxyCODONE-acetaminophen (PERCOCET) 5-325 MG Tab Take 1 Tablet by mouth every four hours as needed for Moderate Pain for up to 5 days., Disp-15 Tablet, R-0, Normal      prochlorperazine (COMPAZINE) 25 MG Suppos Insert 1 Suppository into the rectum every 6 hours as needed for Nausea/Vomiting., Disp-10 Suppository, R-0, Normal             FINAL IMPRESSION  1. Right lower quadrant abdominal pain         Tino NEWTON (Scribe), am scribing for, and in the presence of, Jorge Burns D.O..    Electronically signed by: Tino Keller (Scribe), 7/10/2022    IJorge D.O. personally performed the services described in this documentation, as scribed by Tino Keller in my presence, and it is both accurate and complete. C.    The note accurately reflects work and decisions made by me.  Jorge Burns D.O.  7/10/2022  5:11 PM

## 2022-07-10 NOTE — ED NOTES
Pt had a witnessed seizure in CT, it only lasted for a couple seconds, pt was not post ictal after.

## 2022-07-10 NOTE — DISCHARGE PLANNING
Received call from patient's sister Kelley Savage stating that medications prescribed on d/c yesterday were initially sent to [facility] but [place] insurance would not approve use of [facility] so [facility]. She works for [employer] and checked all pharmacies, they did not receive the prescriptions. She is asking that the prescriptions be sent to [facility] in [place].  Spoke to hospitalist BARRON Arredondo who wrote scripts, she will resend them to [facility] [place].

## 2022-07-10 NOTE — ED NOTES
Pt still having pain and nausea after being medicated. Pt also said he had another seizure and was incontinent of urine. Linens changed, orders received for pain and nausea meds.

## 2022-07-10 NOTE — ED TRIAGE NOTES
BIB [facility] to rm 6, pt is coming from home  Chief Complaint   Patient presents with   • Abdominal Pain     Sudden onset abd pain      Pt was here on 7/7 after having a seizure and accidentally stabbing himself in the abd with a paring knife. The wound was closed. Today while napping, pt said he heard a pop and had immediate pain around the wound. Small amount of drainage, abd soft, chart up for ERP.

## 2022-07-14 ENCOUNTER — APPOINTMENT (OUTPATIENT)
Dept: RADIOLOGY | Facility: MEDICAL CENTER | Age: 52
End: 2022-07-14
Attending: EMERGENCY MEDICINE
Payer: COMMERCIAL

## 2022-07-14 ENCOUNTER — HOSPITAL ENCOUNTER (EMERGENCY)
Facility: MEDICAL CENTER | Age: 52
End: 2022-07-14
Attending: EMERGENCY MEDICINE
Payer: COMMERCIAL

## 2022-07-14 VITALS
RESPIRATION RATE: 16 BRPM | OXYGEN SATURATION: 93 % | SYSTOLIC BLOOD PRESSURE: 128 MMHG | HEIGHT: 69 IN | DIASTOLIC BLOOD PRESSURE: 73 MMHG | TEMPERATURE: 98 F | BODY MASS INDEX: 28.14 KG/M2 | HEART RATE: 97 BPM | WEIGHT: 190 LBS

## 2022-07-14 DIAGNOSIS — F10.10 ALCOHOL ABUSE: ICD-10-CM

## 2022-07-14 DIAGNOSIS — E86.0 DEHYDRATION: ICD-10-CM

## 2022-07-14 DIAGNOSIS — R56.9 SEIZURE (HCC): ICD-10-CM

## 2022-07-14 LAB
ANION GAP SERPL CALC-SCNC: 17 MMOL/L (ref 7–16)
BASOPHILS # BLD AUTO: 1.5 % (ref 0–1.8)
BASOPHILS # BLD: 0.12 K/UL (ref 0–0.12)
BUN SERPL-MCNC: 6 MG/DL (ref 8–22)
CALCIUM SERPL-MCNC: 8.8 MG/DL (ref 8.5–10.5)
CHLORIDE SERPL-SCNC: 106 MMOL/L (ref 96–112)
CO2 SERPL-SCNC: 17 MMOL/L (ref 20–33)
CREAT SERPL-MCNC: 0.8 MG/DL (ref 0.5–1.4)
EOSINOPHIL # BLD AUTO: 0.3 K/UL (ref 0–0.51)
EOSINOPHIL NFR BLD: 3.8 % (ref 0–6.9)
ERYTHROCYTE [DISTWIDTH] IN BLOOD BY AUTOMATED COUNT: 53.6 FL (ref 35.9–50)
ETHANOL BLD-MCNC: 45.6 MG/DL
GFR SERPLBLD CREATININE-BSD FMLA CKD-EPI: 108 ML/MIN/1.73 M 2
GLUCOSE SERPL-MCNC: 112 MG/DL (ref 65–99)
HCT VFR BLD AUTO: 44.2 % (ref 42–52)
HGB BLD-MCNC: 14.5 G/DL (ref 14–18)
IMM GRANULOCYTES # BLD AUTO: 0.09 K/UL (ref 0–0.11)
IMM GRANULOCYTES NFR BLD AUTO: 1.1 % (ref 0–0.9)
LYMPHOCYTES # BLD AUTO: 2.04 K/UL (ref 1–4.8)
LYMPHOCYTES NFR BLD: 25.6 % (ref 22–41)
MCH RBC QN AUTO: 29.2 PG (ref 27–33)
MCHC RBC AUTO-ENTMCNC: 32.8 G/DL (ref 33.7–35.3)
MCV RBC AUTO: 88.9 FL (ref 81.4–97.8)
MONOCYTES # BLD AUTO: 0.64 K/UL (ref 0–0.85)
MONOCYTES NFR BLD AUTO: 8 % (ref 0–13.4)
NEUTROPHILS # BLD AUTO: 4.77 K/UL (ref 1.82–7.42)
NEUTROPHILS NFR BLD: 60 % (ref 44–72)
NRBC # BLD AUTO: 0 K/UL
NRBC BLD-RTO: 0 /100 WBC
PLATELET # BLD AUTO: 243 K/UL (ref 164–446)
PMV BLD AUTO: 9 FL (ref 9–12.9)
POTASSIUM SERPL-SCNC: 3.4 MMOL/L (ref 3.6–5.5)
RBC # BLD AUTO: 4.97 M/UL (ref 4.7–6.1)
SODIUM SERPL-SCNC: 140 MMOL/L (ref 135–145)
WBC # BLD AUTO: 8 K/UL (ref 4.8–10.8)

## 2022-07-14 PROCEDURE — 36415 COLL VENOUS BLD VENIPUNCTURE: CPT

## 2022-07-14 PROCEDURE — 700101 HCHG RX REV CODE 250: Performed by: EMERGENCY MEDICINE

## 2022-07-14 PROCEDURE — 700102 HCHG RX REV CODE 250 W/ 637 OVERRIDE(OP): Performed by: EMERGENCY MEDICINE

## 2022-07-14 PROCEDURE — 85025 COMPLETE CBC W/AUTO DIFF WBC: CPT

## 2022-07-14 PROCEDURE — 700111 HCHG RX REV CODE 636 W/ 250 OVERRIDE (IP): Performed by: EMERGENCY MEDICINE

## 2022-07-14 PROCEDURE — A9270 NON-COVERED ITEM OR SERVICE: HCPCS | Performed by: EMERGENCY MEDICINE

## 2022-07-14 PROCEDURE — 70450 CT HEAD/BRAIN W/O DYE: CPT

## 2022-07-14 PROCEDURE — 96375 TX/PRO/DX INJ NEW DRUG ADDON: CPT

## 2022-07-14 PROCEDURE — 80048 BASIC METABOLIC PNL TOTAL CA: CPT

## 2022-07-14 PROCEDURE — 96365 THER/PROPH/DIAG IV INF INIT: CPT

## 2022-07-14 PROCEDURE — 72125 CT NECK SPINE W/O DYE: CPT

## 2022-07-14 PROCEDURE — 82077 ASSAY SPEC XCP UR&BREATH IA: CPT

## 2022-07-14 PROCEDURE — 99284 EMERGENCY DEPT VISIT MOD MDM: CPT

## 2022-07-14 RX ORDER — LEVETIRACETAM 500 MG/5ML
1500 INJECTION, SOLUTION, CONCENTRATE INTRAVENOUS ONCE
Status: COMPLETED | OUTPATIENT
Start: 2022-07-14 | End: 2022-07-14

## 2022-07-14 RX ORDER — LORAZEPAM 2 MG/ML
2 INJECTION INTRAMUSCULAR ONCE
Status: DISCONTINUED | OUTPATIENT
Start: 2022-07-14 | End: 2022-07-14

## 2022-07-14 RX ORDER — KETOROLAC TROMETHAMINE 30 MG/ML
15 INJECTION, SOLUTION INTRAMUSCULAR; INTRAVENOUS ONCE
Status: COMPLETED | OUTPATIENT
Start: 2022-07-14 | End: 2022-07-14

## 2022-07-14 RX ORDER — CHLORDIAZEPOXIDE HYDROCHLORIDE 25 MG/1
50 CAPSULE, GELATIN COATED ORAL ONCE
Status: COMPLETED | OUTPATIENT
Start: 2022-07-14 | End: 2022-07-14

## 2022-07-14 RX ORDER — POTASSIUM CHLORIDE 20 MEQ/1
40 TABLET, EXTENDED RELEASE ORAL ONCE
Status: COMPLETED | OUTPATIENT
Start: 2022-07-14 | End: 2022-07-14

## 2022-07-14 RX ADMIN — THIAMINE HYDROCHLORIDE 1000 ML: 100 INJECTION, SOLUTION INTRAMUSCULAR; INTRAVENOUS at 14:36

## 2022-07-14 RX ADMIN — LEVETIRACETAM 1500 MG: 100 INJECTION, SOLUTION INTRAVENOUS at 13:03

## 2022-07-14 RX ADMIN — CHLORDIAZEPOXIDE HYDROCHLORIDE 50 MG: 25 CAPSULE ORAL at 15:03

## 2022-07-14 RX ADMIN — POTASSIUM CHLORIDE 40 MEQ: 1500 TABLET, EXTENDED RELEASE ORAL at 15:03

## 2022-07-14 RX ADMIN — KETOROLAC TROMETHAMINE 15 MG: 30 INJECTION, SOLUTION INTRAMUSCULAR; INTRAVENOUS at 13:02

## 2022-07-14 ASSESSMENT — FIBROSIS 4 INDEX: FIB4 SCORE: 1.252168258351177908

## 2022-07-14 NOTE — DISCHARGE INSTRUCTIONS
You were seen in the Emergency Department for seizure.    Labs, CT scans were completed without significant acute abnormalities.    Please use 1,000mg of tylenol or 600mg of ibuprofen every 6 hours as needed for pain.  Please continue taking Keppra as directed.  Avoid further alcohol use.    Please follow up with your primary care physician.  And it is very important that you go to the follow-up with neurology tomorrow.    Return to the Emergency Department with recurrent seizures, severe headaches or confusion, new neurologic deficits, or other.

## 2022-07-14 NOTE — ED PROVIDER NOTES
ED Provider Note    Scribed for Ying Smith M.D. by Debbi Holden. 7/14/2022  12:22 PM    Means of arrival: EMS  History obtained from: Patient  History limited by: None      CHIEF COMPLAINT  Chief Complaint   Patient presents with   • Seizure       HPI  Donal Carpio is a 52 y.o. male, with a history of alcohol abuse and withdrawal as well as seizures,  presents to the Emergency Department for seizures onset prior to arrival. He states he takes Keppra as prescribed but typically experiences 2-3 breakthrough seizures daily. He reports hitting his face during his seizure today and is experiencing facial pain and neck pain. He denies fevers, chills, nausea, vomiting, or cough.    He reports compliance with his medication and states that he has not drank alcohol in the last week.  He has not been seen by neurology in the past however has an appointment with them scheduled tomorrow reportedly.    REVIEW OF SYSTEMS  Pertinent positive include seizures, facial pain, and neck pain. Pertinent negative include fevers, chills, nausea, vomiting, or cough.  All other systems reviewed and are negative.    PAST MEDICAL HISTORY   has a past medical history of Drug-seeking behavior and Seizure (HCC).    SOCIAL HISTORY  Social History     Tobacco Use   • Smoking status: Former Smoker   • Smokeless tobacco: Not on file   Vaping Use   • Vaping Use: Every day   • Substances: Nicotine   Substance and Sexual Activity   • Alcohol use: Yes     Alcohol/week: 2.4 oz     Types: 4 Shots of liquor per week   • Drug use: None noted       SURGICAL HISTORY   has a past surgical history that includes lap,diagnostic abdomen (N/A, 7/7/2022) and wound exploration ortho (Right, 7/7/2022).    CURRENT MEDICATIONS  Current Outpatient Medications   Medication Instructions   • acetaminophen (TYLENOL) 650 mg, Oral, EVERY 6 HOURS   • acetaminophen (TYLENOL) 500-1,000 mg, Oral, EVERY 6 HOURS PRN   • gabapentin (NEURONTIN) 100 mg, Oral, 3 TIMES DAILY  "  • gabapentin (NEURONTIN) 100 mg, Oral, 3 TIMES DAILY   • levetiracetam (KEPPRA) 1,000 mg, Oral, 2 TIMES DAILY   • levetiracetam (KEPPRA) 1,000 mg, Oral, TWO TIMES DAILY   • omeprazole (PRILOSEC) 20 mg, Oral, DAILY   • omeprazole (PRILOSEC) 20 mg, Oral, DAILY   • oxyCODONE-acetaminophen (PERCOCET) 5-325 MG Tab 1 Tablet, Oral, EVERY 4 HOURS PRN   • prochlorperazine (COMPAZINE) 25 mg, Rectal, EVERY 6 HOURS PRN   • sulfamethoxazole-trimethoprim (BACTRIM DS) 800-160 MG tablet 1 Tablet, Oral, 2 TIMES DAILY       ALLERGIES  Allergies   Allergen Reactions   • Penicillins Unspecified     Unknown. Per patient, he knows he has one because his mom told him       PHYSICAL EXAM   VITAL SIGNS: /66   Pulse (!) 105   Temp 36.6 °C (97.8 °F) (Temporal)   Resp 20   Ht 1.753 m (5' 9\")   Wt 86.2 kg (190 lb)   SpO2 95%   BMI 28.06 kg/m²    Constitutional: Nontoxic appearing, alert in no apparent distress.  Somnolent but arousable.  HENT: Normocephalic, Atraumatic. Bilateral external ears normal. Nose normal.  Moist mucous membranes.  Oropharynx clear.  Eyes: Pupils are equal and reactive. Conjunctiva normal.   Neck: Supple, full range of motion  Heart: Regular rate and rhythm.  No murmurs.    Lungs: No respiratory distress, normal work of breathing. Lungs clear to auscultation bilaterally.  Abdomen Soft, no distention.  No tenderness to palpation.  Musculoskeletal: Atraumatic. No obvious deformities noted.  No lower extremity edema.  Skin: Warm, Dry.  No erythema, No rash.   Neurologic: Alert and oriented x3.  Cranial nerves II-XII intact.  Normal speech.  Patient has diffuse weakness however no focal findings.  Sensation intact in all extremities.  Psychiatric: Affect normal, Mood normal, Appears appropriate and not intoxicated.      DIAGNOSTIC STUDIES    LABS  Personally reviewed by me  Labs Reviewed   CBC WITH DIFFERENTIAL - Abnormal; Notable for the following components:       Result Value    MCHC 32.8 (*)     RDW 53.6 " "(*)     Immature Granulocytes 1.10 (*)     All other components within normal limits   BASIC METABOLIC PANEL - Abnormal; Notable for the following components:    Potassium 3.4 (*)     Co2 17 (*)     Glucose 112 (*)     Bun 6 (*)     Anion Gap 17.0 (*)     All other components within normal limits   DIAGNOSTIC ALCOHOL - Abnormal; Notable for the following components:    Diagnostic Alcohol 45.6 (*)     All other components within normal limits   ESTIMATED GFR       RADIOLOGY  Personally reviewed by me  CT-HEAD W/O   Final Result      Head CT without contrast within normal limits. No evidence of acute cerebral infarction, hemorrhage or mass lesion.         CT-CSPINE WITHOUT PLUS RECONS   Final Result      No acute fracture or traumatic listhesis in the cervical spine.          ED COURSE  Vitals:    07/14/22 1201 07/14/22 1204 07/14/22 1301 07/14/22 1401   BP: 107/66  115/74 113/66   Pulse:  (!) 105  85   Resp:  20     Temp:  36.6 °C (97.8 °F)     TempSrc:  Temporal     SpO2:  95% 94% 91%   Weight:  86.2 kg (190 lb)     Height:  1.753 m (5' 9\")           Medications administered:  Medications   chlordiazePOXIDE (Librium) capsule 50 mg (has no administration in time range)   potassium chloride SA (Kdur) tablet 40 mEq (has no administration in time range)   levETIRAcetam (Keppra) injection 1,500 mg (1,500 mg Intravenous Given 7/14/22 1303)   ketorolac (TORADOL) injection 15 mg (15 mg Intravenous Given 7/14/22 1302)   detox IV 1000 mL (D5LR + magnesium 1 g + thiamine 100 mg + folic acid 1 mg) infusion (1,000 mL Intravenous New Bag 7/14/22 1436)         Old records personally reviewed:  He is seen here frequently for seizures. He was just here at the beginning of the month for a seizure where he ended up stabbing himself in the abdomen. He was discharged on Keppra.  He appears to have had a work-up at [facility] with prior MRI that was normal and neurology consultation.  Unsure if EEG was performed at that time.  It seems " as though seizures have been thought to be alcohol related.  There is concern for drug-seeking behavior.    12:22 PM - Patient seen and examined at bedside. The patient presents with seizures. Ordered for CT-Head, CT-CSpine, CBC with diff and BMP to evaluate. Patient will be treated with Keppra 1500 mg and Toradol 15 mg for his symptoms.       MEDICAL DECISION MAKING  Patient with history of alcohol abuse and possible seizure disorder versus alcohol withdrawal seizures who is currently taking Keppra, presenting with a seizure today.  The seizure was unwitnessed at home.  He is alert and oriented on arrival with normal vital signs.  He does not appear postictal at this time.  Vital signs are normal.  He has no focal neurologic deficits on exam.  There is no evidence of trauma however the patient is complaining of a severe headache and neck pain therefore imaging was performed showing no evidence of intracranial hemorrhage, skull fracture, cervical spine fracture.  Labs show increased anion gap which could be due to seizure or dehydration.  He has mildly low potassium that was repleted here.  No fevers, leukocytosis or infectious symptoms.    The patient tells me that he has not drank alcohol in a week however his alcohol level returned back low but present.  He states he has not been drinking heavily and does not feel as though he has alcohol withdrawal currently.  He does not have any tremors or other signs of alcohol withdrawal at this time along with normal vital signs.  He did have what was concerning for a seizure episode that was witnessed by the nursing that did not actually appear like a generalized tonic-clonic seizure.  Possibly a pseudoseizure? Patient was treated with Versed and loaded with Keppra here.  He feels like he wants to go home this evening therefore will give a dose of oral Librium before he leaves in case of alcohol withdrawal.  He is instructed that it is very important that he continues his  Keppra and follows up with neurology tomorrow as scheduled.  He was counseled on alcohol use    Patient understands plan of care and strict return precautions for new or changing symptoms.         DISPOSITION:  Patient will be discharged home in stable condition.    FOLLOW UP:  [facility] - Neurology  [address], [address]  [facility] [address] [phone]    as scheduled tomorrow    [facility], Emergency Dept  [address]  [facility] [address] [phone]    If symptoms worsen      OUTPATIENT MEDICATIONS:  New Prescriptions    No medications on file         IMPRESSION  (R56.9) Seizure (HCC)  (F10.10) Alcohol abuse  (E86.0) Dehydration    Results, diagnoses, and treatment options were discussed with the patient and/or family. Patient verbalized understanding of plan of care.           Debbi NEWTON (Scribe), am scribing for, and in the presence of, Ying Smith M.D..    Electronically signed by: Debbi Holden (Scribe), 7/14/2022    Ying NEWTON M.D. personally performed the services described in this documentation, as scribed by Debbi Holden in my presence, and it is both accurate and complete.    The note accurately reflects work and decisions made by me.  Ying Smith M.D.  7/14/2022  3:06 PM

## 2022-07-14 NOTE — ED NOTES
Assist RN: medicated patient per MAR. Patient had shaking episode. Dr. Smith at bedside, does not believe it was a seizure. Gabrielle RN notified.

## 2022-07-14 NOTE — ED NOTES
.Patient d/c per MD order.  Pt states understanding of d.c instructions.  Pt given copies of instructions.  Belongings with pt.  VSS.  Pt ambulate to lobby with steady gait.  A&Ox4  Pt aware of need to see neuro tomorrow, pt repeat information

## 2022-07-14 NOTE — ED TRIAGE NOTES
51 y/o male BIB [facility] from home  Chief Complaint   Patient presents with   • Seizure     Pt has hx of sz, seen here.  102/67, 110, 97% 15L NRB. GSC 15, FSBs 115  EMS PTA 5 mg Versed IM,   2.5 mg IV Versed

## 2022-07-16 ENCOUNTER — HOSPITAL ENCOUNTER (EMERGENCY)
Facility: MEDICAL CENTER | Age: 52
End: 2022-07-16
Attending: EMERGENCY MEDICINE
Payer: COMMERCIAL

## 2022-07-16 ENCOUNTER — APPOINTMENT (OUTPATIENT)
Dept: RADIOLOGY | Facility: MEDICAL CENTER | Age: 52
End: 2022-07-16
Attending: EMERGENCY MEDICINE
Payer: COMMERCIAL

## 2022-07-16 ENCOUNTER — PHARMACY VISIT (OUTPATIENT)
Dept: PHARMACY | Facility: MEDICAL CENTER | Age: 52
End: 2022-07-16
Payer: COMMERCIAL

## 2022-07-16 VITALS
SYSTOLIC BLOOD PRESSURE: 140 MMHG | TEMPERATURE: 98.6 F | OXYGEN SATURATION: 97 % | HEART RATE: 87 BPM | RESPIRATION RATE: 58 BRPM | DIASTOLIC BLOOD PRESSURE: 71 MMHG

## 2022-07-16 DIAGNOSIS — F10.930 ALCOHOL WITHDRAWAL SYNDROME WITHOUT COMPLICATION (HCC): ICD-10-CM

## 2022-07-16 DIAGNOSIS — R25.1 SPELLS OF TREMBLING: ICD-10-CM

## 2022-07-16 LAB
ALBUMIN SERPL BCP-MCNC: 3.2 G/DL (ref 3.2–4.9)
ALBUMIN/GLOB SERPL: 0.9 G/DL
ALP SERPL-CCNC: 89 U/L (ref 30–99)
ALT SERPL-CCNC: 30 U/L (ref 2–50)
AMPHET UR QL SCN: NEGATIVE
ANION GAP SERPL CALC-SCNC: 13 MMOL/L (ref 7–16)
AST SERPL-CCNC: 27 U/L (ref 12–45)
BARBITURATES UR QL SCN: NEGATIVE
BASOPHILS # BLD AUTO: 1 % (ref 0–1.8)
BASOPHILS # BLD: 0.11 K/UL (ref 0–0.12)
BENZODIAZ UR QL SCN: POSITIVE
BILIRUB SERPL-MCNC: 0.3 MG/DL (ref 0.1–1.5)
BUN SERPL-MCNC: 7 MG/DL (ref 8–22)
BZE UR QL SCN: NEGATIVE
CALCIUM SERPL-MCNC: 8.5 MG/DL (ref 8.4–10.2)
CANNABINOIDS UR QL SCN: POSITIVE
CHLORIDE SERPL-SCNC: 107 MMOL/L (ref 96–112)
CO2 SERPL-SCNC: 17 MMOL/L (ref 20–33)
CREAT SERPL-MCNC: 0.73 MG/DL (ref 0.5–1.4)
EOSINOPHIL # BLD AUTO: 0.43 K/UL (ref 0–0.51)
EOSINOPHIL NFR BLD: 4.1 % (ref 0–6.9)
ERYTHROCYTE [DISTWIDTH] IN BLOOD BY AUTOMATED COUNT: 53.3 FL (ref 35.9–50)
ETHANOL BLD-MCNC: <10.1 MG/DL
GFR SERPLBLD CREATININE-BSD FMLA CKD-EPI: 111 ML/MIN/1.73 M 2
GLOBULIN SER CALC-MCNC: 3.5 G/DL (ref 1.9–3.5)
GLUCOSE SERPL-MCNC: 96 MG/DL (ref 65–99)
HCT VFR BLD AUTO: 41.2 % (ref 42–52)
HGB BLD-MCNC: 13.3 G/DL (ref 14–18)
IMM GRANULOCYTES # BLD AUTO: 0.1 K/UL (ref 0–0.11)
IMM GRANULOCYTES NFR BLD AUTO: 0.9 % (ref 0–0.9)
LACTATE BLD-SCNC: 1.1 MMOL/L (ref 0.5–2)
LYMPHOCYTES # BLD AUTO: 2.01 K/UL (ref 1–4.8)
LYMPHOCYTES NFR BLD: 19 % (ref 22–41)
MCH RBC QN AUTO: 29.1 PG (ref 27–33)
MCHC RBC AUTO-ENTMCNC: 32.3 G/DL (ref 33.7–35.3)
MCV RBC AUTO: 90.2 FL (ref 81.4–97.8)
METHADONE UR QL SCN: NEGATIVE
MONOCYTES # BLD AUTO: 0.77 K/UL (ref 0–0.85)
MONOCYTES NFR BLD AUTO: 7.3 % (ref 0–13.4)
NEUTROPHILS # BLD AUTO: 7.16 K/UL (ref 1.82–7.42)
NEUTROPHILS NFR BLD: 67.7 % (ref 44–72)
NRBC # BLD AUTO: 0 K/UL
NRBC BLD-RTO: 0 /100 WBC
OPIATES UR QL SCN: NEGATIVE
OXYCODONE UR QL SCN: NEGATIVE
PCP UR QL SCN: NEGATIVE
PLATELET # BLD AUTO: 238 K/UL (ref 164–446)
PMV BLD AUTO: 9 FL (ref 9–12.9)
POTASSIUM SERPL-SCNC: 4.1 MMOL/L (ref 3.6–5.5)
PROPOXYPH UR QL SCN: NEGATIVE
PROT SERPL-MCNC: 6.7 G/DL (ref 6–8.2)
RBC # BLD AUTO: 4.57 M/UL (ref 4.7–6.1)
SODIUM SERPL-SCNC: 137 MMOL/L (ref 135–145)
WBC # BLD AUTO: 10.6 K/UL (ref 4.8–10.8)

## 2022-07-16 PROCEDURE — 96374 THER/PROPH/DIAG INJ IV PUSH: CPT

## 2022-07-16 PROCEDURE — 70486 CT MAXILLOFACIAL W/O DYE: CPT

## 2022-07-16 PROCEDURE — 80307 DRUG TEST PRSMV CHEM ANLYZR: CPT

## 2022-07-16 PROCEDURE — 96375 TX/PRO/DX INJ NEW DRUG ADDON: CPT

## 2022-07-16 PROCEDURE — 700101 HCHG RX REV CODE 250: Performed by: EMERGENCY MEDICINE

## 2022-07-16 PROCEDURE — 70450 CT HEAD/BRAIN W/O DYE: CPT

## 2022-07-16 PROCEDURE — 80053 COMPREHEN METABOLIC PANEL: CPT

## 2022-07-16 PROCEDURE — A9270 NON-COVERED ITEM OR SERVICE: HCPCS | Performed by: EMERGENCY MEDICINE

## 2022-07-16 PROCEDURE — 82077 ASSAY SPEC XCP UR&BREATH IA: CPT

## 2022-07-16 PROCEDURE — 72125 CT NECK SPINE W/O DYE: CPT

## 2022-07-16 PROCEDURE — 700102 HCHG RX REV CODE 250 W/ 637 OVERRIDE(OP): Performed by: EMERGENCY MEDICINE

## 2022-07-16 PROCEDURE — 72131 CT LUMBAR SPINE W/O DYE: CPT

## 2022-07-16 PROCEDURE — 700111 HCHG RX REV CODE 636 W/ 250 OVERRIDE (IP): Performed by: EMERGENCY MEDICINE

## 2022-07-16 PROCEDURE — RXMED WILLOW AMBULATORY MEDICATION CHARGE: Performed by: EMERGENCY MEDICINE

## 2022-07-16 PROCEDURE — 99285 EMERGENCY DEPT VISIT HI MDM: CPT

## 2022-07-16 PROCEDURE — 83605 ASSAY OF LACTIC ACID: CPT

## 2022-07-16 PROCEDURE — 36415 COLL VENOUS BLD VENIPUNCTURE: CPT

## 2022-07-16 PROCEDURE — 85025 COMPLETE CBC W/AUTO DIFF WBC: CPT

## 2022-07-16 RX ORDER — KETOROLAC TROMETHAMINE 30 MG/ML
15 INJECTION, SOLUTION INTRAMUSCULAR; INTRAVENOUS ONCE
Status: COMPLETED | OUTPATIENT
Start: 2022-07-16 | End: 2022-07-16

## 2022-07-16 RX ORDER — SULFAMETHOXAZOLE AND TRIMETHOPRIM 800; 160 MG/1; MG/1
1 TABLET ORAL 2 TIMES DAILY
Status: ON HOLD | COMMUNITY
End: 2022-08-22

## 2022-07-16 RX ORDER — MIDAZOLAM HYDROCHLORIDE 1 MG/ML
5 INJECTION INTRAMUSCULAR; INTRAVENOUS ONCE
Status: COMPLETED | OUTPATIENT
Start: 2022-07-16 | End: 2022-07-16

## 2022-07-16 RX ORDER — CHLORDIAZEPOXIDE HYDROCHLORIDE 25 MG/1
100 CAPSULE, GELATIN COATED ORAL EVERY 6 HOURS
Qty: 28 CAPSULE | Refills: 0 | Status: SHIPPED | OUTPATIENT
Start: 2022-07-16 | End: 2022-07-19

## 2022-07-16 RX ORDER — CHLORDIAZEPOXIDE HYDROCHLORIDE 25 MG/1
100 CAPSULE, GELATIN COATED ORAL ONCE
Status: COMPLETED | OUTPATIENT
Start: 2022-07-16 | End: 2022-07-16

## 2022-07-16 RX ORDER — FOLIC ACID 1 MG/1
1 TABLET ORAL DAILY
Status: ON HOLD | COMMUNITY
End: 2022-08-22

## 2022-07-16 RX ORDER — AMLODIPINE BESYLATE 5 MG/1
5 TABLET ORAL DAILY
Status: ON HOLD | COMMUNITY
End: 2022-08-22

## 2022-07-16 RX ADMIN — KETOROLAC TROMETHAMINE 15 MG: 30 INJECTION, SOLUTION INTRAMUSCULAR; INTRAVENOUS at 11:11

## 2022-07-16 RX ADMIN — CHLORDIAZEPOXIDE HYDROCHLORIDE 100 MG: 25 CAPSULE ORAL at 12:32

## 2022-07-16 RX ADMIN — MIDAZOLAM HYDROCHLORIDE 5 MG: 1 INJECTION, SOLUTION INTRAMUSCULAR; INTRAVENOUS at 09:59

## 2022-07-16 RX ADMIN — THIAMINE HYDROCHLORIDE 1000 ML: 100 INJECTION, SOLUTION INTRAMUSCULAR; INTRAVENOUS at 10:01

## 2022-07-16 NOTE — DISCHARGE INSTRUCTIONS
You were seen in the ER for alcohol withdrawal.  Thankfully, your labs and imaging are reassuring.  Your CT scan did reveal an abnormality at your L1 vertebral body but this appears to be chronic and you had no tenderness there.  Thankfully, you have good strength and sensation in your legs.  This can be followed up as an outpatient and I have given you the contact information for the on-call spine surgeon.  We spent quite a bit of time organizing housing and alcohol rehab for you.  The [facility] has entered you into their system and would like you to show up at 10 AM this Monday.  Until that time, we have provided you with a prescription for Librium which is a medication to help with alcohol withdrawal, please take it as directed.  Do not drink alcohol with this medication.  We have provided you with a taxi voucher to get to the [facility] on Monday.  Return with new or worsening symptoms.  I hope you feel better soon!

## 2022-07-16 NOTE — DISCHARGE PLANNING
"Anticipated Discharge Disposition:   [facility] [address]    Action:   Per Dwayne Hunt unable to assist with inpt substance abuse placement.     [facility] , talked to Kris. He said that this is an inpatient program that is 12-15 months and it is [facility]ism based. Pt will need to be agreeable. Kris placed pt on their computer system and made an appt for pt on Monday the 18th at 10 am . Appt will be at [address] Tel # [phone].    Met with pt and he is agreeable to go to appt on Monday. He said \" I am agreeable to do anything that will help me cure this\" He is aware that this is an inpatient program for 12-15 months. CM asked if family can be called to assist him and get him to his appt on Monday. He refused for CM to have family called. CM will give him a taxi voucher [facility]. (Pt said he has not been able to pay rent for 3 months so he is at the brink of being homeless. He lives at [facility] [address] @ [address]. He also does not have a job at the moment and possible does not have [employer] insurance anymore)    Librium prescription sent by Dr. Chavez to [facility]. [facility] pharmacy tech sent CM a message stating that cost is $ 20.92 thus CM sent approved services. Y[facility]iana confirmed receiving the approved services and meds to beds will deliver to ED [facility] bedside at 230pm.     Note of appt given to pt with [facility]cher [facility]. Given to RN.     Barriers to Discharge:   meds to bed delivery    Plan:   RN please call CM once pt is ready and CM will set up [employer] to  pt.     "

## 2022-07-16 NOTE — ED NOTES
Meds-to-Beds: Discharge prescription orders listed below delivered to patient's bedside. RN notified. Patient counseled.      Current Outpatient Medications   Medication Sig Dispense Refill   • chlordiazePOXIDE (LIBRIUM) 25 MG Cap  Day 1: Take 4 capsules every 6 hours     Day 2: Take 2 capsules every 6 hours    Day 3: Take 1 capsule every 6 hours 28 Capsule 0      Ute Driscoll, PharmD

## 2022-07-16 NOTE — ED NOTES
Med rec completed per pt and RX bottles (returned)  Allergies reviewed    Pt is on a 4 day course of Bactrim DS and has two doses of this antibiotic left

## 2022-07-16 NOTE — DISCHARGE PLANNING
note:  Per Shayy WILKERSON, meds to beds has delivered the prescription medication to bedside.     [employer] ride set up.   Picked up by Marcella Moy  Lisence Plate [id number]  Cost $ 22.64

## 2022-07-16 NOTE — ED NOTES
Patient discharged to home with librium that he will take every 6 hours. Monday at 1000 am he has an appt at [facility] for detox. Cab voucher given for today and Monday. Patient ambulates without difficulty to lobby.

## 2022-07-16 NOTE — DISCHARGE PLANNING
note:  Received a call from ROCK Ortiz because MD needs assistance with dispo.    ANN called Adry at [facility] @ [phone]. Discussed case with Adry and a referral was sent to Dr. Wong at [facility] for inpt substance abuse admission. Pt has [facility] [employer] insurance.     Dr. Wong called Dr. Chavez and in discussion regarding case.

## 2022-07-16 NOTE — ED NOTES
Spoke with Maureen pt's sister-in-law. She said that the pt is out of control with his drinking and needs to be in a rehab facility. He has [employer] insurance and the closest place that she could find for him was in [place]. Her number is [phone] for any collateral information.

## 2022-07-16 NOTE — ED TRIAGE NOTES
"Chief Complaint   Patient presents with   • ETOH Withdrawal     Pt JANAE STRICKLAND from [facility]. Pt was seen in [facility] yesterday and was sent to [facility] for ETOH detox. Pt denies SI/HI.  [facility] care could not accept him due to insurance issues. Pt walked in to [facility] today and was sent to ER for increase tremors, \" seizures\" per [facility] staff. Pt oriented to self, place and situtaion. Pt states his last drink was last Tuesday. Had an ETOH relapse about 4 months ago.reports to drink about 1 pint a day.   "
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
## 2022-07-22 ENCOUNTER — HOSPITAL ENCOUNTER (EMERGENCY)
Facility: MEDICAL CENTER | Age: 52
End: 2022-07-22
Attending: EMERGENCY MEDICINE
Payer: COMMERCIAL

## 2022-07-22 ENCOUNTER — APPOINTMENT (OUTPATIENT)
Dept: RADIOLOGY | Facility: MEDICAL CENTER | Age: 52
End: 2022-07-22
Attending: EMERGENCY MEDICINE
Payer: COMMERCIAL

## 2022-07-22 VITALS
HEIGHT: 69 IN | BODY MASS INDEX: 28.14 KG/M2 | SYSTOLIC BLOOD PRESSURE: 128 MMHG | TEMPERATURE: 98 F | OXYGEN SATURATION: 94 % | DIASTOLIC BLOOD PRESSURE: 82 MMHG | WEIGHT: 190 LBS | RESPIRATION RATE: 16 BRPM | HEART RATE: 98 BPM

## 2022-07-22 DIAGNOSIS — R56.9 SEIZURE-LIKE ACTIVITY (HCC): ICD-10-CM

## 2022-07-22 LAB
ALBUMIN SERPL BCP-MCNC: 3.8 G/DL (ref 3.2–4.9)
ALBUMIN/GLOB SERPL: 1.1 G/DL
ALP SERPL-CCNC: 146 U/L (ref 30–99)
ALT SERPL-CCNC: 38 U/L (ref 2–50)
ANION GAP SERPL CALC-SCNC: 15 MMOL/L (ref 7–16)
AST SERPL-CCNC: 39 U/L (ref 12–45)
BASOPHILS # BLD AUTO: 0.5 % (ref 0–1.8)
BASOPHILS # BLD: 0.05 K/UL (ref 0–0.12)
BILIRUB SERPL-MCNC: 0.5 MG/DL (ref 0.1–1.5)
BUN SERPL-MCNC: 7 MG/DL (ref 8–22)
CALCIUM SERPL-MCNC: 9.1 MG/DL (ref 8.5–10.5)
CHLORIDE SERPL-SCNC: 106 MMOL/L (ref 96–112)
CO2 SERPL-SCNC: 18 MMOL/L (ref 20–33)
CREAT SERPL-MCNC: 0.73 MG/DL (ref 0.5–1.4)
EKG IMPRESSION: NORMAL
EOSINOPHIL # BLD AUTO: 0.3 K/UL (ref 0–0.51)
EOSINOPHIL NFR BLD: 2.9 % (ref 0–6.9)
ERYTHROCYTE [DISTWIDTH] IN BLOOD BY AUTOMATED COUNT: 48.9 FL (ref 35.9–50)
ETHANOL BLD-MCNC: <10.1 MG/DL
GFR SERPLBLD CREATININE-BSD FMLA CKD-EPI: 111 ML/MIN/1.73 M 2
GLOBULIN SER CALC-MCNC: 3.5 G/DL (ref 1.9–3.5)
GLUCOSE SERPL-MCNC: 110 MG/DL (ref 65–99)
HCT VFR BLD AUTO: 44 % (ref 42–52)
HGB BLD-MCNC: 15.1 G/DL (ref 14–18)
IMM GRANULOCYTES # BLD AUTO: 0.05 K/UL (ref 0–0.11)
IMM GRANULOCYTES NFR BLD AUTO: 0.5 % (ref 0–0.9)
LYMPHOCYTES # BLD AUTO: 1.71 K/UL (ref 1–4.8)
LYMPHOCYTES NFR BLD: 16.5 % (ref 22–41)
MCH RBC QN AUTO: 29.8 PG (ref 27–33)
MCHC RBC AUTO-ENTMCNC: 34.3 G/DL (ref 33.7–35.3)
MCV RBC AUTO: 87 FL (ref 81.4–97.8)
MONOCYTES # BLD AUTO: 0.74 K/UL (ref 0–0.85)
MONOCYTES NFR BLD AUTO: 7.1 % (ref 0–13.4)
NEUTROPHILS # BLD AUTO: 7.5 K/UL (ref 1.82–7.42)
NEUTROPHILS NFR BLD: 72.5 % (ref 44–72)
NRBC # BLD AUTO: 0 K/UL
NRBC BLD-RTO: 0 /100 WBC
PLATELET # BLD AUTO: 322 K/UL (ref 164–446)
PMV BLD AUTO: 8.5 FL (ref 9–12.9)
POTASSIUM SERPL-SCNC: 3.5 MMOL/L (ref 3.6–5.5)
PROT SERPL-MCNC: 7.3 G/DL (ref 6–8.2)
RBC # BLD AUTO: 5.06 M/UL (ref 4.7–6.1)
SODIUM SERPL-SCNC: 139 MMOL/L (ref 135–145)
WBC # BLD AUTO: 10.4 K/UL (ref 4.8–10.8)

## 2022-07-22 PROCEDURE — 99285 EMERGENCY DEPT VISIT HI MDM: CPT

## 2022-07-22 PROCEDURE — 700102 HCHG RX REV CODE 250 W/ 637 OVERRIDE(OP): Performed by: EMERGENCY MEDICINE

## 2022-07-22 PROCEDURE — 85025 COMPLETE CBC W/AUTO DIFF WBC: CPT

## 2022-07-22 PROCEDURE — 80053 COMPREHEN METABOLIC PANEL: CPT

## 2022-07-22 PROCEDURE — 82077 ASSAY SPEC XCP UR&BREATH IA: CPT

## 2022-07-22 PROCEDURE — 36415 COLL VENOUS BLD VENIPUNCTURE: CPT

## 2022-07-22 PROCEDURE — 93005 ELECTROCARDIOGRAM TRACING: CPT

## 2022-07-22 PROCEDURE — 93005 ELECTROCARDIOGRAM TRACING: CPT | Performed by: EMERGENCY MEDICINE

## 2022-07-22 PROCEDURE — 700111 HCHG RX REV CODE 636 W/ 250 OVERRIDE (IP): Performed by: EMERGENCY MEDICINE

## 2022-07-22 PROCEDURE — A9270 NON-COVERED ITEM OR SERVICE: HCPCS | Performed by: EMERGENCY MEDICINE

## 2022-07-22 PROCEDURE — 96374 THER/PROPH/DIAG INJ IV PUSH: CPT

## 2022-07-22 RX ORDER — ACETAMINOPHEN 325 MG/1
650 TABLET ORAL ONCE
Status: COMPLETED | OUTPATIENT
Start: 2022-07-22 | End: 2022-07-22

## 2022-07-22 RX ORDER — PHENOBARBITAL SODIUM 130 MG/ML
130 INJECTION, SOLUTION INTRAMUSCULAR; INTRAVENOUS ONCE
Status: COMPLETED | OUTPATIENT
Start: 2022-07-22 | End: 2022-07-22

## 2022-07-22 RX ADMIN — PHENOBARBITAL SODIUM 130 MG: 130 INJECTION INTRAMUSCULAR at 13:55

## 2022-07-22 RX ADMIN — ACETAMINOPHEN 650 MG: 325 TABLET, FILM COATED ORAL at 13:54

## 2022-07-22 ASSESSMENT — FIBROSIS 4 INDEX: FIB4 SCORE: 1.04

## 2022-07-22 NOTE — ED NOTES
Discharge teaching and paperwork provided regarding and all questions/concerns answered. VSS, neuro assessment stable and PIV removed. Pt encouraged to follow up with neurology appt. Patient discharged to the care of self and ambulated out of the ED to friend.
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
## 2022-07-22 NOTE — ED NOTES
"Meds administered. Pt wondering if he can go home tonight stating, \"I'm hungry.\" Pt remains alert and oriented x4.  "

## 2022-07-22 NOTE — ED TRIAGE NOTES
Vitals:    07/22/22 1245   BP: (!) 154/92   Pulse: (!) 107   Resp: 20   Temp: 36.5 °C (97.7 °F)   SpO2: 94%     Chief Complaint   Patient presents with   • Seizure     Hx of with GLF   • T-5000 GLF     Pt has a hx of seizures, is on keppra and states compliance, pt has had multiple seizures today with subsequent falls, c/o head and neck pain, arrives in c-collar. Pt states that he has multiple seizures daily, usually 3-4.  Pt is alert and oriented x 4. Pt was ambulatory on scene. FSBG 144. Pt reports he had a beer on Sunday but otherwise has been abstaining from alcohol and has been going to AA meetings.

## 2022-08-22 ENCOUNTER — HOSPITAL ENCOUNTER (INPATIENT)
Facility: MEDICAL CENTER | Age: 52
LOS: 2 days | DRG: 392 | End: 2022-08-24
Attending: EMERGENCY MEDICINE | Admitting: HOSPITALIST
Payer: COMMERCIAL

## 2022-08-22 ENCOUNTER — APPOINTMENT (OUTPATIENT)
Dept: RADIOLOGY | Facility: MEDICAL CENTER | Age: 52
DRG: 392 | End: 2022-08-22
Attending: EMERGENCY MEDICINE
Payer: COMMERCIAL

## 2022-08-22 DIAGNOSIS — R19.7 ABDOMINAL PAIN, VOMITING, AND DIARRHEA: ICD-10-CM

## 2022-08-22 DIAGNOSIS — R11.10 ABDOMINAL PAIN, VOMITING, AND DIARRHEA: ICD-10-CM

## 2022-08-22 DIAGNOSIS — R10.9 ABDOMINAL PAIN, VOMITING, AND DIARRHEA: ICD-10-CM

## 2022-08-22 DIAGNOSIS — I10 PRIMARY HYPERTENSION: ICD-10-CM

## 2022-08-22 LAB
ALBUMIN SERPL BCP-MCNC: 4.5 G/DL (ref 3.2–4.9)
ALBUMIN/GLOB SERPL: 1.3 G/DL
ALP SERPL-CCNC: 95 U/L (ref 30–99)
ALT SERPL-CCNC: 34 U/L (ref 2–50)
ANION GAP SERPL CALC-SCNC: 16 MMOL/L (ref 7–16)
APPEARANCE UR: CLEAR
AST SERPL-CCNC: 40 U/L (ref 12–45)
BASOPHILS # BLD AUTO: 0.9 % (ref 0–1.8)
BASOPHILS # BLD: 0.09 K/UL (ref 0–0.12)
BILIRUB SERPL-MCNC: 0.4 MG/DL (ref 0.1–1.5)
BILIRUB UR QL STRIP.AUTO: NEGATIVE
BUN SERPL-MCNC: 8 MG/DL (ref 8–22)
CALCIUM SERPL-MCNC: 9.2 MG/DL (ref 8.5–10.5)
CHLORIDE SERPL-SCNC: 105 MMOL/L (ref 96–112)
CO2 SERPL-SCNC: 19 MMOL/L (ref 20–33)
COLOR UR: YELLOW
CREAT SERPL-MCNC: 0.76 MG/DL (ref 0.5–1.4)
EOSINOPHIL # BLD AUTO: 0.04 K/UL (ref 0–0.51)
EOSINOPHIL NFR BLD: 0.4 % (ref 0–6.9)
ERYTHROCYTE [DISTWIDTH] IN BLOOD BY AUTOMATED COUNT: 41.4 FL (ref 35.9–50)
GFR SERPLBLD CREATININE-BSD FMLA CKD-EPI: 109 ML/MIN/1.73 M 2
GLOBULIN SER CALC-MCNC: 3.5 G/DL (ref 1.9–3.5)
GLUCOSE SERPL-MCNC: 119 MG/DL (ref 65–99)
GLUCOSE UR STRIP.AUTO-MCNC: NEGATIVE MG/DL
HCT VFR BLD AUTO: 41.8 % (ref 42–52)
HGB BLD-MCNC: 14.7 G/DL (ref 14–18)
IMM GRANULOCYTES # BLD AUTO: 0.04 K/UL (ref 0–0.11)
IMM GRANULOCYTES NFR BLD AUTO: 0.4 % (ref 0–0.9)
KETONES UR STRIP.AUTO-MCNC: NEGATIVE MG/DL
LACTATE SERPL-SCNC: 2.8 MMOL/L (ref 0.5–2)
LACTATE SERPL-SCNC: 3.5 MMOL/L (ref 0.5–2)
LEUKOCYTE ESTERASE UR QL STRIP.AUTO: NEGATIVE
LIPASE SERPL-CCNC: 29 U/L (ref 11–82)
LYMPHOCYTES # BLD AUTO: 1.73 K/UL (ref 1–4.8)
LYMPHOCYTES NFR BLD: 17.2 % (ref 22–41)
MCH RBC QN AUTO: 29.6 PG (ref 27–33)
MCHC RBC AUTO-ENTMCNC: 35.2 G/DL (ref 33.7–35.3)
MCV RBC AUTO: 84.1 FL (ref 81.4–97.8)
MICRO URNS: ABNORMAL
MONOCYTES # BLD AUTO: 0.77 K/UL (ref 0–0.85)
MONOCYTES NFR BLD AUTO: 7.6 % (ref 0–13.4)
NEUTROPHILS # BLD AUTO: 7.41 K/UL (ref 1.82–7.42)
NEUTROPHILS NFR BLD: 73.5 % (ref 44–72)
NITRITE UR QL STRIP.AUTO: NEGATIVE
NRBC # BLD AUTO: 0 K/UL
NRBC BLD-RTO: 0 /100 WBC
PH UR STRIP.AUTO: 7.5 [PH] (ref 5–8)
PLATELET # BLD AUTO: 294 K/UL (ref 164–446)
PMV BLD AUTO: 8.7 FL (ref 9–12.9)
POTASSIUM SERPL-SCNC: 3.7 MMOL/L (ref 3.6–5.5)
PROT SERPL-MCNC: 8 G/DL (ref 6–8.2)
PROT UR QL STRIP: NEGATIVE MG/DL
RBC # BLD AUTO: 4.97 M/UL (ref 4.7–6.1)
RBC UR QL AUTO: NEGATIVE
SODIUM SERPL-SCNC: 140 MMOL/L (ref 135–145)
SP GR UR STRIP.AUTO: >=1.045
UROBILINOGEN UR STRIP.AUTO-MCNC: 0.2 MG/DL
WBC # BLD AUTO: 10.1 K/UL (ref 4.8–10.8)

## 2022-08-22 PROCEDURE — 87040 BLOOD CULTURE FOR BACTERIA: CPT | Mod: 91

## 2022-08-22 PROCEDURE — 83690 ASSAY OF LIPASE: CPT

## 2022-08-22 PROCEDURE — 700102 HCHG RX REV CODE 250 W/ 637 OVERRIDE(OP): Performed by: STUDENT IN AN ORGANIZED HEALTH CARE EDUCATION/TRAINING PROGRAM

## 2022-08-22 PROCEDURE — 96375 TX/PRO/DX INJ NEW DRUG ADDON: CPT

## 2022-08-22 PROCEDURE — 93005 ELECTROCARDIOGRAM TRACING: CPT | Performed by: EMERGENCY MEDICINE

## 2022-08-22 PROCEDURE — 83605 ASSAY OF LACTIC ACID: CPT

## 2022-08-22 PROCEDURE — 770006 HCHG ROOM/CARE - MED/SURG/GYN SEMI*

## 2022-08-22 PROCEDURE — 700102 HCHG RX REV CODE 250 W/ 637 OVERRIDE(OP): Performed by: HOSPITALIST

## 2022-08-22 PROCEDURE — 700117 HCHG RX CONTRAST REV CODE 255: Performed by: EMERGENCY MEDICINE

## 2022-08-22 PROCEDURE — 99285 EMERGENCY DEPT VISIT HI MDM: CPT

## 2022-08-22 PROCEDURE — A9270 NON-COVERED ITEM OR SERVICE: HCPCS | Performed by: STUDENT IN AN ORGANIZED HEALTH CARE EDUCATION/TRAINING PROGRAM

## 2022-08-22 PROCEDURE — 36415 COLL VENOUS BLD VENIPUNCTURE: CPT

## 2022-08-22 PROCEDURE — 700102 HCHG RX REV CODE 250 W/ 637 OVERRIDE(OP)

## 2022-08-22 PROCEDURE — 99223 1ST HOSP IP/OBS HIGH 75: CPT | Mod: GC | Performed by: HOSPITALIST

## 2022-08-22 PROCEDURE — 81003 URINALYSIS AUTO W/O SCOPE: CPT

## 2022-08-22 PROCEDURE — 700111 HCHG RX REV CODE 636 W/ 250 OVERRIDE (IP): Performed by: EMERGENCY MEDICINE

## 2022-08-22 PROCEDURE — 87086 URINE CULTURE/COLONY COUNT: CPT

## 2022-08-22 PROCEDURE — 96365 THER/PROPH/DIAG IV INF INIT: CPT

## 2022-08-22 PROCEDURE — 80053 COMPREHEN METABOLIC PANEL: CPT

## 2022-08-22 PROCEDURE — 96376 TX/PRO/DX INJ SAME DRUG ADON: CPT

## 2022-08-22 PROCEDURE — 700105 HCHG RX REV CODE 258: Performed by: EMERGENCY MEDICINE

## 2022-08-22 PROCEDURE — 85025 COMPLETE CBC W/AUTO DIFF WBC: CPT

## 2022-08-22 PROCEDURE — A9270 NON-COVERED ITEM OR SERVICE: HCPCS

## 2022-08-22 PROCEDURE — 700101 HCHG RX REV CODE 250: Performed by: EMERGENCY MEDICINE

## 2022-08-22 PROCEDURE — 74177 CT ABD & PELVIS W/CONTRAST: CPT

## 2022-08-22 PROCEDURE — 700105 HCHG RX REV CODE 258: Performed by: HOSPITALIST

## 2022-08-22 PROCEDURE — A9270 NON-COVERED ITEM OR SERVICE: HCPCS | Performed by: HOSPITALIST

## 2022-08-22 RX ORDER — HALOPERIDOL 5 MG/ML
2.5 INJECTION INTRAMUSCULAR ONCE
Status: COMPLETED | OUTPATIENT
Start: 2022-08-22 | End: 2022-08-22

## 2022-08-22 RX ORDER — MORPHINE SULFATE 4 MG/ML
4 INJECTION INTRAVENOUS ONCE
Status: COMPLETED | OUTPATIENT
Start: 2022-08-22 | End: 2022-08-22

## 2022-08-22 RX ORDER — ONDANSETRON 2 MG/ML
4 INJECTION INTRAMUSCULAR; INTRAVENOUS EVERY 4 HOURS PRN
Status: DISCONTINUED | OUTPATIENT
Start: 2022-08-22 | End: 2022-08-24 | Stop reason: HOSPADM

## 2022-08-22 RX ORDER — HYDROMORPHONE HYDROCHLORIDE 1 MG/ML
1 INJECTION, SOLUTION INTRAMUSCULAR; INTRAVENOUS; SUBCUTANEOUS ONCE
Status: COMPLETED | OUTPATIENT
Start: 2022-08-22 | End: 2022-08-22

## 2022-08-22 RX ORDER — BACLOFEN 5 MG/1
5 TABLET ORAL 3 TIMES DAILY PRN
Status: DISCONTINUED | OUTPATIENT
Start: 2022-08-22 | End: 2022-08-24 | Stop reason: HOSPADM

## 2022-08-22 RX ORDER — LEVETIRACETAM 500 MG/1
1000 TABLET ORAL 2 TIMES DAILY
Status: DISCONTINUED | OUTPATIENT
Start: 2022-08-22 | End: 2022-08-22

## 2022-08-22 RX ORDER — SODIUM CHLORIDE, SODIUM LACTATE, POTASSIUM CHLORIDE, CALCIUM CHLORIDE 600; 310; 30; 20 MG/100ML; MG/100ML; MG/100ML; MG/100ML
1000 INJECTION, SOLUTION INTRAVENOUS ONCE
Status: COMPLETED | OUTPATIENT
Start: 2022-08-22 | End: 2022-08-22

## 2022-08-22 RX ORDER — ONDANSETRON 4 MG/1
4 TABLET, ORALLY DISINTEGRATING ORAL EVERY 4 HOURS PRN
Status: DISCONTINUED | OUTPATIENT
Start: 2022-08-22 | End: 2022-08-24 | Stop reason: HOSPADM

## 2022-08-22 RX ORDER — GABAPENTIN 100 MG/1
100 CAPSULE ORAL 3 TIMES DAILY
Status: ON HOLD | COMMUNITY
End: 2022-09-20 | Stop reason: SDUPTHER

## 2022-08-22 RX ORDER — SODIUM CHLORIDE, SODIUM LACTATE, POTASSIUM CHLORIDE, CALCIUM CHLORIDE 600; 310; 30; 20 MG/100ML; MG/100ML; MG/100ML; MG/100ML
INJECTION, SOLUTION INTRAVENOUS CONTINUOUS
Status: DISCONTINUED | OUTPATIENT
Start: 2022-08-22 | End: 2022-08-24 | Stop reason: HOSPADM

## 2022-08-22 RX ORDER — HYDROMORPHONE HYDROCHLORIDE 1 MG/ML
0.5 INJECTION, SOLUTION INTRAMUSCULAR; INTRAVENOUS; SUBCUTANEOUS ONCE
Status: COMPLETED | OUTPATIENT
Start: 2022-08-22 | End: 2022-08-22

## 2022-08-22 RX ORDER — METRONIDAZOLE 500 MG/100ML
500 INJECTION, SOLUTION INTRAVENOUS ONCE
Status: COMPLETED | OUTPATIENT
Start: 2022-08-22 | End: 2022-08-22

## 2022-08-22 RX ORDER — LEVETIRACETAM 500 MG/1
500 TABLET ORAL 2 TIMES DAILY
Status: DISCONTINUED | OUTPATIENT
Start: 2022-08-22 | End: 2022-08-24 | Stop reason: HOSPADM

## 2022-08-22 RX ORDER — AMLODIPINE BESYLATE 10 MG/1
5 TABLET ORAL
Status: DISCONTINUED | OUTPATIENT
Start: 2022-08-22 | End: 2022-08-24 | Stop reason: HOSPADM

## 2022-08-22 RX ORDER — LEVETIRACETAM 1000 MG/1
1000 TABLET ORAL 2 TIMES DAILY
Status: ON HOLD | COMMUNITY
End: 2022-09-20 | Stop reason: SDUPTHER

## 2022-08-22 RX ORDER — OXYCODONE HYDROCHLORIDE 5 MG/1
5 TABLET ORAL EVERY 6 HOURS PRN
Status: DISCONTINUED | OUTPATIENT
Start: 2022-08-22 | End: 2022-08-24 | Stop reason: HOSPADM

## 2022-08-22 RX ORDER — HYDRALAZINE HYDROCHLORIDE 10 MG/1
10 TABLET, FILM COATED ORAL EVERY 6 HOURS PRN
Status: DISCONTINUED | OUTPATIENT
Start: 2022-08-22 | End: 2022-08-24 | Stop reason: HOSPADM

## 2022-08-22 RX ORDER — CEFTRIAXONE 2 G/1
2 INJECTION, POWDER, FOR SOLUTION INTRAMUSCULAR; INTRAVENOUS ONCE
Status: COMPLETED | OUTPATIENT
Start: 2022-08-22 | End: 2022-08-22

## 2022-08-22 RX ORDER — SODIUM CHLORIDE, SODIUM LACTATE, POTASSIUM CHLORIDE, AND CALCIUM CHLORIDE .6; .31; .03; .02 G/100ML; G/100ML; G/100ML; G/100ML
30 INJECTION, SOLUTION INTRAVENOUS ONCE
Status: COMPLETED | OUTPATIENT
Start: 2022-08-22 | End: 2022-08-22

## 2022-08-22 RX ORDER — ESOMEPRAZOLE MAGNESIUM 20 MG/1
40 TABLET, DELAYED RELEASE ORAL
Status: ON HOLD | COMMUNITY
End: 2022-09-20

## 2022-08-22 RX ORDER — ACETAMINOPHEN 325 MG/1
650 TABLET ORAL EVERY 6 HOURS PRN
Status: DISCONTINUED | OUTPATIENT
Start: 2022-08-22 | End: 2022-08-24 | Stop reason: HOSPADM

## 2022-08-22 RX ORDER — LORATADINE 10 MG/1
10 TABLET ORAL DAILY
Status: ON HOLD | COMMUNITY
End: 2022-09-20

## 2022-08-22 RX ORDER — ONDANSETRON 2 MG/ML
4 INJECTION INTRAMUSCULAR; INTRAVENOUS ONCE
Status: COMPLETED | OUTPATIENT
Start: 2022-08-22 | End: 2022-08-22

## 2022-08-22 RX ADMIN — HALOPERIDOL LACTATE 2.5 MG: 5 INJECTION, SOLUTION INTRAMUSCULAR at 12:15

## 2022-08-22 RX ADMIN — METRONIDAZOLE 500 MG: 5 INJECTION, SOLUTION INTRAVENOUS at 13:32

## 2022-08-22 RX ADMIN — SODIUM CHLORIDE, POTASSIUM CHLORIDE, SODIUM LACTATE AND CALCIUM CHLORIDE: 600; 310; 30; 20 INJECTION, SOLUTION INTRAVENOUS at 17:52

## 2022-08-22 RX ADMIN — LEVETIRACETAM 500 MG: 500 TABLET, FILM COATED ORAL at 19:31

## 2022-08-22 RX ADMIN — RIVAROXABAN 10 MG: 10 TABLET, FILM COATED ORAL at 17:52

## 2022-08-22 RX ADMIN — OXYCODONE 5 MG: 5 TABLET ORAL at 19:31

## 2022-08-22 RX ADMIN — AMLODIPINE BESYLATE 5 MG: 10 TABLET ORAL at 17:52

## 2022-08-22 RX ADMIN — BACLOFEN 5 MG: 5 TABLET ORAL at 19:36

## 2022-08-22 RX ADMIN — ONDANSETRON 4 MG: 2 INJECTION INTRAMUSCULAR; INTRAVENOUS at 10:48

## 2022-08-22 RX ADMIN — HALOPERIDOL LACTATE 2.5 MG: 5 INJECTION, SOLUTION INTRAMUSCULAR at 13:31

## 2022-08-22 RX ADMIN — IOHEXOL 12 ML: 240 INJECTION, SOLUTION INTRATHECAL; INTRAVASCULAR; INTRAVENOUS; ORAL at 12:15

## 2022-08-22 RX ADMIN — HYDROMORPHONE HYDROCHLORIDE 1 MG: 1 INJECTION, SOLUTION INTRAMUSCULAR; INTRAVENOUS; SUBCUTANEOUS at 13:06

## 2022-08-22 RX ADMIN — SODIUM CHLORIDE, POTASSIUM CHLORIDE, SODIUM LACTATE AND CALCIUM CHLORIDE 1000 ML: 600; 310; 30; 20 INJECTION, SOLUTION INTRAVENOUS at 13:06

## 2022-08-22 RX ADMIN — MORPHINE SULFATE 4 MG: 4 INJECTION INTRAVENOUS at 10:48

## 2022-08-22 RX ADMIN — CEFTRIAXONE SODIUM 2 G: 2 INJECTION, POWDER, FOR SOLUTION INTRAMUSCULAR; INTRAVENOUS at 13:32

## 2022-08-22 RX ADMIN — HYDROMORPHONE HYDROCHLORIDE 0.5 MG: 1 INJECTION, SOLUTION INTRAMUSCULAR; INTRAVENOUS; SUBCUTANEOUS at 12:15

## 2022-08-22 RX ADMIN — SODIUM CHLORIDE, POTASSIUM CHLORIDE, SODIUM LACTATE AND CALCIUM CHLORIDE 1000 ML: 600; 310; 30; 20 INJECTION, SOLUTION INTRAVENOUS at 10:58

## 2022-08-22 RX ADMIN — IOHEXOL 100 ML: 350 INJECTION, SOLUTION INTRAVENOUS at 11:53

## 2022-08-22 ASSESSMENT — LIFESTYLE VARIABLES
HOW MANY TIMES IN THE PAST YEAR HAVE YOU HAD 5 OR MORE DRINKS IN A DAY: 1
EVER FELT BAD OR GUILTY ABOUT YOUR DRINKING: NO
TOTAL SCORE: 0
HAVE PEOPLE ANNOYED YOU BY CRITICIZING YOUR DRINKING: NO
AVERAGE NUMBER OF DAYS PER WEEK YOU HAVE A DRINK CONTAINING ALCOHOL: 0
SUBSTANCE_ABUSE: 0
TOTAL SCORE: 0
ALCOHOL_USE: YES
CONSUMPTION TOTAL: POSITIVE
DOES PATIENT WANT TO TALK TO SOMEONE ABOUT QUITTING: NO
HAVE YOU EVER FELT YOU SHOULD CUT DOWN ON YOUR DRINKING: NO
TOTAL SCORE: 0
ON A TYPICAL DAY WHEN YOU DRINK ALCOHOL HOW MANY DRINKS DO YOU HAVE: 1
DO YOU DRINK ALCOHOL: NO
DOES PATIENT WANT TO STOP DRINKING: NO
EVER HAD A DRINK FIRST THING IN THE MORNING TO STEADY YOUR NERVES TO GET RID OF A HANGOVER: NO

## 2022-08-22 ASSESSMENT — PAIN DESCRIPTION - PAIN TYPE
TYPE: ACUTE PAIN;SURGICAL PAIN
TYPE: ACUTE PAIN;SURGICAL PAIN

## 2022-08-22 ASSESSMENT — ENCOUNTER SYMPTOMS
ABDOMINAL PAIN: 1
BLURRED VISION: 0
VOMITING: 1
POLYDIPSIA: 0
DIARRHEA: 1
BRUISES/BLEEDS EASILY: 0
MYALGIAS: 0
DIZZINESS: 0
WEAKNESS: 0
FEVER: 0
CHILLS: 0
NAUSEA: 1
WEIGHT LOSS: 0
SHORTNESS OF BREATH: 0

## 2022-08-22 ASSESSMENT — PATIENT HEALTH QUESTIONNAIRE - PHQ9
2. FEELING DOWN, DEPRESSED, IRRITABLE, OR HOPELESS: NOT AT ALL
SUM OF ALL RESPONSES TO PHQ9 QUESTIONS 1 AND 2: 0
1. LITTLE INTEREST OR PLEASURE IN DOING THINGS: NOT AT ALL
SUM OF ALL RESPONSES TO PHQ9 QUESTIONS 1 AND 2: 0
2. FEELING DOWN, DEPRESSED, IRRITABLE, OR HOPELESS: NOT AT ALL

## 2022-08-22 ASSESSMENT — FIBROSIS 4 INDEX: FIB4 SCORE: 0.98
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
## 2022-08-22 NOTE — PROGRESS NOTES
Pt arrived to floor from ED. Pt A+Ox4, able to make needs known, PIV to 20 right AC Patent. Pain 5/10 to RLQ.   CSMT's and SAMEER's WNL.  Educated pt on call light and TV remote.
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
## 2022-08-23 ENCOUNTER — APPOINTMENT (OUTPATIENT)
Dept: RADIOLOGY | Facility: MEDICAL CENTER | Age: 52
DRG: 392 | End: 2022-08-23
Attending: HOSPITALIST
Payer: COMMERCIAL

## 2022-08-23 LAB
ALBUMIN SERPL BCP-MCNC: 3.6 G/DL (ref 3.2–4.9)
ALBUMIN/GLOB SERPL: 1.1 G/DL
ALP SERPL-CCNC: 86 U/L (ref 30–99)
ALT SERPL-CCNC: 27 U/L (ref 2–50)
ANION GAP SERPL CALC-SCNC: 13 MMOL/L (ref 7–16)
AST SERPL-CCNC: 27 U/L (ref 12–45)
BILIRUB SERPL-MCNC: 0.4 MG/DL (ref 0.1–1.5)
BUN SERPL-MCNC: 7 MG/DL (ref 8–22)
CALCIUM SERPL-MCNC: 8.8 MG/DL (ref 8.5–10.5)
CHLORIDE SERPL-SCNC: 103 MMOL/L (ref 96–112)
CO2 SERPL-SCNC: 24 MMOL/L (ref 20–33)
CREAT SERPL-MCNC: 0.63 MG/DL (ref 0.5–1.4)
EKG IMPRESSION: NORMAL
GFR SERPLBLD CREATININE-BSD FMLA CKD-EPI: 116 ML/MIN/1.73 M 2
GLOBULIN SER CALC-MCNC: 3.3 G/DL (ref 1.9–3.5)
GLUCOSE SERPL-MCNC: 84 MG/DL (ref 65–99)
LACTATE SERPL-SCNC: 1 MMOL/L (ref 0.5–2)
LACTATE SERPL-SCNC: 1.6 MMOL/L (ref 0.5–2)
POTASSIUM SERPL-SCNC: 3.8 MMOL/L (ref 3.6–5.5)
PROT SERPL-MCNC: 6.9 G/DL (ref 6–8.2)
SODIUM SERPL-SCNC: 140 MMOL/L (ref 135–145)

## 2022-08-23 PROCEDURE — A9270 NON-COVERED ITEM OR SERVICE: HCPCS | Performed by: STUDENT IN AN ORGANIZED HEALTH CARE EDUCATION/TRAINING PROGRAM

## 2022-08-23 PROCEDURE — 80177 DRUG SCRN QUAN LEVETIRACETAM: CPT

## 2022-08-23 PROCEDURE — 36415 COLL VENOUS BLD VENIPUNCTURE: CPT

## 2022-08-23 PROCEDURE — 80053 COMPREHEN METABOLIC PANEL: CPT

## 2022-08-23 PROCEDURE — 83605 ASSAY OF LACTIC ACID: CPT | Mod: 91

## 2022-08-23 PROCEDURE — A9270 NON-COVERED ITEM OR SERVICE: HCPCS

## 2022-08-23 PROCEDURE — 700102 HCHG RX REV CODE 250 W/ 637 OVERRIDE(OP)

## 2022-08-23 PROCEDURE — 770006 HCHG ROOM/CARE - MED/SURG/GYN SEMI*

## 2022-08-23 PROCEDURE — 700102 HCHG RX REV CODE 250 W/ 637 OVERRIDE(OP): Performed by: HOSPITALIST

## 2022-08-23 PROCEDURE — 84425 ASSAY OF VITAMIN B-1: CPT

## 2022-08-23 PROCEDURE — 99232 SBSQ HOSP IP/OBS MODERATE 35: CPT | Performed by: HOSPITALIST

## 2022-08-23 PROCEDURE — 700102 HCHG RX REV CODE 250 W/ 637 OVERRIDE(OP): Performed by: STUDENT IN AN ORGANIZED HEALTH CARE EDUCATION/TRAINING PROGRAM

## 2022-08-23 PROCEDURE — 74250 X-RAY XM SM INT 1CNTRST STD: CPT

## 2022-08-23 PROCEDURE — 700117 HCHG RX CONTRAST REV CODE 255: Performed by: HOSPITALIST

## 2022-08-23 PROCEDURE — A9270 NON-COVERED ITEM OR SERVICE: HCPCS | Performed by: HOSPITALIST

## 2022-08-23 RX ADMIN — OXYCODONE 5 MG: 5 TABLET ORAL at 11:00

## 2022-08-23 RX ADMIN — LEVETIRACETAM 500 MG: 500 TABLET, FILM COATED ORAL at 16:43

## 2022-08-23 RX ADMIN — AMLODIPINE BESYLATE 5 MG: 10 TABLET ORAL at 05:49

## 2022-08-23 RX ADMIN — LEVETIRACETAM 500 MG: 500 TABLET, FILM COATED ORAL at 05:49

## 2022-08-23 RX ADMIN — BACLOFEN 5 MG: 5 TABLET ORAL at 16:43

## 2022-08-23 RX ADMIN — BACLOFEN 5 MG: 5 TABLET ORAL at 09:15

## 2022-08-23 RX ADMIN — RIVAROXABAN 10 MG: 10 TABLET, FILM COATED ORAL at 16:43

## 2022-08-23 RX ADMIN — OXYCODONE 5 MG: 5 TABLET ORAL at 16:43

## 2022-08-23 RX ADMIN — IOHEXOL 400 ML: 350 INJECTION, SOLUTION INTRAVENOUS at 17:05

## 2022-08-23 ASSESSMENT — LIFESTYLE VARIABLES
PAROXYSMAL SWEATS: NO SWEAT VISIBLE
AUDITORY DISTURBANCES: NOT PRESENT
NAUSEA AND VOMITING: NO NAUSEA AND NO VOMITING
TREMOR: NO TREMOR
AGITATION: NORMAL ACTIVITY
HEADACHE, FULLNESS IN HEAD: NOT PRESENT
TOTAL SCORE: 0
ORIENTATION AND CLOUDING OF SENSORIUM: ORIENTED AND CAN DO SERIAL ADDITIONS
SUBSTANCE_ABUSE: 0
ANXIETY: NO ANXIETY (AT EASE)
VISUAL DISTURBANCES: NOT PRESENT

## 2022-08-23 ASSESSMENT — ENCOUNTER SYMPTOMS
FEVER: 0
BRUISES/BLEEDS EASILY: 0
EYE PAIN: 0
SHORTNESS OF BREATH: 0
VOMITING: 1
HEADACHES: 0
DEPRESSION: 0
DIARRHEA: 0
COUGH: 0
DIAPHORESIS: 0
FOCAL WEAKNESS: 0
NECK PAIN: 0
SPUTUM PRODUCTION: 0
PALPITATIONS: 0
CLAUDICATION: 0
MYALGIAS: 0
DIZZINESS: 0
EYE DISCHARGE: 0
BACK PAIN: 0
NAUSEA: 1
ABDOMINAL PAIN: 1
CHILLS: 0
LOSS OF CONSCIOUSNESS: 0
WHEEZING: 0
SPEECH CHANGE: 0
SORE THROAT: 0
CONSTIPATION: 0
WEAKNESS: 0
SENSORY CHANGE: 0
HEMOPTYSIS: 0

## 2022-08-23 ASSESSMENT — PAIN DESCRIPTION - PAIN TYPE
TYPE: ACUTE PAIN

## 2022-08-23 NOTE — ASSESSMENT & PLAN NOTE
Recent abdominal surgery (7/2022), appendectomy approx 20 years ago, history of hernia repair. Wood's esophagus EGD (1/2022).  Denies hx of kidney stones, gall bladder stones, or pancreatitis. On evaluation, does not appear septic. Tachycardia and HTN secondary pain. CT abd/pelvis with contrast no signs of inflammation shows mesh repair of R anterior abdominal wall.   -Antibiotics discontinued   -Blood cultures no growth.  Concern for persistent abdominal pain, guarding and lack of BM or flatus.  8/23:  Consulted general surgery, spoke with Dr. Russ and Dr. Estes.  Ordered SBFT to ensure no perforation that could be missed on CT abdomen.  NPO.  IVFs.

## 2022-08-23 NOTE — CARE PLAN
The patient is Stable - Low risk of patient condition declining or worsening    Shift Goals  Clinical Goals: Monitor VS, pain control, safety, I/O's  Patient Goals: rest, D/C  Family Goals: No family present      Problem: Pain - Standard  Goal: Alleviation of pain or a reduction in pain to the patient’s comfort goal  Outcome: Progressing  Note: Educated patient on the use of 1-10 pain scale and use of pain descriptors. Administered pain medication when needed per MAR. Non-pharmacological methods for pain control in place such as rest, repositioning, and enforcing a calm and conductive environment.      Problem: Knowledge Deficit - Standard  Goal: Patient and family/care givers will demonstrate understanding of plan of care, disease process/condition, diagnostic tests and medications  Outcome: Progressing  Note: Patient educated on plan and goals of care and disease process. Education provided on medications, procedures, and equipment. Will continue to re-inforce education when required. All questions and concerns answered at this time.

## 2022-08-24 ENCOUNTER — PATIENT OUTREACH (OUTPATIENT)
Dept: SCHEDULING | Facility: IMAGING CENTER | Age: 52
End: 2022-08-24
Payer: COMMERCIAL

## 2022-08-24 VITALS
DIASTOLIC BLOOD PRESSURE: 83 MMHG | TEMPERATURE: 97.8 F | BODY MASS INDEX: 28.14 KG/M2 | OXYGEN SATURATION: 98 % | HEIGHT: 69 IN | SYSTOLIC BLOOD PRESSURE: 129 MMHG | HEART RATE: 80 BPM | WEIGHT: 190 LBS | RESPIRATION RATE: 18 BRPM

## 2022-08-24 PROBLEM — R10.9 ABDOMINAL PAIN: Status: RESOLVED | Noted: 2022-08-22 | Resolved: 2022-08-24

## 2022-08-24 PROBLEM — R11.2 NAUSEA AND VOMITING: Status: RESOLVED | Noted: 2022-06-17 | Resolved: 2022-08-24

## 2022-08-24 LAB
ALBUMIN SERPL BCP-MCNC: 4 G/DL (ref 3.2–4.9)
ALBUMIN/GLOB SERPL: 1.2 G/DL
ALP SERPL-CCNC: 96 U/L (ref 30–99)
ALT SERPL-CCNC: 27 U/L (ref 2–50)
ANION GAP SERPL CALC-SCNC: 14 MMOL/L (ref 7–16)
AST SERPL-CCNC: 30 U/L (ref 12–45)
BACTERIA UR CULT: NORMAL
BASOPHILS # BLD AUTO: 1 % (ref 0–1.8)
BASOPHILS # BLD: 0.07 K/UL (ref 0–0.12)
BILIRUB SERPL-MCNC: 0.4 MG/DL (ref 0.1–1.5)
BUN SERPL-MCNC: 11 MG/DL (ref 8–22)
CALCIUM SERPL-MCNC: 9.4 MG/DL (ref 8.5–10.5)
CHLORIDE SERPL-SCNC: 103 MMOL/L (ref 96–112)
CO2 SERPL-SCNC: 23 MMOL/L (ref 20–33)
CREAT SERPL-MCNC: 0.69 MG/DL (ref 0.5–1.4)
EOSINOPHIL # BLD AUTO: 0.46 K/UL (ref 0–0.51)
EOSINOPHIL NFR BLD: 6.5 % (ref 0–6.9)
ERYTHROCYTE [DISTWIDTH] IN BLOOD BY AUTOMATED COUNT: 42.4 FL (ref 35.9–50)
GFR SERPLBLD CREATININE-BSD FMLA CKD-EPI: 113 ML/MIN/1.73 M 2
GLOBULIN SER CALC-MCNC: 3.3 G/DL (ref 1.9–3.5)
GLUCOSE SERPL-MCNC: 89 MG/DL (ref 65–99)
HCT VFR BLD AUTO: 43.2 % (ref 42–52)
HGB BLD-MCNC: 14.4 G/DL (ref 14–18)
IMM GRANULOCYTES # BLD AUTO: 0.03 K/UL (ref 0–0.11)
IMM GRANULOCYTES NFR BLD AUTO: 0.4 % (ref 0–0.9)
LEVETIRACETAM SERPL-MCNC: 9 UG/ML (ref 10–40)
LYMPHOCYTES # BLD AUTO: 2.24 K/UL (ref 1–4.8)
LYMPHOCYTES NFR BLD: 31.5 % (ref 22–41)
MCH RBC QN AUTO: 28.9 PG (ref 27–33)
MCHC RBC AUTO-ENTMCNC: 33.3 G/DL (ref 33.7–35.3)
MCV RBC AUTO: 86.6 FL (ref 81.4–97.8)
MONOCYTES # BLD AUTO: 0.68 K/UL (ref 0–0.85)
MONOCYTES NFR BLD AUTO: 9.6 % (ref 0–13.4)
NEUTROPHILS # BLD AUTO: 3.63 K/UL (ref 1.82–7.42)
NEUTROPHILS NFR BLD: 51 % (ref 44–72)
NRBC # BLD AUTO: 0 K/UL
NRBC BLD-RTO: 0 /100 WBC
PLATELET # BLD AUTO: 266 K/UL (ref 164–446)
PMV BLD AUTO: 9 FL (ref 9–12.9)
POTASSIUM SERPL-SCNC: 3.6 MMOL/L (ref 3.6–5.5)
PROT SERPL-MCNC: 7.3 G/DL (ref 6–8.2)
RBC # BLD AUTO: 4.99 M/UL (ref 4.7–6.1)
SIGNIFICANT IND 70042: NORMAL
SITE SITE: NORMAL
SODIUM SERPL-SCNC: 140 MMOL/L (ref 135–145)
SOURCE SOURCE: NORMAL
WBC # BLD AUTO: 7.1 K/UL (ref 4.8–10.8)

## 2022-08-24 PROCEDURE — 700102 HCHG RX REV CODE 250 W/ 637 OVERRIDE(OP): Performed by: STUDENT IN AN ORGANIZED HEALTH CARE EDUCATION/TRAINING PROGRAM

## 2022-08-24 PROCEDURE — 80053 COMPREHEN METABOLIC PANEL: CPT

## 2022-08-24 PROCEDURE — 85025 COMPLETE CBC W/AUTO DIFF WBC: CPT

## 2022-08-24 PROCEDURE — 99239 HOSP IP/OBS DSCHRG MGMT >30: CPT | Performed by: HOSPITALIST

## 2022-08-24 PROCEDURE — 700102 HCHG RX REV CODE 250 W/ 637 OVERRIDE(OP)

## 2022-08-24 PROCEDURE — A9270 NON-COVERED ITEM OR SERVICE: HCPCS

## 2022-08-24 PROCEDURE — A9270 NON-COVERED ITEM OR SERVICE: HCPCS | Performed by: STUDENT IN AN ORGANIZED HEALTH CARE EDUCATION/TRAINING PROGRAM

## 2022-08-24 RX ORDER — AMLODIPINE BESYLATE 5 MG/1
5 TABLET ORAL DAILY
Qty: 30 TABLET | Refills: 3 | Status: ON HOLD | OUTPATIENT
Start: 2022-08-25 | End: 2022-09-20 | Stop reason: SDUPTHER

## 2022-08-24 RX ADMIN — OXYCODONE 5 MG: 5 TABLET ORAL at 01:05

## 2022-08-24 RX ADMIN — OXYCODONE 5 MG: 5 TABLET ORAL at 07:30

## 2022-08-24 RX ADMIN — BACLOFEN 5 MG: 5 TABLET ORAL at 01:07

## 2022-08-24 RX ADMIN — AMLODIPINE BESYLATE 5 MG: 10 TABLET ORAL at 06:03

## 2022-08-24 RX ADMIN — LEVETIRACETAM 500 MG: 500 TABLET, FILM COATED ORAL at 06:02

## 2022-08-24 ASSESSMENT — PAIN DESCRIPTION - PAIN TYPE
TYPE: ACUTE PAIN
TYPE: ACUTE PAIN
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
## 2022-08-24 NOTE — PROGRESS NOTES
Pt sleeping soundly but easily arousable. Due meds were given and well tolerated. Pt denies n/v and diarrhea.Call light within reach and safety precautions maintained. Report given to day shift RN.

## 2022-08-24 NOTE — CARE PLAN
The patient is Stable - Low risk of patient condition declining or worsening    Problem: Pain - Standard  Goal: Alleviation of pain or a reduction in pain to the patient’s comfort goal  Outcome: Progressing     Problem: Knowledge Deficit - Standard  Goal: Patient and family/care givers will demonstrate understanding of plan of care, disease process/condition, diagnostic tests and medications  Outcome: Progressing     Problem: Hemodynamics  Goal: Patient's hemodynamics, fluid balance and neurologic status will be stable or improve  Outcome: Progressing     Shift Goals  Clinical Goals: pain control, safety, comfort  Patient Goals: rest and sleep  Family Goals: No family present    Progress made toward(s) clinical / shift goals:  Pt c/o abdominal pain, pt medicated per MAR and well tolerated. Non-pharmacological pain mgt methods like repositioning, rest and distractions encouraged. Pt remained hemodynamically stable. POC update provided.

## 2022-08-24 NOTE — DISCHARGE PLANNING
Case Management Discharge Planning    Admission Date: 8/22/2022  GMLOS: 2.6  ALOS: 2    6-Clicks ADL Score:    6-Clicks Mobility Score:        Anticipated Discharge Dispo:      DME Needed: No    Action(s) Taken: OTHER    Escalations Completed: None    Medically Clear: Yes    Next Steps: Taxi voucher provided for transportation home. No other discharge needs identified.     Barriers to Discharge: None

## 2022-08-24 NOTE — PROGRESS NOTES
Received report from day shift RN. Pt met sitting up in bed awake and calm on taking over the shift. A/O X4, on RA, breathing unlabored and pt in no acute respiratory distress. Pt denies pain at this time, call light within reach and safety precautions maintained. Pt requested for cold water, same provided.

## 2022-08-24 NOTE — DISCHARGE INSTRUCTIONS
Discharge Instructions    Discharged to home by taxi with relative. Discharged via wheelchair, hospital escort: Yes.  Special equipment needed: Not Applicable    Be sure to schedule a follow-up appointment with your primary care doctor or any specialists as instructed.     Discharge Plan:   Diet Plan: Discussed  Activity Level: Discussed  Confirmed Follow up Appointment: Patient to Call and Schedule Appointment  Confirmed Symptoms Management: Discussed  Medication Reconciliation Updated: Yes    I understand that a diet low in cholesterol, fat, and sodium is recommended for good health. Unless I have been given specific instructions below for another diet, I accept this instruction as my diet prescription.       Special Instructions: None    -Is this patient being discharged with medication to prevent blood clots?  No    Is patient discharged on Warfarin / Coumadin?   No

## 2022-08-24 NOTE — PROGRESS NOTES
Discharge orders acknowledged, Pt aware and compliant with new orders, D/C teaching completed, Pt able to verbalize needs and complaint with discharge orders.  IV removed.
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
## 2022-08-26 LAB — VIT B1 BLD-MCNC: 118 NMOL/L (ref 70–180)

## 2022-08-27 ENCOUNTER — HOSPITAL ENCOUNTER (INPATIENT)
Facility: MEDICAL CENTER | Age: 52
LOS: 4 days | DRG: 641 | End: 2022-08-31
Attending: EMERGENCY MEDICINE | Admitting: INTERNAL MEDICINE
Payer: COMMERCIAL

## 2022-08-27 ENCOUNTER — APPOINTMENT (OUTPATIENT)
Dept: RADIOLOGY | Facility: MEDICAL CENTER | Age: 52
DRG: 641 | End: 2022-08-27
Attending: EMERGENCY MEDICINE
Payer: COMMERCIAL

## 2022-08-27 DIAGNOSIS — R11.2 NAUSEA VOMITING AND DIARRHEA: ICD-10-CM

## 2022-08-27 DIAGNOSIS — E86.0 DEHYDRATION: ICD-10-CM

## 2022-08-27 DIAGNOSIS — R19.7 NAUSEA VOMITING AND DIARRHEA: ICD-10-CM

## 2022-08-27 DIAGNOSIS — R10.84 GENERALIZED ABDOMINAL PAIN: ICD-10-CM

## 2022-08-27 DIAGNOSIS — M62.838 MUSCLE SPASM: ICD-10-CM

## 2022-08-27 LAB
ALBUMIN SERPL BCP-MCNC: 4.5 G/DL (ref 3.2–4.9)
ALBUMIN/GLOB SERPL: 1.2 G/DL
ALP SERPL-CCNC: 113 U/L (ref 30–99)
ALT SERPL-CCNC: 41 U/L (ref 2–50)
AMPHET UR QL SCN: NEGATIVE
ANION GAP SERPL CALC-SCNC: 18 MMOL/L (ref 7–16)
APPEARANCE UR: CLEAR
APTT PPP: 27.7 SEC (ref 24.7–36)
AST SERPL-CCNC: 45 U/L (ref 12–45)
BACTERIA BLD CULT: NORMAL
BACTERIA BLD CULT: NORMAL
BARBITURATES UR QL SCN: NEGATIVE
BASOPHILS # BLD AUTO: 1.2 % (ref 0–1.8)
BASOPHILS # BLD: 0.08 K/UL (ref 0–0.12)
BENZODIAZ UR QL SCN: NEGATIVE
BILIRUB SERPL-MCNC: 0.2 MG/DL (ref 0.1–1.5)
BILIRUB UR QL STRIP.AUTO: NEGATIVE
BUN SERPL-MCNC: 8 MG/DL (ref 8–22)
BZE UR QL SCN: NEGATIVE
CALCIUM SERPL-MCNC: 9.8 MG/DL (ref 8.5–10.5)
CANNABINOIDS UR QL SCN: NEGATIVE
CHLORIDE SERPL-SCNC: 103 MMOL/L (ref 96–112)
CO2 SERPL-SCNC: 19 MMOL/L (ref 20–33)
COLOR UR: YELLOW
CREAT SERPL-MCNC: 0.79 MG/DL (ref 0.5–1.4)
EKG IMPRESSION: NORMAL
EOSINOPHIL # BLD AUTO: 0.17 K/UL (ref 0–0.51)
EOSINOPHIL NFR BLD: 2.5 % (ref 0–6.9)
ERYTHROCYTE [DISTWIDTH] IN BLOOD BY AUTOMATED COUNT: 41.8 FL (ref 35.9–50)
GFR SERPLBLD CREATININE-BSD FMLA CKD-EPI: 108 ML/MIN/1.73 M 2
GLOBULIN SER CALC-MCNC: 3.7 G/DL (ref 1.9–3.5)
GLUCOSE SERPL-MCNC: 151 MG/DL (ref 65–99)
GLUCOSE UR STRIP.AUTO-MCNC: NEGATIVE MG/DL
HCT VFR BLD AUTO: 46 % (ref 42–52)
HGB BLD-MCNC: 15.7 G/DL (ref 14–18)
IMM GRANULOCYTES # BLD AUTO: 0.03 K/UL (ref 0–0.11)
IMM GRANULOCYTES NFR BLD AUTO: 0.4 % (ref 0–0.9)
INR PPP: 0.95 (ref 0.87–1.13)
KETONES UR STRIP.AUTO-MCNC: NEGATIVE MG/DL
LACTATE SERPL-SCNC: 2 MMOL/L (ref 0.5–2)
LACTATE SERPL-SCNC: 3.9 MMOL/L (ref 0.5–2)
LEUKOCYTE ESTERASE UR QL STRIP.AUTO: NEGATIVE
LIPASE SERPL-CCNC: 28 U/L (ref 11–82)
LYMPHOCYTES # BLD AUTO: 2.2 K/UL (ref 1–4.8)
LYMPHOCYTES NFR BLD: 32.2 % (ref 22–41)
MCH RBC QN AUTO: 28.8 PG (ref 27–33)
MCHC RBC AUTO-ENTMCNC: 34.1 G/DL (ref 33.7–35.3)
MCV RBC AUTO: 84.2 FL (ref 81.4–97.8)
METHADONE UR QL SCN: NEGATIVE
MICRO URNS: ABNORMAL
MONOCYTES # BLD AUTO: 0.73 K/UL (ref 0–0.85)
MONOCYTES NFR BLD AUTO: 10.7 % (ref 0–13.4)
NEUTROPHILS # BLD AUTO: 3.63 K/UL (ref 1.82–7.42)
NEUTROPHILS NFR BLD: 53 % (ref 44–72)
NITRITE UR QL STRIP.AUTO: NEGATIVE
NRBC # BLD AUTO: 0 K/UL
NRBC BLD-RTO: 0 /100 WBC
OPIATES UR QL SCN: POSITIVE
OXYCODONE UR QL SCN: NEGATIVE
PCP UR QL SCN: NEGATIVE
PH UR STRIP.AUTO: 8 [PH] (ref 5–8)
PLATELET # BLD AUTO: 318 K/UL (ref 164–446)
PMV BLD AUTO: 8.6 FL (ref 9–12.9)
POTASSIUM SERPL-SCNC: 3.8 MMOL/L (ref 3.6–5.5)
PROPOXYPH UR QL SCN: NEGATIVE
PROT SERPL-MCNC: 8.2 G/DL (ref 6–8.2)
PROT UR QL STRIP: NEGATIVE MG/DL
PROTHROMBIN TIME: 12.6 SEC (ref 12–14.6)
RBC # BLD AUTO: 5.46 M/UL (ref 4.7–6.1)
RBC UR QL AUTO: NEGATIVE
SIGNIFICANT IND 70042: NORMAL
SIGNIFICANT IND 70042: NORMAL
SITE SITE: NORMAL
SITE SITE: NORMAL
SODIUM SERPL-SCNC: 140 MMOL/L (ref 135–145)
SOURCE SOURCE: NORMAL
SOURCE SOURCE: NORMAL
SP GR UR STRIP.AUTO: >=1.045
UROBILINOGEN UR STRIP.AUTO-MCNC: 0.2 MG/DL
WBC # BLD AUTO: 6.8 K/UL (ref 4.8–10.8)

## 2022-08-27 PROCEDURE — 80307 DRUG TEST PRSMV CHEM ANLYZR: CPT

## 2022-08-27 PROCEDURE — 96375 TX/PRO/DX INJ NEW DRUG ADDON: CPT

## 2022-08-27 PROCEDURE — 71045 X-RAY EXAM CHEST 1 VIEW: CPT

## 2022-08-27 PROCEDURE — 700111 HCHG RX REV CODE 636 W/ 250 OVERRIDE (IP): Performed by: EMERGENCY MEDICINE

## 2022-08-27 PROCEDURE — 700111 HCHG RX REV CODE 636 W/ 250 OVERRIDE (IP): Performed by: INTERNAL MEDICINE

## 2022-08-27 PROCEDURE — 96376 TX/PRO/DX INJ SAME DRUG ADON: CPT

## 2022-08-27 PROCEDURE — 85610 PROTHROMBIN TIME: CPT

## 2022-08-27 PROCEDURE — 83605 ASSAY OF LACTIC ACID: CPT | Mod: 91

## 2022-08-27 PROCEDURE — 74174 CTA ABD&PLVS W/CONTRAST: CPT

## 2022-08-27 PROCEDURE — A9270 NON-COVERED ITEM OR SERVICE: HCPCS | Performed by: INTERNAL MEDICINE

## 2022-08-27 PROCEDURE — 83690 ASSAY OF LIPASE: CPT

## 2022-08-27 PROCEDURE — 87040 BLOOD CULTURE FOR BACTERIA: CPT | Mod: 91

## 2022-08-27 PROCEDURE — 99285 EMERGENCY DEPT VISIT HI MDM: CPT

## 2022-08-27 PROCEDURE — 700105 HCHG RX REV CODE 258: Performed by: INTERNAL MEDICINE

## 2022-08-27 PROCEDURE — 93005 ELECTROCARDIOGRAM TRACING: CPT | Performed by: EMERGENCY MEDICINE

## 2022-08-27 PROCEDURE — 99223 1ST HOSP IP/OBS HIGH 75: CPT | Performed by: INTERNAL MEDICINE

## 2022-08-27 PROCEDURE — 87086 URINE CULTURE/COLONY COUNT: CPT

## 2022-08-27 PROCEDURE — 770001 HCHG ROOM/CARE - MED/SURG/GYN PRIV*

## 2022-08-27 PROCEDURE — 8E0ZXY6 ISOLATION: ICD-10-PCS | Performed by: EMERGENCY MEDICINE

## 2022-08-27 PROCEDURE — 80053 COMPREHEN METABOLIC PANEL: CPT

## 2022-08-27 PROCEDURE — 700117 HCHG RX CONTRAST REV CODE 255: Performed by: EMERGENCY MEDICINE

## 2022-08-27 PROCEDURE — 96365 THER/PROPH/DIAG IV INF INIT: CPT

## 2022-08-27 PROCEDURE — 85025 COMPLETE CBC W/AUTO DIFF WBC: CPT

## 2022-08-27 PROCEDURE — 700102 HCHG RX REV CODE 250 W/ 637 OVERRIDE(OP): Performed by: INTERNAL MEDICINE

## 2022-08-27 PROCEDURE — 36415 COLL VENOUS BLD VENIPUNCTURE: CPT

## 2022-08-27 PROCEDURE — 81003 URINALYSIS AUTO W/O SCOPE: CPT

## 2022-08-27 PROCEDURE — 700105 HCHG RX REV CODE 258: Performed by: EMERGENCY MEDICINE

## 2022-08-27 PROCEDURE — 85730 THROMBOPLASTIN TIME PARTIAL: CPT

## 2022-08-27 RX ORDER — LEVETIRACETAM 500 MG/5ML
1000 INJECTION, SOLUTION, CONCENTRATE INTRAVENOUS EVERY 12 HOURS
Status: DISCONTINUED | OUTPATIENT
Start: 2022-08-27 | End: 2022-08-28

## 2022-08-27 RX ORDER — PROMETHAZINE HYDROCHLORIDE 25 MG/1
12.5-25 TABLET ORAL EVERY 4 HOURS PRN
Status: DISCONTINUED | OUTPATIENT
Start: 2022-08-27 | End: 2022-08-31 | Stop reason: HOSPADM

## 2022-08-27 RX ORDER — CYCLOBENZAPRINE HCL 10 MG
10 TABLET ORAL 3 TIMES DAILY PRN
Status: DISCONTINUED | OUTPATIENT
Start: 2022-08-27 | End: 2022-08-31 | Stop reason: HOSPADM

## 2022-08-27 RX ORDER — PROCHLORPERAZINE EDISYLATE 5 MG/ML
10 INJECTION INTRAMUSCULAR; INTRAVENOUS ONCE
Status: COMPLETED | OUTPATIENT
Start: 2022-08-27 | End: 2022-08-27

## 2022-08-27 RX ORDER — LABETALOL HYDROCHLORIDE 5 MG/ML
10 INJECTION, SOLUTION INTRAVENOUS EVERY 4 HOURS PRN
Status: DISCONTINUED | OUTPATIENT
Start: 2022-08-27 | End: 2022-08-31 | Stop reason: HOSPADM

## 2022-08-27 RX ORDER — SODIUM CHLORIDE 9 MG/ML
INJECTION, SOLUTION INTRAVENOUS CONTINUOUS
Status: DISCONTINUED | OUTPATIENT
Start: 2022-08-27 | End: 2022-08-31 | Stop reason: HOSPADM

## 2022-08-27 RX ORDER — AMLODIPINE BESYLATE 5 MG/1
5 TABLET ORAL
Status: DISCONTINUED | OUTPATIENT
Start: 2022-08-27 | End: 2022-08-31 | Stop reason: HOSPADM

## 2022-08-27 RX ORDER — SODIUM CHLORIDE, SODIUM LACTATE, POTASSIUM CHLORIDE, AND CALCIUM CHLORIDE .6; .31; .03; .02 G/100ML; G/100ML; G/100ML; G/100ML
30 INJECTION, SOLUTION INTRAVENOUS ONCE
Status: COMPLETED | OUTPATIENT
Start: 2022-08-27 | End: 2022-08-27

## 2022-08-27 RX ORDER — ONDANSETRON 2 MG/ML
4 INJECTION INTRAMUSCULAR; INTRAVENOUS EVERY 4 HOURS PRN
Status: DISCONTINUED | OUTPATIENT
Start: 2022-08-27 | End: 2022-08-31 | Stop reason: HOSPADM

## 2022-08-27 RX ORDER — ENOXAPARIN SODIUM 100 MG/ML
40 INJECTION SUBCUTANEOUS DAILY
Status: DISCONTINUED | OUTPATIENT
Start: 2022-08-27 | End: 2022-08-31 | Stop reason: HOSPADM

## 2022-08-27 RX ORDER — HYDROMORPHONE HYDROCHLORIDE 1 MG/ML
0.5 INJECTION, SOLUTION INTRAMUSCULAR; INTRAVENOUS; SUBCUTANEOUS ONCE
Status: COMPLETED | OUTPATIENT
Start: 2022-08-27 | End: 2022-08-27

## 2022-08-27 RX ORDER — HYDRALAZINE HYDROCHLORIDE 20 MG/ML
10 INJECTION INTRAMUSCULAR; INTRAVENOUS ONCE
Status: COMPLETED | OUTPATIENT
Start: 2022-08-27 | End: 2022-08-27

## 2022-08-27 RX ORDER — MORPHINE SULFATE 4 MG/ML
1-4 INJECTION INTRAVENOUS EVERY 4 HOURS PRN
Status: DISCONTINUED | OUTPATIENT
Start: 2022-08-27 | End: 2022-08-28

## 2022-08-27 RX ORDER — PROMETHAZINE HYDROCHLORIDE 25 MG/1
12.5-25 SUPPOSITORY RECTAL EVERY 4 HOURS PRN
Status: DISCONTINUED | OUTPATIENT
Start: 2022-08-27 | End: 2022-08-31 | Stop reason: HOSPADM

## 2022-08-27 RX ORDER — ACETAMINOPHEN 325 MG/1
650 TABLET ORAL EVERY 6 HOURS PRN
Status: DISCONTINUED | OUTPATIENT
Start: 2022-08-27 | End: 2022-08-31 | Stop reason: HOSPADM

## 2022-08-27 RX ORDER — ONDANSETRON 4 MG/1
4 TABLET, ORALLY DISINTEGRATING ORAL EVERY 4 HOURS PRN
Status: DISCONTINUED | OUTPATIENT
Start: 2022-08-27 | End: 2022-08-31 | Stop reason: HOSPADM

## 2022-08-27 RX ORDER — SIMETHICONE 80 MG
40 TABLET,CHEWABLE ORAL EVERY 6 HOURS PRN
Status: DISCONTINUED | OUTPATIENT
Start: 2022-08-27 | End: 2022-08-28

## 2022-08-27 RX ORDER — PROCHLORPERAZINE EDISYLATE 5 MG/ML
5-10 INJECTION INTRAMUSCULAR; INTRAVENOUS EVERY 4 HOURS PRN
Status: DISCONTINUED | OUTPATIENT
Start: 2022-08-27 | End: 2022-08-31 | Stop reason: HOSPADM

## 2022-08-27 RX ADMIN — PIPERACILLIN AND TAZOBACTAM 3.38 G: 3; .375 INJECTION, POWDER, LYOPHILIZED, FOR SOLUTION INTRAVENOUS; PARENTERAL at 22:46

## 2022-08-27 RX ADMIN — CYCLOBENZAPRINE 10 MG: 10 TABLET, FILM COATED ORAL at 10:33

## 2022-08-27 RX ADMIN — HYDRALAZINE HYDROCHLORIDE 10 MG: 20 INJECTION, SOLUTION INTRAMUSCULAR; INTRAVENOUS at 06:56

## 2022-08-27 RX ADMIN — HYDROMORPHONE HYDROCHLORIDE 0.5 MG: 1 INJECTION, SOLUTION INTRAMUSCULAR; INTRAVENOUS; SUBCUTANEOUS at 08:40

## 2022-08-27 RX ADMIN — LEVETIRACETAM 1000 MG: 100 INJECTION, SOLUTION INTRAVENOUS at 12:26

## 2022-08-27 RX ADMIN — MORPHINE SULFATE 2 MG: 4 INJECTION INTRAVENOUS at 22:46

## 2022-08-27 RX ADMIN — ENOXAPARIN SODIUM 40 MG: 40 INJECTION SUBCUTANEOUS at 17:17

## 2022-08-27 RX ADMIN — PROCHLORPERAZINE EDISYLATE 10 MG: 5 INJECTION INTRAMUSCULAR; INTRAVENOUS at 06:56

## 2022-08-27 RX ADMIN — PIPERACILLIN AND TAZOBACTAM 3.38 G: 3; .375 INJECTION, POWDER, LYOPHILIZED, FOR SOLUTION INTRAVENOUS; PARENTERAL at 12:28

## 2022-08-27 RX ADMIN — PIPERACILLIN AND TAZOBACTAM 3.38 G: 3; .375 INJECTION, POWDER, LYOPHILIZED, FOR SOLUTION INTRAVENOUS; PARENTERAL at 08:43

## 2022-08-27 RX ADMIN — SODIUM CHLORIDE: 9 INJECTION, SOLUTION INTRAVENOUS at 10:36

## 2022-08-27 RX ADMIN — CYCLOBENZAPRINE 10 MG: 10 TABLET, FILM COATED ORAL at 21:41

## 2022-08-27 RX ADMIN — AMLODIPINE BESYLATE 5 MG: 5 TABLET ORAL at 12:26

## 2022-08-27 RX ADMIN — CYCLOBENZAPRINE 10 MG: 10 TABLET, FILM COATED ORAL at 15:37

## 2022-08-27 RX ADMIN — HYDROMORPHONE HYDROCHLORIDE 0.5 MG: 1 INJECTION, SOLUTION INTRAMUSCULAR; INTRAVENOUS; SUBCUTANEOUS at 06:27

## 2022-08-27 RX ADMIN — IOHEXOL 100 ML: 350 INJECTION, SOLUTION INTRAVENOUS at 09:00

## 2022-08-27 RX ADMIN — SODIUM CHLORIDE: 9 INJECTION, SOLUTION INTRAVENOUS at 22:46

## 2022-08-27 RX ADMIN — MORPHINE SULFATE 2 MG: 4 INJECTION INTRAVENOUS at 12:44

## 2022-08-27 RX ADMIN — MORPHINE SULFATE 2 MG: 4 INJECTION INTRAVENOUS at 17:17

## 2022-08-27 RX ADMIN — SODIUM CHLORIDE, POTASSIUM CHLORIDE, SODIUM LACTATE AND CALCIUM CHLORIDE 2586 ML: 600; 310; 30; 20 INJECTION, SOLUTION INTRAVENOUS at 07:00

## 2022-08-27 ASSESSMENT — LIFESTYLE VARIABLES
TOTAL SCORE: 0
HAVE PEOPLE ANNOYED YOU BY CRITICIZING YOUR DRINKING: NO
DOES PATIENT WANT TO TALK TO SOMEONE ABOUT QUITTING: NO
ALCOHOL_USE: YES
HOW MANY TIMES IN THE PAST YEAR HAVE YOU HAD 5 OR MORE DRINKS IN A DAY: 1
CONSUMPTION TOTAL: POSITIVE
AVERAGE NUMBER OF DAYS PER WEEK YOU HAVE A DRINK CONTAINING ALCOHOL: 0
TOTAL SCORE: 0
EVER FELT BAD OR GUILTY ABOUT YOUR DRINKING: NO
HAVE YOU EVER FELT YOU SHOULD CUT DOWN ON YOUR DRINKING: NO
DOES PATIENT WANT TO STOP DRINKING: NO
ON A TYPICAL DAY WHEN YOU DRINK ALCOHOL HOW MANY DRINKS DO YOU HAVE: 1
TOTAL SCORE: 0
EVER HAD A DRINK FIRST THING IN THE MORNING TO STEADY YOUR NERVES TO GET RID OF A HANGOVER: NO

## 2022-08-27 ASSESSMENT — COGNITIVE AND FUNCTIONAL STATUS - GENERAL
SUGGESTED CMS G CODE MODIFIER MOBILITY: CH
SUGGESTED CMS G CODE MODIFIER DAILY ACTIVITY: CH
DAILY ACTIVITIY SCORE: 24
MOBILITY SCORE: 24

## 2022-08-27 ASSESSMENT — ENCOUNTER SYMPTOMS
PHOTOPHOBIA: 0
CHILLS: 1
ABDOMINAL PAIN: 1
DIZZINESS: 1
DIARRHEA: 1
FEVER: 0
HEADACHES: 1
DIAPHORESIS: 1
SHORTNESS OF BREATH: 1
BACK PAIN: 1
MYALGIAS: 1
VOMITING: 1
COUGH: 1
NERVOUS/ANXIOUS: 1
NAUSEA: 1
SEIZURES: 1
BLOOD IN STOOL: 1

## 2022-08-27 ASSESSMENT — PAIN DESCRIPTION - PAIN TYPE
TYPE: ACUTE PAIN

## 2022-08-27 ASSESSMENT — FIBROSIS 4 INDEX
FIB4 SCORE: 1.11
FIB4 SCORE: 1.09

## 2022-08-27 NOTE — ED TRIAGE NOTES
"Donal Carpio  52 y.o.  Chief Complaint   Patient presents with    Abdominal Pain     Starting 3 weeks ago    Nausea/Vomiting/Diarrhea     BIBA for above, pt experiencing abdominal pain starting 3 weeks ago \"with no relief since,\" N/V/D started around same. Pt reports LRQ pain in abdomen, hx appendectomy. 10/10 pain, guarding abdomen at this time.  "
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
## 2022-08-27 NOTE — ED NOTES
Med rec completed per patient at bedside.    Allergies reviewed.    Patient denies outpatient abx in the last 30 days.    Home pharmacy is [facility] in [place].

## 2022-08-27 NOTE — ASSESSMENT & PLAN NOTE
Resolved  With tachycardia. Reported chills at home  Likely from dehydration but at risk for sepsis  Follow blood cx, UA, stool testing for infection  Continue zosyn for now, stop if labs neg for infection  Continue IVF resuscitation

## 2022-08-27 NOTE — PROGRESS NOTES
4 Eyes Skin Assessment Completed by Bhavesh RN and Keshav RN.    Head WDL  Ears WDL  Nose WDL  Mouth WDL  Neck WDL  Breast/Chest WDL  Shoulder Blades WDL  Spine WDL  (R) Arm/Elbow/Hand WDL  (L) Arm/Elbow/Hand WDL  Abdomen Incision, healed and scarred  Groin WDL  Scrotum/Coccyx/Buttocks WDL  (R) Leg WDL  (L) Leg WDL  (R) Heel/Foot/Toe WDL  (L) Heel/Foot/Toe WDL          Devices In Places N/A      Interventions In Place N/A    Possible Skin Injury No    Pictures Uploaded Into Epic N/A  Wound Consult Placed N/A  RN Wound Prevention Protocol Ordered No

## 2022-08-27 NOTE — ED NOTES
Received report and assumed care of pt. Pt sitting up in bed. No apparent distress. Awake and alert.

## 2022-08-27 NOTE — ASSESSMENT & PLAN NOTE
Ongoing for 3 weeks with abd pain, N/V  CTs and SBFT were unremarkable  R/o C diff, stool cx also ordered  Urine toxicology is positive for opiates  IVF, patient requests regular diet  If does not improve, consider GI consult

## 2022-08-28 PROBLEM — R10.13 EPIGASTRIC PAIN: Status: ACTIVE | Noted: 2022-08-28

## 2022-08-28 LAB
ALBUMIN SERPL BCP-MCNC: 3.5 G/DL (ref 3.2–4.9)
ALBUMIN/GLOB SERPL: 1.1 G/DL
ALP SERPL-CCNC: 99 U/L (ref 30–99)
ALT SERPL-CCNC: 26 U/L (ref 2–50)
ANION GAP SERPL CALC-SCNC: 13 MMOL/L (ref 7–16)
AST SERPL-CCNC: 26 U/L (ref 12–45)
BASOPHILS # BLD AUTO: 0.8 % (ref 0–1.8)
BASOPHILS # BLD: 0.06 K/UL (ref 0–0.12)
BILIRUB SERPL-MCNC: 0.5 MG/DL (ref 0.1–1.5)
BUN SERPL-MCNC: 8 MG/DL (ref 8–22)
CALCIUM SERPL-MCNC: 8.7 MG/DL (ref 8.5–10.5)
CHLORIDE SERPL-SCNC: 101 MMOL/L (ref 96–112)
CO2 SERPL-SCNC: 19 MMOL/L (ref 20–33)
CREAT SERPL-MCNC: 0.69 MG/DL (ref 0.5–1.4)
EOSINOPHIL # BLD AUTO: 0.43 K/UL (ref 0–0.51)
EOSINOPHIL NFR BLD: 5.6 % (ref 0–6.9)
ERYTHROCYTE [DISTWIDTH] IN BLOOD BY AUTOMATED COUNT: 42.4 FL (ref 35.9–50)
GFR SERPLBLD CREATININE-BSD FMLA CKD-EPI: 113 ML/MIN/1.73 M 2
GLOBULIN SER CALC-MCNC: 3.3 G/DL (ref 1.9–3.5)
GLUCOSE SERPL-MCNC: 90 MG/DL (ref 65–99)
HCT VFR BLD AUTO: 42.4 % (ref 42–52)
HGB BLD-MCNC: 13.9 G/DL (ref 14–18)
IMM GRANULOCYTES # BLD AUTO: 0.02 K/UL (ref 0–0.11)
IMM GRANULOCYTES NFR BLD AUTO: 0.3 % (ref 0–0.9)
LYMPHOCYTES # BLD AUTO: 2.32 K/UL (ref 1–4.8)
LYMPHOCYTES NFR BLD: 30.2 % (ref 22–41)
MCH RBC QN AUTO: 28.5 PG (ref 27–33)
MCHC RBC AUTO-ENTMCNC: 32.8 G/DL (ref 33.7–35.3)
MCV RBC AUTO: 87.1 FL (ref 81.4–97.8)
MONOCYTES # BLD AUTO: 0.59 K/UL (ref 0–0.85)
MONOCYTES NFR BLD AUTO: 7.7 % (ref 0–13.4)
NEUTROPHILS # BLD AUTO: 4.27 K/UL (ref 1.82–7.42)
NEUTROPHILS NFR BLD: 55.4 % (ref 44–72)
NRBC # BLD AUTO: 0 K/UL
NRBC BLD-RTO: 0 /100 WBC
PLATELET # BLD AUTO: 252 K/UL (ref 164–446)
PMV BLD AUTO: 8.6 FL (ref 9–12.9)
POTASSIUM SERPL-SCNC: 4.1 MMOL/L (ref 3.6–5.5)
PROT SERPL-MCNC: 6.8 G/DL (ref 6–8.2)
RBC # BLD AUTO: 4.87 M/UL (ref 4.7–6.1)
SODIUM SERPL-SCNC: 133 MMOL/L (ref 135–145)
WBC # BLD AUTO: 7.7 K/UL (ref 4.8–10.8)

## 2022-08-28 PROCEDURE — 99233 SBSQ HOSP IP/OBS HIGH 50: CPT | Performed by: INTERNAL MEDICINE

## 2022-08-28 PROCEDURE — 80053 COMPREHEN METABOLIC PANEL: CPT

## 2022-08-28 PROCEDURE — 700111 HCHG RX REV CODE 636 W/ 250 OVERRIDE (IP): Performed by: INTERNAL MEDICINE

## 2022-08-28 PROCEDURE — 700105 HCHG RX REV CODE 258: Performed by: INTERNAL MEDICINE

## 2022-08-28 PROCEDURE — 770001 HCHG ROOM/CARE - MED/SURG/GYN PRIV*

## 2022-08-28 PROCEDURE — 700102 HCHG RX REV CODE 250 W/ 637 OVERRIDE(OP): Performed by: INTERNAL MEDICINE

## 2022-08-28 PROCEDURE — 85025 COMPLETE CBC W/AUTO DIFF WBC: CPT

## 2022-08-28 PROCEDURE — A9270 NON-COVERED ITEM OR SERVICE: HCPCS | Performed by: INTERNAL MEDICINE

## 2022-08-28 PROCEDURE — 700111 HCHG RX REV CODE 636 W/ 250 OVERRIDE (IP)

## 2022-08-28 PROCEDURE — 36415 COLL VENOUS BLD VENIPUNCTURE: CPT

## 2022-08-28 RX ORDER — MORPHINE SULFATE 4 MG/ML
1-4 INJECTION INTRAVENOUS
Status: DISCONTINUED | OUTPATIENT
Start: 2022-08-28 | End: 2022-08-31 | Stop reason: HOSPADM

## 2022-08-28 RX ORDER — OXYCODONE HYDROCHLORIDE 5 MG/1
5 TABLET ORAL EVERY 4 HOURS PRN
Status: DISCONTINUED | OUTPATIENT
Start: 2022-08-28 | End: 2022-08-28

## 2022-08-28 RX ORDER — OXYCODONE HYDROCHLORIDE 5 MG/1
5 TABLET ORAL
Status: DISCONTINUED | OUTPATIENT
Start: 2022-08-28 | End: 2022-08-31 | Stop reason: HOSPADM

## 2022-08-28 RX ORDER — OXYCODONE HYDROCHLORIDE 10 MG/1
10 TABLET ORAL
Status: DISCONTINUED | OUTPATIENT
Start: 2022-08-28 | End: 2022-08-31 | Stop reason: HOSPADM

## 2022-08-28 RX ORDER — OXYCODONE HYDROCHLORIDE 10 MG/1
10 TABLET ORAL ONCE
Status: COMPLETED | OUTPATIENT
Start: 2022-08-28 | End: 2022-08-28

## 2022-08-28 RX ORDER — GABAPENTIN 100 MG/1
100 CAPSULE ORAL 3 TIMES DAILY
Status: DISCONTINUED | OUTPATIENT
Start: 2022-08-28 | End: 2022-08-31 | Stop reason: HOSPADM

## 2022-08-28 RX ORDER — OMEPRAZOLE 20 MG/1
20 CAPSULE, DELAYED RELEASE ORAL 2 TIMES DAILY
Status: DISCONTINUED | OUTPATIENT
Start: 2022-08-28 | End: 2022-08-31 | Stop reason: HOSPADM

## 2022-08-28 RX ORDER — LEVETIRACETAM 500 MG/1
1000 TABLET ORAL 2 TIMES DAILY
Status: DISCONTINUED | OUTPATIENT
Start: 2022-08-28 | End: 2022-08-31 | Stop reason: HOSPADM

## 2022-08-28 RX ADMIN — GABAPENTIN 100 MG: 100 CAPSULE ORAL at 18:00

## 2022-08-28 RX ADMIN — OXYCODONE HYDROCHLORIDE 10 MG: 10 TABLET ORAL at 14:47

## 2022-08-28 RX ADMIN — MORPHINE SULFATE 4 MG: 4 INJECTION INTRAVENOUS at 02:18

## 2022-08-28 RX ADMIN — AMLODIPINE BESYLATE 5 MG: 5 TABLET ORAL at 05:33

## 2022-08-28 RX ADMIN — MORPHINE SULFATE 4 MG: 4 INJECTION INTRAVENOUS at 05:33

## 2022-08-28 RX ADMIN — LEVETIRACETAM 1000 MG: 100 INJECTION, SOLUTION INTRAVENOUS at 00:20

## 2022-08-28 RX ADMIN — MORPHINE SULFATE 4 MG: 4 INJECTION INTRAVENOUS at 09:12

## 2022-08-28 RX ADMIN — MORPHINE SULFATE 4 MG: 4 INJECTION INTRAVENOUS at 16:19

## 2022-08-28 RX ADMIN — ENOXAPARIN SODIUM 40 MG: 40 INJECTION SUBCUTANEOUS at 17:37

## 2022-08-28 RX ADMIN — OXYCODONE HYDROCHLORIDE 10 MG: 10 TABLET ORAL at 19:28

## 2022-08-28 RX ADMIN — ACETAMINOPHEN 650 MG: 325 TABLET ORAL at 14:51

## 2022-08-28 RX ADMIN — OMEPRAZOLE 20 MG: 20 CAPSULE, DELAYED RELEASE ORAL at 17:37

## 2022-08-28 RX ADMIN — GABAPENTIN 100 MG: 100 CAPSULE ORAL at 12:42

## 2022-08-28 RX ADMIN — LEVETIRACETAM 1000 MG: 500 TABLET, FILM COATED ORAL at 17:37

## 2022-08-28 RX ADMIN — OMEPRAZOLE 20 MG: 20 CAPSULE, DELAYED RELEASE ORAL at 11:25

## 2022-08-28 RX ADMIN — PIPERACILLIN AND TAZOBACTAM 3.38 G: 3; .375 INJECTION, POWDER, LYOPHILIZED, FOR SOLUTION INTRAVENOUS; PARENTERAL at 05:33

## 2022-08-28 RX ADMIN — MORPHINE SULFATE 4 MG: 4 INJECTION INTRAVENOUS at 12:42

## 2022-08-28 RX ADMIN — OXYCODONE HYDROCHLORIDE 10 MG: 10 TABLET ORAL at 11:27

## 2022-08-28 RX ADMIN — CYCLOBENZAPRINE 10 MG: 10 TABLET, FILM COATED ORAL at 14:51

## 2022-08-28 RX ADMIN — LIDOCAINE HYDROCHLORIDE 30 ML: 20 SOLUTION OROPHARYNGEAL at 17:37

## 2022-08-28 RX ADMIN — OXYCODONE HYDROCHLORIDE 10 MG: 10 TABLET ORAL at 17:38

## 2022-08-28 RX ADMIN — CYCLOBENZAPRINE 10 MG: 10 TABLET, FILM COATED ORAL at 05:34

## 2022-08-28 RX ADMIN — MORPHINE SULFATE 4 MG: 4 INJECTION INTRAVENOUS at 20:34

## 2022-08-28 ASSESSMENT — ENCOUNTER SYMPTOMS
FALLS: 0
WEAKNESS: 0
NERVOUS/ANXIOUS: 0
DIZZINESS: 0
CHILLS: 0
NAUSEA: 1
VOMITING: 1
CONSTIPATION: 0
HEADACHES: 0
DEPRESSION: 0
BLURRED VISION: 0
ABDOMINAL PAIN: 1
SORE THROAT: 0
FEVER: 0
SHORTNESS OF BREATH: 0
PALPITATIONS: 0
DIARRHEA: 0
COUGH: 0

## 2022-08-28 ASSESSMENT — PAIN DESCRIPTION - PAIN TYPE
TYPE: ACUTE PAIN

## 2022-08-28 NOTE — CARE PLAN
The patient is Stable - Low risk of patient condition declining or worsening    Shift Goals  Clinical Goals: Pain mgmt  Patient Goals: Comfort, tolerate diet    Progress made toward(s) clinical / shift goals: Pain managed appropriately, tolerating diet appropriately.    Patient is not progressing towards the following goals: N/A    Problem: Knowledge Deficit - Standard  Goal: Patient and family/care givers will demonstrate understanding of plan of care, disease process/condition, diagnostic tests and medications  Outcome: Progressing     Problem: Hemodynamics  Goal: Patient's hemodynamics, fluid balance and neurologic status will be stable or improve  Outcome: Progressing     Problem: Fluid Volume  Goal: Fluid volume balance will be maintained  Outcome: Progressing     Problem: Urinary - Renal Perfusion  Goal: Ability to achieve and maintain adequate renal perfusion and functioning will improve  Outcome: Progressing     Problem: Respiratory  Goal: Patient will achieve/maintain optimum respiratory ventilation and gas exchange  Outcome: Progressing     Problem: Mechanical Ventilation  Goal: Safe management of artificial airway and ventilation  Outcome: Progressing  Goal: Successful weaning off mechanical ventilator, spontaneously maintains adequate gas exchange  Outcome: Progressing  Goal: Patient will be able to express needs and understand communication  Outcome: Progressing     Problem: Physical Regulation  Goal: Diagnostic test results will improve  Outcome: Progressing  Goal: Signs and symptoms of infection will decrease  Outcome: Progressing     Problem: Pain - Standard  Goal: Alleviation of pain or a reduction in pain to the patient’s comfort goal  Outcome: Progressing
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
## 2022-08-28 NOTE — CARE PLAN
The patient is Stable - Low risk of patient condition declining or worsening    Shift Goals  Clinical Goals: Pain mgmt  Patient Goals: Comfort, rest    Progress made toward(s) clinical / shift goals:  Pain assessed during shift. Pain medication administered per MAR.    Patient is not progressing towards the following goals:

## 2022-08-29 ENCOUNTER — APPOINTMENT (OUTPATIENT)
Dept: RADIOLOGY | Facility: MEDICAL CENTER | Age: 52
DRG: 641 | End: 2022-08-29
Attending: INTERNAL MEDICINE
Payer: COMMERCIAL

## 2022-08-29 LAB
ANION GAP SERPL CALC-SCNC: 9 MMOL/L (ref 7–16)
BACTERIA UR CULT: NORMAL
BUN SERPL-MCNC: 9 MG/DL (ref 8–22)
CALCIUM SERPL-MCNC: 8.9 MG/DL (ref 8.5–10.5)
CHLORIDE SERPL-SCNC: 102 MMOL/L (ref 96–112)
CO2 SERPL-SCNC: 25 MMOL/L (ref 20–33)
CREAT SERPL-MCNC: 0.96 MG/DL (ref 0.5–1.4)
ERYTHROCYTE [DISTWIDTH] IN BLOOD BY AUTOMATED COUNT: 42.8 FL (ref 35.9–50)
GFR SERPLBLD CREATININE-BSD FMLA CKD-EPI: 96 ML/MIN/1.73 M 2
GLUCOSE SERPL-MCNC: 98 MG/DL (ref 65–99)
HCT VFR BLD AUTO: 41.9 % (ref 42–52)
HGB BLD-MCNC: 13.6 G/DL (ref 14–18)
MAGNESIUM SERPL-MCNC: 2.2 MG/DL (ref 1.5–2.5)
MCH RBC QN AUTO: 28.3 PG (ref 27–33)
MCHC RBC AUTO-ENTMCNC: 32.5 G/DL (ref 33.7–35.3)
MCV RBC AUTO: 87.3 FL (ref 81.4–97.8)
PHOSPHATE SERPL-MCNC: 4.2 MG/DL (ref 2.5–4.5)
PLATELET # BLD AUTO: 255 K/UL (ref 164–446)
PMV BLD AUTO: 8.5 FL (ref 9–12.9)
POTASSIUM SERPL-SCNC: 4.2 MMOL/L (ref 3.6–5.5)
PROCALCITONIN SERPL-MCNC: 0.06 NG/ML
RBC # BLD AUTO: 4.8 M/UL (ref 4.7–6.1)
SIGNIFICANT IND 70042: NORMAL
SITE SITE: NORMAL
SODIUM SERPL-SCNC: 136 MMOL/L (ref 135–145)
SOURCE SOURCE: NORMAL
WBC # BLD AUTO: 7.1 K/UL (ref 4.8–10.8)

## 2022-08-29 PROCEDURE — A9270 NON-COVERED ITEM OR SERVICE: HCPCS | Performed by: INTERNAL MEDICINE

## 2022-08-29 PROCEDURE — 85027 COMPLETE CBC AUTOMATED: CPT

## 2022-08-29 PROCEDURE — 80048 BASIC METABOLIC PNL TOTAL CA: CPT

## 2022-08-29 PROCEDURE — 36415 COLL VENOUS BLD VENIPUNCTURE: CPT

## 2022-08-29 PROCEDURE — 74018 RADEX ABDOMEN 1 VIEW: CPT

## 2022-08-29 PROCEDURE — 99232 SBSQ HOSP IP/OBS MODERATE 35: CPT | Performed by: INTERNAL MEDICINE

## 2022-08-29 PROCEDURE — 84145 PROCALCITONIN (PCT): CPT

## 2022-08-29 PROCEDURE — 770001 HCHG ROOM/CARE - MED/SURG/GYN PRIV*

## 2022-08-29 PROCEDURE — 700111 HCHG RX REV CODE 636 W/ 250 OVERRIDE (IP)

## 2022-08-29 PROCEDURE — 700102 HCHG RX REV CODE 250 W/ 637 OVERRIDE(OP): Performed by: INTERNAL MEDICINE

## 2022-08-29 PROCEDURE — 700111 HCHG RX REV CODE 636 W/ 250 OVERRIDE (IP): Performed by: INTERNAL MEDICINE

## 2022-08-29 PROCEDURE — 84100 ASSAY OF PHOSPHORUS: CPT

## 2022-08-29 PROCEDURE — 83735 ASSAY OF MAGNESIUM: CPT

## 2022-08-29 RX ADMIN — MORPHINE SULFATE 4 MG: 4 INJECTION INTRAVENOUS at 04:19

## 2022-08-29 RX ADMIN — AMLODIPINE BESYLATE 5 MG: 5 TABLET ORAL at 04:18

## 2022-08-29 RX ADMIN — OMEPRAZOLE 20 MG: 20 CAPSULE, DELAYED RELEASE ORAL at 17:26

## 2022-08-29 RX ADMIN — LEVETIRACETAM 1000 MG: 500 TABLET, FILM COATED ORAL at 04:18

## 2022-08-29 RX ADMIN — OXYCODONE HYDROCHLORIDE 10 MG: 10 TABLET ORAL at 20:31

## 2022-08-29 RX ADMIN — CYCLOBENZAPRINE 10 MG: 10 TABLET, FILM COATED ORAL at 16:30

## 2022-08-29 RX ADMIN — CYCLOBENZAPRINE 10 MG: 10 TABLET, FILM COATED ORAL at 04:19

## 2022-08-29 RX ADMIN — GABAPENTIN 100 MG: 100 CAPSULE ORAL at 04:18

## 2022-08-29 RX ADMIN — LEVETIRACETAM 1000 MG: 500 TABLET, FILM COATED ORAL at 17:26

## 2022-08-29 RX ADMIN — GABAPENTIN 100 MG: 100 CAPSULE ORAL at 13:29

## 2022-08-29 RX ADMIN — GABAPENTIN 100 MG: 100 CAPSULE ORAL at 17:27

## 2022-08-29 RX ADMIN — MORPHINE SULFATE 4 MG: 4 INJECTION INTRAVENOUS at 10:18

## 2022-08-29 RX ADMIN — OXYCODONE HYDROCHLORIDE 10 MG: 10 TABLET ORAL at 13:30

## 2022-08-29 RX ADMIN — ENOXAPARIN SODIUM 40 MG: 40 INJECTION SUBCUTANEOUS at 17:29

## 2022-08-29 RX ADMIN — OMEPRAZOLE 20 MG: 20 CAPSULE, DELAYED RELEASE ORAL at 04:18

## 2022-08-29 RX ADMIN — OXYCODONE HYDROCHLORIDE 10 MG: 10 TABLET ORAL at 03:08

## 2022-08-29 RX ADMIN — OXYCODONE HYDROCHLORIDE 10 MG: 10 TABLET ORAL at 17:26

## 2022-08-29 RX ADMIN — OXYCODONE HYDROCHLORIDE 10 MG: 10 TABLET ORAL at 08:21

## 2022-08-29 ASSESSMENT — ENCOUNTER SYMPTOMS
HEADACHES: 0
FEVER: 0
ABDOMINAL PAIN: 1
WEAKNESS: 0
CONSTIPATION: 0
NERVOUS/ANXIOUS: 0
DIARRHEA: 0
SORE THROAT: 0
NAUSEA: 1
VOMITING: 1
PALPITATIONS: 0
SHORTNESS OF BREATH: 0
BLURRED VISION: 0
COUGH: 0
DEPRESSION: 0
DIZZINESS: 0
FALLS: 0
CHILLS: 0

## 2022-08-29 ASSESSMENT — PAIN DESCRIPTION - PAIN TYPE
TYPE: ACUTE PAIN

## 2022-08-29 NOTE — CARE PLAN
The patient is Stable - Low risk of patient condition declining or worsening    Shift Goals  Clinical Goals: Pain mgmt  Patient Goals: Comfort, tolerate diet    Progress made toward(s) clinical / shift goals:    Problem: Knowledge Deficit - Standard  Goal: Patient and family/care givers will demonstrate understanding of plan of care, disease process/condition, diagnostic tests and medications  Outcome: Progressing     Problem: Fluid Volume  Goal: Fluid volume balance will be maintained  Outcome: Progressing     Problem: Hemodynamics  Goal: Patient's hemodynamics, fluid balance and neurologic status will be stable or improve  Outcome: Progressing       Patient is not progressing towards the following goals:
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
## 2022-08-30 ENCOUNTER — APPOINTMENT (OUTPATIENT)
Dept: RADIOLOGY | Facility: MEDICAL CENTER | Age: 52
DRG: 641 | End: 2022-08-30
Attending: STUDENT IN AN ORGANIZED HEALTH CARE EDUCATION/TRAINING PROGRAM
Payer: COMMERCIAL

## 2022-08-30 PROBLEM — F10.10 ALCOHOL ABUSE: Status: ACTIVE | Noted: 2022-08-30

## 2022-08-30 PROBLEM — D72.829 LEUKOCYTOSIS: Status: ACTIVE | Noted: 2022-08-30

## 2022-08-30 PROBLEM — E87.1 HYPONATREMIA: Status: ACTIVE | Noted: 2022-08-30

## 2022-08-30 LAB
ALBUMIN SERPL BCP-MCNC: 3.7 G/DL (ref 3.2–4.9)
ALBUMIN/GLOB SERPL: 1 G/DL
ALP SERPL-CCNC: 100 U/L (ref 30–99)
ALT SERPL-CCNC: 31 U/L (ref 2–50)
ANION GAP SERPL CALC-SCNC: 14 MMOL/L (ref 7–16)
AST SERPL-CCNC: 39 U/L (ref 12–45)
BASOPHILS # BLD AUTO: 0.5 % (ref 0–1.8)
BASOPHILS # BLD: 0.09 K/UL (ref 0–0.12)
BILIRUB SERPL-MCNC: 0.5 MG/DL (ref 0.1–1.5)
BUN SERPL-MCNC: 12 MG/DL (ref 8–22)
CALCIUM SERPL-MCNC: 8.7 MG/DL (ref 8.5–10.5)
CHLORIDE SERPL-SCNC: 97 MMOL/L (ref 96–112)
CO2 SERPL-SCNC: 21 MMOL/L (ref 20–33)
CREAT SERPL-MCNC: 0.95 MG/DL (ref 0.5–1.4)
EKG IMPRESSION: NORMAL
EKG IMPRESSION: NORMAL
EOSINOPHIL # BLD AUTO: 0.12 K/UL (ref 0–0.51)
EOSINOPHIL NFR BLD: 0.6 % (ref 0–6.9)
ERYTHROCYTE [DISTWIDTH] IN BLOOD BY AUTOMATED COUNT: 42.7 FL (ref 35.9–50)
GFR SERPLBLD CREATININE-BSD FMLA CKD-EPI: 97 ML/MIN/1.73 M 2
GLOBULIN SER CALC-MCNC: 3.6 G/DL (ref 1.9–3.5)
GLUCOSE SERPL-MCNC: 120 MG/DL (ref 65–99)
HCT VFR BLD AUTO: 42.6 % (ref 42–52)
HGB BLD-MCNC: 14.3 G/DL (ref 14–18)
IMM GRANULOCYTES # BLD AUTO: 0.15 K/UL (ref 0–0.11)
IMM GRANULOCYTES NFR BLD AUTO: 0.8 % (ref 0–0.9)
LYMPHOCYTES # BLD AUTO: 2.02 K/UL (ref 1–4.8)
LYMPHOCYTES NFR BLD: 10.2 % (ref 22–41)
MAGNESIUM SERPL-MCNC: 2.2 MG/DL (ref 1.5–2.5)
MCH RBC QN AUTO: 29.1 PG (ref 27–33)
MCHC RBC AUTO-ENTMCNC: 33.6 G/DL (ref 33.7–35.3)
MCV RBC AUTO: 86.6 FL (ref 81.4–97.8)
MONOCYTES # BLD AUTO: 0.96 K/UL (ref 0–0.85)
MONOCYTES NFR BLD AUTO: 4.9 % (ref 0–13.4)
NEUTROPHILS # BLD AUTO: 16.41 K/UL (ref 1.82–7.42)
NEUTROPHILS NFR BLD: 83 % (ref 44–72)
NRBC # BLD AUTO: 0 K/UL
NRBC BLD-RTO: 0 /100 WBC
PLATELET # BLD AUTO: 262 K/UL (ref 164–446)
PMV BLD AUTO: 8.8 FL (ref 9–12.9)
POTASSIUM SERPL-SCNC: 4.3 MMOL/L (ref 3.6–5.5)
PROT SERPL-MCNC: 7.3 G/DL (ref 6–8.2)
RBC # BLD AUTO: 4.92 M/UL (ref 4.7–6.1)
SODIUM SERPL-SCNC: 132 MMOL/L (ref 135–145)
TROPONIN T SERPL-MCNC: 6 NG/L (ref 6–19)
WBC # BLD AUTO: 19.8 K/UL (ref 4.8–10.8)

## 2022-08-30 PROCEDURE — 71250 CT THORAX DX C-: CPT

## 2022-08-30 PROCEDURE — 99233 SBSQ HOSP IP/OBS HIGH 50: CPT | Performed by: STUDENT IN AN ORGANIZED HEALTH CARE EDUCATION/TRAINING PROGRAM

## 2022-08-30 PROCEDURE — 770020 HCHG ROOM/CARE - TELE (206)

## 2022-08-30 PROCEDURE — 93005 ELECTROCARDIOGRAM TRACING: CPT | Performed by: STUDENT IN AN ORGANIZED HEALTH CARE EDUCATION/TRAINING PROGRAM

## 2022-08-30 PROCEDURE — 700102 HCHG RX REV CODE 250 W/ 637 OVERRIDE(OP): Performed by: STUDENT IN AN ORGANIZED HEALTH CARE EDUCATION/TRAINING PROGRAM

## 2022-08-30 PROCEDURE — 83735 ASSAY OF MAGNESIUM: CPT

## 2022-08-30 PROCEDURE — A9270 NON-COVERED ITEM OR SERVICE: HCPCS | Performed by: INTERNAL MEDICINE

## 2022-08-30 PROCEDURE — 80053 COMPREHEN METABOLIC PANEL: CPT

## 2022-08-30 PROCEDURE — 85025 COMPLETE CBC W/AUTO DIFF WBC: CPT

## 2022-08-30 PROCEDURE — 93010 ELECTROCARDIOGRAM REPORT: CPT | Mod: 76 | Performed by: INTERNAL MEDICINE

## 2022-08-30 PROCEDURE — 84484 ASSAY OF TROPONIN QUANT: CPT

## 2022-08-30 PROCEDURE — A9270 NON-COVERED ITEM OR SERVICE: HCPCS | Performed by: STUDENT IN AN ORGANIZED HEALTH CARE EDUCATION/TRAINING PROGRAM

## 2022-08-30 PROCEDURE — 700102 HCHG RX REV CODE 250 W/ 637 OVERRIDE(OP): Performed by: INTERNAL MEDICINE

## 2022-08-30 PROCEDURE — 700105 HCHG RX REV CODE 258: Performed by: INTERNAL MEDICINE

## 2022-08-30 PROCEDURE — 700111 HCHG RX REV CODE 636 W/ 250 OVERRIDE (IP): Performed by: INTERNAL MEDICINE

## 2022-08-30 RX ORDER — LORAZEPAM 2 MG/1
2 TABLET ORAL
Status: DISCONTINUED | OUTPATIENT
Start: 2022-08-30 | End: 2022-08-31 | Stop reason: HOSPADM

## 2022-08-30 RX ORDER — LORAZEPAM 1 MG/1
1 TABLET ORAL EVERY 4 HOURS PRN
Status: DISCONTINUED | OUTPATIENT
Start: 2022-08-30 | End: 2022-08-31 | Stop reason: HOSPADM

## 2022-08-30 RX ORDER — AMLODIPINE BESYLATE 5 MG/1
5 TABLET ORAL DAILY
Status: DISCONTINUED | OUTPATIENT
Start: 2022-08-30 | End: 2022-08-30

## 2022-08-30 RX ORDER — NICOTINE 21 MG/24HR
21 PATCH, TRANSDERMAL 24 HOURS TRANSDERMAL
Status: DISCONTINUED | OUTPATIENT
Start: 2022-08-30 | End: 2022-08-31 | Stop reason: HOSPADM

## 2022-08-30 RX ORDER — LEVETIRACETAM 1000 MG/1
1000 TABLET ORAL 2 TIMES DAILY
Status: DISCONTINUED | OUTPATIENT
Start: 2022-08-30 | End: 2022-08-30

## 2022-08-30 RX ORDER — LORAZEPAM 0.5 MG/1
0.5 TABLET ORAL EVERY 4 HOURS PRN
Status: DISCONTINUED | OUTPATIENT
Start: 2022-08-30 | End: 2022-08-31 | Stop reason: HOSPADM

## 2022-08-30 RX ORDER — GAUZE BANDAGE 2" X 2"
100 BANDAGE TOPICAL DAILY
Status: DISCONTINUED | OUTPATIENT
Start: 2022-08-30 | End: 2022-08-31 | Stop reason: HOSPADM

## 2022-08-30 RX ORDER — FOLIC ACID 1 MG/1
1 TABLET ORAL DAILY
Status: DISCONTINUED | OUTPATIENT
Start: 2022-08-30 | End: 2022-08-31 | Stop reason: HOSPADM

## 2022-08-30 RX ORDER — LORAZEPAM 2 MG/1
4 TABLET ORAL
Status: DISCONTINUED | OUTPATIENT
Start: 2022-08-30 | End: 2022-08-31 | Stop reason: HOSPADM

## 2022-08-30 RX ORDER — DIAZEPAM 5 MG/ML
5 INJECTION, SOLUTION INTRAMUSCULAR; INTRAVENOUS
Status: DISCONTINUED | OUTPATIENT
Start: 2022-08-30 | End: 2022-08-31 | Stop reason: HOSPADM

## 2022-08-30 RX ADMIN — AMLODIPINE BESYLATE 5 MG: 5 TABLET ORAL at 04:26

## 2022-08-30 RX ADMIN — ENOXAPARIN SODIUM 40 MG: 40 INJECTION SUBCUTANEOUS at 18:47

## 2022-08-30 RX ADMIN — CYCLOBENZAPRINE 10 MG: 10 TABLET, FILM COATED ORAL at 22:07

## 2022-08-30 RX ADMIN — GABAPENTIN 100 MG: 100 CAPSULE ORAL at 04:26

## 2022-08-30 RX ADMIN — LORAZEPAM 4 MG: 2 TABLET ORAL at 10:03

## 2022-08-30 RX ADMIN — OMEPRAZOLE 20 MG: 20 CAPSULE, DELAYED RELEASE ORAL at 18:47

## 2022-08-30 RX ADMIN — NICOTINE TRANSDERMAL SYSTEM 21 MG: 21 PATCH, EXTENDED RELEASE TRANSDERMAL at 08:55

## 2022-08-30 RX ADMIN — SODIUM CHLORIDE: 9 INJECTION, SOLUTION INTRAVENOUS at 10:05

## 2022-08-30 RX ADMIN — LEVETIRACETAM 1000 MG: 500 TABLET, FILM COATED ORAL at 18:48

## 2022-08-30 RX ADMIN — ONDANSETRON HYDROCHLORIDE 4 MG: 2 SOLUTION INTRAMUSCULAR; INTRAVENOUS at 10:03

## 2022-08-30 RX ADMIN — SODIUM CHLORIDE: 9 INJECTION, SOLUTION INTRAVENOUS at 20:24

## 2022-08-30 RX ADMIN — OXYCODONE HYDROCHLORIDE 10 MG: 10 TABLET ORAL at 18:47

## 2022-08-30 RX ADMIN — OXYCODONE HYDROCHLORIDE 10 MG: 10 TABLET ORAL at 22:07

## 2022-08-30 RX ADMIN — LIDOCAINE HYDROCHLORIDE 30 ML: 20 SOLUTION OROPHARYNGEAL at 20:16

## 2022-08-30 RX ADMIN — GABAPENTIN 100 MG: 100 CAPSULE ORAL at 18:48

## 2022-08-30 RX ADMIN — OXYCODONE HYDROCHLORIDE 10 MG: 10 TABLET ORAL at 14:14

## 2022-08-30 RX ADMIN — ONDANSETRON 4 MG: 4 TABLET, ORALLY DISINTEGRATING ORAL at 20:16

## 2022-08-30 RX ADMIN — CYCLOBENZAPRINE 10 MG: 10 TABLET, FILM COATED ORAL at 14:14

## 2022-08-30 RX ADMIN — LEVETIRACETAM 1000 MG: 500 TABLET, FILM COATED ORAL at 04:26

## 2022-08-30 RX ADMIN — OXYCODONE HYDROCHLORIDE 10 MG: 10 TABLET ORAL at 08:53

## 2022-08-30 RX ADMIN — FOLIC ACID 1 MG: 1 TABLET ORAL at 14:14

## 2022-08-30 RX ADMIN — LIDOCAINE HYDROCHLORIDE 30 ML: 20 SOLUTION OROPHARYNGEAL at 10:08

## 2022-08-30 RX ADMIN — Medication 100 MG: at 14:14

## 2022-08-30 RX ADMIN — OMEPRAZOLE 20 MG: 20 CAPSULE, DELAYED RELEASE ORAL at 04:26

## 2022-08-30 RX ADMIN — OXYCODONE HYDROCHLORIDE 10 MG: 10 TABLET ORAL at 04:26

## 2022-08-30 RX ADMIN — OXYCODONE HYDROCHLORIDE 10 MG: 10 TABLET ORAL at 11:22

## 2022-08-30 RX ADMIN — GABAPENTIN 100 MG: 100 CAPSULE ORAL at 11:22

## 2022-08-30 ASSESSMENT — LIFESTYLE VARIABLES
AGITATION: *
HEADACHE, FULLNESS IN HEAD: NOT PRESENT
VISUAL DISTURBANCES: NOT PRESENT
ORIENTATION AND CLOUDING OF SENSORIUM: ORIENTED AND CAN DO SERIAL ADDITIONS
VISUAL DISTURBANCES: NOT PRESENT
TREMOR: NO TREMOR
TOTAL SCORE: 7
TOTAL SCORE: 26
NAUSEA AND VOMITING: *
TOTAL SCORE: VERY MILD ITCHING, PINS AND NEEDLES SENSATION, BURNING OR NUMBNESS
AGITATION: NORMAL ACTIVITY
AUDITORY DISTURBANCES: NOT PRESENT
SUBSTANCE_ABUSE: 1
PAROXYSMAL SWEATS: BARELY PERCEPTIBLE SWEATING. PALMS MOIST
AUDITORY DISTURBANCES: NOT PRESENT
AUDITORY DISTURBANCES: NOT PRESENT
HEADACHE, FULLNESS IN HEAD: NOT PRESENT
ANXIETY: *
ORIENTATION AND CLOUDING OF SENSORIUM: ORIENTED AND CAN DO SERIAL ADDITIONS
NAUSEA AND VOMITING: *
PAROXYSMAL SWEATS: *
PAROXYSMAL SWEATS: NO SWEAT VISIBLE
TOTAL SCORE: 3
ORIENTATION AND CLOUDING OF SENSORIUM: ORIENTED AND CAN DO SERIAL ADDITIONS
TOTAL SCORE: VERY MILD ITCHING, PINS AND NEEDLES SENSATION, BURNING OR NUMBNESS
AGITATION: NORMAL ACTIVITY
ANXIETY: MILDLY ANXIOUS
TREMOR: MODERATE TREMOR WITH ARMS EXTENDED
VISUAL DISTURBANCES: NOT PRESENT
TREMOR: *
HEADACHE, FULLNESS IN HEAD: SEVERE
NAUSEA AND VOMITING: *
ANXIETY: MILDLY ANXIOUS

## 2022-08-30 ASSESSMENT — ENCOUNTER SYMPTOMS
NAUSEA: 1
SPUTUM PRODUCTION: 1
SHORTNESS OF BREATH: 1
COUGH: 1
CHILLS: 0
HEADACHES: 1
WEAKNESS: 1
BACK PAIN: 1
FEVER: 0
DIZZINESS: 1
EYES NEGATIVE: 1
NERVOUS/ANXIOUS: 1
ABDOMINAL PAIN: 1

## 2022-08-30 ASSESSMENT — PAIN DESCRIPTION - PAIN TYPE
TYPE: ACUTE PAIN

## 2022-08-30 NOTE — PROGRESS NOTES
Patient c/o CP, left side numbness, neck and back pain. JF=093/80, HR= 130s to 140s . STAT EKG ordered and performed. Dr. Khalil made aware and came to see the patient. Patient's CIWA = 26, PRN Ativan administered. Labs ordered and drawn.    10:40 - Report given to CASPER Silva. Patient transferred to [facility] room 706.

## 2022-08-30 NOTE — PROGRESS NOTES
Pt states that he is not withdrawing from alcohol. He states that it has been over 1 month since he had a drink.

## 2022-08-30 NOTE — PROGRESS NOTES
Pt arrived to floor c/o unrelieved cp. Stat EKG ordered. Dr Khalil notified. Telemetry notified. 2L nc applied for comfort. 4 eyes skin check completed with CASPER Werner.

## 2022-08-30 NOTE — ASSESSMENT & PLAN NOTE
WBC 19.8.  Likely due to stress  UA, CXR ordered.  Procalcitonin negative.  No role for antibiotics at this time  Monitor
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
## 2022-08-30 NOTE — DISCHARGE PLANNING
Medical Social Work  Voice message from [facility] [phone], please call back

## 2022-08-30 NOTE — PROGRESS NOTES
4 Eyes Skin Assessment Completed by CASPER Werner and CASPER Silva.    Head WDL  Ears WDL  Nose Scab on bridge of nose  Mouth WDL  Neck WDL  Breast/Chest WDL  Shoulder Blades WDL  Spine WDL  (R) Arm/Elbow/Hand WDL  (L) Arm/Elbow/Hand WDL  Abdomen WDL  Groin WDL  Scrotum/Coccyx/Buttocks WDL  (R) Leg WDL  (L) Leg WDL  (R) Heel/Foot/Toe WDL  (L) Heel/Foot/Toe WDL          Devices In Places Tele Box, Blood Pressure Cuff, and Nasal Cannula      Interventions In Place Gray Ear Foams, Pillows, and Pressure Redistribution Mattress    Possible Skin Injury No    Pictures Uploaded Into Epic N/A  Wound Consult Placed N/A  RN Wound Prevention Protocol Ordered No

## 2022-08-30 NOTE — PROGRESS NOTES
Assumed care of patient from [facility] rn  Aaox4  RA  Complaints of pain in abdomen, medicated with PO oxycodone  Pt states pain remains a 7. Currently sleeping  No BM  Denies nausea  Up self in room  No other complaints  Frequent rounding in place

## 2022-08-30 NOTE — PROGRESS NOTES
Patient's HW=061t with patient reporting chills and sweating. Patient's temp= 36.7. Patient denies daily alcohol intake, with last alcohol drink this past week, Thursday, 08/25/2022. Patient reports using vape daily throughout the day. Notified Dr. Khalil. Orders placed.     0850: Updated Dr. Khalil of the patient's current HR= 126 and with patient denying any other symptoms.

## 2022-08-30 NOTE — CARE PLAN
The patient is Watcher - Medium risk of patient condition declining or worsening    Shift Goals  Clinical Goals: Pain mgt, comfort, safety  Patient Goals: comfort, pain control    Progress made toward(s) clinical / shift goals:  Patient's pain is addressed with PRN pain med. Patient remain safe throughout the shift    Patient is not progressing towards the following goals: Abdominal pain continues, MD made aware, abdominal xray performed.      Problem: Knowledge Deficit - Standard  Goal: Patient and family/care givers will demonstrate understanding of plan of care, disease process/condition, diagnostic tests and medications  Outcome: Progressing     Problem: Hemodynamics  Goal: Patient's hemodynamics, fluid balance and neurologic status will be stable or improve  Outcome: Progressing     Problem: Fluid Volume  Goal: Fluid volume balance will be maintained  Outcome: Progressing     Problem: Urinary - Renal Perfusion  Goal: Ability to achieve and maintain adequate renal perfusion and functioning will improve  Outcome: Progressing     Problem: Respiratory  Goal: Patient will achieve/maintain optimum respiratory ventilation and gas exchange  Outcome: Progressing     Problem: Physical Regulation  Goal: Diagnostic test results will improve  Outcome: Progressing  Goal: Signs and symptoms of infection will decrease  Outcome: Progressing     Problem: Pain - Standard  Goal: Alleviation of pain or a reduction in pain to the patient’s comfort goal  Outcome: Progressing

## 2022-08-30 NOTE — CARE PLAN
The patient is Stable - Low risk of patient condition declining or worsening    Shift Goals  Clinical Goals: Pain mgt, comfort, safety  Patient Goals: comfort, pain control    Progress made toward(s) clinical / shift goals:    Problem: Knowledge Deficit - Standard  Goal: Patient and family/care givers will demonstrate understanding of plan of care, disease process/condition, diagnostic tests and medications  Outcome: Progressing     Problem: Hemodynamics  Goal: Patient's hemodynamics, fluid balance and neurologic status will be stable or improve  Outcome: Progressing     Problem: Fluid Volume  Goal: Fluid volume balance will be maintained  Outcome: Progressing       Patient is not progressing towards the following goals:
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
## 2022-08-31 ENCOUNTER — PHARMACY VISIT (OUTPATIENT)
Dept: PHARMACY | Facility: MEDICAL CENTER | Age: 52
End: 2022-08-31
Payer: COMMERCIAL

## 2022-08-31 VITALS
WEIGHT: 187.39 LBS | SYSTOLIC BLOOD PRESSURE: 119 MMHG | HEIGHT: 69 IN | TEMPERATURE: 100.1 F | DIASTOLIC BLOOD PRESSURE: 77 MMHG | OXYGEN SATURATION: 93 % | BODY MASS INDEX: 27.76 KG/M2 | HEART RATE: 121 BPM | RESPIRATION RATE: 18 BRPM

## 2022-08-31 LAB
ALBUMIN SERPL BCP-MCNC: 3.4 G/DL (ref 3.2–4.9)
ALBUMIN/GLOB SERPL: 1.1 G/DL
ALP SERPL-CCNC: 92 U/L (ref 30–99)
ALT SERPL-CCNC: 25 U/L (ref 2–50)
ANION GAP SERPL CALC-SCNC: 12 MMOL/L (ref 7–16)
AST SERPL-CCNC: 28 U/L (ref 12–45)
BILIRUB SERPL-MCNC: 0.2 MG/DL (ref 0.1–1.5)
BUN SERPL-MCNC: 16 MG/DL (ref 8–22)
CALCIUM SERPL-MCNC: 8.4 MG/DL (ref 8.5–10.5)
CHLORIDE SERPL-SCNC: 101 MMOL/L (ref 96–112)
CO2 SERPL-SCNC: 22 MMOL/L (ref 20–33)
CREAT SERPL-MCNC: 0.88 MG/DL (ref 0.5–1.4)
ERYTHROCYTE [DISTWIDTH] IN BLOOD BY AUTOMATED COUNT: 43.7 FL (ref 35.9–50)
GFR SERPLBLD CREATININE-BSD FMLA CKD-EPI: 105 ML/MIN/1.73 M 2
GLOBULIN SER CALC-MCNC: 3.2 G/DL (ref 1.9–3.5)
GLUCOSE SERPL-MCNC: 124 MG/DL (ref 65–99)
HCT VFR BLD AUTO: 38.7 % (ref 42–52)
HGB BLD-MCNC: 12.4 G/DL (ref 14–18)
MAGNESIUM SERPL-MCNC: 2.2 MG/DL (ref 1.5–2.5)
MCH RBC QN AUTO: 28.3 PG (ref 27–33)
MCHC RBC AUTO-ENTMCNC: 32 G/DL (ref 33.7–35.3)
MCV RBC AUTO: 88.4 FL (ref 81.4–97.8)
PHOSPHATE SERPL-MCNC: 2.6 MG/DL (ref 2.5–4.5)
PLATELET # BLD AUTO: 207 K/UL (ref 164–446)
PMV BLD AUTO: 9.1 FL (ref 9–12.9)
POTASSIUM SERPL-SCNC: 4.1 MMOL/L (ref 3.6–5.5)
PROT SERPL-MCNC: 6.6 G/DL (ref 6–8.2)
RBC # BLD AUTO: 4.38 M/UL (ref 4.7–6.1)
SODIUM SERPL-SCNC: 135 MMOL/L (ref 135–145)
WBC # BLD AUTO: 11.7 K/UL (ref 4.8–10.8)

## 2022-08-31 PROCEDURE — 700102 HCHG RX REV CODE 250 W/ 637 OVERRIDE(OP): Performed by: INTERNAL MEDICINE

## 2022-08-31 PROCEDURE — A9270 NON-COVERED ITEM OR SERVICE: HCPCS | Performed by: STUDENT IN AN ORGANIZED HEALTH CARE EDUCATION/TRAINING PROGRAM

## 2022-08-31 PROCEDURE — 83735 ASSAY OF MAGNESIUM: CPT

## 2022-08-31 PROCEDURE — A9270 NON-COVERED ITEM OR SERVICE: HCPCS | Performed by: INTERNAL MEDICINE

## 2022-08-31 PROCEDURE — RXMED WILLOW AMBULATORY MEDICATION CHARGE: Performed by: STUDENT IN AN ORGANIZED HEALTH CARE EDUCATION/TRAINING PROGRAM

## 2022-08-31 PROCEDURE — 85027 COMPLETE CBC AUTOMATED: CPT

## 2022-08-31 PROCEDURE — 99239 HOSP IP/OBS DSCHRG MGMT >30: CPT | Performed by: STUDENT IN AN ORGANIZED HEALTH CARE EDUCATION/TRAINING PROGRAM

## 2022-08-31 PROCEDURE — 80053 COMPREHEN METABOLIC PANEL: CPT

## 2022-08-31 PROCEDURE — 700102 HCHG RX REV CODE 250 W/ 637 OVERRIDE(OP): Performed by: STUDENT IN AN ORGANIZED HEALTH CARE EDUCATION/TRAINING PROGRAM

## 2022-08-31 PROCEDURE — 84100 ASSAY OF PHOSPHORUS: CPT

## 2022-08-31 RX ORDER — BACLOFEN 10 MG/1
10 TABLET ORAL 3 TIMES DAILY
Qty: 90 TABLET | Refills: 0 | Status: ON HOLD | OUTPATIENT
Start: 2022-08-31 | End: 2022-09-20

## 2022-08-31 RX ADMIN — GABAPENTIN 100 MG: 100 CAPSULE ORAL at 05:20

## 2022-08-31 RX ADMIN — CYCLOBENZAPRINE 10 MG: 10 TABLET, FILM COATED ORAL at 07:59

## 2022-08-31 RX ADMIN — FOLIC ACID 1 MG: 1 TABLET ORAL at 05:20

## 2022-08-31 RX ADMIN — AMLODIPINE BESYLATE 5 MG: 5 TABLET ORAL at 05:20

## 2022-08-31 RX ADMIN — OXYCODONE HYDROCHLORIDE 10 MG: 10 TABLET ORAL at 03:06

## 2022-08-31 RX ADMIN — OXYCODONE HYDROCHLORIDE 10 MG: 10 TABLET ORAL at 07:59

## 2022-08-31 RX ADMIN — OMEPRAZOLE 20 MG: 20 CAPSULE, DELAYED RELEASE ORAL at 05:20

## 2022-08-31 RX ADMIN — Medication 100 MG: at 05:20

## 2022-08-31 RX ADMIN — LEVETIRACETAM 1000 MG: 500 TABLET, FILM COATED ORAL at 05:21

## 2022-08-31 RX ADMIN — NICOTINE TRANSDERMAL SYSTEM 21 MG: 21 PATCH, EXTENDED RELEASE TRANSDERMAL at 05:20

## 2022-08-31 ASSESSMENT — LIFESTYLE VARIABLES
NAUSEA AND VOMITING: NO NAUSEA AND NO VOMITING
AUDITORY DISTURBANCES: NOT PRESENT
HEADACHE, FULLNESS IN HEAD: NOT PRESENT
ANXIETY: NO ANXIETY (AT EASE)
AGITATION: NORMAL ACTIVITY
PAROXYSMAL SWEATS: NO SWEAT VISIBLE
TOTAL SCORE: 0
VISUAL DISTURBANCES: NOT PRESENT
ORIENTATION AND CLOUDING OF SENSORIUM: ORIENTED AND CAN DO SERIAL ADDITIONS
TREMOR: NO TREMOR

## 2022-08-31 ASSESSMENT — PAIN DESCRIPTION - PAIN TYPE
TYPE: ACUTE PAIN
TYPE: ACUTE PAIN

## 2022-08-31 NOTE — PROGRESS NOTES
Report received at bedside, pt care assumed, tele box on. Pt aaox4, no signs of distress noted at this time. Patient resting comfortably in bed. POC discussed with pt and verbalizes no questions. Pt denies any additional needs at this time. Bed in lowest position, pt educated on fall risk and verbalized understanding, call light within reach, refusing bed alarm at this time. Pt educated on importance. Pt verbalizes understanding. Hourly rounding in place.

## 2022-08-31 NOTE — CARE PLAN
The patient is Stable - Low risk of patient condition declining or worsening    Shift Goals  Clinical Goals: pain control  Patient Goals: go home    Progress made toward(s) clinical / shift goals:    Problem: Knowledge Deficit - Standard  Goal: Patient and family/care givers will demonstrate understanding of plan of care, disease process/condition, diagnostic tests and medications  Outcome: Progressing, Patient educated on disease process, treatment plan, medications, and plan of care for today. Patient verbalized understanding and no additional questions at this time.       Problem: Pain - Standard  Goal: Alleviation of pain or a reduction in pain to the patient’s comfort goal  Outcome: Progressing, pain is being controlled with Oxy 10mg       Patient is not progressing towards the following goals:

## 2022-08-31 NOTE — PROGRESS NOTES
Pt DC'd. IV removed, discharge instructions provided to patient, pt verbalizes understanding. Pt states all questions have been answered. Copy of discharge paperwork provided to pt, signed copy in chart. Pt states all belongings in possession. Pt escorted off unit by RN without incident. Meds to beds delivered prior to DC.

## 2022-09-01 LAB
BACTERIA BLD CULT: NORMAL
BACTERIA BLD CULT: NORMAL
SIGNIFICANT IND 70042: NORMAL
SIGNIFICANT IND 70042: NORMAL
SITE SITE: NORMAL
SITE SITE: NORMAL
SOURCE SOURCE: NORMAL
SOURCE SOURCE: NORMAL

## 2022-09-01 NOTE — DISCHARGE PLANNING
Meds-to-Beds: Discharge prescription orders listed below delivered to patient in discharge lounge. RNs Nora and Toma notified. Patient counseled.  Patient elected to have co-payment billed to patient account.      Current Outpatient Medications   Medication Sig Dispense Refill    baclofen (LIORESAL) 10 MG Tab Take 1 Tablet by mouth 3 times a day. 90 Tablet 0      Jazmine Rasmussen, PharmD
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
## 2022-09-18 ENCOUNTER — APPOINTMENT (OUTPATIENT)
Dept: RADIOLOGY | Facility: MEDICAL CENTER | Age: 52
End: 2022-09-18
Attending: EMERGENCY MEDICINE
Payer: COMMERCIAL

## 2022-09-18 ENCOUNTER — APPOINTMENT (OUTPATIENT)
Dept: RADIOLOGY | Facility: MEDICAL CENTER | Age: 52
End: 2022-09-18
Attending: STUDENT IN AN ORGANIZED HEALTH CARE EDUCATION/TRAINING PROGRAM
Payer: COMMERCIAL

## 2022-09-18 ENCOUNTER — HOSPITAL ENCOUNTER (EMERGENCY)
Facility: MEDICAL CENTER | Age: 52
End: 2022-09-19
Attending: EMERGENCY MEDICINE
Payer: COMMERCIAL

## 2022-09-18 DIAGNOSIS — R56.9 SEIZURE (HCC): ICD-10-CM

## 2022-09-18 DIAGNOSIS — F10.930 ALCOHOL WITHDRAWAL SYNDROME WITHOUT COMPLICATION (HCC): ICD-10-CM

## 2022-09-18 DIAGNOSIS — F10.10 ALCOHOL ABUSE: ICD-10-CM

## 2022-09-18 PROBLEM — E87.6 HYPOKALEMIA: Status: ACTIVE | Noted: 2022-09-18

## 2022-09-18 LAB
ALBUMIN SERPL BCP-MCNC: 4 G/DL (ref 3.2–4.9)
ALBUMIN/GLOB SERPL: 1.1 G/DL
ALP SERPL-CCNC: 94 U/L (ref 30–99)
ALT SERPL-CCNC: 25 U/L (ref 2–50)
ANION GAP SERPL CALC-SCNC: 18 MMOL/L (ref 7–16)
AST SERPL-CCNC: 32 U/L (ref 12–45)
BASOPHILS # BLD AUTO: 1.9 % (ref 0–1.8)
BASOPHILS # BLD: 0.16 K/UL (ref 0–0.12)
BILIRUB SERPL-MCNC: 0.2 MG/DL (ref 0.1–1.5)
BUN SERPL-MCNC: 7 MG/DL (ref 8–22)
CALCIUM SERPL-MCNC: 8.6 MG/DL (ref 8.5–10.5)
CHLORIDE SERPL-SCNC: 105 MMOL/L (ref 96–112)
CO2 SERPL-SCNC: 20 MMOL/L (ref 20–33)
CREAT SERPL-MCNC: 0.77 MG/DL (ref 0.5–1.4)
EKG IMPRESSION: NORMAL
EKG IMPRESSION: NORMAL
EOSINOPHIL # BLD AUTO: 0.46 K/UL (ref 0–0.51)
EOSINOPHIL NFR BLD: 5.3 % (ref 0–6.9)
ERYTHROCYTE [DISTWIDTH] IN BLOOD BY AUTOMATED COUNT: 43.3 FL (ref 35.9–50)
GFR SERPLBLD CREATININE-BSD FMLA CKD-EPI: 109 ML/MIN/1.73 M 2
GLOBULIN SER CALC-MCNC: 3.6 G/DL (ref 1.9–3.5)
GLUCOSE SERPL-MCNC: 99 MG/DL (ref 65–99)
HCT VFR BLD AUTO: 48.3 % (ref 42–52)
HGB BLD-MCNC: 15.7 G/DL (ref 14–18)
IMM GRANULOCYTES # BLD AUTO: 0.06 K/UL (ref 0–0.11)
IMM GRANULOCYTES NFR BLD AUTO: 0.7 % (ref 0–0.9)
LIPASE SERPL-CCNC: 40 U/L (ref 11–82)
LYMPHOCYTES # BLD AUTO: 3.86 K/UL (ref 1–4.8)
LYMPHOCYTES NFR BLD: 44.8 % (ref 22–41)
MCH RBC QN AUTO: 27.8 PG (ref 27–33)
MCHC RBC AUTO-ENTMCNC: 32.5 G/DL (ref 33.7–35.3)
MCV RBC AUTO: 85.5 FL (ref 81.4–97.8)
MONOCYTES # BLD AUTO: 0.6 K/UL (ref 0–0.85)
MONOCYTES NFR BLD AUTO: 7 % (ref 0–13.4)
NEUTROPHILS # BLD AUTO: 3.48 K/UL (ref 1.82–7.42)
NEUTROPHILS NFR BLD: 40.3 % (ref 44–72)
NRBC # BLD AUTO: 0 K/UL
NRBC BLD-RTO: 0 /100 WBC
PLATELET # BLD AUTO: 445 K/UL (ref 164–446)
PMV BLD AUTO: 8.2 FL (ref 9–12.9)
POTASSIUM SERPL-SCNC: 3.4 MMOL/L (ref 3.6–5.5)
PROT SERPL-MCNC: 7.6 G/DL (ref 6–8.2)
RBC # BLD AUTO: 5.65 M/UL (ref 4.7–6.1)
SODIUM SERPL-SCNC: 143 MMOL/L (ref 135–145)
TROPONIN T SERPL-MCNC: 8 NG/L (ref 6–19)
WBC # BLD AUTO: 8.6 K/UL (ref 4.8–10.8)

## 2022-09-18 PROCEDURE — 99285 EMERGENCY DEPT VISIT HI MDM: CPT | Performed by: STUDENT IN AN ORGANIZED HEALTH CARE EDUCATION/TRAINING PROGRAM

## 2022-09-18 PROCEDURE — 700111 HCHG RX REV CODE 636 W/ 250 OVERRIDE (IP): Performed by: EMERGENCY MEDICINE

## 2022-09-18 PROCEDURE — 84484 ASSAY OF TROPONIN QUANT: CPT

## 2022-09-18 PROCEDURE — 80053 COMPREHEN METABOLIC PANEL: CPT

## 2022-09-18 PROCEDURE — 96376 TX/PRO/DX INJ SAME DRUG ADON: CPT

## 2022-09-18 PROCEDURE — 85025 COMPLETE CBC W/AUTO DIFF WBC: CPT

## 2022-09-18 PROCEDURE — 93005 ELECTROCARDIOGRAM TRACING: CPT

## 2022-09-18 PROCEDURE — 83690 ASSAY OF LIPASE: CPT

## 2022-09-18 PROCEDURE — 76700 US EXAM ABDOM COMPLETE: CPT

## 2022-09-18 PROCEDURE — 71045 X-RAY EXAM CHEST 1 VIEW: CPT

## 2022-09-18 PROCEDURE — 99285 EMERGENCY DEPT VISIT HI MDM: CPT

## 2022-09-18 PROCEDURE — 36415 COLL VENOUS BLD VENIPUNCTURE: CPT

## 2022-09-18 PROCEDURE — 96375 TX/PRO/DX INJ NEW DRUG ADDON: CPT

## 2022-09-18 RX ORDER — DIAZEPAM 5 MG/1
5 TABLET ORAL EVERY 6 HOURS
Status: DISCONTINUED | OUTPATIENT
Start: 2022-09-19 | End: 2022-09-19 | Stop reason: HOSPADM

## 2022-09-18 RX ORDER — DIAZEPAM 5 MG/ML
5 INJECTION, SOLUTION INTRAMUSCULAR; INTRAVENOUS
Status: DISCONTINUED | OUTPATIENT
Start: 2022-09-18 | End: 2022-09-19 | Stop reason: HOSPADM

## 2022-09-18 RX ORDER — LORATADINE 10 MG/1
10 TABLET ORAL DAILY
Status: DISCONTINUED | OUTPATIENT
Start: 2022-09-19 | End: 2022-09-19 | Stop reason: HOSPADM

## 2022-09-18 RX ORDER — LORAZEPAM 1 MG/1
1 TABLET ORAL EVERY 4 HOURS PRN
Status: DISCONTINUED | OUTPATIENT
Start: 2022-09-18 | End: 2022-09-19 | Stop reason: HOSPADM

## 2022-09-18 RX ORDER — DIAZEPAM 5 MG/1
10 TABLET ORAL EVERY 6 HOURS
Status: DISCONTINUED | OUTPATIENT
Start: 2022-09-18 | End: 2022-09-19 | Stop reason: HOSPADM

## 2022-09-18 RX ORDER — LABETALOL HYDROCHLORIDE 5 MG/ML
10 INJECTION, SOLUTION INTRAVENOUS EVERY 4 HOURS PRN
Status: DISCONTINUED | OUTPATIENT
Start: 2022-09-18 | End: 2022-09-19 | Stop reason: HOSPADM

## 2022-09-18 RX ORDER — POTASSIUM CHLORIDE 20 MEQ/1
60 TABLET, EXTENDED RELEASE ORAL ONCE
Status: COMPLETED | OUTPATIENT
Start: 2022-09-18 | End: 2022-09-19

## 2022-09-18 RX ORDER — OMEPRAZOLE 20 MG/1
20 CAPSULE, DELAYED RELEASE ORAL 2 TIMES DAILY
Status: DISCONTINUED | OUTPATIENT
Start: 2022-09-18 | End: 2022-09-18

## 2022-09-18 RX ORDER — PROMETHAZINE HYDROCHLORIDE 25 MG/1
12.5-25 SUPPOSITORY RECTAL EVERY 4 HOURS PRN
Status: DISCONTINUED | OUTPATIENT
Start: 2022-09-18 | End: 2022-09-19 | Stop reason: HOSPADM

## 2022-09-18 RX ORDER — SODIUM CHLORIDE AND POTASSIUM CHLORIDE 150; 900 MG/100ML; MG/100ML
INJECTION, SOLUTION INTRAVENOUS CONTINUOUS
Status: DISCONTINUED | OUTPATIENT
Start: 2022-09-18 | End: 2022-09-19 | Stop reason: HOSPADM

## 2022-09-18 RX ORDER — FOLIC ACID 1 MG/1
1 TABLET ORAL DAILY
Status: DISCONTINUED | OUTPATIENT
Start: 2022-09-19 | End: 2022-09-19 | Stop reason: HOSPADM

## 2022-09-18 RX ORDER — LORAZEPAM 2 MG/1
4 TABLET ORAL
Status: DISCONTINUED | OUTPATIENT
Start: 2022-09-18 | End: 2022-09-19 | Stop reason: HOSPADM

## 2022-09-18 RX ORDER — AMLODIPINE BESYLATE 5 MG/1
5 TABLET ORAL DAILY
Status: DISCONTINUED | OUTPATIENT
Start: 2022-09-19 | End: 2022-09-19 | Stop reason: HOSPADM

## 2022-09-18 RX ORDER — ONDANSETRON 2 MG/ML
4 INJECTION INTRAMUSCULAR; INTRAVENOUS EVERY 4 HOURS PRN
Status: DISCONTINUED | OUTPATIENT
Start: 2022-09-18 | End: 2022-09-19 | Stop reason: HOSPADM

## 2022-09-18 RX ORDER — OXYCODONE HYDROCHLORIDE 5 MG/1
10 TABLET ORAL
Status: DISCONTINUED | OUTPATIENT
Start: 2022-09-18 | End: 2022-09-19 | Stop reason: HOSPADM

## 2022-09-18 RX ORDER — LORAZEPAM 1 MG/1
0.5 TABLET ORAL EVERY 4 HOURS PRN
Status: DISCONTINUED | OUTPATIENT
Start: 2022-09-18 | End: 2022-09-19 | Stop reason: HOSPADM

## 2022-09-18 RX ORDER — PROCHLORPERAZINE EDISYLATE 5 MG/ML
5-10 INJECTION INTRAMUSCULAR; INTRAVENOUS EVERY 4 HOURS PRN
Status: DISCONTINUED | OUTPATIENT
Start: 2022-09-18 | End: 2022-09-19 | Stop reason: HOSPADM

## 2022-09-18 RX ORDER — LEVETIRACETAM 500 MG/1
1000 TABLET ORAL 2 TIMES DAILY
Status: DISCONTINUED | OUTPATIENT
Start: 2022-09-19 | End: 2022-09-19 | Stop reason: HOSPADM

## 2022-09-18 RX ORDER — ACETAMINOPHEN 500 MG
1500 TABLET ORAL
Status: ON HOLD | COMMUNITY
End: 2022-09-20

## 2022-09-18 RX ORDER — ENOXAPARIN SODIUM 100 MG/ML
40 INJECTION SUBCUTANEOUS DAILY
Status: DISCONTINUED | OUTPATIENT
Start: 2022-09-18 | End: 2022-09-19 | Stop reason: HOSPADM

## 2022-09-18 RX ORDER — GUAIFENESIN/DEXTROMETHORPHAN 100-10MG/5
10 SYRUP ORAL EVERY 6 HOURS PRN
Status: DISCONTINUED | OUTPATIENT
Start: 2022-09-18 | End: 2022-09-19 | Stop reason: HOSPADM

## 2022-09-18 RX ORDER — MORPHINE SULFATE 4 MG/ML
4 INJECTION INTRAVENOUS
Status: DISCONTINUED | OUTPATIENT
Start: 2022-09-18 | End: 2022-09-19 | Stop reason: HOSPADM

## 2022-09-18 RX ORDER — PROMETHAZINE HYDROCHLORIDE 25 MG/1
12.5-25 TABLET ORAL EVERY 4 HOURS PRN
Status: DISCONTINUED | OUTPATIENT
Start: 2022-09-18 | End: 2022-09-19 | Stop reason: HOSPADM

## 2022-09-18 RX ORDER — OMEPRAZOLE 20 MG/1
40 CAPSULE, DELAYED RELEASE ORAL 2 TIMES DAILY
Status: DISCONTINUED | OUTPATIENT
Start: 2022-09-18 | End: 2022-09-19 | Stop reason: HOSPADM

## 2022-09-18 RX ORDER — SODIUM CHLORIDE AND POTASSIUM CHLORIDE 150; 900 MG/100ML; MG/100ML
INJECTION, SOLUTION INTRAVENOUS CONTINUOUS
Status: DISCONTINUED | OUTPATIENT
Start: 2022-09-18 | End: 2022-09-18

## 2022-09-18 RX ORDER — OXYCODONE HYDROCHLORIDE 5 MG/1
5 TABLET ORAL
Status: DISCONTINUED | OUTPATIENT
Start: 2022-09-18 | End: 2022-09-19 | Stop reason: HOSPADM

## 2022-09-18 RX ORDER — POLYETHYLENE GLYCOL 3350 17 G/17G
1 POWDER, FOR SOLUTION ORAL
Status: DISCONTINUED | OUTPATIENT
Start: 2022-09-18 | End: 2022-09-19 | Stop reason: HOSPADM

## 2022-09-18 RX ORDER — LORAZEPAM 2 MG/1
2 TABLET ORAL
Status: DISCONTINUED | OUTPATIENT
Start: 2022-09-18 | End: 2022-09-19 | Stop reason: HOSPADM

## 2022-09-18 RX ORDER — CLONIDINE HYDROCHLORIDE 0.1 MG/1
0.1 TABLET ORAL
Status: DISCONTINUED | OUTPATIENT
Start: 2022-09-18 | End: 2022-09-19 | Stop reason: HOSPADM

## 2022-09-18 RX ORDER — BISACODYL 10 MG
10 SUPPOSITORY, RECTAL RECTAL
Status: DISCONTINUED | OUTPATIENT
Start: 2022-09-18 | End: 2022-09-19 | Stop reason: HOSPADM

## 2022-09-18 RX ORDER — BACLOFEN 10 MG/1
10 TABLET ORAL EVERY 8 HOURS
Status: DISCONTINUED | OUTPATIENT
Start: 2022-09-19 | End: 2022-09-19 | Stop reason: HOSPADM

## 2022-09-18 RX ORDER — ONDANSETRON 4 MG/1
4 TABLET, ORALLY DISINTEGRATING ORAL EVERY 4 HOURS PRN
Status: DISCONTINUED | OUTPATIENT
Start: 2022-09-18 | End: 2022-09-19 | Stop reason: HOSPADM

## 2022-09-18 RX ORDER — GAUZE BANDAGE 2" X 2"
100 BANDAGE TOPICAL DAILY
Status: DISCONTINUED | OUTPATIENT
Start: 2022-09-19 | End: 2022-09-19 | Stop reason: HOSPADM

## 2022-09-18 RX ORDER — DIAZEPAM 5 MG/ML
10 INJECTION, SOLUTION INTRAMUSCULAR; INTRAVENOUS ONCE
Status: COMPLETED | OUTPATIENT
Start: 2022-09-18 | End: 2022-09-18

## 2022-09-18 RX ORDER — GABAPENTIN 100 MG/1
100 CAPSULE ORAL 3 TIMES DAILY
Status: DISCONTINUED | OUTPATIENT
Start: 2022-09-19 | End: 2022-09-19 | Stop reason: HOSPADM

## 2022-09-18 RX ORDER — ACETAMINOPHEN 325 MG/1
650 TABLET ORAL EVERY 6 HOURS PRN
Status: DISCONTINUED | OUTPATIENT
Start: 2022-09-18 | End: 2022-09-19 | Stop reason: HOSPADM

## 2022-09-18 RX ORDER — AMOXICILLIN 250 MG
2 CAPSULE ORAL 2 TIMES DAILY
Status: DISCONTINUED | OUTPATIENT
Start: 2022-09-19 | End: 2022-09-19 | Stop reason: HOSPADM

## 2022-09-18 RX ADMIN — DIAZEPAM 10 MG: 5 INJECTION, SOLUTION INTRAMUSCULAR; INTRAVENOUS at 21:40

## 2022-09-18 RX ADMIN — DIAZEPAM 10 MG: 5 INJECTION, SOLUTION INTRAMUSCULAR; INTRAVENOUS at 22:49

## 2022-09-18 ASSESSMENT — LIFESTYLE VARIABLES
VISUAL DISTURBANCES: NOT PRESENT
ANXIETY: *
HEADACHE, FULLNESS IN HEAD: MODERATE
VISUAL DISTURBANCES: NOT PRESENT
AGITATION: *
PAROXYSMAL SWEATS: *
TREMOR: TREMOR NOT VISIBLE BUT CAN BE FELT, FINGERTIP TO FINGERTIP
PAROXYSMAL SWEATS: *
TOTAL SCORE: 17
AUDITORY DISTURBANCES: NOT PRESENT
TREMOR: TREMOR NOT VISIBLE BUT CAN BE FELT, FINGERTIP TO FINGERTIP
ORIENTATION AND CLOUDING OF SENSORIUM: CANNOT DO SERIAL ADDITIONS OR IS UNCERTAIN ABOUT DATE
ORIENTATION AND CLOUDING OF SENSORIUM: CANNOT DO SERIAL ADDITIONS OR IS UNCERTAIN ABOUT DATE
AGITATION: *
TREMOR: TREMOR NOT VISIBLE BUT CAN BE FELT, FINGERTIP TO FINGERTIP
TOTAL SCORE: 17
AUDITORY DISTURBANCES: NOT PRESENT
ORIENTATION AND CLOUDING OF SENSORIUM: CANNOT DO SERIAL ADDITIONS OR IS UNCERTAIN ABOUT DATE
NAUSEA AND VOMITING: INTERMITTENT NAUSEA WITH DRY HEAVES
HEADACHE, FULLNESS IN HEAD: MODERATE
VISUAL DISTURBANCES: NOT PRESENT
PAROXYSMAL SWEATS: *
TOTAL SCORE: 17
NAUSEA AND VOMITING: INTERMITTENT NAUSEA WITH DRY HEAVES
AGITATION: *
ANXIETY: MODERATELY ANXIOUS OR GUARDED, SO ANXIETY IS INFERRED
AUDITORY DISTURBANCES: NOT PRESENT
SUBSTANCE_ABUSE: 0
ANXIETY: MODERATELY ANXIOUS OR GUARDED, SO ANXIETY IS INFERRED
HEADACHE, FULLNESS IN HEAD: MODERATE
NAUSEA AND VOMITING: INTERMITTENT NAUSEA WITH DRY HEAVES

## 2022-09-18 ASSESSMENT — ENCOUNTER SYMPTOMS
BLURRED VISION: 0
BRUISES/BLEEDS EASILY: 0
FEVER: 0
FALLS: 0
DOUBLE VISION: 0
MYALGIAS: 0
SHORTNESS OF BREATH: 0
PALPITATIONS: 1
HALLUCINATIONS: 1
ABDOMINAL PAIN: 1
VOMITING: 1
DIZZINESS: 0
COUGH: 0
NERVOUS/ANXIOUS: 1
FOCAL WEAKNESS: 0
NAUSEA: 1
DEPRESSION: 0
CHILLS: 0
HEADACHES: 0
SPEECH CHANGE: 0
HEARTBURN: 1
CONSTIPATION: 0
DIAPHORESIS: 1

## 2022-09-18 ASSESSMENT — FIBROSIS 4 INDEX: FIB4 SCORE: 1.35

## 2022-09-19 ENCOUNTER — HOSPITAL ENCOUNTER (INPATIENT)
Facility: MEDICAL CENTER | Age: 52
LOS: 2 days | DRG: 897 | End: 2022-09-21
Attending: INTERNAL MEDICINE | Admitting: INTERNAL MEDICINE
Payer: COMMERCIAL

## 2022-09-19 VITALS
DIASTOLIC BLOOD PRESSURE: 81 MMHG | HEIGHT: 69 IN | RESPIRATION RATE: 21 BRPM | SYSTOLIC BLOOD PRESSURE: 153 MMHG | OXYGEN SATURATION: 97 % | BODY MASS INDEX: 28.14 KG/M2 | WEIGHT: 190 LBS | HEART RATE: 127 BPM | TEMPERATURE: 98.6 F

## 2022-09-19 DIAGNOSIS — G40.909 SEIZURE DISORDER (HCC): ICD-10-CM

## 2022-09-19 DIAGNOSIS — F10.939 ALCOHOL WITHDRAWAL SYNDROME WITH COMPLICATION (HCC): ICD-10-CM

## 2022-09-19 DIAGNOSIS — I10 PRIMARY HYPERTENSION: ICD-10-CM

## 2022-09-19 PROBLEM — F10.931 ALCOHOL WITHDRAWAL DELIRIUM (HCC): Status: ACTIVE | Noted: 2022-09-19

## 2022-09-19 PROBLEM — E83.39 HYPOPHOSPHATEMIA: Status: ACTIVE | Noted: 2022-09-19

## 2022-09-19 LAB
ALBUMIN SERPL BCP-MCNC: 3.4 G/DL (ref 3.2–4.9)
ALBUMIN SERPL BCP-MCNC: 3.6 G/DL (ref 3.2–4.9)
ALBUMIN/GLOB SERPL: 1 G/DL
ALP SERPL-CCNC: 91 U/L (ref 30–99)
ALT SERPL-CCNC: 19 U/L (ref 2–50)
AMPHET UR QL SCN: NEGATIVE
ANION GAP SERPL CALC-SCNC: 11 MMOL/L (ref 7–16)
AST SERPL-CCNC: 22 U/L (ref 12–45)
BARBITURATES UR QL SCN: NEGATIVE
BASOPHILS # BLD AUTO: 1.3 % (ref 0–1.8)
BASOPHILS # BLD AUTO: 1.6 % (ref 0–1.8)
BASOPHILS # BLD: 0.09 K/UL (ref 0–0.12)
BASOPHILS # BLD: 0.14 K/UL (ref 0–0.12)
BENZODIAZ UR QL SCN: NEGATIVE
BILIRUB SERPL-MCNC: 0.3 MG/DL (ref 0.1–1.5)
BUN SERPL-MCNC: 13 MG/DL (ref 8–22)
BUN SERPL-MCNC: 8 MG/DL (ref 8–22)
BZE UR QL SCN: NEGATIVE
CALCIUM SERPL-MCNC: 8.3 MG/DL (ref 8.4–10.2)
CALCIUM SERPL-MCNC: 8.6 MG/DL (ref 8.4–10.2)
CANNABINOIDS UR QL SCN: NEGATIVE
CHLORIDE SERPL-SCNC: 103 MMOL/L (ref 96–112)
CHLORIDE SERPL-SCNC: 103 MMOL/L (ref 96–112)
CO2 SERPL-SCNC: 21 MMOL/L (ref 20–33)
CO2 SERPL-SCNC: 22 MMOL/L (ref 20–33)
COMMENT 1642: NORMAL
CREAT SERPL-MCNC: 0.66 MG/DL (ref 0.5–1.4)
CREAT SERPL-MCNC: 0.7 MG/DL (ref 0.5–1.4)
EOSINOPHIL # BLD AUTO: 0.34 K/UL (ref 0–0.51)
EOSINOPHIL # BLD AUTO: 0.35 K/UL (ref 0–0.51)
EOSINOPHIL NFR BLD: 4.1 % (ref 0–6.9)
EOSINOPHIL NFR BLD: 4.7 % (ref 0–6.9)
ERYTHROCYTE [DISTWIDTH] IN BLOOD BY AUTOMATED COUNT: 42.7 FL (ref 35.9–50)
ERYTHROCYTE [DISTWIDTH] IN BLOOD BY AUTOMATED COUNT: 43.8 FL (ref 35.9–50)
GFR SERPLBLD CREATININE-BSD FMLA CKD-EPI: 112 ML/MIN/1.73 M 2
GFR SERPLBLD CREATININE-BSD FMLA CKD-EPI: 114 ML/MIN/1.73 M 2
GLOBULIN SER CALC-MCNC: 3.4 G/DL (ref 1.9–3.5)
GLUCOSE SERPL-MCNC: 128 MG/DL (ref 65–99)
GLUCOSE SERPL-MCNC: 128 MG/DL (ref 65–99)
HCT VFR BLD AUTO: 40.3 % (ref 42–52)
HCT VFR BLD AUTO: 45.5 % (ref 42–52)
HGB BLD-MCNC: 13 G/DL (ref 14–18)
HGB BLD-MCNC: 14.5 G/DL (ref 14–18)
IMM GRANULOCYTES # BLD AUTO: 0.02 K/UL (ref 0–0.11)
IMM GRANULOCYTES # BLD AUTO: 0.03 K/UL (ref 0–0.11)
IMM GRANULOCYTES NFR BLD AUTO: 0.3 % (ref 0–0.9)
IMM GRANULOCYTES NFR BLD AUTO: 0.4 % (ref 0–0.9)
LYMPHOCYTES # BLD AUTO: 2.04 K/UL (ref 1–4.8)
LYMPHOCYTES # BLD AUTO: 2.74 K/UL (ref 1–4.8)
LYMPHOCYTES NFR BLD: 28.5 % (ref 22–41)
LYMPHOCYTES NFR BLD: 32.2 % (ref 22–41)
MAGNESIUM SERPL-MCNC: 2 MG/DL (ref 1.5–2.5)
MAGNESIUM SERPL-MCNC: 2.2 MG/DL (ref 1.5–2.5)
MCH RBC QN AUTO: 27.7 PG (ref 27–33)
MCH RBC QN AUTO: 27.8 PG (ref 27–33)
MCHC RBC AUTO-ENTMCNC: 31.9 G/DL (ref 33.7–35.3)
MCHC RBC AUTO-ENTMCNC: 32.3 G/DL (ref 33.7–35.3)
MCV RBC AUTO: 85.9 FL (ref 81.4–97.8)
MCV RBC AUTO: 87.2 FL (ref 81.4–97.8)
METHADONE UR QL SCN: NEGATIVE
MONOCYTES # BLD AUTO: 0.83 K/UL (ref 0–0.85)
MONOCYTES # BLD AUTO: 0.84 K/UL (ref 0–0.85)
MONOCYTES NFR BLD AUTO: 11.6 % (ref 0–13.4)
MONOCYTES NFR BLD AUTO: 9.9 % (ref 0–13.4)
NEUTROPHILS # BLD AUTO: 3.84 K/UL (ref 1.82–7.42)
NEUTROPHILS # BLD AUTO: 4.42 K/UL (ref 1.82–7.42)
NEUTROPHILS NFR BLD: 51.8 % (ref 44–72)
NEUTROPHILS NFR BLD: 53.6 % (ref 44–72)
NRBC # BLD AUTO: 0 K/UL
NRBC # BLD AUTO: 0 K/UL
NRBC BLD-RTO: 0 /100 WBC
NRBC BLD-RTO: 0 /100 WBC
OPIATES UR QL SCN: POSITIVE
OXYCODONE UR QL SCN: NEGATIVE
PCP UR QL SCN: NEGATIVE
PHOSPHATE SERPL-MCNC: 1.8 MG/DL (ref 2.5–4.5)
PHOSPHATE SERPL-MCNC: 3.1 MG/DL (ref 2.5–4.5)
PLATELET # BLD AUTO: 193 K/UL (ref 164–446)
PLATELET # BLD AUTO: 267 K/UL (ref 164–446)
PMV BLD AUTO: 8.4 FL (ref 9–12.9)
PMV BLD AUTO: 8.9 FL (ref 9–12.9)
POTASSIUM SERPL-SCNC: 3.5 MMOL/L (ref 3.6–5.5)
POTASSIUM SERPL-SCNC: 4.2 MMOL/L (ref 3.6–5.5)
PROPOXYPH UR QL SCN: NEGATIVE
PROT SERPL-MCNC: 6.8 G/DL (ref 6–8.2)
RBC # BLD AUTO: 4.69 M/UL (ref 4.7–6.1)
RBC # BLD AUTO: 5.22 M/UL (ref 4.7–6.1)
SODIUM SERPL-SCNC: 136 MMOL/L (ref 135–145)
SODIUM SERPL-SCNC: 140 MMOL/L (ref 135–145)
WBC # BLD AUTO: 7.2 K/UL (ref 4.8–10.8)
WBC # BLD AUTO: 8.5 K/UL (ref 4.8–10.8)

## 2022-09-19 PROCEDURE — 85025 COMPLETE CBC W/AUTO DIFF WBC: CPT | Mod: 91

## 2022-09-19 PROCEDURE — 700111 HCHG RX REV CODE 636 W/ 250 OVERRIDE (IP): Performed by: HOSPITALIST

## 2022-09-19 PROCEDURE — 700102 HCHG RX REV CODE 250 W/ 637 OVERRIDE(OP): Performed by: STUDENT IN AN ORGANIZED HEALTH CARE EDUCATION/TRAINING PROGRAM

## 2022-09-19 PROCEDURE — 700111 HCHG RX REV CODE 636 W/ 250 OVERRIDE (IP): Performed by: INTERNAL MEDICINE

## 2022-09-19 PROCEDURE — 80069 RENAL FUNCTION PANEL: CPT

## 2022-09-19 PROCEDURE — 700101 HCHG RX REV CODE 250: Performed by: STUDENT IN AN ORGANIZED HEALTH CARE EDUCATION/TRAINING PROGRAM

## 2022-09-19 PROCEDURE — 80307 DRUG TEST PRSMV CHEM ANLYZR: CPT

## 2022-09-19 PROCEDURE — 36415 COLL VENOUS BLD VENIPUNCTURE: CPT

## 2022-09-19 PROCEDURE — 700102 HCHG RX REV CODE 250 W/ 637 OVERRIDE(OP): Performed by: INTERNAL MEDICINE

## 2022-09-19 PROCEDURE — HZ2ZZZZ DETOXIFICATION SERVICES FOR SUBSTANCE ABUSE TREATMENT: ICD-10-PCS | Performed by: GENERAL PRACTICE

## 2022-09-19 PROCEDURE — A9270 NON-COVERED ITEM OR SERVICE: HCPCS | Performed by: INTERNAL MEDICINE

## 2022-09-19 PROCEDURE — 700102 HCHG RX REV CODE 250 W/ 637 OVERRIDE(OP): Performed by: GENERAL PRACTICE

## 2022-09-19 PROCEDURE — 94760 N-INVAS EAR/PLS OXIMETRY 1: CPT

## 2022-09-19 PROCEDURE — 83735 ASSAY OF MAGNESIUM: CPT | Mod: 91

## 2022-09-19 PROCEDURE — A9270 NON-COVERED ITEM OR SERVICE: HCPCS | Performed by: HOSPITALIST

## 2022-09-19 PROCEDURE — 82962 GLUCOSE BLOOD TEST: CPT

## 2022-09-19 PROCEDURE — 96372 THER/PROPH/DIAG INJ SC/IM: CPT

## 2022-09-19 PROCEDURE — 700101 HCHG RX REV CODE 250: Performed by: GENERAL PRACTICE

## 2022-09-19 PROCEDURE — 96376 TX/PRO/DX INJ SAME DRUG ADON: CPT

## 2022-09-19 PROCEDURE — 99233 SBSQ HOSP IP/OBS HIGH 50: CPT | Performed by: GENERAL PRACTICE

## 2022-09-19 PROCEDURE — 80053 COMPREHEN METABOLIC PANEL: CPT

## 2022-09-19 PROCEDURE — 700102 HCHG RX REV CODE 250 W/ 637 OVERRIDE(OP): Performed by: HOSPITALIST

## 2022-09-19 PROCEDURE — 93005 ELECTROCARDIOGRAM TRACING: CPT | Performed by: HOSPITALIST

## 2022-09-19 PROCEDURE — 96365 THER/PROPH/DIAG IV INF INIT: CPT

## 2022-09-19 PROCEDURE — 770020 HCHG ROOM/CARE - TELE (206)

## 2022-09-19 PROCEDURE — 700101 HCHG RX REV CODE 250: Performed by: INTERNAL MEDICINE

## 2022-09-19 PROCEDURE — A9270 NON-COVERED ITEM OR SERVICE: HCPCS | Performed by: GENERAL PRACTICE

## 2022-09-19 PROCEDURE — A9270 NON-COVERED ITEM OR SERVICE: HCPCS | Performed by: STUDENT IN AN ORGANIZED HEALTH CARE EDUCATION/TRAINING PROGRAM

## 2022-09-19 PROCEDURE — 700111 HCHG RX REV CODE 636 W/ 250 OVERRIDE (IP): Performed by: STUDENT IN AN ORGANIZED HEALTH CARE EDUCATION/TRAINING PROGRAM

## 2022-09-19 PROCEDURE — 96375 TX/PRO/DX INJ NEW DRUG ADDON: CPT

## 2022-09-19 PROCEDURE — 84100 ASSAY OF PHOSPHORUS: CPT

## 2022-09-19 RX ORDER — LABETALOL HYDROCHLORIDE 5 MG/ML
10 INJECTION, SOLUTION INTRAVENOUS EVERY 4 HOURS PRN
Status: DISCONTINUED | OUTPATIENT
Start: 2022-09-19 | End: 2022-09-21 | Stop reason: HOSPADM

## 2022-09-19 RX ORDER — BISACODYL 10 MG
10 SUPPOSITORY, RECTAL RECTAL
Status: DISCONTINUED | OUTPATIENT
Start: 2022-09-19 | End: 2022-09-21 | Stop reason: HOSPADM

## 2022-09-19 RX ORDER — OXYCODONE HYDROCHLORIDE 5 MG/1
5 TABLET ORAL
Status: DISCONTINUED | OUTPATIENT
Start: 2022-09-19 | End: 2022-09-20

## 2022-09-19 RX ORDER — FOLIC ACID 1 MG/1
1 TABLET ORAL DAILY
Status: DISCONTINUED | OUTPATIENT
Start: 2022-09-19 | End: 2022-09-21 | Stop reason: HOSPADM

## 2022-09-19 RX ORDER — OXYCODONE HYDROCHLORIDE 10 MG/1
10 TABLET ORAL
Status: DISCONTINUED | OUTPATIENT
Start: 2022-09-19 | End: 2022-09-20

## 2022-09-19 RX ORDER — DIAZEPAM 5 MG/ML
5 INJECTION, SOLUTION INTRAMUSCULAR; INTRAVENOUS
Status: DISCONTINUED | OUTPATIENT
Start: 2022-09-19 | End: 2022-09-19

## 2022-09-19 RX ORDER — DIAZEPAM 5 MG/1
10 TABLET ORAL EVERY 6 HOURS
Status: DISCONTINUED | OUTPATIENT
Start: 2022-09-19 | End: 2022-09-19

## 2022-09-19 RX ORDER — SODIUM CHLORIDE AND POTASSIUM CHLORIDE 150; 900 MG/100ML; MG/100ML
2000 INJECTION, SOLUTION INTRAVENOUS CONTINUOUS
Status: DISCONTINUED | OUTPATIENT
Start: 2022-09-19 | End: 2022-09-19

## 2022-09-19 RX ORDER — ACETAMINOPHEN 325 MG/1
650 TABLET ORAL EVERY 6 HOURS PRN
Status: DISCONTINUED | OUTPATIENT
Start: 2022-09-19 | End: 2022-09-21 | Stop reason: HOSPADM

## 2022-09-19 RX ORDER — PROMETHAZINE HYDROCHLORIDE 25 MG/1
12.5-25 TABLET ORAL EVERY 4 HOURS PRN
Status: DISCONTINUED | OUTPATIENT
Start: 2022-09-19 | End: 2022-09-21 | Stop reason: HOSPADM

## 2022-09-19 RX ORDER — LEVETIRACETAM 500 MG/5ML
1000 INJECTION, SOLUTION, CONCENTRATE INTRAVENOUS ONCE
Status: COMPLETED | OUTPATIENT
Start: 2022-09-19 | End: 2022-09-19

## 2022-09-19 RX ORDER — BACLOFEN 10 MG/1
10 TABLET ORAL EVERY 8 HOURS
Status: DISCONTINUED | OUTPATIENT
Start: 2022-09-19 | End: 2022-09-20

## 2022-09-19 RX ORDER — AMLODIPINE BESYLATE 5 MG/1
5 TABLET ORAL DAILY
Status: DISCONTINUED | OUTPATIENT
Start: 2022-09-19 | End: 2022-09-21 | Stop reason: HOSPADM

## 2022-09-19 RX ORDER — LORAZEPAM 1 MG/1
1 TABLET ORAL EVERY 4 HOURS PRN
Status: DISCONTINUED | OUTPATIENT
Start: 2022-09-19 | End: 2022-09-21 | Stop reason: HOSPADM

## 2022-09-19 RX ORDER — ONDANSETRON 2 MG/ML
4 INJECTION INTRAMUSCULAR; INTRAVENOUS EVERY 4 HOURS PRN
Status: DISCONTINUED | OUTPATIENT
Start: 2022-09-19 | End: 2022-09-21 | Stop reason: HOSPADM

## 2022-09-19 RX ORDER — LORAZEPAM 0.5 MG/1
0.5 TABLET ORAL EVERY 4 HOURS PRN
Status: DISCONTINUED | OUTPATIENT
Start: 2022-09-19 | End: 2022-09-21 | Stop reason: HOSPADM

## 2022-09-19 RX ORDER — GABAPENTIN 100 MG/1
100 CAPSULE ORAL 3 TIMES DAILY
Status: DISCONTINUED | OUTPATIENT
Start: 2022-09-19 | End: 2022-09-21 | Stop reason: HOSPADM

## 2022-09-19 RX ORDER — DIAZEPAM 5 MG/1
5 TABLET ORAL EVERY 6 HOURS
Status: DISCONTINUED | OUTPATIENT
Start: 2022-09-19 | End: 2022-09-19

## 2022-09-19 RX ORDER — LORAZEPAM 1 MG/1
2 TABLET ORAL
Status: DISCONTINUED | OUTPATIENT
Start: 2022-09-19 | End: 2022-09-21 | Stop reason: HOSPADM

## 2022-09-19 RX ORDER — LORATADINE 10 MG/1
10 TABLET ORAL DAILY
Status: DISCONTINUED | OUTPATIENT
Start: 2022-09-19 | End: 2022-09-21 | Stop reason: HOSPADM

## 2022-09-19 RX ORDER — PROCHLORPERAZINE EDISYLATE 5 MG/ML
5-10 INJECTION INTRAMUSCULAR; INTRAVENOUS EVERY 4 HOURS PRN
Status: DISCONTINUED | OUTPATIENT
Start: 2022-09-19 | End: 2022-09-21 | Stop reason: HOSPADM

## 2022-09-19 RX ORDER — POLYETHYLENE GLYCOL 3350 17 G/17G
1 POWDER, FOR SOLUTION ORAL
Status: DISCONTINUED | OUTPATIENT
Start: 2022-09-19 | End: 2022-09-21 | Stop reason: HOSPADM

## 2022-09-19 RX ORDER — DIAZEPAM 2 MG/1
2 TABLET ORAL ONCE
Status: COMPLETED | OUTPATIENT
Start: 2022-09-19 | End: 2022-09-19

## 2022-09-19 RX ORDER — SODIUM CHLORIDE AND POTASSIUM CHLORIDE 150; 900 MG/100ML; MG/100ML
2000 INJECTION, SOLUTION INTRAVENOUS CONTINUOUS
Status: DISPENSED | OUTPATIENT
Start: 2022-09-19 | End: 2022-09-19

## 2022-09-19 RX ORDER — ENOXAPARIN SODIUM 100 MG/ML
40 INJECTION SUBCUTANEOUS DAILY
Status: DISCONTINUED | OUTPATIENT
Start: 2022-09-19 | End: 2022-09-21 | Stop reason: HOSPADM

## 2022-09-19 RX ORDER — CLONIDINE HYDROCHLORIDE 0.1 MG/1
0.1 TABLET ORAL
Status: DISCONTINUED | OUTPATIENT
Start: 2022-09-19 | End: 2022-09-21 | Stop reason: HOSPADM

## 2022-09-19 RX ORDER — ONDANSETRON 4 MG/1
4 TABLET, ORALLY DISINTEGRATING ORAL EVERY 4 HOURS PRN
Status: DISCONTINUED | OUTPATIENT
Start: 2022-09-19 | End: 2022-09-21 | Stop reason: HOSPADM

## 2022-09-19 RX ORDER — GAUZE BANDAGE 2" X 2"
100 BANDAGE TOPICAL DAILY
Status: DISCONTINUED | OUTPATIENT
Start: 2022-09-19 | End: 2022-09-21 | Stop reason: HOSPADM

## 2022-09-19 RX ORDER — MORPHINE SULFATE 4 MG/ML
4 INJECTION INTRAVENOUS
Status: DISCONTINUED | OUTPATIENT
Start: 2022-09-19 | End: 2022-09-20

## 2022-09-19 RX ORDER — GUAIFENESIN/DEXTROMETHORPHAN 100-10MG/5
10 SYRUP ORAL EVERY 6 HOURS PRN
Status: DISCONTINUED | OUTPATIENT
Start: 2022-09-19 | End: 2022-09-21 | Stop reason: HOSPADM

## 2022-09-19 RX ORDER — OMEPRAZOLE 20 MG/1
40 CAPSULE, DELAYED RELEASE ORAL 2 TIMES DAILY
Status: DISCONTINUED | OUTPATIENT
Start: 2022-09-19 | End: 2022-09-21 | Stop reason: HOSPADM

## 2022-09-19 RX ORDER — LEVETIRACETAM 500 MG/1
1000 TABLET ORAL 2 TIMES DAILY
Status: DISCONTINUED | OUTPATIENT
Start: 2022-09-19 | End: 2022-09-21 | Stop reason: HOSPADM

## 2022-09-19 RX ORDER — LORAZEPAM 2 MG/ML
2 INJECTION INTRAMUSCULAR
Status: DISCONTINUED | OUTPATIENT
Start: 2022-09-19 | End: 2022-09-21 | Stop reason: HOSPADM

## 2022-09-19 RX ORDER — AMOXICILLIN 250 MG
2 CAPSULE ORAL 2 TIMES DAILY
Status: DISCONTINUED | OUTPATIENT
Start: 2022-09-19 | End: 2022-09-21 | Stop reason: HOSPADM

## 2022-09-19 RX ORDER — LORAZEPAM 1 MG/1
4 TABLET ORAL
Status: DISCONTINUED | OUTPATIENT
Start: 2022-09-19 | End: 2022-09-21 | Stop reason: HOSPADM

## 2022-09-19 RX ORDER — PROMETHAZINE HYDROCHLORIDE 25 MG/1
12.5-25 SUPPOSITORY RECTAL EVERY 4 HOURS PRN
Status: DISCONTINUED | OUTPATIENT
Start: 2022-09-19 | End: 2022-09-21 | Stop reason: HOSPADM

## 2022-09-19 RX ORDER — LORAZEPAM 1 MG/1
3 TABLET ORAL
Status: DISCONTINUED | OUTPATIENT
Start: 2022-09-19 | End: 2022-09-21 | Stop reason: HOSPADM

## 2022-09-19 RX ORDER — POTASSIUM CHLORIDE 20 MEQ/1
40 TABLET, EXTENDED RELEASE ORAL ONCE
Status: COMPLETED | OUTPATIENT
Start: 2022-09-19 | End: 2022-09-19

## 2022-09-19 RX ADMIN — BACLOFEN 10 MG: 10 TABLET ORAL at 14:00

## 2022-09-19 RX ADMIN — THIAMINE HCL TAB 100 MG 100 MG: 100 TAB at 05:35

## 2022-09-19 RX ADMIN — MORPHINE SULFATE 4 MG: 4 INJECTION INTRAVENOUS at 01:07

## 2022-09-19 RX ADMIN — LEVETIRACETAM 1000 MG: 500 TABLET, FILM COATED ORAL at 05:35

## 2022-09-19 RX ADMIN — LEVETIRACETAM 1000 MG: 100 INJECTION, SOLUTION INTRAVENOUS at 21:48

## 2022-09-19 RX ADMIN — AMLODIPINE BESYLATE 5 MG: 5 TABLET ORAL at 05:35

## 2022-09-19 RX ADMIN — BACLOFEN 10 MG: 10 TABLET ORAL at 05:36

## 2022-09-19 RX ADMIN — DIBASIC SODIUM PHOSPHATE, MONOBASIC POTASSIUM PHOSPHATE AND MONOBASIC SODIUM PHOSPHATE 500 MG: 852; 155; 130 TABLET ORAL at 17:29

## 2022-09-19 RX ADMIN — THERA TABS 1 TABLET: TAB at 05:37

## 2022-09-19 RX ADMIN — OMEPRAZOLE 40 MG: 20 CAPSULE, DELAYED RELEASE ORAL at 00:19

## 2022-09-19 RX ADMIN — DIAZEPAM 10 MG: 5 TABLET ORAL at 04:35

## 2022-09-19 RX ADMIN — POTASSIUM CHLORIDE 40 MEQ: 1500 TABLET, EXTENDED RELEASE ORAL at 11:50

## 2022-09-19 RX ADMIN — PROMETHAZINE HYDROCHLORIDE 25 MG: 25 TABLET ORAL at 00:04

## 2022-09-19 RX ADMIN — POTASSIUM CHLORIDE AND SODIUM CHLORIDE: 900; 150 INJECTION, SOLUTION INTRAVENOUS at 01:38

## 2022-09-19 RX ADMIN — ENOXAPARIN SODIUM 40 MG: 100 INJECTION SUBCUTANEOUS at 17:32

## 2022-09-19 RX ADMIN — THIAMINE HYDROCHLORIDE: 100 INJECTION, SOLUTION INTRAMUSCULAR; INTRAVENOUS at 00:05

## 2022-09-19 RX ADMIN — LORATADINE 10 MG: 10 TABLET ORAL at 05:35

## 2022-09-19 RX ADMIN — MORPHINE SULFATE 4 MG: 4 INJECTION INTRAVENOUS at 01:41

## 2022-09-19 RX ADMIN — GABAPENTIN 100 MG: 100 CAPSULE ORAL at 17:27

## 2022-09-19 RX ADMIN — POTASSIUM CHLORIDE AND SODIUM CHLORIDE 2000 ML: 900; 150 INJECTION, SOLUTION INTRAVENOUS at 03:16

## 2022-09-19 RX ADMIN — DIBASIC SODIUM PHOSPHATE, MONOBASIC POTASSIUM PHOSPHATE AND MONOBASIC SODIUM PHOSPHATE 500 MG: 852; 155; 130 TABLET ORAL at 11:50

## 2022-09-19 RX ADMIN — POTASSIUM CHLORIDE AND SODIUM CHLORIDE 2000 ML: 900; 150 INJECTION, SOLUTION INTRAVENOUS at 11:12

## 2022-09-19 RX ADMIN — LORAZEPAM 3 MG: 1 TABLET ORAL at 03:17

## 2022-09-19 RX ADMIN — ENOXAPARIN SODIUM 40 MG: 40 INJECTION SUBCUTANEOUS at 00:04

## 2022-09-19 RX ADMIN — LEVETIRACETAM 1000 MG: 500 TABLET, FILM COATED ORAL at 17:25

## 2022-09-19 RX ADMIN — DIAZEPAM 2 MG: 2 TABLET ORAL at 21:48

## 2022-09-19 RX ADMIN — POTASSIUM CHLORIDE 60 MEQ: 1500 TABLET, EXTENDED RELEASE ORAL at 00:19

## 2022-09-19 RX ADMIN — GABAPENTIN 100 MG: 100 CAPSULE ORAL at 11:49

## 2022-09-19 RX ADMIN — SENNOSIDES AND DOCUSATE SODIUM 2 TABLET: 50; 8.6 TABLET ORAL at 05:35

## 2022-09-19 RX ADMIN — SENNOSIDES AND DOCUSATE SODIUM 2 TABLET: 50; 8.6 TABLET ORAL at 17:29

## 2022-09-19 RX ADMIN — FOLIC ACID 1 MG: 1 TABLET ORAL at 05:35

## 2022-09-19 RX ADMIN — OMEPRAZOLE 40 MG: 20 CAPSULE, DELAYED RELEASE ORAL at 17:28

## 2022-09-19 RX ADMIN — PROCHLORPERAZINE EDISYLATE 10 MG: 5 INJECTION INTRAMUSCULAR; INTRAVENOUS at 03:20

## 2022-09-19 RX ADMIN — GABAPENTIN 100 MG: 100 CAPSULE ORAL at 05:35

## 2022-09-19 RX ADMIN — LORAZEPAM 3 MG: 2 TABLET ORAL at 00:04

## 2022-09-19 ASSESSMENT — PATIENT HEALTH QUESTIONNAIRE - PHQ9
1. LITTLE INTEREST OR PLEASURE IN DOING THINGS: NOT AT ALL
2. FEELING DOWN, DEPRESSED, IRRITABLE, OR HOPELESS: NOT AT ALL
SUM OF ALL RESPONSES TO PHQ9 QUESTIONS 1 AND 2: 0

## 2022-09-19 ASSESSMENT — LIFESTYLE VARIABLES
AGITATION: NORMAL ACTIVITY
TREMOR: NO TREMOR
PAROXYSMAL SWEATS: BARELY PERCEPTIBLE SWEATING. PALMS MOIST
NAUSEA AND VOMITING: NO NAUSEA AND NO VOMITING
TREMOR: NO TREMOR
PAROXYSMAL SWEATS: NO SWEAT VISIBLE
ON A TYPICAL DAY WHEN YOU DRINK ALCOHOL HOW MANY DRINKS DO YOU HAVE: 4
AUDITORY DISTURBANCES: NOT PRESENT
ANXIETY: *
NAUSEA AND VOMITING: INTERMITTENT NAUSEA WITH DRY HEAVES
HEADACHE, FULLNESS IN HEAD: MODERATE
TOTAL SCORE: 4
TREMOR: TREMOR NOT VISIBLE BUT CAN BE FELT, FINGERTIP TO FINGERTIP
ORIENTATION AND CLOUDING OF SENSORIUM: CANNOT DO SERIAL ADDITIONS OR IS UNCERTAIN ABOUT DATE
EVER FELT BAD OR GUILTY ABOUT YOUR DRINKING: YES
VISUAL DISTURBANCES: NOT PRESENT
ANXIETY: NO ANXIETY (AT EASE)
HEADACHE, FULLNESS IN HEAD: MODERATE
VISUAL DISTURBANCES: NOT PRESENT
CONSUMPTION TOTAL: POSITIVE
PAROXYSMAL SWEATS: *
NAUSEA AND VOMITING: INTERMITTENT NAUSEA WITH DRY HEAVES
AGITATION: NORMAL ACTIVITY
VISUAL DISTURBANCES: NOT PRESENT
ORIENTATION AND CLOUDING OF SENSORIUM: CANNOT DO SERIAL ADDITIONS OR IS UNCERTAIN ABOUT DATE
AUDITORY DISTURBANCES: NOT PRESENT
HEADACHE, FULLNESS IN HEAD: MODERATE
HAVE YOU EVER FELT YOU SHOULD CUT DOWN ON YOUR DRINKING: YES
TOTAL SCORE: 12
NAUSEA AND VOMITING: NO NAUSEA AND NO VOMITING
NAUSEA AND VOMITING: NO NAUSEA AND NO VOMITING
VISUAL DISTURBANCES: NOT PRESENT
VISUAL DISTURBANCES: NOT PRESENT
TOTAL SCORE: 2
TOTAL SCORE: 4
ANXIETY: *
TOTAL SCORE: 2
AUDITORY DISTURBANCES: NOT PRESENT
AGITATION: NORMAL ACTIVITY
ALCOHOL_USE: YES
AVERAGE NUMBER OF DAYS PER WEEK YOU HAVE A DRINK CONTAINING ALCOHOL: 3
TOTAL SCORE: 15
HAVE PEOPLE ANNOYED YOU BY CRITICIZING YOUR DRINKING: NO
PAROXYSMAL SWEATS: NO SWEAT VISIBLE
ANXIETY: NO ANXIETY (AT EASE)
TREMOR: NO TREMOR
VISUAL DISTURBANCES: NOT PRESENT
PAROXYSMAL SWEATS: BARELY PERCEPTIBLE SWEATING. PALMS MOIST
PAROXYSMAL SWEATS: NO SWEAT VISIBLE
HEADACHE, FULLNESS IN HEAD: VERY MILD
TOTAL SCORE: 2
ORIENTATION AND CLOUDING OF SENSORIUM: CANNOT DO SERIAL ADDITIONS OR IS UNCERTAIN ABOUT DATE
HEADACHE, FULLNESS IN HEAD: MODERATE
AGITATION: SOMEWHAT MORE THAN NORMAL ACTIVITY
HEADACHE, FULLNESS IN HEAD: MODERATE
AGITATION: *
ANXIETY: NO ANXIETY (AT EASE)
ORIENTATION AND CLOUDING OF SENSORIUM: CANNOT DO SERIAL ADDITIONS OR IS UNCERTAIN ABOUT DATE
TREMOR: TREMOR NOT VISIBLE BUT CAN BE FELT, FINGERTIP TO FINGERTIP
AUDITORY DISTURBANCES: NOT PRESENT
TOTAL SCORE: VERY MILD ITCHING, PINS AND NEEDLES SENSATION, BURNING OR NUMBNESS
ANXIETY: *
TOTAL SCORE: 2
TOTAL SCORE: 17
AUDITORY DISTURBANCES: NOT PRESENT
ORIENTATION AND CLOUDING OF SENSORIUM: CANNOT DO SERIAL ADDITIONS OR IS UNCERTAIN ABOUT DATE
AGITATION: NORMAL ACTIVITY
NAUSEA AND VOMITING: INTERMITTENT NAUSEA WITH DRY HEAVES
EVER HAD A DRINK FIRST THING IN THE MORNING TO STEADY YOUR NERVES TO GET RID OF A HANGOVER: NO
ORIENTATION AND CLOUDING OF SENSORIUM: CANNOT DO SERIAL ADDITIONS OR IS UNCERTAIN ABOUT DATE
TREMOR: NO TREMOR
HOW MANY TIMES IN THE PAST YEAR HAVE YOU HAD 5 OR MORE DRINKS IN A DAY: 10
AUDITORY DISTURBANCES: NOT PRESENT

## 2022-09-19 ASSESSMENT — COGNITIVE AND FUNCTIONAL STATUS - GENERAL
TOILETING: A LITTLE
DRESSING REGULAR UPPER BODY CLOTHING: A LITTLE
DRESSING REGULAR LOWER BODY CLOTHING: A LITTLE
SUGGESTED CMS G CODE MODIFIER DAILY ACTIVITY: CK
DAILY ACTIVITIY SCORE: 18
STANDING UP FROM CHAIR USING ARMS: A LITTLE
TURNING FROM BACK TO SIDE WHILE IN FLAT BAD: A LITTLE
SUGGESTED CMS G CODE MODIFIER MOBILITY: CK
HELP NEEDED FOR BATHING: A LITTLE
PERSONAL GROOMING: A LITTLE
MOBILITY SCORE: 18
EATING MEALS: A LITTLE
MOVING TO AND FROM BED TO CHAIR: A LITTLE
WALKING IN HOSPITAL ROOM: A LITTLE
CLIMB 3 TO 5 STEPS WITH RAILING: A LITTLE
MOVING FROM LYING ON BACK TO SITTING ON SIDE OF FLAT BED: A LITTLE

## 2022-09-19 ASSESSMENT — ENCOUNTER SYMPTOMS
TREMORS: 1
NERVOUS/ANXIOUS: 1

## 2022-09-19 ASSESSMENT — PAIN DESCRIPTION - PAIN TYPE
TYPE: ACUTE PAIN

## 2022-09-19 ASSESSMENT — FIBROSIS 4 INDEX: FIB4 SCORE: 0.72

## 2022-09-19 NOTE — ED PROVIDER NOTES
"ED Provider Note    ER Provider Note         CHIEF COMPLAINT  Chief Complaint   Patient presents with    ETOH Intoxication    Seizure       HPI  Donal Carpio is a 52 y.o. male who presents to the Emergency Department with a reports of seizure.  The patient says he intermittently drinks and was drinking heavily tonight.  Said he stopped early evening and then had these rhythmic movements.  He does not remember anything afterwards.  He says he takes his Keppra regularly.  He denies any chest pain or shortness of breath.  He denies any numbness or tingling.  There is intermittent reports of chest pain has been going on for about a half a day.  He denies any cough.  He cannot describe his chest pain anymore and it hurts.  There is no nausea or vomiting.    REVIEW OF SYSTEMS  See HPI for further details. All other systems are negative.     PAST MEDICAL HISTORY   has a past medical history of Drug-seeking behavior, Peptic ulcer, Seizure (HCC), Seizure disorder (HCC), and Ulcer of abdomen wall (HCC).    SURGICAL HISTORY   has a past surgical history that includes appendectomy; upper gi endoscopy,diagnosis (N/A, 6/30/2022); upper gi endoscopy,biopsy (N/A, 6/30/2022); lap,diagnostic abdomen (N/A, 7/7/2022); and wound exploration ortho (Right, 7/7/2022).    SOCIAL HISTORY  Social History     Tobacco Use    Smoking status: Former    Smokeless tobacco: Never   Vaping Use    Vaping Use: Every day    Substances: Nicotine   Substance Use Topics    Alcohol use: Yes     Alcohol/week: 2.4 oz     Types: 4 Shots of liquor per week     Comment: \"rarely\"    Drug use: Yes     Types: Inhaled     Comment: last THC use 2 months      Social History     Substance and Sexual Activity   Drug Use Yes    Types: Inhaled    Comment: last THC use 2 months       FAMILY HISTORY  No family history on file.    CURRENT MEDICATIONS  Home Medications       Reviewed by Haroldo Stevens (Pharmacy Tech) on 09/18/22 at 2345  Med List Status: Complete " "    Medication Last Dose Status   acetaminophen (TYLENOL) 500 MG Tab 9/18/2022 Active   amLODIPine (NORVASC) 5 MG Tab 9/18/2022 Active   baclofen (LIORESAL) 10 MG Tab 9/18/2022 Active   Esomeprazole Magnesium 20 MG Tablet Delayed Response 9/17/2022 Active   gabapentin (NEURONTIN) 100 MG Cap 9/18/2022 Active   levetiracetam (KEPPRA) 1000 MG tablet 9/18/2022 Active   loratadine (CLARITIN) 10 MG Tab 9/18/2022 Active   multivitamin (THERAGRAN) Tab 9/18/2022 Active                    ALLERGIES  Allergies   Allergen Reactions    Penicillins Unspecified     Unknown childhood reaction     Penicillins Unspecified     Unknown. Per patient, he knows he has one because his mom told him       PHYSICAL EXAM  VITAL SIGNS: BP (!) 153/81   Pulse (!) 127 Comment: 2L O2  Temp 37 °C (98.6 °F) (Temporal)   Resp (!) 21 Comment: 2L O2  Ht 1.753 m (5' 9\")   Wt 86.2 kg (190 lb)   SpO2 97% Comment: 2L O2  BMI 28.06 kg/m²      Constitutional: Alert in no apparent distress.  HENT: No signs of trauma, Bilateral external ears normal, Nose normal.   Eyes: Pupils are equal, Conjunctiva normal, Non-icteric.   Neck: Normal range of motion, No tenderness, Supple, No stridor.   Lymphatic: No lymphadenopathy noted.   Cardiovascular: R tachycardic with no murmurs, nondisplaced PMI  Thorax & Lungs: No respiratory distress,  No chest tenderness.   Abdomen: Bowel sounds normal, Soft, No tenderness, No masses, No pulsatile masses. No peritoneal signs.  Skin: Warm, Dry, No erythema, No rash.   Back: No bony tenderness, No CVA tenderness.   Extremities: Intact distal pulses, No edema, No tenderness, No cyanosis.  Musculoskeletal: Good range of motion in all major joints. No tenderness to palpation or major deformities noted.   Neurologic: Alert , Normal motor function, Normal sensory function, No focal deficits noted.   Psychiatric: Affect normal, Judgment normal, Mood normal.     DIAGNOSTIC STUDIES / PROCEDURES    EKG Interpretation:  Interpreted by " me  Sinus tachycardia rate of 106 with no ST elevation, depression or T wave inversion.       LABS  Labs Reviewed   CBC WITH DIFFERENTIAL - Abnormal; Notable for the following components:       Result Value    MCHC 32.5 (*)     MPV 8.2 (*)     Neutrophils-Polys 40.30 (*)     Lymphocytes 44.80 (*)     Basophils 1.90 (*)     Baso (Absolute) 0.16 (*)     All other components within normal limits    Narrative:     Biotin intake of greater than 5 mg per day may interfere with  troponin levels, causing false low values.   COMP METABOLIC PANEL - Abnormal; Notable for the following components:    Potassium 3.4 (*)     Anion Gap 18.0 (*)     Bun 7 (*)     Globulin 3.6 (*)     All other components within normal limits    Narrative:     Biotin intake of greater than 5 mg per day may interfere with  troponin levels, causing false low values.   TROPONIN    Narrative:     Biotin intake of greater than 5 mg per day may interfere with  troponin levels, causing false low values.   LIPASE    Narrative:     Biotin intake of greater than 5 mg per day may interfere with  troponin levels, causing false low values.   ESTIMATED GFR    Narrative:     Biotin intake of greater than 5 mg per day may interfere with  troponin levels, causing false low values.   CBC WITH DIFFERENTIAL   RENAL FUNCTION PANEL   MAGNESIUM   URINE DRUG SCREEN       All labs reviewed by me.    RADIOLOGY  US-ABDOMEN COMPLETE SURVEY   Final Result      1.  Limited examination due to patient body habitus and bowel gas.      2.  No evidence of gallstone.      3.  Common bile duct not identified by the technologist however there is no evidence of intrahepatic biliary ductal dilatation.      4.  The liver is echogenic consistent with fatty change versus hepatocellular dysfunction.      DX-CHEST-PORTABLE (1 VIEW)   Final Result      No evidence of acute cardiopulmonary process.           The radiologist's interpretation of all radiological studies have been reviewed by  me.    COURSE & MEDICAL DECISION MAKING  Pertinent Labs & Imaging studies reviewed. (See chart for details)    This is a 52 y.o. male that presents with chest pain.  This is very atypical.  In addition the patient is having potentially seizures versus pseudoseizures.  He did have an event when I walked into the room of which she had rhythmic movement and then I did a sternal rub and he woke up immediately.  This makes me think this is likely more pseudoseizures.  He does have some slight tachycardia and may have some withdrawal symptoms therefore I will give him Valium.  I will reassess after this..     9:29 PM - Patient seen and examined at bedside.     The patient is persistently tachycardic and has electrolyte derangements.  I believe this some alcohol ketoacidosis.  Alleviators and worsening withdrawal.  I will give him more Valium.  He will need to be admitted to the hospital.    The total critical care time on this patient is 35 minutes, resuscitating patient, speaking with admitting physician, and deciphering test results. This  is exclusive of separately billable procedures.            FINAL IMPRESSION  1. Alcohol abuse    2. Seizure (HCC)    3. Alcohol withdrawal syndrome without complication (HCC)              Electronically signed by: Paulo Isidro M.D., 9/18/2022
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
## 2022-09-19 NOTE — ED NOTES
Pt awake and is aware of poc and admission to [facility]. Second dose of valium to be given as well as something for pain and nausea per request. Pt given sandwich and juice.

## 2022-09-19 NOTE — PROGRESS NOTES
Telemetry Shift Summary     Rhythm: ST  HR: 120  Ectopy: none    Measurements: 0.16/0.08/0.30    Normal Values  Rhythm: SR  HR:   Measurements: 0.12-0.20/0.08-0.10/0.30-0.52

## 2022-09-19 NOTE — PROGRESS NOTES
4 Eyes Skin Assessment Completed by Ma, RN and CASPER Bates.    Head WDL  Ears WDL  Nose WDL  Mouth WDL  Neck WDL  Breast/Chest WDL  Shoulder Blades WDL  Spine WDL  (R) Arm/Elbow/Hand WDL  (L) Arm/Elbow/Hand WDL  Abdomen Old sx scars  Groin WDL  Scrotum/Coccyx/Buttocks WDL  (R) Leg Old scar below R knee  (L) Leg WDL  (R) Heel/Foot/Toe WDL  (L) Heel/Foot/Toe WDL          Devices In Places Blood Pressure Cuff, Pulse Ox, and Nasal Cannula      Interventions In Place Pillows    Possible Skin Injury No    Pictures Uploaded Into Epic No, needs to be completed  Wound Consult Placed N/A  RN Wound Prevention Protocol Ordered No

## 2022-09-19 NOTE — ED NOTES
Transport ETA updated for pt. Pt reporting abd pain returning. More juice provided and pt remains on the monitor

## 2022-09-19 NOTE — PROGRESS NOTES
Pt arrived via gurney with EM, admitted to room 218 from [facility]. Pt is A&Ox4, on  2 L NC, complains pain level of 10/10. CIWA in place. Pt oriented to room and call light. POC reviewed with pt and family. Fall precautions and seizure precautions in place, bed at lowest position. Hourly rounding in place.

## 2022-09-19 NOTE — ED TRIAGE NOTES
BIBA from hotel where pt repots having multiple seizures today. Pt reports being sober the last 3 months, but relapsed and drank a pint of whiskey today.     H/o seizures but pt takes Keppra. Has not missed a dose. Reports h/o withdrawals. Pt states he's had about 6 seizures today.    EMS gave 4mg odt Zofran for nausea.     Also c/o abd and chest pain. Pt placed on monitor and seizure precautions taken. Pt appears to have multiple small seizures while in the ER witnessed by this RN. Vitals remain stable throughout, and each lasts approx 15 seconds.

## 2022-09-19 NOTE — CARE PLAN
The patient is Stable - Low risk of patient condition declining or worsening    Shift Goals  Clinical Goals: Pt's CIWA score will remain under 20 or below during this shift  Patient Goals: pain control    Progress made toward(s) clinical / shift goals:  Pt's CIWA score remained below 20 during the shift. No seizure activity observed during shift; sz precautions and fall precautions in place. Will continue to monitor.    Patient is not progressing towards the following goals: NA

## 2022-09-19 NOTE — ED NOTES
CIWA determined to be 17. Provider at bedside to witness pt's seizure-like activity. Pt remains on the monitor.

## 2022-09-19 NOTE — ED NOTES
Report called to Roberth Torres RN, Ma. Pt transported on monitor with fluids running. IV patent.

## 2022-09-19 NOTE — DISCHARGE PLANNING
COBRA signed by pt.    PCS and Approved Svc forms faxed to the [facility].    Transfer packet left with bedside RN.

## 2022-09-19 NOTE — ASSESSMENT & PLAN NOTE
Clean for a month, relapsed, admitted to drinking at least one pint of vodka prior to ER  ETOH cessation advised  Detox bag, MV, thiamine, folic acid  Discontinued Valium  CIWA

## 2022-09-19 NOTE — ASSESSMENT & PLAN NOTE
Clean for a month, relapsed, admitted to drinking at least one pint of vodka prior to ER  ETOH cessation advised  Detox bag, SABINO  Scheduled Valium  CIWA

## 2022-09-19 NOTE — H&P
Hospital Medicine History & Physical Note    Date of Service  9/18/2022    Primary Care Physician  Pcp Pt States None    Consultants  None    Code Status  Full Code    Chief Complaint  Chief Complaint   Patient presents with    ETOH Intoxication    Seizure       History of Presenting Illness  Donal Carpio is a 52 y.o. male with history of alcohol abuse who presented 9/18/2022 with evaluation for alcohol withdrawal.  Patient reported attending AA meetings, no alcohol for the past month.  However, admits to consuming a pint of vodka daily.  He is very apologetic of why and how he ended up in ER.  He reports abdominal discomfort, nausea, vomiting, poor oral intake.  Admission requested by ERP for alcohol withdrawal syndrome.  Admitted to medicine service.    I discussed the plan of care with patient and bedside RN.    Review of Systems  Review of Systems   Constitutional:  Positive for diaphoresis. Negative for chills and fever.   HENT:  Negative for hearing loss and tinnitus.    Eyes:  Negative for blurred vision and double vision.   Respiratory:  Negative for cough and shortness of breath.    Cardiovascular:  Positive for palpitations. Negative for chest pain.   Gastrointestinal:  Positive for abdominal pain, heartburn, nausea and vomiting. Negative for constipation.   Genitourinary:  Negative for dysuria and hematuria.   Musculoskeletal:  Negative for falls and myalgias.   Skin:  Negative for itching and rash.   Neurological:  Negative for dizziness, speech change, focal weakness and headaches.   Endo/Heme/Allergies:  Negative for environmental allergies. Does not bruise/bleed easily.   Psychiatric/Behavioral:  Positive for hallucinations. Negative for depression and substance abuse. The patient is nervous/anxious.    All other systems reviewed and are negative.    Past Medical History   has a past medical history of Drug-seeking behavior, Peptic ulcer, Seizure (HCC), Seizure disorder (HCC), and Ulcer of  abdomen wall (HCC).    Surgical History   has a past surgical history that includes appendectomy; pr upper gi endoscopy,diagnosis (N/A, 6/30/2022); pr upper gi endoscopy,biopsy (N/A, 6/30/2022); pr lap,diagnostic abdomen (N/A, 7/7/2022); and wound exploration ortho (Right, 7/7/2022).     Family History  family history is not on file.   Family history reviewed with patient. There is no family history that is pertinent to the chief complaint.     Social History   reports that he has quit smoking. He has never used smokeless tobacco. He reports current alcohol use of about 2.4 oz per week. He reports current drug use. Drug: Inhaled.    Allergies  Allergies   Allergen Reactions    Penicillins Unspecified     Unknown childhood reaction     Penicillins Unspecified     Unknown. Per patient, he knows he has one because his mom told him       Medications  Prior to Admission Medications   Prescriptions Last Dose Informant Patient Reported? Taking?   Esomeprazole Magnesium 20 MG Tablet Delayed Response  Patient Yes No   Sig: Take 40 mg by mouth every day.   amLODIPine (NORVASC) 5 MG Tab  Patient No No   Sig: Take 1 Tablet by mouth every day.   baclofen (LIORESAL) 10 MG Tab   No No   Sig: Take 1 Tablet by mouth 3 times a day.   gabapentin (NEURONTIN) 100 MG Cap  Patient Yes No   Sig: Take 100 mg by mouth 3 times a day.   levetiracetam (KEPPRA) 1000 MG tablet  Patient Yes No   Sig: Take 1,000 mg by mouth 2 times a day.   loratadine (CLARITIN) 10 MG Tab  Patient Yes No   Sig: Take 10 mg by mouth every day.   multivitamin (THERAGRAN) Tab  Patient Yes No   Sig: Take 1 Tablet by mouth every day.      Facility-Administered Medications: None       Physical Exam  Temp:  [37 °C (98.6 °F)] 37 °C (98.6 °F)  Pulse:  [105-113] 106  Resp:  [11-18] 11  BP: (151)/(82) 151/82  SpO2:  [89 %-95 %] 89 %  Blood Pressure: (!) 151/82   Temperature: 37 °C (98.6 °F)   Pulse: (!) 106   Respiration: (!) 11   Pulse Oximetry: 89 %       Physical  Exam  Vitals and nursing note reviewed.   Constitutional:       Appearance: Normal appearance.   HENT:      Head: Normocephalic and atraumatic.      Nose: Nose normal.      Mouth/Throat:      Mouth: Mucous membranes are moist.      Pharynx: Oropharynx is clear.   Eyes:      General: No scleral icterus.     Extraocular Movements: Extraocular movements intact.   Cardiovascular:      Rate and Rhythm: Regular rhythm. Tachycardia present.   Pulmonary:      Effort: No respiratory distress.      Breath sounds: No stridor. No wheezing or rales.   Abdominal:      General: There is no distension.      Tenderness: There is no abdominal tenderness. There is no guarding or rebound.   Musculoskeletal:         General: No swelling or tenderness.      Cervical back: Neck supple. No tenderness.   Skin:     General: Skin is warm and dry.   Neurological:      General: No focal deficit present.      Mental Status: He is alert and oriented to person, place, and time.   Psychiatric:         Mood and Affect: Mood normal.       Laboratory:  Recent Labs     09/18/22  2100   WBC 8.6   RBC 5.65   HEMOGLOBIN 15.7   HEMATOCRIT 48.3   MCV 85.5   MCH 27.8   MCHC 32.5*   RDW 43.3   PLATELETCT 445   MPV 8.2*     Recent Labs     09/18/22  2100   SODIUM 143   POTASSIUM 3.4*   CHLORIDE 105   CO2 20   GLUCOSE 99   BUN 7*   CREATININE 0.77   CALCIUM 8.6     Recent Labs     09/18/22  2100   ALTSGPT 25   ASTSGOT 32   ALKPHOSPHAT 94   TBILIRUBIN 0.2   LIPASE 40   GLUCOSE 99         No results for input(s): NTPROBNP in the last 72 hours.      Recent Labs     09/18/22  2100   TROPONINT 8       Imaging:  DX-CHEST-PORTABLE (1 VIEW)   Final Result      No evidence of acute cardiopulmonary process.      US-ABDOMEN COMPLETE SURVEY    (Results Pending)       X-Ray:  I have personally reviewed the images and compared with prior images.  EKG:  I have personally reviewed the images and compared with prior images.    Assessment/Plan:  Justification for Admission  Status  I anticipate this patient will require at least two midnights of hospitalization, therefore appropriate for inpatient status.      * Alcohol withdrawal syndrome with complication (HCC)- (present on admission)  Assessment & Plan  Clean for a month, relapsed, admitted to drinking at least one pint of vodka prior to ER  ETOH cessation advised  Detox bag, MV  Scheduled Valium  CIWA    Hypokalemia  Assessment & Plan  Replace  Replace Mg    Peptic ulcer- (present on admission)  Assessment & Plan  PPI    HTN (hypertension)  Assessment & Plan  Amlodipine  PRN catapress, labetalol    High anion gap metabolic acidosis- (present on admission)  Assessment & Plan  Likely dehydration and ETOH use  IVF    Seizure disorder (HCC)- (present on admission)  Assessment & Plan  Keppra      VTE prophylaxis: enoxaparin ppx

## 2022-09-19 NOTE — PROGRESS NOTES
Hospital Medicine Daily Progress Note    Date of Service  9/19/2022    Chief Complaint  Donal Carpio is a 52 y.o. male admitted 9/19/2022 with alcohol withdrawal    Hospital Course  This is a 52-year-old male with past medical history of hypertension, seizure disorder, peptic ulcer disease, and alcohol use disorder who was admitted on 09/19/2022 with alcohol withdrawal.    The patient started on aggressive IV fluids, CIWA protocol.    Interval Problem Update  Patient seen after being given dose of Valium and Ativan, difficult to arouse.  Discontinued Valium.     Continue aggressive IV fluids, CIWA protocol.    I have discussed this patient's plan of care and discharge plan at IDT rounds today with Case Management, Nursing, Nursing leadership, and other members of the IDT team.    Consultants/Specialty  None    Code Status  Full Code    Disposition  Patient is not medically cleared for discharge.   Anticipate discharge to to home with close outpatient follow-up.  I have placed the appropriate orders for post-discharge needs.    Review of Systems  Review of Systems   Neurological:  Positive for tremors.   Psychiatric/Behavioral:  The patient is nervous/anxious.    All other systems reviewed and are negative.     Physical Exam  Temp:  [36.6 °C (97.8 °F)-37 °C (98.6 °F)] 36.8 °C (98.2 °F)  Pulse:  [103-128] 105  Resp:  [11-37] 20  BP: (132-156)/(73-91) 156/90  SpO2:  [89 %-99 %] 92 %    Physical Exam  Vitals and nursing note reviewed.   Constitutional:       General: He is not in acute distress.     Appearance: Normal appearance. He is ill-appearing.   HENT:      Head: Normocephalic and atraumatic.   Eyes:      Extraocular Movements: Extraocular movements intact.      Conjunctiva/sclera: Conjunctivae normal.      Pupils: Pupils are equal, round, and reactive to light.   Cardiovascular:      Rate and Rhythm: Regular rhythm. Tachycardia present.      Pulses: Normal pulses.      Heart sounds: No murmur heard.    No  friction rub. No gallop.   Pulmonary:      Effort: Pulmonary effort is normal. No respiratory distress.      Breath sounds: Normal breath sounds. No wheezing, rhonchi or rales.   Abdominal:      General: Bowel sounds are normal. There is no distension.      Palpations: Abdomen is soft.      Tenderness: There is no abdominal tenderness.   Musculoskeletal:         General: No swelling or tenderness. Normal range of motion.      Cervical back: Normal range of motion and neck supple. No muscular tenderness.      Right lower leg: No edema.      Left lower leg: No edema.   Skin:     General: Skin is warm and dry.      Capillary Refill: Capillary refill takes less than 2 seconds.      Findings: No bruising, erythema or rash.   Neurological:      General: No focal deficit present.      Mental Status: He is alert. He is disoriented.       Fluids    Intake/Output Summary (Last 24 hours) at 9/19/2022 1158  Last data filed at 9/19/2022 1100  Gross per 24 hour   Intake 240 ml   Output 800 ml   Net -560 ml       Laboratory  Recent Labs     09/18/22  2100 09/19/22  0258   WBC 8.6 8.5   RBC 5.65 5.22   HEMOGLOBIN 15.7 14.5   HEMATOCRIT 48.3 45.5   MCV 85.5 87.2   MCH 27.8 27.8   MCHC 32.5* 31.9*   RDW 43.3 43.8   PLATELETCT 445 193   MPV 8.2* 8.9*     Recent Labs     09/18/22  2100 09/19/22  0258   SODIUM 143 140   POTASSIUM 3.4* 3.5*   CHLORIDE 105 103   CO2 20 21   GLUCOSE 99 128*   BUN 7* 8   CREATININE 0.77 0.70   CALCIUM 8.6 8.3*                   Imaging  No orders to display        Assessment/Plan  * Alcohol withdrawal syndrome with complication (HCC)- (present on admission)  Assessment & Plan  Clean for a month, relapsed, admitted to drinking at least one pint of vodka prior to ER  ETOH cessation advised  Detox bag, MV, thiamine, folic acid  Discontinued Valium  CIWA    Hypophosphatemia  Assessment & Plan  Repleted with oral supplementation    Hypokalemia- (present on admission)  Assessment & Plan  Repleted with oral and IV  supplementation    Peptic ulcer- (present on admission)  Assessment & Plan  Resume PPI    HTN (hypertension)- (present on admission)  Assessment & Plan  Resume amlodipine    High anion gap metabolic acidosis- (present on admission)  Assessment & Plan  Resolved    Seizure disorder (HCC)- (present on admission)  Assessment & Plan  Resume patient's Keppra       VTE prophylaxis: enoxaparin ppx    I have performed a physical exam and reviewed and updated ROS and Plan today (9/19/2022). In review of yesterday's note (9/18/2022), there are no changes except as documented above.

## 2022-09-19 NOTE — ED NOTES
Pt sleeping with even, unlabored resps. Remains on the monitor at this time. VSS, seizure precautions remain in place

## 2022-09-20 ENCOUNTER — APPOINTMENT (OUTPATIENT)
Dept: RADIOLOGY | Facility: REHABILITATION | Age: 52
DRG: 897 | End: 2022-09-20
Attending: GENERAL PRACTICE
Payer: COMMERCIAL

## 2022-09-20 ENCOUNTER — APPOINTMENT (OUTPATIENT)
Dept: RADIOLOGY | Facility: MEDICAL CENTER | Age: 52
DRG: 897 | End: 2022-09-20
Attending: GENERAL PRACTICE
Payer: COMMERCIAL

## 2022-09-20 LAB
ANION GAP SERPL CALC-SCNC: 9 MMOL/L (ref 7–16)
BUN SERPL-MCNC: 11 MG/DL (ref 8–22)
CALCIUM SERPL-MCNC: 8.9 MG/DL (ref 8.4–10.2)
CHLORIDE SERPL-SCNC: 105 MMOL/L (ref 96–112)
CO2 SERPL-SCNC: 24 MMOL/L (ref 20–33)
CREAT SERPL-MCNC: 0.68 MG/DL (ref 0.5–1.4)
EKG IMPRESSION: NORMAL
GFR SERPLBLD CREATININE-BSD FMLA CKD-EPI: 113 ML/MIN/1.73 M 2
GLUCOSE BLD STRIP.AUTO-MCNC: 101 MG/DL (ref 65–99)
GLUCOSE SERPL-MCNC: 116 MG/DL (ref 65–99)
MAGNESIUM SERPL-MCNC: 2.1 MG/DL (ref 1.5–2.5)
PHOSPHATE SERPL-MCNC: 3.5 MG/DL (ref 2.5–4.5)
POTASSIUM SERPL-SCNC: 4.6 MMOL/L (ref 3.6–5.5)
SODIUM SERPL-SCNC: 138 MMOL/L (ref 135–145)

## 2022-09-20 PROCEDURE — 83735 ASSAY OF MAGNESIUM: CPT

## 2022-09-20 PROCEDURE — A9270 NON-COVERED ITEM OR SERVICE: HCPCS | Performed by: INTERNAL MEDICINE

## 2022-09-20 PROCEDURE — 700102 HCHG RX REV CODE 250 W/ 637 OVERRIDE(OP): Performed by: INTERNAL MEDICINE

## 2022-09-20 PROCEDURE — 700102 HCHG RX REV CODE 250 W/ 637 OVERRIDE(OP): Performed by: GENERAL PRACTICE

## 2022-09-20 PROCEDURE — 80048 BASIC METABOLIC PNL TOTAL CA: CPT

## 2022-09-20 PROCEDURE — RXMED WILLOW AMBULATORY MEDICATION CHARGE: Performed by: GENERAL PRACTICE

## 2022-09-20 PROCEDURE — 99233 SBSQ HOSP IP/OBS HIGH 50: CPT | Performed by: GENERAL PRACTICE

## 2022-09-20 PROCEDURE — A9270 NON-COVERED ITEM OR SERVICE: HCPCS | Performed by: GENERAL PRACTICE

## 2022-09-20 PROCEDURE — 94760 N-INVAS EAR/PLS OXIMETRY 1: CPT

## 2022-09-20 PROCEDURE — 93010 ELECTROCARDIOGRAM REPORT: CPT | Performed by: INTERNAL MEDICINE

## 2022-09-20 PROCEDURE — 770020 HCHG ROOM/CARE - TELE (206)

## 2022-09-20 PROCEDURE — 36415 COLL VENOUS BLD VENIPUNCTURE: CPT

## 2022-09-20 PROCEDURE — 84100 ASSAY OF PHOSPHORUS: CPT

## 2022-09-20 PROCEDURE — 700111 HCHG RX REV CODE 636 W/ 250 OVERRIDE (IP): Performed by: INTERNAL MEDICINE

## 2022-09-20 RX ORDER — GABAPENTIN 100 MG/1
100 CAPSULE ORAL 3 TIMES DAILY
Qty: 180 CAPSULE | Refills: 0 | Status: SHIPPED | OUTPATIENT
Start: 2022-09-20 | End: 2022-11-20

## 2022-09-20 RX ORDER — TRAMADOL HYDROCHLORIDE 50 MG/1
50 TABLET ORAL EVERY 6 HOURS PRN
Status: DISCONTINUED | OUTPATIENT
Start: 2022-09-20 | End: 2022-09-21 | Stop reason: HOSPADM

## 2022-09-20 RX ORDER — OXYCODONE HYDROCHLORIDE AND ACETAMINOPHEN 5; 325 MG/1; MG/1
1 TABLET ORAL EVERY 6 HOURS PRN
Status: DISCONTINUED | OUTPATIENT
Start: 2022-09-20 | End: 2022-09-21 | Stop reason: HOSPADM

## 2022-09-20 RX ORDER — LEVETIRACETAM 1000 MG/1
1000 TABLET ORAL 2 TIMES DAILY
Qty: 120 TABLET | Refills: 0 | Status: SHIPPED | OUTPATIENT
Start: 2022-09-20 | End: 2022-11-20

## 2022-09-20 RX ORDER — OMEPRAZOLE 40 MG/1
40 CAPSULE, DELAYED RELEASE ORAL 2 TIMES DAILY
Qty: 120 CAPSULE | Refills: 0 | Status: SHIPPED | OUTPATIENT
Start: 2022-09-20 | End: 2022-11-20

## 2022-09-20 RX ORDER — AMLODIPINE BESYLATE 5 MG/1
5 TABLET ORAL DAILY
Qty: 60 TABLET | Refills: 0 | Status: SHIPPED | OUTPATIENT
Start: 2022-09-20 | End: 2022-11-20

## 2022-09-20 RX ORDER — NICOTINE 21 MG/24HR
21 PATCH, TRANSDERMAL 24 HOURS TRANSDERMAL
Status: DISCONTINUED | OUTPATIENT
Start: 2022-09-20 | End: 2022-09-21 | Stop reason: HOSPADM

## 2022-09-20 RX ORDER — HYDROXYZINE HYDROCHLORIDE 25 MG/1
25 TABLET, FILM COATED ORAL 3 TIMES DAILY PRN
Status: DISCONTINUED | OUTPATIENT
Start: 2022-09-20 | End: 2022-09-21 | Stop reason: HOSPADM

## 2022-09-20 RX ORDER — LANOLIN ALCOHOL/MO/W.PET/CERES
100 CREAM (GRAM) TOPICAL DAILY
Qty: 60 TABLET | Refills: 0 | Status: SHIPPED | OUTPATIENT
Start: 2022-09-21 | End: 2022-11-20

## 2022-09-20 RX ADMIN — GABAPENTIN 100 MG: 100 CAPSULE ORAL at 11:08

## 2022-09-20 RX ADMIN — HYDROXYZINE HYDROCHLORIDE 25 MG: 25 TABLET, FILM COATED ORAL at 12:36

## 2022-09-20 RX ADMIN — OXYCODONE AND ACETAMINOPHEN 1 TABLET: 5; 325 TABLET ORAL at 18:04

## 2022-09-20 RX ADMIN — LEVETIRACETAM 1000 MG: 500 TABLET, FILM COATED ORAL at 17:24

## 2022-09-20 RX ADMIN — GABAPENTIN 100 MG: 100 CAPSULE ORAL at 05:55

## 2022-09-20 RX ADMIN — OMEPRAZOLE 40 MG: 20 CAPSULE, DELAYED RELEASE ORAL at 17:24

## 2022-09-20 RX ADMIN — LEVETIRACETAM 1000 MG: 500 TABLET, FILM COATED ORAL at 05:55

## 2022-09-20 RX ADMIN — FOLIC ACID 1 MG: 1 TABLET ORAL at 05:55

## 2022-09-20 RX ADMIN — OMEPRAZOLE 40 MG: 20 CAPSULE, DELAYED RELEASE ORAL at 05:55

## 2022-09-20 RX ADMIN — ONDANSETRON 4 MG: 4 TABLET, ORALLY DISINTEGRATING ORAL at 20:27

## 2022-09-20 RX ADMIN — OXYCODONE AND ACETAMINOPHEN 1 TABLET: 5; 325 TABLET ORAL at 11:07

## 2022-09-20 RX ADMIN — TRAMADOL HYDROCHLORIDE 50 MG: 50 TABLET, COATED ORAL at 13:47

## 2022-09-20 RX ADMIN — ENOXAPARIN SODIUM 40 MG: 100 INJECTION SUBCUTANEOUS at 17:23

## 2022-09-20 RX ADMIN — SENNOSIDES AND DOCUSATE SODIUM 2 TABLET: 50; 8.6 TABLET ORAL at 17:25

## 2022-09-20 RX ADMIN — TRAMADOL HYDROCHLORIDE 50 MG: 50 TABLET, COATED ORAL at 20:27

## 2022-09-20 RX ADMIN — GABAPENTIN 100 MG: 100 CAPSULE ORAL at 17:25

## 2022-09-20 RX ADMIN — AMLODIPINE BESYLATE 5 MG: 5 TABLET ORAL at 05:55

## 2022-09-20 RX ADMIN — LORATADINE 10 MG: 10 TABLET ORAL at 05:55

## 2022-09-20 RX ADMIN — BACLOFEN 10 MG: 10 TABLET ORAL at 05:55

## 2022-09-20 RX ADMIN — SENNOSIDES AND DOCUSATE SODIUM 2 TABLET: 50; 8.6 TABLET ORAL at 05:55

## 2022-09-20 RX ADMIN — THERA TABS 1 TABLET: TAB at 06:00

## 2022-09-20 RX ADMIN — LORAZEPAM 0.5 MG: 0.5 TABLET ORAL at 09:20

## 2022-09-20 RX ADMIN — NICOTINE TRANSDERMAL SYSTEM 21 MG: 21 PATCH, EXTENDED RELEASE TRANSDERMAL at 12:09

## 2022-09-20 RX ADMIN — THIAMINE HCL TAB 100 MG 100 MG: 100 TAB at 05:55

## 2022-09-20 ASSESSMENT — PATIENT HEALTH QUESTIONNAIRE - PHQ9
SUM OF ALL RESPONSES TO PHQ9 QUESTIONS 1 AND 2: 6
3. TROUBLE FALLING OR STAYING ASLEEP OR SLEEPING TOO MUCH: NEARLY EVERY DAY
4. FEELING TIRED OR HAVING LITTLE ENERGY: NEARLY EVERY DAY
6. FEELING BAD ABOUT YOURSELF - OR THAT YOU ARE A FAILURE OR HAVE LET YOURSELF OR YOUR FAMILY DOWN: NEARLY EVERY DAY
SUM OF ALL RESPONSES TO PHQ QUESTIONS 1-9: 23
5. POOR APPETITE OR OVEREATING: NEARLY EVERY DAY
8. MOVING OR SPEAKING SO SLOWLY THAT OTHER PEOPLE COULD HAVE NOTICED. OR THE OPPOSITE, BEING SO FIGETY OR RESTLESS THAT YOU HAVE BEEN MOVING AROUND A LOT MORE THAN USUAL: SEVERAL DAYS
7. TROUBLE CONCENTRATING ON THINGS, SUCH AS READING THE NEWSPAPER OR WATCHING TELEVISION: NEARLY EVERY DAY
SUM OF ALL RESPONSES TO PHQ9 QUESTIONS 1 AND 2: 0
2. FEELING DOWN, DEPRESSED, IRRITABLE, OR HOPELESS: NOT AT ALL
1. LITTLE INTEREST OR PLEASURE IN DOING THINGS: NOT AT ALL
9. THOUGHTS THAT YOU WOULD BE BETTER OFF DEAD, OR OF HURTING YOURSELF: SEVERAL DAYS
2. FEELING DOWN, DEPRESSED, IRRITABLE, OR HOPELESS: NEARLY EVERY DAY
1. LITTLE INTEREST OR PLEASURE IN DOING THINGS: NEARLY EVERY DAY

## 2022-09-20 ASSESSMENT — LIFESTYLE VARIABLES
TREMOR: TREMOR NOT VISIBLE BUT CAN BE FELT, FINGERTIP TO FINGERTIP
ORIENTATION AND CLOUDING OF SENSORIUM: ORIENTED AND CAN DO SERIAL ADDITIONS
TREMOR: *
PAROXYSMAL SWEATS: BARELY PERCEPTIBLE SWEATING. PALMS MOIST
AGITATION: NORMAL ACTIVITY
AUDITORY DISTURBANCES: NOT PRESENT
ORIENTATION AND CLOUDING OF SENSORIUM: ORIENTED AND CAN DO SERIAL ADDITIONS
AGITATION: NORMAL ACTIVITY
ORIENTATION AND CLOUDING OF SENSORIUM: ORIENTED AND CAN DO SERIAL ADDITIONS
ANXIETY: MILDLY ANXIOUS
PAROXYSMAL SWEATS: BARELY PERCEPTIBLE SWEATING. PALMS MOIST
HEADACHE, FULLNESS IN HEAD: NOT PRESENT
ORIENTATION AND CLOUDING OF SENSORIUM: ORIENTED AND CAN DO SERIAL ADDITIONS
ANXIETY: MILDLY ANXIOUS
HEADACHE, FULLNESS IN HEAD: NOT PRESENT
PAROXYSMAL SWEATS: NO SWEAT VISIBLE
VISUAL DISTURBANCES: NOT PRESENT
AGITATION: NORMAL ACTIVITY
ANXIETY: MILDLY ANXIOUS
PAROXYSMAL SWEATS: BARELY PERCEPTIBLE SWEATING. PALMS MOIST
NAUSEA AND VOMITING: NO NAUSEA AND NO VOMITING
TOTAL SCORE: 4
TOTAL SCORE: 1
PAROXYSMAL SWEATS: BARELY PERCEPTIBLE SWEATING. PALMS MOIST
AGITATION: NORMAL ACTIVITY
VISUAL DISTURBANCES: NOT PRESENT
PAROXYSMAL SWEATS: NO SWEAT VISIBLE
AUDITORY DISTURBANCES: NOT PRESENT
VISUAL DISTURBANCES: NOT PRESENT
TOTAL SCORE: 1
TREMOR: TREMOR NOT VISIBLE BUT CAN BE FELT, FINGERTIP TO FINGERTIP
VISUAL DISTURBANCES: NOT PRESENT
VISUAL DISTURBANCES: NOT PRESENT
ANXIETY: NO ANXIETY (AT EASE)
TOTAL SCORE: 4
ORIENTATION AND CLOUDING OF SENSORIUM: ORIENTED AND CAN DO SERIAL ADDITIONS
NAUSEA AND VOMITING: MILD NAUSEA WITH NO VOMITING
TOTAL SCORE: 3
ANXIETY: MILDLY ANXIOUS
ANXIETY: MILDLY ANXIOUS
TOTAL SCORE: 4
TREMOR: NO TREMOR
AUDITORY DISTURBANCES: NOT PRESENT
AGITATION: NORMAL ACTIVITY
TOTAL SCORE: 1
HEADACHE, FULLNESS IN HEAD: NOT PRESENT
HEADACHE, FULLNESS IN HEAD: NOT PRESENT
NAUSEA AND VOMITING: NO NAUSEA AND NO VOMITING
TOTAL SCORE: 5
VISUAL DISTURBANCES: NOT PRESENT
PAROXYSMAL SWEATS: BARELY PERCEPTIBLE SWEATING. PALMS MOIST
NAUSEA AND VOMITING: NO NAUSEA AND NO VOMITING
PAROXYSMAL SWEATS: NO SWEAT VISIBLE
AGITATION: NORMAL ACTIVITY
AUDITORY DISTURBANCES: NOT PRESENT
VISUAL DISTURBANCES: NOT PRESENT
AUDITORY DISTURBANCES: NOT PRESENT
VISUAL DISTURBANCES: NOT PRESENT
NAUSEA AND VOMITING: NO NAUSEA AND NO VOMITING
ORIENTATION AND CLOUDING OF SENSORIUM: ORIENTED AND CAN DO SERIAL ADDITIONS
ORIENTATION AND CLOUDING OF SENSORIUM: ORIENTED AND CAN DO SERIAL ADDITIONS
TREMOR: TREMOR NOT VISIBLE BUT CAN BE FELT, FINGERTIP TO FINGERTIP
ANXIETY: MILDLY ANXIOUS
NAUSEA AND VOMITING: MILD NAUSEA WITH NO VOMITING
AGITATION: NORMAL ACTIVITY
AGITATION: SOMEWHAT MORE THAN NORMAL ACTIVITY
TREMOR: TREMOR NOT VISIBLE BUT CAN BE FELT, FINGERTIP TO FINGERTIP
HEADACHE, FULLNESS IN HEAD: NOT PRESENT
AUDITORY DISTURBANCES: NOT PRESENT
NAUSEA AND VOMITING: MILD NAUSEA WITH NO VOMITING
HEADACHE, FULLNESS IN HEAD: NOT PRESENT
ORIENTATION AND CLOUDING OF SENSORIUM: ORIENTED AND CAN DO SERIAL ADDITIONS
HEADACHE, FULLNESS IN HEAD: NOT PRESENT
TREMOR: NO TREMOR
TREMOR: TREMOR NOT VISIBLE BUT CAN BE FELT, FINGERTIP TO FINGERTIP
NAUSEA AND VOMITING: NO NAUSEA AND NO VOMITING
HEADACHE, FULLNESS IN HEAD: NOT PRESENT
ANXIETY: MILDLY ANXIOUS

## 2022-09-20 ASSESSMENT — ENCOUNTER SYMPTOMS
NERVOUS/ANXIOUS: 1
TREMORS: 1

## 2022-09-20 ASSESSMENT — PAIN DESCRIPTION - PAIN TYPE
TYPE: CHRONIC PAIN;ACUTE PAIN
TYPE: ACUTE PAIN;CHRONIC PAIN
TYPE: ACUTE PAIN
TYPE: ACUTE PAIN;CHRONIC PAIN
TYPE: ACUTE PAIN
TYPE: ACUTE PAIN;CHRONIC PAIN

## 2022-09-20 NOTE — CARE PLAN
The patient is Stable - Low risk of patient condition declining or worsening    Shift Goals  Clinical Goals: monitor CIWA score and seizure activity  Patient Goals: rest and comfort    Progress made toward(s) clinical / shift goals:    Problem: Knowledge Deficit - Standard  Goal: Patient and family/care givers will demonstrate understanding of plan of care, disease process/condition, diagnostic tests and medications  Outcome: Progressing     Problem: Fall Risk  Goal: Patient will remain free from falls  Outcome: Progressing     Problem: Optimal Care for Alcohol Withdrawal  Goal: Optimal Care for the alcohol withdrawal patient  Outcome: Progressing     Problem: Seizure Precautions  Goal: Implementation of seizure precautions  Outcome: Progressing     Problem: Lifestyle Changes  Goal: Patient's ability to identify lifestyle changes and available resources to help reduce recurrence of condition will improve  Outcome: Progressing     Problem: Psychosocial  Goal: Patient's level of anxiety will decrease  Outcome: Progressing  Goal: Spiritual and cultural needs incorporated into hospitalization  Outcome: Progressing     Problem: Risk for Aspiration  Goal: Patient's risk for aspiration will be absent or decrease  Outcome: Progressing     Problem: Pain - Standard  Goal: Alleviation of pain or a reduction in pain to the patient’s comfort goal  Outcome: Progressing       Patient is not progressing towards the following goals:

## 2022-09-20 NOTE — PROGRESS NOTES
Pt sitting upright in bed when received. Complains of lower mid abd pain, tender with touch, bowel sounds are all present, pt has had a recent BM. US was negative for any abnormalities. Pt is drowsy, falls asleep while speaking with him. He states that no one has explained anything to him, pt was educated on the events that brought him to [facility] and verbalized understanding. Pt verbalizes needs well and demonstrates use of call bell. Call bell in reach    Chart check completed    2123 received a phone call that pt thought that he had a seizure  2124 arrived to room, pt was assisted to ground by CNA as he was trying to get out of bed. Pt was actively shaking and not responding to painful stimuli.   2125 Rapid response called  2126 Shaking stopped and pt responded to stimuli.  2128 Dr Ornelas arrived to [facility]        2344 post fall documentation completed; Pharmacy made aware of fall and the need to assess medications

## 2022-09-20 NOTE — PROGRESS NOTES
Telemetry Shift Summary    Rhythm SR/ST  HR Range   Ectopy rPVC  Measurements 0.16/0.10/0.30        Normal Values  Rhythm SR  HR Range    Measurements 0.12-0.20 / 0.06-0.10  / 0.30-0.52

## 2022-09-20 NOTE — PROGRESS NOTES
Rapid response has been called on this patient for a seizure.    52M, PMHx HTN, alcohol dependence, seizure disorder admitted 9/19 for alcohol withdrawal    I evaluated and examined the patient at bedside.  The patient is postictal.  Has tachycardia with elevated blood pressures consistent with alcohol withdrawal.  We will check sodium, calcium, magnesium, phosphorus in addition to potassium.  Patient is on CIWA protocol.  Apparently was on diazepam but this was discontinued this morning for concern for oversedation.  I will start low-dose diazepam.  I will give an additional dose of intravenous Keppra.   Continue to treat as alcohol withdrawal at this point with CIWA protocol.  Continue with daily evaluations consider further diagnostic testing including EEG/MRI if there is recurrence or more prolonged seizures outside the alcohol withdrawal window.
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
## 2022-09-20 NOTE — PROGRESS NOTES
Received bedside report.  Assumed pt care.   Assessment and chart check complete.  Pt is AA0X4, resting in bed, no signs of distress.  0810-rounds with pt, educated to call when getting up, offered urinal.  0815- Pt trying to get out of bed by self,  pt found on the floor, 2 RN's with PT put back on bed.   No signs of injury noted.  MD and family notified regarding fall.  Pharmacy notified.  Charge and supervisor aware.  Post fall documentation completed.  1000-noted seizure like activity, MD aware. VS taken. Pt alert and awake. Able to answer some questions.  Fall precautions in place, treaded socks on pt, bed in low position.   Call light within reach.   Educated pt to call if needing anything.   Hourly rounding.

## 2022-09-20 NOTE — CARE PLAN
The patient is Stable - Low risk of patient condition declining or worsening    Shift Goals  Clinical Goals: CIWA score will remain under 8 this shift  Patient Goals: Pt will rest this shift    Progress made toward(s) clinical / shift goals:        Patient is not progressing towards the following goals:      Problem: Knowledge Deficit - Standard  Goal: Patient and family/care givers will demonstrate understanding of plan of care, disease process/condition, diagnostic tests and medications  Outcome: Not Progressing     Problem: Optimal Care for Alcohol Withdrawal  Goal: Optimal Care for the alcohol withdrawal patient  Outcome: Not Progressing     Problem: Lifestyle Changes  Goal: Patient's ability to identify lifestyle changes and available resources to help reduce recurrence of condition will improve  Outcome: Not Progressing

## 2022-09-21 ENCOUNTER — PHARMACY VISIT (OUTPATIENT)
Dept: PHARMACY | Facility: MEDICAL CENTER | Age: 52
End: 2022-09-21
Payer: COMMERCIAL

## 2022-09-21 VITALS
TEMPERATURE: 98 F | RESPIRATION RATE: 20 BRPM | WEIGHT: 198.85 LBS | BODY MASS INDEX: 29.45 KG/M2 | DIASTOLIC BLOOD PRESSURE: 84 MMHG | HEART RATE: 107 BPM | OXYGEN SATURATION: 97 % | SYSTOLIC BLOOD PRESSURE: 128 MMHG | HEIGHT: 69 IN

## 2022-09-21 PROCEDURE — 94760 N-INVAS EAR/PLS OXIMETRY 1: CPT

## 2022-09-21 PROCEDURE — 700102 HCHG RX REV CODE 250 W/ 637 OVERRIDE(OP): Performed by: GENERAL PRACTICE

## 2022-09-21 PROCEDURE — A9270 NON-COVERED ITEM OR SERVICE: HCPCS | Performed by: INTERNAL MEDICINE

## 2022-09-21 PROCEDURE — A9270 NON-COVERED ITEM OR SERVICE: HCPCS | Performed by: GENERAL PRACTICE

## 2022-09-21 PROCEDURE — 99239 HOSP IP/OBS DSCHRG MGMT >30: CPT | Performed by: GENERAL PRACTICE

## 2022-09-21 PROCEDURE — 700102 HCHG RX REV CODE 250 W/ 637 OVERRIDE(OP): Performed by: INTERNAL MEDICINE

## 2022-09-21 RX ADMIN — OXYCODONE AND ACETAMINOPHEN 1 TABLET: 5; 325 TABLET ORAL at 00:30

## 2022-09-21 RX ADMIN — LORATADINE 10 MG: 10 TABLET ORAL at 05:46

## 2022-09-21 RX ADMIN — LEVETIRACETAM 1000 MG: 500 TABLET, FILM COATED ORAL at 05:45

## 2022-09-21 RX ADMIN — TRAMADOL HYDROCHLORIDE 50 MG: 50 TABLET, COATED ORAL at 05:46

## 2022-09-21 RX ADMIN — FOLIC ACID 1 MG: 1 TABLET ORAL at 05:46

## 2022-09-21 RX ADMIN — HYDROXYZINE HYDROCHLORIDE 25 MG: 25 TABLET, FILM COATED ORAL at 05:45

## 2022-09-21 RX ADMIN — THIAMINE HCL TAB 100 MG 100 MG: 100 TAB at 05:46

## 2022-09-21 RX ADMIN — THERA TABS 1 TABLET: TAB at 05:46

## 2022-09-21 RX ADMIN — NICOTINE TRANSDERMAL SYSTEM 21 MG: 21 PATCH, EXTENDED RELEASE TRANSDERMAL at 05:45

## 2022-09-21 RX ADMIN — SENNOSIDES AND DOCUSATE SODIUM 2 TABLET: 50; 8.6 TABLET ORAL at 05:46

## 2022-09-21 RX ADMIN — AMLODIPINE BESYLATE 5 MG: 5 TABLET ORAL at 05:46

## 2022-09-21 RX ADMIN — OMEPRAZOLE 40 MG: 20 CAPSULE, DELAYED RELEASE ORAL at 05:46

## 2022-09-21 RX ADMIN — GABAPENTIN 100 MG: 100 CAPSULE ORAL at 05:46

## 2022-09-21 ASSESSMENT — PAIN DESCRIPTION - PAIN TYPE
TYPE: ACUTE PAIN

## 2022-09-21 ASSESSMENT — LIFESTYLE VARIABLES
AGITATION: NORMAL ACTIVITY
HEADACHE, FULLNESS IN HEAD: NOT PRESENT
TOTAL SCORE: 1
NAUSEA AND VOMITING: NO NAUSEA AND NO VOMITING
ORIENTATION AND CLOUDING OF SENSORIUM: ORIENTED AND CAN DO SERIAL ADDITIONS
AUDITORY DISTURBANCES: NOT PRESENT
ANXIETY: NO ANXIETY (AT EASE)
PAROXYSMAL SWEATS: NO SWEAT VISIBLE
TREMOR: TREMOR NOT VISIBLE BUT CAN BE FELT, FINGERTIP TO FINGERTIP
VISUAL DISTURBANCES: NOT PRESENT

## 2022-09-21 NOTE — PROGRESS NOTES
Assumed care of patient. Bedside report received from night shift RN. Patient updated on plan of care. Safety sitter present. Patient instructed to call for assistance before getting out of bed. Patient verbalized understanding. Call light is within reach and fall precautions are in place. All needs met at this time. Hourly rounding in place.

## 2022-09-21 NOTE — PROGRESS NOTES
Tele strip printed at 8193     Rhythm: ST   HR: 102  Measurements: 0.18/0.08/0.32        Telemetry Shift Summary     Rhythm: SR/ST  HR Range: 90's-100's  Ectopy: None     Telemetry monitoring strips placed in pt's chart.

## 2022-09-21 NOTE — CARE PLAN
The patient is Stable - Low risk of patient condition declining or worsening    Shift Goals  Clinical Goals: Patient will remain free from falls and injury  Patient Goals: Patient will discharge as soon as possible    Progress made toward(s) clinical / shift goals:  Patient has bed alarm and all agreeable fall precautions; refusing treaded socks and yellow wristband. Patient has a steady gait, but instructed to call for standby assist and patient calls appropriately. Patient will discharge home when meds to beds is delivered. Social work delivered taxi voucher for patient.     Patient is not progressing towards the following goals:

## 2022-09-21 NOTE — CARE PLAN
The patient is Stable - Low risk of patient condition declining or worsening    Shift Goals  Clinical Goals: Pt CIWA score will remain below 5 overnight. Pt pain will be tolerabel AEB pt receiving at least 5 hours of sleep overnight.  Patient Goals: adequate pain control    Progress made toward(s) clinical / shift goals:  Pt CIWA remained at 1 overnight. Pt was able to sleep from 6385-3289 and then from 0100 to current time.    Patient is not progressing towards the following goals: n/a

## 2022-09-21 NOTE — CARE PLAN
The patient is Stable - Low risk of patient condition declining or worsening    Shift Goals  Clinical Goals: Monitor for CIWA score this shift  Patient Goals: adequate pain control    Progress made toward(s) clinical / shift goals:  Fall precaution in place. On CIWA protocol scoring 3. PRN medication given for pain control.    Patient is not progressing towards the following goals:      Problem: Seizure Precautions  Goal: Implementation of seizure precautions  Outcome: Progressing     Problem: Pain - Standard  Goal: Alleviation of pain or a reduction in pain to the patient’s comfort goal  Outcome: Progressing

## 2022-09-21 NOTE — DISCHARGE INSTRUCTIONS
Discharge Instructions    Discharged to home by taxi with self. Discharged via wheelchair, hospital escort: Yes.  Special equipment needed: Not Applicable    Be sure to schedule a follow-up appointment with your primary care doctor or any specialists as instructed.     Discharge Plan:   Diet Plan: Discussed  Activity Level: Discussed  Confirmed Follow up Appointment: Patient to Call and Schedule Appointment  Confirmed Symptoms Management: Discussed  Medication Reconciliation Updated: Yes  Influenza Vaccine Indication: Patient Refuses    I understand that a diet low in cholesterol, fat, and sodium is recommended for good health. Unless I have been given specific instructions below for another diet, I accept this instruction as my diet prescription.   Other diet: Regular    Special Instructions: None    -Is this patient being discharged with medication to prevent blood clots?  No    Is patient discharged on Warfarin / Coumadin?   No

## 2022-09-21 NOTE — PROGRESS NOTES
Assumed care of pt at 1915. Received report from Alice SHIRLEY. Pt resting in bed with sitter at bedside for pt safety. Pt stated pain is 7/10, medicated per MAR. Pt also complaining of nausea, medicated per MAR. Attempted PIV placement X3 and pt refused to have another attempt at this time. Gave pt more snacks per pt request. Discussed plan for the night including monitoring pain, CIWA score and seizure activity. No other needs at this time, call light in reach, bed in lowest position.

## 2022-09-21 NOTE — PROGRESS NOTES
Telemetry Shift Summary     Rhythm: SR/ST  HR:   Ectopy: none    Measurements: 0.14/0.08/0.36    Normal Values  Rhythm: SR  HR:   Measurements: 0.12-0.20/0.08-0.10/0.30-0.52

## 2022-09-21 NOTE — PROGRESS NOTES
Patient is being discharged home. Patient is A&Ox4. No IV to be removed. Discharge instructions provided to patient and reviewed. Patient verbalized understanding and all questions and concerns were addressed. All belongings were returned and taken with. Taxi cab called for patient by this RN and patient given taxi voucher. Patient escorted off unit by ANDREA Jackson.

## 2022-09-21 NOTE — DISCHARGE PLANNING
Case Management Discharge Planning    Admission Date: 9/19/2022  GMLOS: 3.4  ALOS: 2    6-Clicks ADL Score: 18  6-Clicks Mobility Score: 18      Anticipated Discharge Dispo: Discharge Disposition: Discharged to home/self care (01)    DME Needed: No    Action(s) Taken:     [facility] Meds to beds will be delivered by noon.  Cab voucher delivered to bedside RN Pina as requested.    Escalations Completed: None    Medically Clear: Yes    Next Steps: DC home today    Barriers to Discharge: None    Is the patient up for discharge tomorrow: No, today
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
## 2022-09-23 ENCOUNTER — APPOINTMENT (OUTPATIENT)
Dept: RADIOLOGY | Facility: MEDICAL CENTER | Age: 52
End: 2022-09-23
Attending: STUDENT IN AN ORGANIZED HEALTH CARE EDUCATION/TRAINING PROGRAM
Payer: MEDICARE

## 2022-09-23 ENCOUNTER — HOSPITAL ENCOUNTER (EMERGENCY)
Facility: MEDICAL CENTER | Age: 52
End: 2022-09-24
Attending: STUDENT IN AN ORGANIZED HEALTH CARE EDUCATION/TRAINING PROGRAM
Payer: MEDICARE

## 2022-09-23 DIAGNOSIS — F10.920 ACUTE ALCOHOLIC INTOXICATION WITHOUT COMPLICATION (HCC): ICD-10-CM

## 2022-09-23 PROCEDURE — 36415 COLL VENOUS BLD VENIPUNCTURE: CPT

## 2022-09-23 PROCEDURE — 99285 EMERGENCY DEPT VISIT HI MDM: CPT

## 2022-09-23 RX ORDER — SODIUM CHLORIDE 9 MG/ML
1000 INJECTION, SOLUTION INTRAVENOUS ONCE
Status: COMPLETED | OUTPATIENT
Start: 2022-09-24 | End: 2022-09-24

## 2022-09-24 ENCOUNTER — APPOINTMENT (OUTPATIENT)
Dept: RADIOLOGY | Facility: MEDICAL CENTER | Age: 52
End: 2022-09-24
Attending: STUDENT IN AN ORGANIZED HEALTH CARE EDUCATION/TRAINING PROGRAM
Payer: MEDICARE

## 2022-09-24 VITALS
DIASTOLIC BLOOD PRESSURE: 82 MMHG | HEIGHT: 67 IN | TEMPERATURE: 98.2 F | WEIGHT: 180 LBS | OXYGEN SATURATION: 93 % | SYSTOLIC BLOOD PRESSURE: 145 MMHG | RESPIRATION RATE: 18 BRPM | BODY MASS INDEX: 28.25 KG/M2 | HEART RATE: 109 BPM

## 2022-09-24 LAB
ALBUMIN SERPL BCP-MCNC: 3.9 G/DL (ref 3.2–4.9)
ALBUMIN/GLOB SERPL: 1.1 G/DL
ALP SERPL-CCNC: 106 U/L (ref 30–99)
ALT SERPL-CCNC: 30 U/L (ref 2–50)
ANION GAP SERPL CALC-SCNC: 16 MMOL/L (ref 7–16)
AST SERPL-CCNC: 42 U/L (ref 12–45)
BASOPHILS # BLD AUTO: 1.3 % (ref 0–1.8)
BASOPHILS # BLD: 0.11 K/UL (ref 0–0.12)
BILIRUB SERPL-MCNC: <0.2 MG/DL (ref 0.1–1.5)
BUN SERPL-MCNC: 5 MG/DL (ref 8–22)
CALCIUM SERPL-MCNC: 8.2 MG/DL (ref 8.5–10.5)
CHLORIDE SERPL-SCNC: 106 MMOL/L (ref 96–112)
CO2 SERPL-SCNC: 21 MMOL/L (ref 20–33)
CREAT SERPL-MCNC: 0.73 MG/DL (ref 0.5–1.4)
EOSINOPHIL # BLD AUTO: 0.55 K/UL (ref 0–0.51)
EOSINOPHIL NFR BLD: 6.5 % (ref 0–6.9)
ERYTHROCYTE [DISTWIDTH] IN BLOOD BY AUTOMATED COUNT: 44.1 FL (ref 35.9–50)
ETHANOL BLD-MCNC: 299.9 MG/DL
GFR SERPLBLD CREATININE-BSD FMLA CKD-EPI: 111 ML/MIN/1.73 M 2
GLOBULIN SER CALC-MCNC: 3.4 G/DL (ref 1.9–3.5)
GLUCOSE SERPL-MCNC: 101 MG/DL (ref 65–99)
HCT VFR BLD AUTO: 43.2 % (ref 42–52)
HGB BLD-MCNC: 14.3 G/DL (ref 14–18)
IMM GRANULOCYTES # BLD AUTO: 0.04 K/UL (ref 0–0.11)
IMM GRANULOCYTES NFR BLD AUTO: 0.5 % (ref 0–0.9)
LYMPHOCYTES # BLD AUTO: 3 K/UL (ref 1–4.8)
LYMPHOCYTES NFR BLD: 35.7 % (ref 22–41)
MCH RBC QN AUTO: 28.4 PG (ref 27–33)
MCHC RBC AUTO-ENTMCNC: 33.1 G/DL (ref 33.7–35.3)
MCV RBC AUTO: 85.7 FL (ref 81.4–97.8)
MONOCYTES # BLD AUTO: 0.45 K/UL (ref 0–0.85)
MONOCYTES NFR BLD AUTO: 5.4 % (ref 0–13.4)
NEUTROPHILS # BLD AUTO: 4.25 K/UL (ref 1.82–7.42)
NEUTROPHILS NFR BLD: 50.6 % (ref 44–72)
NRBC # BLD AUTO: 0 K/UL
NRBC BLD-RTO: 0 /100 WBC
PLATELET # BLD AUTO: 286 K/UL (ref 164–446)
PMV BLD AUTO: 8.8 FL (ref 9–12.9)
POTASSIUM SERPL-SCNC: 3.8 MMOL/L (ref 3.6–5.5)
PROT SERPL-MCNC: 7.3 G/DL (ref 6–8.2)
RBC # BLD AUTO: 5.04 M/UL (ref 4.7–6.1)
SODIUM SERPL-SCNC: 143 MMOL/L (ref 135–145)
WBC # BLD AUTO: 8.4 K/UL (ref 4.8–10.8)

## 2022-09-24 PROCEDURE — 36415 COLL VENOUS BLD VENIPUNCTURE: CPT

## 2022-09-24 PROCEDURE — 700111 HCHG RX REV CODE 636 W/ 250 OVERRIDE (IP): Performed by: STUDENT IN AN ORGANIZED HEALTH CARE EDUCATION/TRAINING PROGRAM

## 2022-09-24 PROCEDURE — 82077 ASSAY SPEC XCP UR&BREATH IA: CPT

## 2022-09-24 PROCEDURE — 71045 X-RAY EXAM CHEST 1 VIEW: CPT

## 2022-09-24 PROCEDURE — 80053 COMPREHEN METABOLIC PANEL: CPT

## 2022-09-24 PROCEDURE — 96374 THER/PROPH/DIAG INJ IV PUSH: CPT

## 2022-09-24 PROCEDURE — 85025 COMPLETE CBC W/AUTO DIFF WBC: CPT

## 2022-09-24 PROCEDURE — 70450 CT HEAD/BRAIN W/O DYE: CPT

## 2022-09-24 PROCEDURE — 700105 HCHG RX REV CODE 258: Performed by: STUDENT IN AN ORGANIZED HEALTH CARE EDUCATION/TRAINING PROGRAM

## 2022-09-24 RX ORDER — MIDAZOLAM HYDROCHLORIDE 1 MG/ML
4 INJECTION INTRAMUSCULAR; INTRAVENOUS ONCE
Status: COMPLETED | OUTPATIENT
Start: 2022-09-24 | End: 2022-09-24

## 2022-09-24 RX ADMIN — SODIUM CHLORIDE 1000 ML: 9 INJECTION, SOLUTION INTRAVENOUS at 01:50

## 2022-09-24 RX ADMIN — MIDAZOLAM HYDROCHLORIDE 2 MG: 1 INJECTION, SOLUTION INTRAMUSCULAR; INTRAVENOUS at 00:45

## 2022-09-24 NOTE — ED NOTES
Unable to obtain med rec at this time  Patient is combative and unable to participate in interview
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
## 2022-09-24 NOTE — ED TRIAGE NOTES
Mai Twenty-Nine  89 y.o.  Chief Complaint   Patient presents with    Suicidal Ideation    ETOH Intoxication     4 375 ml black velvet and 2 tall 4 locos     BIBA for above, pt endorsing SI with EMS, unable to assess for plan due to sedation. Pt given 2.5mg versed en route. Pt refusing or unable to answer questions at this time.

## 2022-09-25 ENCOUNTER — HOSPITAL ENCOUNTER (EMERGENCY)
Facility: MEDICAL CENTER | Age: 52
End: 2022-09-25
Attending: EMERGENCY MEDICINE
Payer: COMMERCIAL

## 2022-09-25 ENCOUNTER — HOSPITAL ENCOUNTER (EMERGENCY)
Facility: MEDICAL CENTER | Age: 52
End: 2022-09-26
Attending: STUDENT IN AN ORGANIZED HEALTH CARE EDUCATION/TRAINING PROGRAM
Payer: COMMERCIAL

## 2022-09-25 VITALS
HEART RATE: 103 BPM | RESPIRATION RATE: 16 BRPM | OXYGEN SATURATION: 93 % | BODY MASS INDEX: 24.38 KG/M2 | HEIGHT: 72 IN | DIASTOLIC BLOOD PRESSURE: 89 MMHG | TEMPERATURE: 98.3 F | SYSTOLIC BLOOD PRESSURE: 128 MMHG | WEIGHT: 180 LBS

## 2022-09-25 DIAGNOSIS — F10.920 ALCOHOLIC INTOXICATION WITHOUT COMPLICATION (HCC): ICD-10-CM

## 2022-09-25 DIAGNOSIS — F10.920 ALCOHOLIC INTOXICATION WITHOUT COMPLICATION (HCC): Primary | ICD-10-CM

## 2022-09-25 DIAGNOSIS — R45.851 SUICIDAL IDEATION: ICD-10-CM

## 2022-09-25 DIAGNOSIS — F10.10 ALCOHOL ABUSE: ICD-10-CM

## 2022-09-25 LAB
ALBUMIN SERPL BCP-MCNC: 4.2 G/DL (ref 3.2–4.9)
ALBUMIN/GLOB SERPL: 1.1 G/DL
ALP SERPL-CCNC: 118 U/L (ref 30–99)
ALT SERPL-CCNC: 28 U/L (ref 2–50)
ANION GAP SERPL CALC-SCNC: 15 MMOL/L (ref 7–16)
AST SERPL-CCNC: 37 U/L (ref 12–45)
BASOPHILS # BLD AUTO: 1.6 % (ref 0–1.8)
BASOPHILS # BLD: 0.12 K/UL (ref 0–0.12)
BILIRUB SERPL-MCNC: 0.2 MG/DL (ref 0.1–1.5)
BUN SERPL-MCNC: 3 MG/DL (ref 8–22)
CALCIUM SERPL-MCNC: 8.6 MG/DL (ref 8.5–10.5)
CHLORIDE SERPL-SCNC: 107 MMOL/L (ref 96–112)
CO2 SERPL-SCNC: 22 MMOL/L (ref 20–33)
CREAT SERPL-MCNC: 0.81 MG/DL (ref 0.5–1.4)
EOSINOPHIL # BLD AUTO: 0.32 K/UL (ref 0–0.51)
EOSINOPHIL NFR BLD: 4.1 % (ref 0–6.9)
ERYTHROCYTE [DISTWIDTH] IN BLOOD BY AUTOMATED COUNT: 42.8 FL (ref 35.9–50)
GFR SERPLBLD CREATININE-BSD FMLA CKD-EPI: 107 ML/MIN/1.73 M 2
GLOBULIN SER CALC-MCNC: 3.7 G/DL (ref 1.9–3.5)
GLUCOSE SERPL-MCNC: 110 MG/DL (ref 65–99)
HCT VFR BLD AUTO: 46.7 % (ref 42–52)
HGB BLD-MCNC: 15.5 G/DL (ref 14–18)
IMM GRANULOCYTES # BLD AUTO: 0.02 K/UL (ref 0–0.11)
IMM GRANULOCYTES NFR BLD AUTO: 0.3 % (ref 0–0.9)
LIPASE SERPL-CCNC: 34 U/L (ref 11–82)
LYMPHOCYTES # BLD AUTO: 3.59 K/UL (ref 1–4.8)
LYMPHOCYTES NFR BLD: 46.4 % (ref 22–41)
MCH RBC QN AUTO: 27.9 PG (ref 27–33)
MCHC RBC AUTO-ENTMCNC: 33.2 G/DL (ref 33.7–35.3)
MCV RBC AUTO: 84.1 FL (ref 81.4–97.8)
MONOCYTES # BLD AUTO: 0.52 K/UL (ref 0–0.85)
MONOCYTES NFR BLD AUTO: 6.7 % (ref 0–13.4)
NEUTROPHILS # BLD AUTO: 3.16 K/UL (ref 1.82–7.42)
NEUTROPHILS NFR BLD: 40.9 % (ref 44–72)
NRBC # BLD AUTO: 0 K/UL
NRBC BLD-RTO: 0 /100 WBC
PLATELET # BLD AUTO: 276 K/UL (ref 164–446)
PMV BLD AUTO: 8.6 FL (ref 9–12.9)
POC BREATHALIZER: 0.23 PERCENT (ref 0–0.01)
POC BREATHALIZER: 0.24 PERCENT (ref 0–0.01)
POTASSIUM SERPL-SCNC: 4.1 MMOL/L (ref 3.6–5.5)
PROT SERPL-MCNC: 7.9 G/DL (ref 6–8.2)
RBC # BLD AUTO: 5.55 M/UL (ref 4.7–6.1)
SODIUM SERPL-SCNC: 144 MMOL/L (ref 135–145)
WBC # BLD AUTO: 7.7 K/UL (ref 4.8–10.8)

## 2022-09-25 PROCEDURE — 99285 EMERGENCY DEPT VISIT HI MDM: CPT

## 2022-09-25 PROCEDURE — 80053 COMPREHEN METABOLIC PANEL: CPT

## 2022-09-25 PROCEDURE — 96374 THER/PROPH/DIAG INJ IV PUSH: CPT

## 2022-09-25 PROCEDURE — 96375 TX/PRO/DX INJ NEW DRUG ADDON: CPT

## 2022-09-25 PROCEDURE — 302970 POC BREATHALIZER: Performed by: EMERGENCY MEDICINE

## 2022-09-25 PROCEDURE — 85025 COMPLETE CBC W/AUTO DIFF WBC: CPT

## 2022-09-25 PROCEDURE — 36415 COLL VENOUS BLD VENIPUNCTURE: CPT

## 2022-09-25 PROCEDURE — 700111 HCHG RX REV CODE 636 W/ 250 OVERRIDE (IP): Performed by: EMERGENCY MEDICINE

## 2022-09-25 PROCEDURE — 700105 HCHG RX REV CODE 258: Performed by: EMERGENCY MEDICINE

## 2022-09-25 PROCEDURE — 83690 ASSAY OF LIPASE: CPT

## 2022-09-25 PROCEDURE — 700111 HCHG RX REV CODE 636 W/ 250 OVERRIDE (IP): Performed by: STUDENT IN AN ORGANIZED HEALTH CARE EDUCATION/TRAINING PROGRAM

## 2022-09-25 PROCEDURE — 94760 N-INVAS EAR/PLS OXIMETRY 1: CPT

## 2022-09-25 PROCEDURE — 96372 THER/PROPH/DIAG INJ SC/IM: CPT

## 2022-09-25 PROCEDURE — 302970 POC BREATHALIZER: Performed by: STUDENT IN AN ORGANIZED HEALTH CARE EDUCATION/TRAINING PROGRAM

## 2022-09-25 RX ORDER — HALOPERIDOL 5 MG/ML
5 INJECTION INTRAMUSCULAR ONCE
Status: COMPLETED | OUTPATIENT
Start: 2022-09-25 | End: 2022-09-25

## 2022-09-25 RX ORDER — SODIUM CHLORIDE 9 MG/ML
1000 INJECTION, SOLUTION INTRAVENOUS ONCE
Status: COMPLETED | OUTPATIENT
Start: 2022-09-25 | End: 2022-09-25

## 2022-09-25 RX ORDER — ONDANSETRON 2 MG/ML
4 INJECTION INTRAMUSCULAR; INTRAVENOUS ONCE
Status: COMPLETED | OUTPATIENT
Start: 2022-09-25 | End: 2022-09-25

## 2022-09-25 RX ORDER — LORAZEPAM 2 MG/ML
1 INJECTION INTRAMUSCULAR ONCE
Status: COMPLETED | OUTPATIENT
Start: 2022-09-25 | End: 2022-09-25

## 2022-09-25 RX ORDER — BACLOFEN 10 MG/1
10 TABLET ORAL 3 TIMES DAILY PRN
Status: SHIPPED | COMMUNITY
End: 2023-12-01

## 2022-09-25 RX ADMIN — SODIUM CHLORIDE 1000 ML: 9 INJECTION, SOLUTION INTRAVENOUS at 18:39

## 2022-09-25 RX ADMIN — LORAZEPAM 1 MG: 2 INJECTION INTRAMUSCULAR; INTRAVENOUS at 18:40

## 2022-09-25 RX ADMIN — ONDANSETRON 4 MG: 2 INJECTION INTRAMUSCULAR; INTRAVENOUS at 18:39

## 2022-09-25 RX ADMIN — HALOPERIDOL LACTATE 5 MG: 5 INJECTION, SOLUTION INTRAMUSCULAR at 21:33

## 2022-09-25 ASSESSMENT — ENCOUNTER SYMPTOMS
VOMITING: 0
DEPRESSION: 1
NAUSEA: 1
FEVER: 0
ABDOMINAL PAIN: 1
DIARRHEA: 0
CHILLS: 0

## 2022-09-25 ASSESSMENT — LIFESTYLE VARIABLES
TOTAL SCORE: 4
DO YOU DRINK ALCOHOL: YES
SUBSTANCE_ABUSE: 1
HAVE PEOPLE ANNOYED YOU BY CRITICIZING YOUR DRINKING: YES
EVER HAD A DRINK FIRST THING IN THE MORNING TO STEADY YOUR NERVES TO GET RID OF A HANGOVER: YES
TOTAL SCORE: 4
CONSUMPTION TOTAL: INCOMPLETE
TOTAL SCORE: 4
EVER FELT BAD OR GUILTY ABOUT YOUR DRINKING: YES
HAVE YOU EVER FELT YOU SHOULD CUT DOWN ON YOUR DRINKING: YES

## 2022-09-25 ASSESSMENT — FIBROSIS 4 INDEX
FIB4 SCORE: 1.27
FIB4 SCORE: 1.34

## 2022-09-26 VITALS
HEART RATE: 82 BPM | BODY MASS INDEX: 27.43 KG/M2 | HEIGHT: 69 IN | RESPIRATION RATE: 18 BRPM | DIASTOLIC BLOOD PRESSURE: 85 MMHG | TEMPERATURE: 97.5 F | SYSTOLIC BLOOD PRESSURE: 144 MMHG | OXYGEN SATURATION: 95 % | WEIGHT: 185.19 LBS

## 2022-09-26 LAB
AMPHET UR QL SCN: NEGATIVE
BARBITURATES UR QL SCN: NEGATIVE
BENZODIAZ UR QL SCN: POSITIVE
BZE UR QL SCN: NEGATIVE
CANNABINOIDS UR QL SCN: NEGATIVE
METHADONE UR QL SCN: NEGATIVE
OPIATES UR QL SCN: NEGATIVE
OXYCODONE UR QL SCN: NEGATIVE
PCP UR QL SCN: NEGATIVE
PROPOXYPH UR QL SCN: NEGATIVE

## 2022-09-26 PROCEDURE — A9270 NON-COVERED ITEM OR SERVICE: HCPCS | Performed by: STUDENT IN AN ORGANIZED HEALTH CARE EDUCATION/TRAINING PROGRAM

## 2022-09-26 PROCEDURE — 90791 PSYCH DIAGNOSTIC EVALUATION: CPT

## 2022-09-26 PROCEDURE — 700102 HCHG RX REV CODE 250 W/ 637 OVERRIDE(OP): Performed by: STUDENT IN AN ORGANIZED HEALTH CARE EDUCATION/TRAINING PROGRAM

## 2022-09-26 PROCEDURE — 80307 DRUG TEST PRSMV CHEM ANLYZR: CPT

## 2022-09-26 RX ORDER — LORAZEPAM 2 MG/1
2 TABLET ORAL
Status: DISCONTINUED | OUTPATIENT
Start: 2022-09-26 | End: 2022-09-26 | Stop reason: HOSPADM

## 2022-09-26 RX ORDER — GAUZE BANDAGE 2" X 2"
100 BANDAGE TOPICAL DAILY
Status: DISCONTINUED | OUTPATIENT
Start: 2022-09-26 | End: 2022-09-26 | Stop reason: HOSPADM

## 2022-09-26 RX ORDER — OMEPRAZOLE 20 MG/1
40 CAPSULE, DELAYED RELEASE ORAL 2 TIMES DAILY
Refills: 0 | Status: DISCONTINUED | OUTPATIENT
Start: 2022-09-26 | End: 2022-09-26 | Stop reason: HOSPADM

## 2022-09-26 RX ORDER — GABAPENTIN 100 MG/1
100 CAPSULE ORAL 3 TIMES DAILY
Status: DISCONTINUED | OUTPATIENT
Start: 2022-09-26 | End: 2022-09-26 | Stop reason: HOSPADM

## 2022-09-26 RX ORDER — LORAZEPAM 2 MG/1
4 TABLET ORAL
Status: DISCONTINUED | OUTPATIENT
Start: 2022-09-26 | End: 2022-09-26 | Stop reason: HOSPADM

## 2022-09-26 RX ORDER — LEVETIRACETAM 500 MG/1
1000 TABLET ORAL 2 TIMES DAILY
Refills: 0 | Status: DISCONTINUED | OUTPATIENT
Start: 2022-09-26 | End: 2022-09-26 | Stop reason: HOSPADM

## 2022-09-26 RX ORDER — AMLODIPINE BESYLATE 5 MG/1
5 TABLET ORAL DAILY
Status: DISCONTINUED | OUTPATIENT
Start: 2022-09-26 | End: 2022-09-26 | Stop reason: HOSPADM

## 2022-09-26 RX ORDER — BACLOFEN 10 MG/1
10 TABLET ORAL 3 TIMES DAILY
Status: DISCONTINUED | OUTPATIENT
Start: 2022-09-26 | End: 2022-09-26 | Stop reason: HOSPADM

## 2022-09-26 RX ORDER — LORAZEPAM 1 MG/1
0.5 TABLET ORAL EVERY 4 HOURS PRN
Status: DISCONTINUED | OUTPATIENT
Start: 2022-09-26 | End: 2022-09-26 | Stop reason: HOSPADM

## 2022-09-26 RX ORDER — CHLORDIAZEPOXIDE HYDROCHLORIDE 25 MG/1
25 CAPSULE, GELATIN COATED ORAL ONCE
Status: COMPLETED | OUTPATIENT
Start: 2022-09-26 | End: 2022-09-26

## 2022-09-26 RX ORDER — DIAZEPAM 5 MG/ML
5 INJECTION, SOLUTION INTRAMUSCULAR; INTRAVENOUS
Status: DISCONTINUED | OUTPATIENT
Start: 2022-09-26 | End: 2022-09-26 | Stop reason: HOSPADM

## 2022-09-26 RX ORDER — LORAZEPAM 1 MG/1
1 TABLET ORAL EVERY 4 HOURS PRN
Status: DISCONTINUED | OUTPATIENT
Start: 2022-09-26 | End: 2022-09-26 | Stop reason: HOSPADM

## 2022-09-26 RX ADMIN — GABAPENTIN 100 MG: 100 CAPSULE ORAL at 06:00

## 2022-09-26 RX ADMIN — CHLORDIAZEPOXIDE HYDROCHLORIDE 25 MG: 25 CAPSULE ORAL at 04:38

## 2022-09-26 RX ADMIN — AMLODIPINE BESYLATE 5 MG: 5 TABLET ORAL at 06:00

## 2022-09-26 RX ADMIN — Medication 100 MG: at 06:00

## 2022-09-26 RX ADMIN — OMEPRAZOLE 40 MG: 20 CAPSULE, DELAYED RELEASE ORAL at 06:00

## 2022-09-26 RX ADMIN — BACLOFEN 10 MG: 10 TABLET ORAL at 05:00

## 2022-09-26 RX ADMIN — LEVETIRACETAM 1000 MG: 500 TABLET, FILM COATED ORAL at 06:00

## 2022-09-26 ASSESSMENT — LIFESTYLE VARIABLES
TOTAL SCORE: 4
EVER FELT BAD OR GUILTY ABOUT YOUR DRINKING: YES
HOW MANY TIMES IN THE PAST YEAR HAVE YOU HAD 5 OR MORE DRINKS IN A DAY: 365
ANXIETY: MILDLY ANXIOUS
AUDITORY DISTURBANCES: NOT PRESENT
CONSUMPTION TOTAL: POSITIVE
TOTAL SCORE: 4
DO YOU DRINK ALCOHOL: YES
NAUSEA AND VOMITING: MILD NAUSEA WITH NO VOMITING
VISUAL DISTURBANCES: NOT PRESENT
ORIENTATION AND CLOUDING OF SENSORIUM: ORIENTED AND CAN DO SERIAL ADDITIONS
EVER HAD A DRINK FIRST THING IN THE MORNING TO STEADY YOUR NERVES TO GET RID OF A HANGOVER: YES
HEADACHE, FULLNESS IN HEAD: NOT PRESENT
HAVE PEOPLE ANNOYED YOU BY CRITICIZING YOUR DRINKING: YES
PAROXYSMAL SWEATS: NO SWEAT VISIBLE
TOTAL SCORE: 4
TREMOR: TREMOR NOT VISIBLE BUT CAN BE FELT, FINGERTIP TO FINGERTIP
TOTAL SCORE: 4
TACTILE DISTURBANCES: VERY MILD ITCHING, PINS AND NEEDLES SENSATION, BURNING OR NUMBNESS
AVERAGE NUMBER OF DAYS PER WEEK YOU HAVE A DRINK CONTAINING ALCOHOL: 7
AGITATION: NORMAL ACTIVITY
ON A TYPICAL DAY WHEN YOU DRINK ALCOHOL HOW MANY DRINKS DO YOU HAVE: 10
HAVE YOU EVER FELT YOU SHOULD CUT DOWN ON YOUR DRINKING: YES

## 2022-09-26 NOTE — ED NOTES
Pt denying suicidal ideations at this time, ambulatory with steady gait to lob, provided with emergency contact. Pt aware he is leaving before medically cleared, ERP contacted, pt left before ERP arrival. Discharging at this time.

## 2022-09-26 NOTE — ED NOTES
Med rec updated and complete. Pt is unable to partipcate in an interview. Updated med rec based on  chart review and reconcile outside medications.  SO is unsure when the pt last  took any medications.      Prescriptions last filled at [facility].

## 2022-09-26 NOTE — ED NOTES
Patient's home medications have been reviewed by the pharmacy team.     Past Medical History:   Diagnosis Date    Alcohol withdrawal delirium (HCC) 9/19/2022    Drug-seeking behavior     Peptic ulcer     Seizure (HCC)     Seizure disorder (HCC)     Ulcer of abdomen wall (HCC)        Patient's Medications   New Prescriptions    No medications on file   Previous Medications    AMLODIPINE (NORVASC) 5 MG TAB    Take 1 Tablet by mouth every day for 60 days.    BACLOFEN (LIORESAL) 10 MG TAB    Take 10 mg by mouth 3 times a day.    GABAPENTIN (NEURONTIN) 100 MG CAP    Take 1 Capsule by mouth 3 times a day for 60 days.    LEVETIRACETAM (KEPPRA) 1000 MG TABLET    Take 1 Tablet by mouth 2 times a day for 60 days.    OMEPRAZOLE (PRILOSEC) 40 MG DELAYED-RELEASE CAPSULE    Take 1 Capsule by mouth 2 times a day for 60 days.    THIAMINE (THIAMINE) 100 MG TABLET    Take 1 Tablet by mouth every day for 60 days.   Modified Medications    No medications on file   Discontinued Medications    No medications on file     A:  Medications do not appear to be contributing to current complaints.     P:    No recommendations at this time. Home medications have been reordered as appropriate.    Alexi Fofana, PharmD, BCPS
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
## 2022-09-26 NOTE — DISCHARGE PLANNING
Alert Team:     Referral: Minor/Adult Patient Transfer to Mental Health Facility     Intervention: Received call from Arlen at [facility] stating that Dr. Santana has accepted the patient for admission.  Facility requests that transport be arranged for 0900 AM.     Arranged for transportation to be set up through [facility].     The pt will be picked up at 0900.      Notified the RN of the departure time as well as accepting facility.      Created transfer packet and placed on chart. (Cobra completed with parent.)     Plan: Pt will transfer to [facility] at 0900.

## 2022-09-26 NOTE — ED TRIAGE NOTES
Chief Complaint   Patient presents with    Suicidal Ideation     Pt doesn't have a plan but wants to die    Alcohol Intoxication     Pt just left from ED about an hour ago with same complaint. Pt never left premises when family brought him back in for continued statements of suicide.   Pt roomed to [facility].

## 2022-09-26 NOTE — ED NOTES
Pt discharge to [facility] from transportation to [facility]. Belongings given to [facility]. Pt able to update family

## 2022-09-26 NOTE — ED NOTES
Pt awake and alert, 1:1 remains at bedside. Pt denying SI at this time, breathalyzed at 0.043, alert team notified.

## 2022-09-26 NOTE — ED NOTES
Witnessed pt assist himself to ground from chair and have seizure like activity lasting 5 seconds.  Pt then woke up and assisted back to bed by RN.  Pt placed in gown and hospital socks.

## 2022-09-26 NOTE — ED TRIAGE NOTES
Pt arrives via EMS with c/o alcohol intoxication. Per EMS pt refused to speak with them during ride here. Pt at this time will answer minimal questions. FSBS normal per EMS

## 2022-09-26 NOTE — ED NOTES
Pt asking for food and water, dry heaving in room. RN educated pt on NPO status while feeling nauseous. ERP updated.

## 2022-09-26 NOTE — PROGRESS NOTES
"    ED Observation Progress Note    Date of Service: 09/26/22    Interval History  Patient without complaints.  Seen by behavioral health when sober.  Placed on legal hold I am told that the patient will be transporting to [facility] today.    Physical Exam  BP (!) 142/88   Pulse 86   Temp 36.7 °C (98 °F) (Temporal)   Resp 17   Ht 1.753 m (5' 9\")   Wt 84 kg (185 lb 3 oz)   SpO2 93%   BMI 27.35 kg/m² .    Constitutional: Awake and alert. Nontoxic  HENT:  Grossly normal  Eyes: Grossly normal  Neck: Normal range of motion  Cardiovascular: Normal heart rate     Labs  Results for orders placed or performed during the hospital encounter of 09/25/22   Urine Drug Screen   Result Value Ref Range    Amphetamines Urine Negative Negative    Barbiturates Negative Negative    Benzodiazepines Positive (A) Negative    Cocaine Metabolite Negative Negative    Methadone Negative Negative    Opiates Negative Negative    Oxycodone Negative Negative    Phencyclidine -Pcp Negative Negative    Propoxyphene Negative Negative    Cannabinoid Metab Negative Negative   POC Breathalizer   Result Value Ref Range    POC Breathalizer 0.23 (A) 0.00 - 0.01 Percent       Problem List  1.  Suicidal ideation      Electronically signed by: Abran Gamez M.D., 9/26/2022 11:55 AM   "
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
## 2023-09-10 ENCOUNTER — HOSPITAL ENCOUNTER (EMERGENCY)
Facility: MEDICAL CENTER | Age: 53
End: 2023-09-10
Attending: EMERGENCY MEDICINE
Payer: COMMERCIAL

## 2023-09-10 VITALS
HEIGHT: 69 IN | HEART RATE: 71 BPM | WEIGHT: 190 LBS | RESPIRATION RATE: 15 BRPM | SYSTOLIC BLOOD PRESSURE: 131 MMHG | OXYGEN SATURATION: 94 % | BODY MASS INDEX: 28.14 KG/M2 | TEMPERATURE: 97.7 F | DIASTOLIC BLOOD PRESSURE: 75 MMHG

## 2023-09-10 DIAGNOSIS — G89.29 CHRONIC ABDOMINAL PAIN: ICD-10-CM

## 2023-09-10 DIAGNOSIS — R11.2 NAUSEA AND VOMITING, UNSPECIFIED VOMITING TYPE: ICD-10-CM

## 2023-09-10 DIAGNOSIS — R10.9 CHRONIC ABDOMINAL PAIN: ICD-10-CM

## 2023-09-10 LAB
ALBUMIN SERPL BCP-MCNC: 4.2 G/DL (ref 3.2–4.9)
ALBUMIN/GLOB SERPL: 1.3 G/DL
ALP SERPL-CCNC: 65 U/L (ref 30–99)
ALT SERPL-CCNC: 17 U/L (ref 2–50)
ANION GAP SERPL CALC-SCNC: 14 MMOL/L (ref 7–16)
APPEARANCE UR: CLEAR
AST SERPL-CCNC: 21 U/L (ref 12–45)
BASOPHILS # BLD AUTO: 0.9 % (ref 0–1.8)
BASOPHILS # BLD: 0.07 K/UL (ref 0–0.12)
BILIRUB SERPL-MCNC: <0.2 MG/DL (ref 0.1–1.5)
BILIRUB UR QL STRIP.AUTO: NEGATIVE
BUN SERPL-MCNC: 17 MG/DL (ref 8–22)
CALCIUM ALBUM COR SERPL-MCNC: 9 MG/DL (ref 8.5–10.5)
CALCIUM SERPL-MCNC: 9.2 MG/DL (ref 8.5–10.5)
CHLORIDE SERPL-SCNC: 107 MMOL/L (ref 96–112)
CO2 SERPL-SCNC: 22 MMOL/L (ref 20–33)
COLOR UR: YELLOW
CREAT SERPL-MCNC: 1.03 MG/DL (ref 0.5–1.4)
EOSINOPHIL # BLD AUTO: 0.42 K/UL (ref 0–0.51)
EOSINOPHIL NFR BLD: 5.2 % (ref 0–6.9)
ERYTHROCYTE [DISTWIDTH] IN BLOOD BY AUTOMATED COUNT: 44.9 FL (ref 35.9–50)
GFR SERPLBLD CREATININE-BSD FMLA CKD-EPI: 88 ML/MIN/1.73 M 2
GLOBULIN SER CALC-MCNC: 3.3 G/DL (ref 1.9–3.5)
GLUCOSE SERPL-MCNC: 116 MG/DL (ref 65–99)
GLUCOSE UR STRIP.AUTO-MCNC: NEGATIVE MG/DL
HCT VFR BLD AUTO: 44.3 % (ref 42–52)
HGB BLD-MCNC: 14.8 G/DL (ref 14–18)
IMM GRANULOCYTES # BLD AUTO: 0.04 K/UL (ref 0–0.11)
IMM GRANULOCYTES NFR BLD AUTO: 0.5 % (ref 0–0.9)
KETONES UR STRIP.AUTO-MCNC: ABNORMAL MG/DL
LEUKOCYTE ESTERASE UR QL STRIP.AUTO: NEGATIVE
LIPASE SERPL-CCNC: 36 U/L (ref 11–82)
LYMPHOCYTES # BLD AUTO: 2.24 K/UL (ref 1–4.8)
LYMPHOCYTES NFR BLD: 27.7 % (ref 22–41)
MCH RBC QN AUTO: 29 PG (ref 27–33)
MCHC RBC AUTO-ENTMCNC: 33.4 G/DL (ref 32.3–36.5)
MCV RBC AUTO: 86.9 FL (ref 81.4–97.8)
MICRO URNS: ABNORMAL
MONOCYTES # BLD AUTO: 0.83 K/UL (ref 0–0.85)
MONOCYTES NFR BLD AUTO: 10.3 % (ref 0–13.4)
NEUTROPHILS # BLD AUTO: 4.49 K/UL (ref 1.82–7.42)
NEUTROPHILS NFR BLD: 55.4 % (ref 44–72)
NITRITE UR QL STRIP.AUTO: NEGATIVE
NRBC # BLD AUTO: 0 K/UL
NRBC BLD-RTO: 0 /100 WBC (ref 0–0.2)
PH UR STRIP.AUTO: 6 [PH] (ref 5–8)
PLATELET # BLD AUTO: 266 K/UL (ref 164–446)
PMV BLD AUTO: 9 FL (ref 9–12.9)
POTASSIUM SERPL-SCNC: 4 MMOL/L (ref 3.6–5.5)
PROT SERPL-MCNC: 7.5 G/DL (ref 6–8.2)
PROT UR QL STRIP: NEGATIVE MG/DL
RBC # BLD AUTO: 5.1 M/UL (ref 4.7–6.1)
RBC UR QL AUTO: NEGATIVE
SODIUM SERPL-SCNC: 143 MMOL/L (ref 135–145)
SP GR UR STRIP.AUTO: 1.02
UROBILINOGEN UR STRIP.AUTO-MCNC: 0.2 MG/DL
WBC # BLD AUTO: 8.1 K/UL (ref 4.8–10.8)

## 2023-09-10 PROCEDURE — 96374 THER/PROPH/DIAG INJ IV PUSH: CPT

## 2023-09-10 PROCEDURE — 81003 URINALYSIS AUTO W/O SCOPE: CPT

## 2023-09-10 PROCEDURE — 80053 COMPREHEN METABOLIC PANEL: CPT

## 2023-09-10 PROCEDURE — 700105 HCHG RX REV CODE 258: Mod: UD | Performed by: EMERGENCY MEDICINE

## 2023-09-10 PROCEDURE — 700111 HCHG RX REV CODE 636 W/ 250 OVERRIDE (IP): Mod: JZ | Performed by: EMERGENCY MEDICINE

## 2023-09-10 PROCEDURE — 85025 COMPLETE CBC W/AUTO DIFF WBC: CPT

## 2023-09-10 PROCEDURE — 83690 ASSAY OF LIPASE: CPT

## 2023-09-10 PROCEDURE — 36415 COLL VENOUS BLD VENIPUNCTURE: CPT

## 2023-09-10 PROCEDURE — 99285 EMERGENCY DEPT VISIT HI MDM: CPT

## 2023-09-10 RX ORDER — SODIUM CHLORIDE 9 MG/ML
1000 INJECTION, SOLUTION INTRAVENOUS ONCE
Status: COMPLETED | OUTPATIENT
Start: 2023-09-10 | End: 2023-09-10

## 2023-09-10 RX ORDER — HALOPERIDOL 5 MG/ML
5 INJECTION INTRAMUSCULAR ONCE
Status: COMPLETED | OUTPATIENT
Start: 2023-09-10 | End: 2023-09-10

## 2023-09-10 RX ADMIN — SODIUM CHLORIDE 1000 ML: 9 INJECTION, SOLUTION INTRAVENOUS at 04:29

## 2023-09-10 RX ADMIN — HALOPERIDOL LACTATE 5 MG: 5 INJECTION, SOLUTION INTRAMUSCULAR at 04:24

## 2023-09-10 ASSESSMENT — PAIN DESCRIPTION - DESCRIPTORS: DESCRIPTORS: SHARP

## 2023-09-10 ASSESSMENT — FIBROSIS 4 INDEX: FIB4 SCORE: 1.22

## 2023-09-10 NOTE — ED NOTES
[facility] discuss plan of care

## 2023-09-10 NOTE — ED NOTES
Irwin Martin discharged to home. Patient provided with Diet Education Materials from Nutrition Services, Speech Therapy handouts, AVS Summary, Aspirin/Plavix/Metformin/Atorvastatin FYWBs, Stroke Folder, Information on Insurance from  and Home Health information.   Valuables and belongings sent with patient.   discharge summary, discharge instructions, medications and follow up appointments reviewed with patient. Patient verbalized understanding.      Patient transferred off of floor via wheelchair accompanied by nursing staff. Extensive medication instructions and stroke education provided, along with caregiver support. Patient appreciative and verbalized understanding. Medications delivered to patient by [facility]patient Pharm[facility] prior to discharge. Patient transferred home via [employer].        The pt ambulatory to the restroom with a steady gait. The pt collecting a urine and stool sample. The pt alert and oriented. The pt reports nausea with frequent dry heaving

## 2023-09-10 NOTE — ED PROVIDER NOTES
ED Provider Note    CHIEF COMPLAINT  Chief Complaint   Patient presents with    Abdominal Pain     The pt reports intermittent right sided abd pain for the last 1.5 years. This past week, the pt went to [facility] and was diagnosed with a blockage. The pt was discharged yesterday but continues to have N/V and abd pain. Pt was told to come here for evaluation. The pt also reports a seizure 3 nights ago and that this type of stomach pain causes seizure. Medics gave 100mcg fentanyl and 4mg zofran.      EXTERNAL RECORDS REVIEWED  Reviewed last primary care visit from July.  Patient has a history of seizures, gastric ulcer, hypertension.  He has been seen at [facility] emergency department multiple times this year for abdominal pain as well as seizures.  There has been some concern for nonepileptic seizures although he does continue to take antiepileptics.  EEG was performed in our system last July 22 and normal.    Received records from [facility].  Patient had CT scan of the abdomen performed there on September 4 which was normal other than lesion in the right upper pole of the kidney as well as diverticulosis.  He had a CT scan of his head that day as well which was normal. Ultrasound performed on 9/8 showed chronic inflammation versus fatty infiltration of pancreas however was otherwise normal.    HPI/ROS  LIMITATION TO HISTORY   Select: : None  OUTSIDE HISTORIAN(S):  Significant other wife at bedside    Donal Carpio is a 53 y.o. adult who presents to the Emergency Department with history of seizure disorder as well as chronic abdominal pain who presents with abdominal pain and vomiting this evening.  Patient has had symptoms similar to this over the last 1-1/2 years.  He was just recently admitted at [facility] and discharged today.  He continues to have severe right-sided abdominal pain as well as vomiting and dry heaving.  Patient reports that in the past this pain has caused him to have  seizures.  He continues to follow with Dr. Perkins for seizures.  He states that they recently changed his antiepileptic medications during his admission at [facility].  The patient does have a history of alcohol abuse in the past however has been sober for the last year.  He does state he has had prior endoscopy and colonoscopy however it has been over a year ago.    PAST MEDICAL HISTORY  Past Medical History:   Diagnosis Date    Alcohol withdrawal delirium (HCC) 9/19/2022    Drug-seeking behavior     Peptic ulcer     Seizure (HCC)     Seizure disorder (HCC)     Ulcer of abdomen wall (HCC)         SURGICAL HISTORY  Past Surgical History:   Procedure Laterality Date    MN LAP,DIAGNOSTIC ABDOMEN N/A 7/7/2022    Procedure: DIAGNOSTIC LAPAROSCOPY;  Surgeon: Nahid Russ D.O.;  Location: SURGERY [facility];  Service: Gen Robotic    WOUND EXPLORATION ORTHO Right 7/7/2022    Procedure: EXPLORATION, WOUND, ABDOMINAL WALL;  Surgeon: Nahid Russ D.O.;  Location: SURGERY [facility];  Service: Gen Robotic    MN UPPER GI ENDOSCOPY,DIAGNOSIS N/A 6/30/2022    Procedure: ESOPHAGOGASTRODUODENOSCOPY;  Surgeon: Jess Lombardo D.O.;  Location: SURGERY SAME DAY [facility];  Service: Gastroenterology    MN UPPER GI ENDOSCOPY,BIOPSY N/A 6/30/2022    Procedure: GASTROSCOPY, WITH BIOPSY;  Surgeon: Jess Lombardo D.O.;  Location: SURGERY SAME DAY [facility];  Service: Gastroenterology    APPENDECTOMY          FAMILY HISTORY  History reviewed. No pertinent family history.    SOCIAL HISTORY   reports that he has never smoked. He has never used smokeless tobacco. He reports that he does not currently use alcohol. He reports that he does not currently use drugs after having used the following drugs: Inhaled.    CURRENT MEDICATIONS  Discharge Medication List as of 9/10/2023  6:23 AM        CONTINUE these medications which have NOT CHANGED    Details   baclofen (LIORESAL) 10 MG Tab Take 10 mg by mouth 3 times a day., Historical Med        "      ALLERGIES  Penicillins and Penicillins    PHYSICAL EXAM  /75   Pulse 71   Temp 36.5 °C (97.7 °F) (Temporal)   Resp 15   Ht 1.753 m (5' 9\")   Wt 86.2 kg (190 lb)   SpO2 94%      Constitutional: Nontoxic appearing. Alert in no distress.  Intermittent dry heaving.  HENT: Normocephalic, Atraumatic. Bilateral external ears normal. Nose normal.  Moist mucous membranes.  Oropharynx clear.  Eyes: Pupils are equal and reactive. Conjunctiva normal.   Neck: Supple, full range of motion  Heart: Regular rate and rhythm.  No murmurs.    Lungs: No respiratory distress, normal work of breathing. Lungs clear to auscultation bilaterally.  Abdomen Soft, no distention.  Tenderness to palpation, worse over the right lower quadrant.  Musculoskeletal: Atraumatic. No obvious deformities noted.  No lower extremity edema.  Skin: Warm, Dry.  No erythema, No rash.   Neurologic: Alert and oriented x3. Moving all extremities spontaneously without focal deficits.  Psychiatric: Affect normal, Mood normal, Appears appropriate and not intoxicated.      DIAGNOSTIC STUDIES / PROCEDURES    LABS  Labs Reviewed   COMP METABOLIC PANEL - Abnormal; Notable for the following components:       Result Value    Glucose 116 (*)     All other components within normal limits   URINALYSIS - Abnormal; Notable for the following components:    Ketones Trace (*)     All other components within normal limits    Narrative:     Release to patient->Immediate   CBC WITH DIFFERENTIAL   LIPASE   ESTIMATED GFR           COURSE & MEDICAL DECISION MAKING    ED Observation Status? Yes; I am placing the patient in to an observation status due to a diagnostic uncertainty as well as therapeutic intensity. Patient placed in observation status at 3:39 AM, 9/10/2023.     Observation plan is as follows: labs, fluids, meds, obtain records from [facility]    Upon Reevaluation, the patient's condition has: Improved; and will be discharged.    Patient discharged from ED " Observation status at 615AM (Time) 09/10/23 (Date).     INITIAL ASSESSMENT, COURSE AND PLAN  Care Narrative: Patient with history of chronic abdominal pain and vomiting who presents with similar symptoms after recently hospitalization at outside hospital with normal imaging.  He has reassuring vitals on arrival.  Labs are reassuring without leukocytosis, transaminitis, elevated lipase concerning for pancreatitis.  Considered imaging however both CT and ultrasound were performed in the past week without acute abnormality.  Doubt surgical process such as appendicitis, diverticulitis, bowel obstruction or perforation.  Patient was treated with Haldol and fluids with significant improvement of symptoms.    6:15 AM - Upon reassessment, patient is resting comfortably with normal vital signs.  No new complaints at this time.  Discussed results with patient and/or family as well as importance of primary care/GI follow up.  Patient understands plan of care and strict return precautions for new or changing symptoms.       ADDITIONAL PROBLEM LIST  Problem #1:  Chronic abdominal pain - workup reassuring, recently discharged with medication and plan for GI follow up    Problem #2:  Nausea/vomiting - resolved following Haldol      DISPOSITION AND DISCUSSIONS  Escalation of care considered, and ultimately not performed:diagnostic imaging    Decision tools and prescription drugs considered including, but not limited to: Pain Medications over the counter medication should be sufficient .      DISPOSITION:  Patient will be discharged home in stable condition.    FOLLOW UP:  Your primary care physician    Schedule an appointment as soon as possible for a visit       [facility]  [address] [address] [phone]  Schedule an appointment as soon as possible for a visit       [facility], Emergency Dept  [address] [address] [phone]    If symptoms  worsen      OUTPATIENT MEDICATIONS:  Discharge Medication List as of 9/10/2023  6:23 AM            FINAL DIAGNOSIS  1. Chronic abdominal pain    2. Nausea and vomiting, unspecified vomiting type

## 2023-09-10 NOTE — ED TRIAGE NOTES
"Chief Complaint   Patient presents with    Abdominal Pain     The pt reports intermittent right sided abd pain for the last 1.5 years. This past week, the pt went to [facility] and was diagnosed with a blockage. The pt was discharged yesterday but continues to have N/V and abd pain. Pt was told to come here for evaluation. The pt also reports a seizure 3 nights ago and that this type of stomach pain causes seizure. Medics gave 100mcg fentanyl and 4mg zofran.      BIB EMS to , pt on monitor and in gown, labs drawn and sent. Pt consists of: for the above complaint.    Medications given en route:100mcg fentanyl, 4 mg zofran    Ht 1.753 m (5' 9\")   Wt 86.2 kg (190 lb)   BMI 28.06 kg/m²     "

## 2023-09-10 NOTE — DISCHARGE INSTRUCTIONS
Regional Anesthesia Brief Note      Patient seen on evening rounds    VSS. Catheter site intact/clean.    Inadequate pain control. States she is having right shoulder pain. She did not endorse this pain on morning or afternoon rounds. After evaluation, pain seems related to positioning and laying in bed. Suggested PRN oxy and to talk to the primary team about robaxin. With help from bedside RN, repositioning and ice were able to significantly help her pain.    Epidural managing incisional pain. No evidence of LAST.     No changes made to epidural    Adrián Cruz MD   Anesthesiology Resident   You were seen in the Emergency Department for chronic abdominal pain and vomiting.    Labs were completed without significant acute abnormalities.    Please continue home medications and eat a bland diet.    Please follow up with your primary care physician and gastroenterologist for further work-up including endoscopy or HIDA scan.    Return to the Emergency Department with other concerns.

## 2023-11-08 ENCOUNTER — APPOINTMENT (OUTPATIENT)
Dept: RADIOLOGY | Facility: MEDICAL CENTER | Age: 53
DRG: 389 | End: 2023-11-08
Attending: EMERGENCY MEDICINE
Payer: COMMERCIAL

## 2023-11-08 ENCOUNTER — HOSPITAL ENCOUNTER (INPATIENT)
Facility: MEDICAL CENTER | Age: 53
LOS: 2 days | DRG: 389 | End: 2023-11-11
Attending: EMERGENCY MEDICINE | Admitting: STUDENT IN AN ORGANIZED HEALTH CARE EDUCATION/TRAINING PROGRAM
Payer: COMMERCIAL

## 2023-11-08 DIAGNOSIS — R11.2 INTRACTABLE NAUSEA AND VOMITING: ICD-10-CM

## 2023-11-08 DIAGNOSIS — K66.8 BILOMA: ICD-10-CM

## 2023-11-08 DIAGNOSIS — R10.13 EPIGASTRIC PAIN: ICD-10-CM

## 2023-11-08 DIAGNOSIS — K56.7 ILEUS (HCC): ICD-10-CM

## 2023-11-08 LAB
BASOPHILS # BLD AUTO: 0.9 % (ref 0–1.8)
BASOPHILS # BLD: 0.13 K/UL (ref 0–0.12)
EOSINOPHIL # BLD AUTO: 0.7 K/UL (ref 0–0.51)
EOSINOPHIL NFR BLD: 4.7 % (ref 0–6.9)
ERYTHROCYTE [DISTWIDTH] IN BLOOD BY AUTOMATED COUNT: 46.9 FL (ref 35.9–50)
HCT VFR BLD AUTO: 40.4 % (ref 42–52)
HGB BLD-MCNC: 13.1 G/DL (ref 14–18)
IMM GRANULOCYTES # BLD AUTO: 0.25 K/UL (ref 0–0.11)
IMM GRANULOCYTES NFR BLD AUTO: 1.7 % (ref 0–0.9)
LYMPHOCYTES # BLD AUTO: 3.25 K/UL (ref 1–4.8)
LYMPHOCYTES NFR BLD: 21.9 % (ref 22–41)
MCH RBC QN AUTO: 28.6 PG (ref 27–33)
MCHC RBC AUTO-ENTMCNC: 32.4 G/DL (ref 32.3–36.5)
MCV RBC AUTO: 88.2 FL (ref 81.4–97.8)
MONOCYTES # BLD AUTO: 1.78 K/UL (ref 0–0.85)
MONOCYTES NFR BLD AUTO: 12 % (ref 0–13.4)
NEUTROPHILS # BLD AUTO: 8.71 K/UL (ref 1.82–7.42)
NEUTROPHILS NFR BLD: 58.8 % (ref 44–72)
NRBC # BLD AUTO: 0 K/UL
NRBC BLD-RTO: 0 /100 WBC (ref 0–0.2)
PLATELET # BLD AUTO: 355 K/UL (ref 164–446)
PMV BLD AUTO: 9.3 FL (ref 9–12.9)
RBC # BLD AUTO: 4.58 M/UL (ref 4.7–6.1)
WBC # BLD AUTO: 14.8 K/UL (ref 4.8–10.8)

## 2023-11-08 PROCEDURE — 84145 PROCALCITONIN (PCT): CPT

## 2023-11-08 PROCEDURE — 96374 THER/PROPH/DIAG INJ IV PUSH: CPT

## 2023-11-08 PROCEDURE — 83605 ASSAY OF LACTIC ACID: CPT

## 2023-11-08 PROCEDURE — 700105 HCHG RX REV CODE 258: Mod: UD | Performed by: EMERGENCY MEDICINE

## 2023-11-08 PROCEDURE — 83690 ASSAY OF LIPASE: CPT

## 2023-11-08 PROCEDURE — 36415 COLL VENOUS BLD VENIPUNCTURE: CPT

## 2023-11-08 PROCEDURE — 96375 TX/PRO/DX INJ NEW DRUG ADDON: CPT

## 2023-11-08 PROCEDURE — 71045 X-RAY EXAM CHEST 1 VIEW: CPT

## 2023-11-08 PROCEDURE — 85025 COMPLETE CBC W/AUTO DIFF WBC: CPT

## 2023-11-08 PROCEDURE — 80053 COMPREHEN METABOLIC PANEL: CPT

## 2023-11-08 PROCEDURE — 700117 HCHG RX CONTRAST REV CODE 255: Mod: UD | Performed by: EMERGENCY MEDICINE

## 2023-11-08 PROCEDURE — 86140 C-REACTIVE PROTEIN: CPT

## 2023-11-08 PROCEDURE — 700111 HCHG RX REV CODE 636 W/ 250 OVERRIDE (IP): Mod: JZ,UD | Performed by: EMERGENCY MEDICINE

## 2023-11-08 PROCEDURE — 87040 BLOOD CULTURE FOR BACTERIA: CPT | Mod: 91

## 2023-11-08 PROCEDURE — 99285 EMERGENCY DEPT VISIT HI MDM: CPT

## 2023-11-08 PROCEDURE — 74177 CT ABD & PELVIS W/CONTRAST: CPT

## 2023-11-08 RX ORDER — ONDANSETRON 2 MG/ML
4 INJECTION INTRAMUSCULAR; INTRAVENOUS ONCE
Status: COMPLETED | OUTPATIENT
Start: 2023-11-08 | End: 2023-11-08

## 2023-11-08 RX ORDER — SODIUM CHLORIDE, SODIUM LACTATE, POTASSIUM CHLORIDE, AND CALCIUM CHLORIDE .6; .31; .03; .02 G/100ML; G/100ML; G/100ML; G/100ML
30 INJECTION, SOLUTION INTRAVENOUS ONCE
Status: COMPLETED | OUTPATIENT
Start: 2023-11-08 | End: 2023-11-09

## 2023-11-08 RX ORDER — HYDROMORPHONE HYDROCHLORIDE 1 MG/ML
1 INJECTION, SOLUTION INTRAMUSCULAR; INTRAVENOUS; SUBCUTANEOUS ONCE
Status: COMPLETED | OUTPATIENT
Start: 2023-11-08 | End: 2023-11-08

## 2023-11-08 RX ADMIN — ONDANSETRON 4 MG: 2 INJECTION INTRAMUSCULAR; INTRAVENOUS at 23:24

## 2023-11-08 RX ADMIN — IOHEXOL 100 ML: 350 INJECTION, SOLUTION INTRAVENOUS at 23:48

## 2023-11-08 RX ADMIN — HYDROMORPHONE HYDROCHLORIDE 1 MG: 1 INJECTION, SOLUTION INTRAMUSCULAR; INTRAVENOUS; SUBCUTANEOUS at 23:26

## 2023-11-08 RX ADMIN — SODIUM CHLORIDE, POTASSIUM CHLORIDE, SODIUM LACTATE AND CALCIUM CHLORIDE 2682 ML: 600; 310; 30; 20 INJECTION, SOLUTION INTRAVENOUS at 23:52

## 2023-11-08 ASSESSMENT — PAIN DESCRIPTION - PAIN TYPE: TYPE: ACUTE PAIN

## 2023-11-08 ASSESSMENT — FIBROSIS 4 INDEX: FIB4 SCORE: 0.78

## 2023-11-09 ENCOUNTER — APPOINTMENT (OUTPATIENT)
Dept: RADIOLOGY | Facility: MEDICAL CENTER | Age: 53
DRG: 389 | End: 2023-11-09
Attending: NURSE PRACTITIONER
Payer: COMMERCIAL

## 2023-11-09 PROBLEM — K56.7 ILEUS (HCC): Status: ACTIVE | Noted: 2023-11-09

## 2023-11-09 PROBLEM — K66.8 BILOMA: Status: ACTIVE | Noted: 2023-11-09

## 2023-11-09 LAB
ALBUMIN SERPL BCP-MCNC: 3.9 G/DL (ref 3.2–4.9)
ALBUMIN/GLOB SERPL: 1 G/DL
ALP SERPL-CCNC: 108 U/L (ref 30–99)
ALT SERPL-CCNC: 20 U/L (ref 2–50)
ANION GAP SERPL CALC-SCNC: 12 MMOL/L (ref 7–16)
APPEARANCE UR: CLEAR
AST SERPL-CCNC: 24 U/L (ref 12–45)
BILIRUB SERPL-MCNC: <0.2 MG/DL (ref 0.1–1.5)
BILIRUB UR QL STRIP.AUTO: NEGATIVE
BUN SERPL-MCNC: 18 MG/DL (ref 8–22)
CALCIUM ALBUM COR SERPL-MCNC: 9.6 MG/DL (ref 8.5–10.5)
CALCIUM SERPL-MCNC: 9.5 MG/DL (ref 8.5–10.5)
CHLORIDE SERPL-SCNC: 108 MMOL/L (ref 96–112)
CO2 SERPL-SCNC: 19 MMOL/L (ref 20–33)
COLOR UR: YELLOW
CREAT SERPL-MCNC: 0.74 MG/DL (ref 0.5–1.4)
CRP SERPL HS-MCNC: 0.94 MG/DL (ref 0–0.75)
GFR SERPLBLD CREATININE-BSD FMLA CKD-EPI: 109 ML/MIN/1.73 M 2
GLOBULIN SER CALC-MCNC: 3.9 G/DL (ref 1.9–3.5)
GLUCOSE SERPL-MCNC: 108 MG/DL (ref 65–99)
GLUCOSE UR STRIP.AUTO-MCNC: NEGATIVE MG/DL
KETONES UR STRIP.AUTO-MCNC: NEGATIVE MG/DL
LACTATE SERPL-SCNC: 1.2 MMOL/L (ref 0.5–2)
LEUKOCYTE ESTERASE UR QL STRIP.AUTO: NEGATIVE
LIPASE SERPL-CCNC: 38 U/L (ref 11–82)
MICRO URNS: NORMAL
NITRITE UR QL STRIP.AUTO: NEGATIVE
PH UR STRIP.AUTO: 7.5 [PH] (ref 5–8)
POTASSIUM SERPL-SCNC: 4.8 MMOL/L (ref 3.6–5.5)
PROCALCITONIN SERPL-MCNC: 0.05 NG/ML
PROT SERPL-MCNC: 7.8 G/DL (ref 6–8.2)
PROT UR QL STRIP: NEGATIVE MG/DL
RBC UR QL AUTO: NEGATIVE
SODIUM SERPL-SCNC: 139 MMOL/L (ref 135–145)
SP GR UR STRIP.AUTO: 1.04
UROBILINOGEN UR STRIP.AUTO-MCNC: 0.2 MG/DL

## 2023-11-09 PROCEDURE — 700102 HCHG RX REV CODE 250 W/ 637 OVERRIDE(OP): Performed by: STUDENT IN AN ORGANIZED HEALTH CARE EDUCATION/TRAINING PROGRAM

## 2023-11-09 PROCEDURE — A9270 NON-COVERED ITEM OR SERVICE: HCPCS | Performed by: EMERGENCY MEDICINE

## 2023-11-09 PROCEDURE — 96375 TX/PRO/DX INJ NEW DRUG ADDON: CPT

## 2023-11-09 PROCEDURE — 87086 URINE CULTURE/COLONY COUNT: CPT

## 2023-11-09 PROCEDURE — 700111 HCHG RX REV CODE 636 W/ 250 OVERRIDE (IP): Mod: JZ,UD | Performed by: EMERGENCY MEDICINE

## 2023-11-09 PROCEDURE — 700111 HCHG RX REV CODE 636 W/ 250 OVERRIDE (IP): Performed by: STUDENT IN AN ORGANIZED HEALTH CARE EDUCATION/TRAINING PROGRAM

## 2023-11-09 PROCEDURE — 96376 TX/PRO/DX INJ SAME DRUG ADON: CPT

## 2023-11-09 PROCEDURE — 700102 HCHG RX REV CODE 250 W/ 637 OVERRIDE(OP): Performed by: EMERGENCY MEDICINE

## 2023-11-09 PROCEDURE — 81003 URINALYSIS AUTO W/O SCOPE: CPT

## 2023-11-09 PROCEDURE — 770001 HCHG ROOM/CARE - MED/SURG/GYN PRIV*

## 2023-11-09 PROCEDURE — A9270 NON-COVERED ITEM OR SERVICE: HCPCS | Performed by: NURSE PRACTITIONER

## 2023-11-09 PROCEDURE — 99223 1ST HOSP IP/OBS HIGH 75: CPT | Performed by: STUDENT IN AN ORGANIZED HEALTH CARE EDUCATION/TRAINING PROGRAM

## 2023-11-09 PROCEDURE — 700105 HCHG RX REV CODE 258: Performed by: STUDENT IN AN ORGANIZED HEALTH CARE EDUCATION/TRAINING PROGRAM

## 2023-11-09 PROCEDURE — A9270 NON-COVERED ITEM OR SERVICE: HCPCS | Performed by: STUDENT IN AN ORGANIZED HEALTH CARE EDUCATION/TRAINING PROGRAM

## 2023-11-09 PROCEDURE — 700102 HCHG RX REV CODE 250 W/ 637 OVERRIDE(OP): Performed by: NURSE PRACTITIONER

## 2023-11-09 RX ORDER — OXYCODONE HYDROCHLORIDE 10 MG/1
10 TABLET ORAL EVERY 8 HOURS PRN
Status: ON HOLD | COMMUNITY
End: 2023-11-11

## 2023-11-09 RX ORDER — PROCHLORPERAZINE EDISYLATE 5 MG/ML
5-10 INJECTION INTRAMUSCULAR; INTRAVENOUS EVERY 4 HOURS PRN
Status: DISCONTINUED | OUTPATIENT
Start: 2023-11-09 | End: 2023-11-11 | Stop reason: HOSPADM

## 2023-11-09 RX ORDER — HYDROMORPHONE HYDROCHLORIDE 2 MG/1
2-4 TABLET ORAL EVERY 6 HOURS PRN
Status: ON HOLD | COMMUNITY
End: 2023-11-11

## 2023-11-09 RX ORDER — ONDANSETRON 2 MG/ML
4 INJECTION INTRAMUSCULAR; INTRAVENOUS EVERY 4 HOURS PRN
Status: DISCONTINUED | OUTPATIENT
Start: 2023-11-09 | End: 2023-11-11 | Stop reason: HOSPADM

## 2023-11-09 RX ORDER — POLYETHYLENE GLYCOL 3350 17 G/17G
1 POWDER, FOR SOLUTION ORAL
Status: DISCONTINUED | OUTPATIENT
Start: 2023-11-09 | End: 2023-11-09

## 2023-11-09 RX ORDER — LOSARTAN POTASSIUM 50 MG/1
50 TABLET ORAL
Status: DISCONTINUED | OUTPATIENT
Start: 2023-11-10 | End: 2023-11-11

## 2023-11-09 RX ORDER — AMOXICILLIN 250 MG
2 CAPSULE ORAL 2 TIMES DAILY
Status: DISCONTINUED | OUTPATIENT
Start: 2023-11-09 | End: 2023-11-09

## 2023-11-09 RX ORDER — AMLODIPINE BESYLATE 10 MG/1
5 TABLET ORAL
Status: DISCONTINUED | OUTPATIENT
Start: 2023-11-10 | End: 2023-11-11

## 2023-11-09 RX ORDER — LABETALOL HYDROCHLORIDE 5 MG/ML
10 INJECTION, SOLUTION INTRAVENOUS EVERY 4 HOURS PRN
Status: DISCONTINUED | OUTPATIENT
Start: 2023-11-09 | End: 2023-11-11 | Stop reason: HOSPADM

## 2023-11-09 RX ORDER — NICOTINE 21 MG/24HR
1 PATCH, TRANSDERMAL 24 HOURS TRANSDERMAL EVERY 24 HOURS
COMMUNITY
Start: 2023-10-10

## 2023-11-09 RX ORDER — PROMETHAZINE HYDROCHLORIDE 25 MG/1
12.5-25 SUPPOSITORY RECTAL EVERY 4 HOURS PRN
Status: DISCONTINUED | OUTPATIENT
Start: 2023-11-09 | End: 2023-11-11 | Stop reason: HOSPADM

## 2023-11-09 RX ORDER — DIVALPROEX SODIUM 500 MG/1
500 TABLET, EXTENDED RELEASE ORAL 2 TIMES DAILY
Status: DISCONTINUED | OUTPATIENT
Start: 2023-11-09 | End: 2023-11-09

## 2023-11-09 RX ORDER — PROMETHAZINE HYDROCHLORIDE 25 MG/1
12.5-25 TABLET ORAL EVERY 4 HOURS PRN
Status: DISCONTINUED | OUTPATIENT
Start: 2023-11-09 | End: 2023-11-11

## 2023-11-09 RX ORDER — HYDROCODONE BITARTRATE AND ACETAMINOPHEN 5; 325 MG/1; MG/1
1 TABLET ORAL EVERY 8 HOURS PRN
Status: ON HOLD | COMMUNITY
End: 2023-11-11

## 2023-11-09 RX ORDER — DIVALPROEX SODIUM 500 MG
500 TABLET, DELAYED RELEASE (ENTERIC COATED) ORAL 2 TIMES DAILY
COMMUNITY
Start: 2023-06-15 | End: 2024-02-14

## 2023-11-09 RX ORDER — OXYCODONE HYDROCHLORIDE 5 MG/1
5 TABLET ORAL EVERY 4 HOURS PRN
Status: DISCONTINUED | OUTPATIENT
Start: 2023-11-09 | End: 2023-11-11

## 2023-11-09 RX ORDER — ONDANSETRON 4 MG/1
4 TABLET, FILM COATED ORAL EVERY 8 HOURS PRN
COMMUNITY
End: 2023-12-01

## 2023-11-09 RX ORDER — METRONIDAZOLE 500 MG/1
500 TABLET ORAL 3 TIMES DAILY
Status: ON HOLD | COMMUNITY
Start: 2023-11-03 | End: 2023-11-11

## 2023-11-09 RX ORDER — OXYCODONE HYDROCHLORIDE 5 MG/1
5 TABLET ORAL EVERY 4 HOURS PRN
Status: DISCONTINUED | OUTPATIENT
Start: 2023-11-09 | End: 2023-11-09

## 2023-11-09 RX ORDER — SODIUM CHLORIDE 9 MG/ML
INJECTION, SOLUTION INTRAVENOUS CONTINUOUS
Status: DISCONTINUED | OUTPATIENT
Start: 2023-11-09 | End: 2023-11-11 | Stop reason: HOSPADM

## 2023-11-09 RX ORDER — PANTOPRAZOLE SODIUM 40 MG/1
40 TABLET, DELAYED RELEASE ORAL 2 TIMES DAILY
COMMUNITY
Start: 2023-10-23 | End: 2023-12-01

## 2023-11-09 RX ORDER — ONDANSETRON 4 MG/1
4 TABLET, ORALLY DISINTEGRATING ORAL EVERY 8 HOURS PRN
Status: SHIPPED | COMMUNITY
End: 2023-11-09

## 2023-11-09 RX ORDER — LANSOPRAZOLE 30 MG/1
30 TABLET, ORALLY DISINTEGRATING, DELAYED RELEASE ORAL DAILY
Status: DISCONTINUED | OUTPATIENT
Start: 2023-11-09 | End: 2023-11-11

## 2023-11-09 RX ORDER — SUMATRIPTAN 100 MG/1
100 TABLET, FILM COATED ORAL
COMMUNITY
End: 2023-12-01

## 2023-11-09 RX ORDER — ONDANSETRON 4 MG/1
4 TABLET, ORALLY DISINTEGRATING ORAL EVERY 4 HOURS PRN
Status: DISCONTINUED | OUTPATIENT
Start: 2023-11-09 | End: 2023-11-11

## 2023-11-09 RX ORDER — LOSARTAN POTASSIUM 50 MG/1
50 TABLET ORAL
Status: DISCONTINUED | OUTPATIENT
Start: 2023-11-09 | End: 2023-11-09

## 2023-11-09 RX ORDER — ACETAMINOPHEN 325 MG/1
650 TABLET ORAL EVERY 6 HOURS PRN
Status: DISCONTINUED | OUTPATIENT
Start: 2023-11-09 | End: 2023-11-11

## 2023-11-09 RX ORDER — HYDROMORPHONE HYDROCHLORIDE 1 MG/ML
0.5 INJECTION, SOLUTION INTRAMUSCULAR; INTRAVENOUS; SUBCUTANEOUS ONCE
Status: COMPLETED | OUTPATIENT
Start: 2023-11-09 | End: 2023-11-09

## 2023-11-09 RX ORDER — VALPROIC ACID 250 MG/5ML
500 SOLUTION ORAL 2 TIMES DAILY
Status: DISCONTINUED | OUTPATIENT
Start: 2023-11-09 | End: 2023-11-11

## 2023-11-09 RX ORDER — LOSARTAN POTASSIUM 50 MG/1
50 TABLET ORAL DAILY
COMMUNITY
Start: 2023-09-13

## 2023-11-09 RX ORDER — HALOPERIDOL 5 MG/ML
2.5 INJECTION INTRAMUSCULAR
Status: COMPLETED | OUTPATIENT
Start: 2023-11-09 | End: 2023-11-09

## 2023-11-09 RX ORDER — NICOTINE 21 MG/24HR
21 PATCH, TRANSDERMAL 24 HOURS TRANSDERMAL
Status: DISCONTINUED | OUTPATIENT
Start: 2023-11-10 | End: 2023-11-11 | Stop reason: HOSPADM

## 2023-11-09 RX ORDER — CEFDINIR 300 MG/1
300 CAPSULE ORAL 2 TIMES DAILY
Status: ON HOLD | COMMUNITY
Start: 2023-11-03 | End: 2023-11-11

## 2023-11-09 RX ORDER — AMLODIPINE BESYLATE 5 MG/1
1 TABLET ORAL DAILY
COMMUNITY
Start: 2023-09-04

## 2023-11-09 RX ORDER — MORPHINE SULFATE 4 MG/ML
4 INJECTION INTRAVENOUS EVERY 4 HOURS PRN
Status: DISCONTINUED | OUTPATIENT
Start: 2023-11-09 | End: 2023-11-11 | Stop reason: HOSPADM

## 2023-11-09 RX ORDER — BISACODYL 10 MG
10 SUPPOSITORY, RECTAL RECTAL
Status: DISCONTINUED | OUTPATIENT
Start: 2023-11-09 | End: 2023-11-09

## 2023-11-09 RX ORDER — METOCLOPRAMIDE HYDROCHLORIDE 5 MG/ML
10 INJECTION INTRAMUSCULAR; INTRAVENOUS ONCE
Status: COMPLETED | OUTPATIENT
Start: 2023-11-09 | End: 2023-11-09

## 2023-11-09 RX ORDER — AMLODIPINE BESYLATE 5 MG/1
5 TABLET ORAL
Status: DISCONTINUED | OUTPATIENT
Start: 2023-11-09 | End: 2023-11-09

## 2023-11-09 RX ADMIN — VALPROIC ACID 500 MG: 500 SOLUTION ORAL at 21:00

## 2023-11-09 RX ADMIN — MORPHINE SULFATE 4 MG: 4 INJECTION, SOLUTION INTRAMUSCULAR; INTRAVENOUS at 15:06

## 2023-11-09 RX ADMIN — FENTANYL CITRATE 50 MCG: 50 INJECTION, SOLUTION INTRAMUSCULAR; INTRAVENOUS at 02:03

## 2023-11-09 RX ADMIN — SODIUM CHLORIDE: 9 INJECTION, SOLUTION INTRAVENOUS at 13:34

## 2023-11-09 RX ADMIN — MORPHINE SULFATE 4 MG: 4 INJECTION, SOLUTION INTRAMUSCULAR; INTRAVENOUS at 09:28

## 2023-11-09 RX ADMIN — OXYCODONE 5 MG: 5 TABLET ORAL at 12:19

## 2023-11-09 RX ADMIN — SODIUM CHLORIDE: 9 INJECTION, SOLUTION INTRAVENOUS at 06:15

## 2023-11-09 RX ADMIN — LIDOCAINE HYDROCHLORIDE 30 ML: 20 SOLUTION OROPHARYNGEAL at 02:22

## 2023-11-09 RX ADMIN — MORPHINE SULFATE 4 MG: 4 INJECTION, SOLUTION INTRAMUSCULAR; INTRAVENOUS at 20:16

## 2023-11-09 RX ADMIN — VALPROIC ACID 500 MG: 500 SOLUTION ORAL at 12:19

## 2023-11-09 RX ADMIN — METOCLOPRAMIDE 10 MG: 5 INJECTION, SOLUTION INTRAMUSCULAR; INTRAVENOUS at 02:03

## 2023-11-09 RX ADMIN — HALOPERIDOL LACTATE 2.5 MG: 5 INJECTION, SOLUTION INTRAMUSCULAR at 00:27

## 2023-11-09 RX ADMIN — HALOPERIDOL LACTATE 2.5 MG: 5 INJECTION, SOLUTION INTRAMUSCULAR at 02:04

## 2023-11-09 RX ADMIN — FAMOTIDINE 20 MG: 10 INJECTION, SOLUTION INTRAVENOUS at 02:03

## 2023-11-09 RX ADMIN — PROCHLORPERAZINE EDISYLATE 10 MG: 5 INJECTION INTRAMUSCULAR; INTRAVENOUS at 09:27

## 2023-11-09 RX ADMIN — OXYCODONE 5 MG: 5 TABLET ORAL at 22:47

## 2023-11-09 RX ADMIN — HYDROMORPHONE HYDROCHLORIDE 0.5 MG: 1 INJECTION, SOLUTION INTRAMUSCULAR; INTRAVENOUS; SUBCUTANEOUS at 03:31

## 2023-11-09 RX ADMIN — PROCHLORPERAZINE EDISYLATE 5 MG: 5 INJECTION INTRAMUSCULAR; INTRAVENOUS at 20:16

## 2023-11-09 ASSESSMENT — LIFESTYLE VARIABLES
DOES PATIENT WANT TO STOP DRINKING: NO
HAVE YOU EVER FELT YOU SHOULD CUT DOWN ON YOUR DRINKING: NO
AVERAGE NUMBER OF DAYS PER WEEK YOU HAVE A DRINK CONTAINING ALCOHOL: 0
ON A TYPICAL DAY WHEN YOU DRINK ALCOHOL HOW MANY DRINKS DO YOU HAVE: 0
HOW MANY TIMES IN THE PAST YEAR HAVE YOU HAD 5 OR MORE DRINKS IN A DAY: 0
TOTAL SCORE: 0
EVER HAD A DRINK FIRST THING IN THE MORNING TO STEADY YOUR NERVES TO GET RID OF A HANGOVER: NO
CONSUMPTION TOTAL: NEGATIVE
ALCOHOL_USE: NO
HAVE PEOPLE ANNOYED YOU BY CRITICIZING YOUR DRINKING: NO
TOTAL SCORE: 0
EVER FELT BAD OR GUILTY ABOUT YOUR DRINKING: NO
TOTAL SCORE: 0

## 2023-11-09 ASSESSMENT — PAIN DESCRIPTION - PAIN TYPE
TYPE: ACUTE PAIN

## 2023-11-09 ASSESSMENT — ENCOUNTER SYMPTOMS
VOMITING: 1
CARDIOVASCULAR NEGATIVE: 1
CHILLS: 0
PSYCHIATRIC NEGATIVE: 1
FEVER: 0
RESPIRATORY NEGATIVE: 1
NAUSEA: 1
COUGH: 0
WEAKNESS: 1
DIARRHEA: 1
EYES NEGATIVE: 1
SHORTNESS OF BREATH: 0
MYALGIAS: 1
ABDOMINAL PAIN: 1

## 2023-11-09 ASSESSMENT — COGNITIVE AND FUNCTIONAL STATUS - GENERAL
CLIMB 3 TO 5 STEPS WITH RAILING: A LITTLE
TOILETING: A LITTLE
TURNING FROM BACK TO SIDE WHILE IN FLAT BAD: A LOT
STANDING UP FROM CHAIR USING ARMS: A LOT
DRESSING REGULAR UPPER BODY CLOTHING: A LITTLE
SUGGESTED CMS G CODE MODIFIER MOBILITY: CL
HELP NEEDED FOR BATHING: A LITTLE
MOVING FROM LYING ON BACK TO SITTING ON SIDE OF FLAT BED: A LOT
DRESSING REGULAR LOWER BODY CLOTHING: A LITTLE
MOVING TO AND FROM BED TO CHAIR: A LOT
DAILY ACTIVITIY SCORE: 20
MOBILITY SCORE: 14
SUGGESTED CMS G CODE MODIFIER DAILY ACTIVITY: CJ
WALKING IN HOSPITAL ROOM: A LITTLE

## 2023-11-09 ASSESSMENT — PATIENT HEALTH QUESTIONNAIRE - PHQ9
2. FEELING DOWN, DEPRESSED, IRRITABLE, OR HOPELESS: NOT AT ALL
1. LITTLE INTEREST OR PLEASURE IN DOING THINGS: NOT AT ALL
SUM OF ALL RESPONSES TO PHQ9 QUESTIONS 1 AND 2: 0

## 2023-11-09 NOTE — H&P
Hospital Medicine History & Physical Note    Date of Service  11/9/2023    Primary Care Physician  Pcp Pt States None    Code Status  Full Code    Chief Complaint  Chief Complaint   Patient presents with    Abdominal Pain     RUQ 10/10 throbbing pain, worsened tonight. +n/v/d for 1 day. Cholecystectomy done in September, states admitted a month ago for sepsis.  Appendix removed in 2003.        History of Presenting Illness  Donal Carpio is a 53 y.o. adult who presented 11/8/2023 with abdominal pain/nausea/vomiting/diarrhea.  PMH of migraines, seizure disorder, HTN.  09/2023 patient had cholecystectomy.  He was admitted under the eval last month for continued abdominal pain following surgery, CT scan showed fluid concerning for seroma versus gallbladder fossa abscess.  Due to clinical picture at the time it was considered abscess and history with IV antibiotics and then discharged with cefdinir and metronidazole which she has completed.  He also had a gastric emptying study which was normal, a repeat CT abdomen/pelvis at outside facility showed persistent biloma/seroma with no evidence of inflammation.    He said he had improved since discharge but his abdominal pain never really resolved.  He is also been having diarrhea for the past 6 days, last episode was in the ED per patient.  Denies any black tarry stools or blood in his stool.  His abdominal pain is getting worse yesterday along with worsening nausea so he decided to come to the ED.  He has been feeling chills but no fevers.    In the ED afebrile, tachycardic and tachypneic.  Labs showed WBC of 14.8.    I discussed the plan of care with patient, bedside RN, and edp .    Review of Systems  Review of Systems   Constitutional:  Positive for malaise/fatigue. Negative for chills and fever.   Respiratory:  Negative for cough and shortness of breath.    Cardiovascular:  Negative for chest pain.   Gastrointestinal:  Positive for abdominal pain, diarrhea, nausea  and vomiting.   Genitourinary:  Negative for dysuria and urgency.       Past Medical History   has a past medical history of Alcohol withdrawal delirium (HCC) (9/19/2022), Drug-seeking behavior, Peptic ulcer, Seizure (HCC), Seizure disorder (HCC), and Ulcer of abdomen wall (HCC).    Surgical History   has a past surgical history that includes appendectomy; pr upper gi endoscopy,diagnosis (N/A, 6/30/2022); pr upper gi endoscopy,biopsy (N/A, 6/30/2022); pr lap,diagnostic abdomen (N/A, 7/7/2022); and wound exploration ortho (Right, 7/7/2022).     Family History  Family history reviewed with patient. There is no family history that is pertinent to the chief complaint.     Social History   reports that he has never smoked. He has never used smokeless tobacco. He reports that he does not currently use alcohol. He reports current drug use. Drug: Inhaled.    Allergies  Allergies   Allergen Reactions    Penicillins Unspecified     Unknown childhood reaction     Penicillins Unspecified     Unknown. Per patient, he knows he has one because his mom told him       Medications  Prior to Admission Medications   Prescriptions Last Dose Informant Patient Reported? Taking?   baclofen (LIORESAL) 10 MG Tab  Historical Yes No   Sig: Take 10 mg by mouth 3 times a day.      Facility-Administered Medications: None       Physical Exam  Temp:  [36.2 °C (97.1 °F)] 36.2 °C (97.1 °F)  Pulse:  [] 74  Resp:  [12-34] 14  BP: (126-172)/(64-87) 149/81  SpO2:  [93 %-95 %] 94 %  Blood Pressure: 126/76   Temperature: 36.2 °C (97.1 °F)   Pulse: 89   Respiration: 14   Pulse Oximetry: 94 %       Physical Exam  Constitutional:       General: He is in acute distress.   HENT:      Head: Normocephalic and atraumatic.      Mouth/Throat:      Mouth: Mucous membranes are moist.      Pharynx: Oropharynx is clear. No oropharyngeal exudate or posterior oropharyngeal erythema.   Eyes:      General: No scleral icterus.  Cardiovascular:      Rate and Rhythm:  "Normal rate and regular rhythm.      Pulses: Normal pulses.      Heart sounds: Normal heart sounds. No murmur heard.  Pulmonary:      Effort: Pulmonary effort is normal. No respiratory distress.      Breath sounds: Normal breath sounds. No wheezing.   Abdominal:      Palpations: Abdomen is soft.      Tenderness: There is abdominal tenderness.   Musculoskeletal:         General: No swelling or tenderness. Normal range of motion.      Cervical back: Normal range of motion.   Skin:     General: Skin is warm and dry.   Neurological:      General: No focal deficit present.      Mental Status: He is alert and oriented to person, place, and time. Mental status is at baseline.         Laboratory:  Recent Labs     11/08/23  2245   WBC 14.8*   RBC 4.58*   HEMOGLOBIN 13.1*   HEMATOCRIT 40.4*   MCV 88.2   MCH 28.6   MCHC 32.4   RDW 46.9   PLATELETCT 355   MPV 9.3     Recent Labs     11/08/23  2245   SODIUM 139   POTASSIUM 4.8   CHLORIDE 108   CO2 19*   GLUCOSE 108*   BUN 18   CREATININE 0.74   CALCIUM 9.5     Recent Labs     11/08/23  2245   ALTSGPT 20   ASTSGOT 24   ALKPHOSPHAT 108*   TBILIRUBIN <0.2   LIPASE 38   GLUCOSE 108*         No results for input(s): \"NTPROBNP\" in the last 72 hours.      No results for input(s): \"TROPONINT\" in the last 72 hours.    Imaging:  CT-ABDOMEN-PELVIS WITH   Final Result         1.  Mild fluid-filled distention of small bowel suggesting component of ileus and/or enteritis.   2.  Low-density along the gallbladder fossa, most compatible with a postoperative fluid collection which could include seroma, abscess, or possibly small biloma.   3.  Fluid-filled distention of the distal esophagus, consider gastroesophageal reflux or dysmotility.   4.  Small fat-containing bilateral inguinal hernias   5.  Diverticulosis   6.  Atherosclerosis      DX-CHEST-PORTABLE (1 VIEW)   Final Result         1.  No acute cardiopulmonary disease.          X-Ray:  I have personally reviewed the images and compared with " prior images. and My impression is: No acute process    Assessment/Plan:  Justification for Admission Status  I anticipate this patient will require at least two midnights for appropriate medical management, necessitating inpatient admission because ileus    * Ileus (HCC)- (present on admission)  Assessment & Plan  Patient coming in with significant abdominal pain, symptoms all began 09/2023 he had acute cholecystitis and had a cholecystectomy at the time.  He was improving but continued to have abdominal pain which never resolved.  Repeat CT scan at outside facility showed fluid collection concerning for abscess at the time due to leukocytosis and complaints of fever.  He was treated with IV antibiotics and discharged with cefdinir and metronidazole which she completed  Continue to have pain so gastric emptying study was done which was negative as well as repeat CT scan which showed seroma in the gallbladder fossa but no signs of acute inflammation  He has been having diarrhea for the past 6 days, nausea/vomiting  CT scan in the ED personally reviewed and shows findings consistent with ileus.  He has been having bowel movements, but due to distended stomach on imaging and continued nausea NG tube ordered    Diarrhea- (present on admission)  Assessment & Plan  Diarrhea for the past 6 days, recently on antibiotics for possible postoperative abscess which she has completed  Findings of ileus on CT scan.  Will be concern for C. difficile, labs ordered    Primary hypertension- (present on admission)  Assessment & Plan  Continue amlodipine, losartan    Seizure disorder (HCC)- (present on admission)  Assessment & Plan  Continue Depakote        VTE prophylaxis: SCDs/TEDs

## 2023-11-09 NOTE — ED NOTES
Med Rec PARTIAL per Pt at bedside. Pt was unable to stay awake during interview.   Allergies reviewed.  Home Pharmacy:  [facility]    Per last dispense Pt has the following:  Cefdinir 300mg BID (11/3)  Flagyl 500mg TID (11/7)

## 2023-11-09 NOTE — ED NOTES
Bedside report received from off going RN/tech: Bryn assumed care of patient.  POC discussed with patient. Call light within reach, all needs addressed at this time.       Fall risk interventions in place: Patient's personal possessions are with in their safe reach, Place socks on patient, Give patient urinal if applicable, and Keep floor surfaces clean and dry (all applicable per [place] Fall risk assessment)   Continuous monitoring: Pulse Ox or Blood Pressure  IVF/IV medications: IVF infusing per MAR  Oxygen: Room Air  Bedside sitter: Not Applicable   Isolation: Not Applicable

## 2023-11-09 NOTE — PROGRESS NOTES
[facility] Medicine Daily Progress Note    Date of Service  11/9/2023    Chief Complaint  Donal Carpio is a 53 y.o. adult admitted 11/8/2023 with abdominal pain    Hospital Course  Mr. Donal Carpio is a 53 y.o. adult with a PMH of migraines, seizure disorder, HTN, on 09/2023 patient had cholecystectomy, who presented 11/8/2023 with abdominal pain/nausea/vomiting/diarrhea.      He was admitted under the eval last month for continued abdominal pain following surgery, CT scan showed fluid concerning for seroma versus gallbladder fossa abscess.  Due to clinical picture at the time it was considered abscess and history with IV antibiotics and then discharged with cefdinir and metronidazole which she has completed.  He also had a gastric emptying study which was normal, a repeat CT abdomen/pelvis at outside facility showed persistent biloma/seroma with no evidence of inflammation. He said he had improved since discharge but his abdominal pain never really resolved.  He is also been having diarrhea for the past 6 days, last episode was in the ED per patient.  Denies any black tarry stools or blood in his stool.  His abdominal pain is getting worse yesterday along with worsening nausea so he decided to come to the ED.  He has been feeling chills but no fevers.     In the ED afebrile, tachycardic and tachypneic.  Labs showed WBC of 14.8.  CT abdomen pelvis notes mild fluid-filled distention of small bowel suggesting component of ileus and or enteritis with low density along the gallbladder fossa most compatible with postoperative fluid collection which could be seroma, abscess, or possibly small biloma; fluid-filled distention of the distal esophagus, consider GERD or dysmotility; small fat-containing bilateral inguinal hernias, diverticulosis, and atherosclerosis.  Initial x-ray notes no acute cardiopulmonary disease noted.  Patient admitted to hospital medicine for management of care.  During this admission, an  NG tube was placed and patient  Due to noted ileus and to decompress stomach.  This is to intermittent suction.  Discussed with pharmacy switching medication to IV.  Patient will also be ruled out for C. difficile due to noted diarrhea for the past 6 days.  No diarrhea was noted since admission.    Interval Problem Update  -Patient seen and examined.  Patient reports continued abdominal pain.  Discussed with patient importance of placing NG tube to decompress stomach and give bowel rest.  Patient agreeable at this time.  Also discussed plan of care with wife Maegan over the phone.  Once abdominal pain has improved, patient can be started on clear liquid diet and advance as tolerated.  If patient regresses, will need to consult surgery for recommendations or possible GI recommendations  -Plan of care: Continue pain management; utilize NG tube for intermittent suction to decompress gut; continue bowel rest; once abdominal pain improves, can start on CLD and advance as tolerated  -Disposition: Patient currently inpatient status as he is anticipated to stay 2-3 midnights for management of ileus, diarrhea with rule out C. Difficile  -Lab work: Reviewed; expected  -VSS at this time    I have discussed this patient's plan of care and discharge plan at IDT rounds today with Case Management, Nursing, Nursing leadership, and other members of the IDT team.    Consultants/Specialty  NONE    Code Status  Full Code    Disposition  The patient is not medically cleared for discharge to home or a post-acute facility.  Anticipate discharge to: home with close outpatient follow-up    I have placed the appropriate orders for post-discharge needs.    Review of Systems  Review of Systems   Constitutional:  Positive for malaise/fatigue. Negative for chills and fever.   HENT: Negative.     Eyes: Negative.    Respiratory: Negative.     Cardiovascular: Negative.    Gastrointestinal:  Positive for abdominal pain, nausea and vomiting.    Genitourinary: Negative.    Musculoskeletal:  Positive for myalgias.   Skin: Negative.    Neurological:  Positive for weakness.   Endo/Heme/Allergies: Negative.    Psychiatric/Behavioral: Negative.          Physical Exam  Temp:  [36.2 °C (97.1 °F)] 36.2 °C (97.1 °F)  Pulse:  [] 84  Resp:  [12-34] 14  BP: (126-172)/(64-87) 134/76  SpO2:  [92 %-95 %] 92 %    Physical Exam  Vitals and nursing note reviewed.   Constitutional:       Appearance: He is obese.   HENT:      Head: Normocephalic.      Nose: Nose normal.      Mouth/Throat:      Mouth: Mucous membranes are moist.      Pharynx: Oropharynx is clear.   Eyes:      Pupils: Pupils are equal, round, and reactive to light.   Cardiovascular:      Rate and Rhythm: Normal rate and regular rhythm.      Pulses: Normal pulses.      Heart sounds: Normal heart sounds.   Pulmonary:      Effort: Pulmonary effort is normal.      Breath sounds: Normal breath sounds.   Abdominal:      Palpations: Abdomen is soft.      Tenderness: There is abdominal tenderness. There is guarding.   Musculoskeletal:         General: Tenderness present.      Cervical back: Normal range of motion and neck supple.   Skin:     General: Skin is dry.      Capillary Refill: Capillary refill takes 2 to 3 seconds.   Neurological:      Mental Status: He is alert. Mental status is at baseline.         Fluids    Intake/Output Summary (Last 24 hours) at 11/9/2023 1406  Last data filed at 11/9/2023 1300  Gross per 24 hour   Intake 2772 ml   Output 150 ml   Net 2622 ml       Laboratory  Recent Labs     11/08/23  2245   WBC 14.8*   RBC 4.58*   HEMOGLOBIN 13.1*   HEMATOCRIT 40.4*   MCV 88.2   MCH 28.6   MCHC 32.4   RDW 46.9   PLATELETCT 355   MPV 9.3     Recent Labs     11/08/23  2245   SODIUM 139   POTASSIUM 4.8   CHLORIDE 108   CO2 19*   GLUCOSE 108*   BUN 18   CREATININE 0.74   CALCIUM 9.5                   Imaging  DX-ABDOMEN FOR TUBE PLACEMENT   Final Result      Enteric feeding tube tip terminates in the  left upper quadrant projecting over the expected location of the stomach.      CT-ABDOMEN-PELVIS WITH   Final Result         1.  Mild fluid-filled distention of small bowel suggesting component of ileus and/or enteritis.   2.  Low-density along the gallbladder fossa, most compatible with a postoperative fluid collection which could include seroma, abscess, or possibly small biloma.   3.  Fluid-filled distention of the distal esophagus, consider gastroesophageal reflux or dysmotility.   4.  Small fat-containing bilateral inguinal hernias   5.  Diverticulosis   6.  Atherosclerosis      DX-CHEST-PORTABLE (1 VIEW)   Final Result         1.  No acute cardiopulmonary disease.           Assessment/Plan  * Ileus (HCC)- (present on admission)  Assessment & Plan  Patient coming in with significant abdominal pain, symptoms all began 09/2023 he had acute cholecystitis and had a cholecystectomy at the time.  He was improving but continued to have abdominal pain which never resolved.  Repeat CT scan at outside facility showed fluid collection concerning for abscess at the time due to leukocytosis and complaints of fever.  He was treated with IV antibiotics and discharged with cefdinir and metronidazole which she completed  Continue to have pain so gastric emptying study was done which was negative as well as repeat CT scan which showed seroma in the gallbladder fossa but no signs of acute inflammation  He has been having diarrhea for the past 6 days, nausea/vomiting  CT scan in the ED personally reviewed and shows findings consistent with ileus.  He has been having bowel movements, but due to distended stomach on imaging and continued nausea NG tube ordered    Biloma  Assessment & Plan  Noted seroma/biloma on gallbladder fossa; no inflammation noted  Patient was placed on a series of antibiotics and had finish course    Diarrhea- (present on admission)  Assessment & Plan  Diarrhea for the past 6 days, recently on antibiotics for  possible postoperative abscess which she has completed  Findings of ileus on CT scan.  Will be concern for C. difficile, labs ordered    Primary hypertension- (present on admission)  Assessment & Plan  Continue amlodipine, losartan    Seizure disorder (HCC)- (present on admission)  Assessment & Plan  Continue Depakote         VTE prophylaxis:   SCDs/TEDs      I have performed a physical exam and reviewed and updated ROS and Plan today (11/9/2023). In review of yesterday's note (11/8/2023), there are no changes except as documented above.    Please note that this dictation was created using voice recognition software. I have made every reasonable attempt to correct obvious errors, but there may be errors of grammar and possibly content that I did not discover before finalizing the note.    Electronically signed by:  Dr. JANESSA Mohr, DNP, APRN, FNP-C  Hospitalist Services  [facility]  [phone]  [email or web address]  11/09/23                 1418

## 2023-11-09 NOTE — PROGRESS NOTES
Report received from ED RN, assumed care at 13:36  Pt is A0X4, and responds appropriately   Pt declines any SOB, chest pain, new onset of numbness/ tingling  Pt rates pain at 9/10, on a scale of 1-10, pt medicated per MAR  Pt is voiding adequately and without hesitancy  Pt has - flatus, + bowel sounds, -BM, PTA    Pt ambulates up self   Pt is on an NPO diet, pt denies any nausea/vomiting  Plan of care discussed, all questions answered. Explained importance of calling before getting OOB and pt verbalizes understanding. Explained importance of oral care. Call light is within reach, treaded slipper socks on, bed in lowest/ locked position, hourly rounding in place, all needs met at this time

## 2023-11-09 NOTE — PROGRESS NOTES
NGT placed at right nare at 56cm. Xray confirmed placement. Pt to be on intermittent suction. 150ml of clear drainage. UA sent. No BM.

## 2023-11-09 NOTE — ASSESSMENT & PLAN NOTE
Noted seroma/biloma on gallbladder fossa; no inflammation noted  Patient was placed on a series of antibiotics and had finish course

## 2023-11-09 NOTE — HOSPITAL COURSE
Mr. Donal Carpio is a 53 y.o. adult with a PMH of migraines, seizure disorder, HTN, on 09/2023 patient had cholecystectomy, who presented 11/8/2023 with abdominal pain/nausea/vomiting/diarrhea.      He was admitted under the eval last month for continued abdominal pain following surgery, CT scan showed fluid concerning for seroma versus gallbladder fossa abscess.  Due to clinical picture at the time it was considered abscess and history with IV antibiotics and then discharged with cefdinir and metronidazole which she has completed.  He also had a gastric emptying study which was normal, a repeat CT abdomen/pelvis at outside facility showed persistent biloma/seroma with no evidence of inflammation. He said he had improved since discharge but his abdominal pain never really resolved.  He is also been having diarrhea for the past 6 days, last episode was in the ED per patient.  Denies any black tarry stools or blood in his stool.  His abdominal pain is getting worse yesterday along with worsening nausea so he decided to come to the ED.  He has been feeling chills but no fevers.     In the ED afebrile, tachycardic and tachypneic.  Labs showed WBC of 14.8.  CT abdomen pelvis notes mild fluid-filled distention of small bowel suggesting component of ileus and or enteritis with low density along the gallbladder fossa most compatible with postoperative fluid collection which could be seroma, abscess, or possibly small biloma; fluid-filled distention of the distal esophagus, consider GERD or dysmotility; small fat-containing bilateral inguinal hernias, diverticulosis, and atherosclerosis.  Initial x-ray notes no acute cardiopulmonary disease noted.  Patient admitted to hospital medicine for management of care.  During this admission, an NG tube was placed and patient  Due to noted ileus and to decompress stomach.  This is to intermittent suction.  Discussed with pharmacy switching medication to IV.  Patient will also be  ruled out for C. difficile due to noted diarrhea for the past 6 days.  No diarrhea was noted since admission.

## 2023-11-09 NOTE — ED NOTES
Bedside report received from off going RN: Dorinda, assumed care of patient.  POC discussed with patient. Call light within reach, all needs addressed at this time.       Fall risk interventions in place: Move the patient closer to the nurse's station, Patient's personal possessions are with in their safe reach, Place fall risk sign on patient's door, Give patient urinal if applicable, and Keep floor surfaces clean and dry (all applicable per [facility] Fall risk assessment)   Continuous monitoring: Cardiac Leads, Pulse Ox, or Blood Pressure  IVF/IV medications: Infusion per MAR (List Med(s)) fluid bolus  Oxygen: Room Air  Bedside sitter: Not Applicable   Isolation: Not Applicable

## 2023-11-09 NOTE — ASSESSMENT & PLAN NOTE
Diarrhea for the past 6 days, recently on antibiotics for possible postoperative abscess which she has completed  Findings of ileus on CT scan.  Will be concern for C. difficile, labs ordered    11/10/23  Improving  Continue to monitor  IV fluids with NS at 83 milliliters per hour

## 2023-11-09 NOTE — PROGRESS NOTES
Report given to Candie on T4. Helene (SPO) updated on poc. Helene requested that Fani MARTIN call her. Message sent to Fani MARTIN via voalte.

## 2023-11-09 NOTE — ASSESSMENT & PLAN NOTE
Patient coming in with significant abdominal pain, symptoms all began 09/2023 he had acute cholecystitis and had a cholecystectomy at the time.  He was improving but continued to have abdominal pain which never resolved.  Repeat CT scan at outside facility showed fluid collection concerning for abscess at the time due to leukocytosis and complaints of fever.  He was treated with IV antibiotics and discharged with cefdinir and metronidazole which she completed  Continue to have pain so gastric emptying study was done which was negative as well as repeat CT scan which showed seroma in the gallbladder fossa but no signs of acute inflammation  He has been having diarrhea for the past 6 days, nausea/vomiting  CT scan in the ED personally reviewed and shows findings consistent with ileus.  He has been having bowel movements, but due to distended stomach on imaging and continued nausea NG tube ordered    11/10/23  Appears to be improving.  Nasogastric tube removed.  Diet advanced to full liquids.

## 2023-11-09 NOTE — ED TRIAGE NOTES
"Chief Complaint   Patient presents with    Abdominal Pain     RUQ 10/10 throbbing pain, worsened tonight. +n/v/d for 1 day. Cholecystectomy done in September, states admitted a month ago for sepsis.  Appendix removed in 2003.        BIB [facility] to [address], pt on monitor and in gown, labs drawn and sent. Pt consists of: above complaint, pt A&Ox4, on room air. Per pt, he is a [facility] pt but told to come to [facility] for worsening pain. Pt guarding self, restless. H/o epilepsy, states he takes medication as directed. Previous admission, pt states they found fluid collection in gallbladder area.     Medications given en route: 100 mcg fentanyl     BP (!) 158/86   Pulse (!) 112   Temp 36.2 °C (97.1 °F) (Oral)   Resp (!) 24   Ht 1.753 m (5' 9\")   Wt 89.4 kg (197 lb)   SpO2 94%   BMI 29.09 kg/m²     "

## 2023-11-09 NOTE — ED NOTES
Medication history reviewed with PT at bedside and PT's home pharmacy [facility] ([phone]).    Med rec is complete per PT and pharmacy reporting.    Allergies reviewed.     PT completed a 3 day course of Cefdinir 300mg. Started 11/03/2023. Last dose was 11/6/2023.  PT completed a 3 day course of Metronidazole 500mg. Started 11/03/2023. Last dose 11/6/2023.    Patient is not taking anticoagulants.    Preferred pharmacy for this visit - [facility] on [address] ([phone])

## 2023-11-10 LAB
ANION GAP SERPL CALC-SCNC: 11 MMOL/L (ref 7–16)
BUN SERPL-MCNC: 11 MG/DL (ref 8–22)
CALCIUM SERPL-MCNC: 8.6 MG/DL (ref 8.5–10.5)
CHLORIDE SERPL-SCNC: 106 MMOL/L (ref 96–112)
CO2 SERPL-SCNC: 22 MMOL/L (ref 20–33)
CREAT SERPL-MCNC: 0.66 MG/DL (ref 0.5–1.4)
ERYTHROCYTE [DISTWIDTH] IN BLOOD BY AUTOMATED COUNT: 46.5 FL (ref 35.9–50)
GFR SERPLBLD CREATININE-BSD FMLA CKD-EPI: 113 ML/MIN/1.73 M 2
GLUCOSE SERPL-MCNC: 85 MG/DL (ref 65–99)
HCT VFR BLD AUTO: 38.2 % (ref 42–52)
HGB BLD-MCNC: 12.3 G/DL (ref 14–18)
MCH RBC QN AUTO: 28.2 PG (ref 27–33)
MCHC RBC AUTO-ENTMCNC: 32.2 G/DL (ref 32.3–36.5)
MCV RBC AUTO: 87.6 FL (ref 81.4–97.8)
PLATELET # BLD AUTO: 218 K/UL (ref 164–446)
PMV BLD AUTO: 8.9 FL (ref 9–12.9)
POTASSIUM SERPL-SCNC: 3.7 MMOL/L (ref 3.6–5.5)
PROCALCITONIN SERPL-MCNC: 0.09 NG/ML
RBC # BLD AUTO: 4.36 M/UL (ref 4.7–6.1)
SODIUM SERPL-SCNC: 139 MMOL/L (ref 135–145)
WBC # BLD AUTO: 9.1 K/UL (ref 4.8–10.8)

## 2023-11-10 PROCEDURE — 700102 HCHG RX REV CODE 250 W/ 637 OVERRIDE(OP): Performed by: STUDENT IN AN ORGANIZED HEALTH CARE EDUCATION/TRAINING PROGRAM

## 2023-11-10 PROCEDURE — 84145 PROCALCITONIN (PCT): CPT

## 2023-11-10 PROCEDURE — 700105 HCHG RX REV CODE 258: Performed by: STUDENT IN AN ORGANIZED HEALTH CARE EDUCATION/TRAINING PROGRAM

## 2023-11-10 PROCEDURE — 700102 HCHG RX REV CODE 250 W/ 637 OVERRIDE(OP): Performed by: NURSE PRACTITIONER

## 2023-11-10 PROCEDURE — 85027 COMPLETE CBC AUTOMATED: CPT

## 2023-11-10 PROCEDURE — 99232 SBSQ HOSP IP/OBS MODERATE 35: CPT | Performed by: STUDENT IN AN ORGANIZED HEALTH CARE EDUCATION/TRAINING PROGRAM

## 2023-11-10 PROCEDURE — A9270 NON-COVERED ITEM OR SERVICE: HCPCS | Performed by: NURSE PRACTITIONER

## 2023-11-10 PROCEDURE — 770001 HCHG ROOM/CARE - MED/SURG/GYN PRIV*

## 2023-11-10 PROCEDURE — 700111 HCHG RX REV CODE 636 W/ 250 OVERRIDE (IP): Mod: JZ | Performed by: STUDENT IN AN ORGANIZED HEALTH CARE EDUCATION/TRAINING PROGRAM

## 2023-11-10 PROCEDURE — 80048 BASIC METABOLIC PNL TOTAL CA: CPT

## 2023-11-10 PROCEDURE — A9270 NON-COVERED ITEM OR SERVICE: HCPCS | Performed by: STUDENT IN AN ORGANIZED HEALTH CARE EDUCATION/TRAINING PROGRAM

## 2023-11-10 RX ORDER — ENOXAPARIN SODIUM 100 MG/ML
40 INJECTION SUBCUTANEOUS DAILY
Status: DISCONTINUED | OUTPATIENT
Start: 2023-11-10 | End: 2023-11-11 | Stop reason: HOSPADM

## 2023-11-10 RX ADMIN — NICOTINE TRANSDERMAL SYSTEM 21 MG: 21 PATCH, EXTENDED RELEASE TRANSDERMAL at 05:39

## 2023-11-10 RX ADMIN — OXYCODONE 5 MG: 5 TABLET ORAL at 19:25

## 2023-11-10 RX ADMIN — PROCHLORPERAZINE EDISYLATE 10 MG: 5 INJECTION INTRAMUSCULAR; INTRAVENOUS at 19:25

## 2023-11-10 RX ADMIN — OXYCODONE 5 MG: 5 TABLET ORAL at 05:27

## 2023-11-10 RX ADMIN — VALPROIC ACID 500 MG: 500 SOLUTION ORAL at 05:34

## 2023-11-10 RX ADMIN — SODIUM CHLORIDE: 9 INJECTION, SOLUTION INTRAVENOUS at 14:57

## 2023-11-10 RX ADMIN — LOSARTAN POTASSIUM 50 MG: 50 TABLET, FILM COATED ORAL at 05:27

## 2023-11-10 RX ADMIN — LANSOPRAZOLE 30 MG: 30 TABLET, ORALLY DISINTEGRATING ORAL at 05:34

## 2023-11-10 RX ADMIN — MORPHINE SULFATE 4 MG: 4 INJECTION, SOLUTION INTRAMUSCULAR; INTRAVENOUS at 07:36

## 2023-11-10 RX ADMIN — MORPHINE SULFATE 4 MG: 4 INJECTION, SOLUTION INTRAMUSCULAR; INTRAVENOUS at 11:38

## 2023-11-10 RX ADMIN — AMLODIPINE BESYLATE 5 MG: 10 TABLET ORAL at 05:27

## 2023-11-10 RX ADMIN — OXYCODONE 5 MG: 5 TABLET ORAL at 10:12

## 2023-11-10 RX ADMIN — MORPHINE SULFATE 4 MG: 4 INJECTION, SOLUTION INTRAMUSCULAR; INTRAVENOUS at 00:34

## 2023-11-10 RX ADMIN — VALPROIC ACID 500 MG: 500 SOLUTION ORAL at 17:14

## 2023-11-10 ASSESSMENT — ENCOUNTER SYMPTOMS
PALPITATIONS: 0
NAUSEA: 0
SHORTNESS OF BREATH: 0
COUGH: 0
CHILLS: 0
VOMITING: 0
MYALGIAS: 0
ABDOMINAL PAIN: 1
FEVER: 0
HEADACHES: 0
DIZZINESS: 0

## 2023-11-10 ASSESSMENT — PAIN DESCRIPTION - PAIN TYPE
TYPE: ACUTE PAIN

## 2023-11-10 NOTE — CARE PLAN
The patient is Stable - Low risk of patient condition declining or worsening    Shift Goals  Clinical Goals: pain control, NGT monitoring  Patient Goals: pain control    Progress made toward(s) clinical / shift goals: Patient pain managed with prn medication per MAR. NGT replaced.

## 2023-11-10 NOTE — PROGRESS NOTES
"Bedside report received.  Assessment complete.  A&O x 4. Patient calls appropriately.  Patient ambulates with standby assist.  Patient has 6-9/10 pain. Medicated per MAR.   Skin per flow sheet.  Patient NPO strict. Denies N/V.  + void, - flatus, - BM. Last BM PTA (11/9 AM per pt).    Reviewed plan of care with patient. Call light and personal belongings within reach. Hourly rounding in place. All needs met at this time.     RN informed by CNA at 2053 that pt NG tube had \"come out\" pet pt when he was laying on his side. New NGT placed and abdominal xray confirmation in for placement. NGT reconnected to low intermittent suction.  "

## 2023-11-10 NOTE — DISCHARGE PLANNING
Case Management Discharge Planning    Admission Date: 11/8/2023  GMLOS: 2.3  ALOS: 1    6-Clicks ADL Score: 20  6-Clicks Mobility Score: 14        Anticipated Discharge Dispo:      DME Needed: No    Action(s) Taken:  Patient discussed in rounds today.  NG tube removed. Depending on diet tolerance patient will possibly discharge tomorrow, 11/1.     Escalations Completed: None    Medically Clear: No    Next Steps: CM will continue to follow patient and assist with DCP needs.     Barriers to Discharge: Medical clearance

## 2023-11-11 ENCOUNTER — PHARMACY VISIT (OUTPATIENT)
Dept: PHARMACY | Facility: MEDICAL CENTER | Age: 53
End: 2023-11-11
Payer: COMMERCIAL

## 2023-11-11 VITALS
SYSTOLIC BLOOD PRESSURE: 122 MMHG | DIASTOLIC BLOOD PRESSURE: 70 MMHG | WEIGHT: 197 LBS | RESPIRATION RATE: 17 BRPM | HEART RATE: 84 BPM | OXYGEN SATURATION: 94 % | BODY MASS INDEX: 29.18 KG/M2 | HEIGHT: 69 IN | TEMPERATURE: 98.6 F

## 2023-11-11 PROBLEM — R19.7 DIARRHEA: Status: RESOLVED | Noted: 2022-08-27 | Resolved: 2023-11-11

## 2023-11-11 PROBLEM — K56.7 ILEUS (HCC): Status: RESOLVED | Noted: 2023-11-09 | Resolved: 2023-11-11

## 2023-11-11 LAB
ALBUMIN SERPL BCP-MCNC: 3.6 G/DL (ref 3.2–4.9)
ALBUMIN/GLOB SERPL: 1.2 G/DL
ALP SERPL-CCNC: 110 U/L (ref 30–99)
ALT SERPL-CCNC: 33 U/L (ref 2–50)
ANION GAP SERPL CALC-SCNC: 11 MMOL/L (ref 7–16)
AST SERPL-CCNC: 29 U/L (ref 12–45)
BACTERIA UR CULT: NORMAL
BASOPHILS # BLD AUTO: 0.8 % (ref 0–1.8)
BASOPHILS # BLD: 0.06 K/UL (ref 0–0.12)
BILIRUB SERPL-MCNC: 0.2 MG/DL (ref 0.1–1.5)
BUN SERPL-MCNC: 13 MG/DL (ref 8–22)
CALCIUM ALBUM COR SERPL-MCNC: 9.1 MG/DL (ref 8.5–10.5)
CALCIUM SERPL-MCNC: 8.8 MG/DL (ref 8.5–10.5)
CHLORIDE SERPL-SCNC: 104 MMOL/L (ref 96–112)
CO2 SERPL-SCNC: 23 MMOL/L (ref 20–33)
CREAT SERPL-MCNC: 0.6 MG/DL (ref 0.5–1.4)
EOSINOPHIL # BLD AUTO: 0.27 K/UL (ref 0–0.51)
EOSINOPHIL NFR BLD: 3.5 % (ref 0–6.9)
ERYTHROCYTE [DISTWIDTH] IN BLOOD BY AUTOMATED COUNT: 43.4 FL (ref 35.9–50)
GFR SERPLBLD CREATININE-BSD FMLA CKD-EPI: 117 ML/MIN/1.73 M 2
GLOBULIN SER CALC-MCNC: 3.1 G/DL (ref 1.9–3.5)
GLUCOSE SERPL-MCNC: 89 MG/DL (ref 65–99)
HCT VFR BLD AUTO: 38.6 % (ref 42–52)
HGB BLD-MCNC: 12.8 G/DL (ref 14–18)
IMM GRANULOCYTES # BLD AUTO: 0.05 K/UL (ref 0–0.11)
IMM GRANULOCYTES NFR BLD AUTO: 0.6 % (ref 0–0.9)
LYMPHOCYTES # BLD AUTO: 1.71 K/UL (ref 1–4.8)
LYMPHOCYTES NFR BLD: 22.1 % (ref 22–41)
MAGNESIUM SERPL-MCNC: 2.1 MG/DL (ref 1.5–2.5)
MCH RBC QN AUTO: 28.3 PG (ref 27–33)
MCHC RBC AUTO-ENTMCNC: 33.2 G/DL (ref 32.3–36.5)
MCV RBC AUTO: 85.4 FL (ref 81.4–97.8)
MONOCYTES # BLD AUTO: 1.05 K/UL (ref 0–0.85)
MONOCYTES NFR BLD AUTO: 13.6 % (ref 0–13.4)
NEUTROPHILS # BLD AUTO: 4.59 K/UL (ref 1.82–7.42)
NEUTROPHILS NFR BLD: 59.4 % (ref 44–72)
NRBC # BLD AUTO: 0 K/UL
NRBC BLD-RTO: 0 /100 WBC (ref 0–0.2)
PLATELET # BLD AUTO: 230 K/UL (ref 164–446)
PMV BLD AUTO: 8.9 FL (ref 9–12.9)
POTASSIUM SERPL-SCNC: 3.9 MMOL/L (ref 3.6–5.5)
PROT SERPL-MCNC: 6.7 G/DL (ref 6–8.2)
RBC # BLD AUTO: 4.52 M/UL (ref 4.7–6.1)
SIGNIFICANT IND 70042: NORMAL
SITE SITE: NORMAL
SODIUM SERPL-SCNC: 138 MMOL/L (ref 135–145)
SOURCE SOURCE: NORMAL
WBC # BLD AUTO: 7.7 K/UL (ref 4.8–10.8)

## 2023-11-11 PROCEDURE — 83735 ASSAY OF MAGNESIUM: CPT

## 2023-11-11 PROCEDURE — A9270 NON-COVERED ITEM OR SERVICE: HCPCS | Performed by: NURSE PRACTITIONER

## 2023-11-11 PROCEDURE — 99239 HOSP IP/OBS DSCHRG MGMT >30: CPT | Performed by: HOSPITALIST

## 2023-11-11 PROCEDURE — 80053 COMPREHEN METABOLIC PANEL: CPT

## 2023-11-11 PROCEDURE — 700102 HCHG RX REV CODE 250 W/ 637 OVERRIDE(OP): Performed by: HOSPITALIST

## 2023-11-11 PROCEDURE — RXMED WILLOW AMBULATORY MEDICATION CHARGE: Performed by: HOSPITALIST

## 2023-11-11 PROCEDURE — 85025 COMPLETE CBC W/AUTO DIFF WBC: CPT

## 2023-11-11 PROCEDURE — 700102 HCHG RX REV CODE 250 W/ 637 OVERRIDE(OP): Performed by: NURSE PRACTITIONER

## 2023-11-11 PROCEDURE — A9270 NON-COVERED ITEM OR SERVICE: HCPCS | Performed by: HOSPITALIST

## 2023-11-11 RX ORDER — DIVALPROEX SODIUM 500 MG/1
500 TABLET, DELAYED RELEASE ORAL 2 TIMES DAILY
Status: DISCONTINUED | OUTPATIENT
Start: 2023-11-11 | End: 2023-11-11 | Stop reason: HOSPADM

## 2023-11-11 RX ORDER — AMLODIPINE BESYLATE 10 MG/1
5 TABLET ORAL
Status: DISCONTINUED | OUTPATIENT
Start: 2023-11-12 | End: 2023-11-11 | Stop reason: HOSPADM

## 2023-11-11 RX ORDER — OXYCODONE HYDROCHLORIDE 5 MG/1
5 TABLET ORAL EVERY 4 HOURS PRN
Qty: 20 TABLET | Refills: 0 | Status: SHIPPED | OUTPATIENT
Start: 2023-11-11 | End: 2023-11-18

## 2023-11-11 RX ORDER — PROMETHAZINE HYDROCHLORIDE 25 MG/1
12.5-25 TABLET ORAL EVERY 4 HOURS PRN
Status: DISCONTINUED | OUTPATIENT
Start: 2023-11-11 | End: 2023-11-11 | Stop reason: HOSPADM

## 2023-11-11 RX ORDER — LOSARTAN POTASSIUM 50 MG/1
50 TABLET ORAL
Status: DISCONTINUED | OUTPATIENT
Start: 2023-11-12 | End: 2023-11-11 | Stop reason: HOSPADM

## 2023-11-11 RX ORDER — OMEPRAZOLE 20 MG/1
20 CAPSULE, DELAYED RELEASE ORAL DAILY
Status: DISCONTINUED | OUTPATIENT
Start: 2023-11-12 | End: 2023-11-11 | Stop reason: HOSPADM

## 2023-11-11 RX ORDER — ONDANSETRON 4 MG/1
4 TABLET, ORALLY DISINTEGRATING ORAL EVERY 4 HOURS PRN
Status: DISCONTINUED | OUTPATIENT
Start: 2023-11-11 | End: 2023-11-11 | Stop reason: HOSPADM

## 2023-11-11 RX ORDER — OXYCODONE HYDROCHLORIDE 5 MG/1
5 TABLET ORAL EVERY 4 HOURS PRN
Status: DISCONTINUED | OUTPATIENT
Start: 2023-11-11 | End: 2023-11-11 | Stop reason: HOSPADM

## 2023-11-11 RX ORDER — ACETAMINOPHEN 325 MG/1
650 TABLET ORAL EVERY 6 HOURS PRN
Status: DISCONTINUED | OUTPATIENT
Start: 2023-11-11 | End: 2023-11-11 | Stop reason: HOSPADM

## 2023-11-11 RX ADMIN — OXYCODONE 5 MG: 5 TABLET ORAL at 10:04

## 2023-11-11 RX ADMIN — NICOTINE TRANSDERMAL SYSTEM 21 MG: 21 PATCH, EXTENDED RELEASE TRANSDERMAL at 10:04

## 2023-11-11 NOTE — PROGRESS NOTES
Bedside report received.  Assessment complete.  A&O x 4. Patient calls appropriately.  Patient ambulates with no assist.    Patient has 8/10 pain. Pain managed with prescribed medications.  Denies N&V. Tolerating regular diet.  + void, + flatus, + BM.  Patient denies SOB.  SCD's refused.    Review plan with of care with patient. Call light and personal belongings within reach. Hourly rounding in place. All needs met at this time.

## 2023-11-11 NOTE — PROGRESS NOTES
Received report from previous shift RN. Assumed care of patient at 1900.  Assessment complete.  Patient A&O x 4. Patient calls appropriately.  Patient ambulates with no assist.   Patient has 5/10 pain. Pain managed with prescribed medications.  Denies N&V. Tolerating regular diet. Pre-medicated with Zofran per MAR for dinner d/t pt stating oxycodone causing nausea.   NS at 83 mL/hr.   + void, + flatus, - BM.  Patient on RA, denies SOB.     Reviewed plan of care with patient. Call light and personal belongings within reach. Hourly rounding in place. All needs met at this time.

## 2023-11-11 NOTE — PROGRESS NOTES
Patient given discharge instructions. Discussed diet, activity, follow up appointments, after care, symptoms and management, and prescriptions provided. Packet sent with patient.  IV d/c'd, discharge paperwork signed, and all questions answered. Patient discharged home via car with relative. Patient discharged from discharge lounge via wheelchair.

## 2023-11-11 NOTE — PROGRESS NOTES
[facility] Medicine Daily Progress Note    Date of Service  11/10/2023    Chief Complaint  Donal Carpio is a 53 y.o. adult admitted 11/8/2023 with abdominal pain due to ileus.    Hospital Course  Mr. Donal Carpio is a 53 y.o. adult with a PMH of migraines, seizure disorder, HTN, on 09/2023 patient had cholecystectomy, who presented 11/8/2023 with abdominal pain/nausea/vomiting/diarrhea.      He was admitted under the eval last month for continued abdominal pain following surgery, CT scan showed fluid concerning for seroma versus gallbladder fossa abscess.  Due to clinical picture at the time it was considered abscess and history with IV antibiotics and then discharged with cefdinir and metronidazole which she has completed.  He also had a gastric emptying study which was normal, a repeat CT abdomen/pelvis at outside facility showed persistent biloma/seroma with no evidence of inflammation. He said he had improved since discharge but his abdominal pain never really resolved.  He is also been having diarrhea for the past 6 days, last episode was in the ED per patient.  Denies any black tarry stools or blood in his stool.  His abdominal pain is getting worse yesterday along with worsening nausea so he decided to come to the ED.  He has been feeling chills but no fevers.     In the ED afebrile, tachycardic and tachypneic.  Labs showed WBC of 14.8.  CT abdomen pelvis notes mild fluid-filled distention of small bowel suggesting component of ileus and or enteritis with low density along the gallbladder fossa most compatible with postoperative fluid collection which could be seroma, abscess, or possibly small biloma; fluid-filled distention of the distal esophagus, consider GERD or dysmotility; small fat-containing bilateral inguinal hernias, diverticulosis, and atherosclerosis.  Initial x-ray notes no acute cardiopulmonary disease noted.  Patient admitted to hospital medicine for management of care.  During this  admission, an NG tube was placed and patient  Due to noted ileus and to decompress stomach.  This is to intermittent suction.  Discussed with pharmacy switching medication to IV.  Patient will also be ruled out for C. difficile due to noted diarrhea for the past 6 days.  No diarrhea was noted since admission.    Interval Problem Update  -Patient seen and examined.  Patient reports continued abdominal pain.  Discussed with patient importance of placing NG tube to decompress stomach and give bowel rest.  Patient agreeable at this time.  Also discussed plan of care with wife Maegan over the phone.  Once abdominal pain has improved, patient can be started on clear liquid diet and advance as tolerated.  If patient regresses, will need to consult surgery for recommendations or possible GI recommendations  -Plan of care: Continue pain management; utilize NG tube for intermittent suction to decompress gut; continue bowel rest; once abdominal pain improves, can start on CLD and advance as tolerated  -Disposition: Patient currently inpatient status as he is anticipated to stay 2-3 midnights for management of ileus, diarrhea with rule out C. Difficile  -Lab work: Reviewed; expected  -VSS at this time    11/10/23  Patient requesting NG tube removal this morning.  Advance his diet to full liquids.  Tolerating.  White count trending down to 9.1.  Procalcitonin remains negative.  Continues on IV fluids NS at 83 mils per hour.    I have discussed this patient's plan of care and discharge plan at IDT rounds today with Case Management, Nursing, Nursing leadership, and other members of the IDT team.    Consultants/Specialty  NONE    Code Status  Full Code    Disposition  The patient is not medically cleared for discharge to home or a post-acute facility.  Anticipate discharge to: home with close outpatient follow-up    I have placed the appropriate orders for post-discharge needs.    Review of Systems  Review of Systems    Constitutional:  Negative for chills and fever.   Respiratory:  Negative for cough and shortness of breath.    Cardiovascular:  Negative for chest pain and palpitations.   Gastrointestinal:  Positive for abdominal pain. Negative for nausea and vomiting.   Genitourinary:  Negative for dysuria and hematuria.   Musculoskeletal:  Negative for joint pain and myalgias.   Neurological:  Negative for dizziness and headaches.        Physical Exam  Temp:  [36.8 °C (98.2 °F)-37.4 °C (99.3 °F)] 37 °C (98.6 °F)  Pulse:  [] 89  Resp:  [18] 18  BP: (105-152)/(72-91) 105/75  SpO2:  [91 %-94 %] 94 %    Physical Exam  Vitals and nursing note reviewed.   Constitutional:       General: He is not in acute distress.     Appearance: He is ill-appearing.   HENT:      Head: Normocephalic and atraumatic.      Nose: Nose normal.      Comments: Nasogastric tube in place     Mouth/Throat:      Mouth: Mucous membranes are moist.      Pharynx: Oropharynx is clear. No oropharyngeal exudate.   Eyes:      General: No scleral icterus.        Right eye: No discharge.         Left eye: No discharge.      Conjunctiva/sclera: Conjunctivae normal.   Cardiovascular:      Rate and Rhythm: Normal rate and regular rhythm.      Pulses: Normal pulses.      Heart sounds: Normal heart sounds. No murmur heard.  Pulmonary:      Effort: Pulmonary effort is normal. No respiratory distress.      Breath sounds: Normal breath sounds.   Abdominal:      General: Abdomen is flat. Bowel sounds are normal. There is distension.      Palpations: Abdomen is soft.      Tenderness: There is abdominal tenderness.   Musculoskeletal:         General: No swelling.      Cervical back: Neck supple. No tenderness.      Right lower leg: No edema.      Left lower leg: No edema.   Skin:     General: Skin is warm and dry.      Coloration: Skin is pale.   Neurological:      Mental Status: He is alert and oriented to person, place, and time. Mental status is at baseline.      Motor:  No weakness.   Psychiatric:         Thought Content: Thought content normal.         Judgment: Judgment normal.         Fluids    Intake/Output Summary (Last 24 hours) at 11/10/2023 2003  Last data filed at 11/10/2023 1859  Gross per 24 hour   Intake 2101.84 ml   Output 475 ml   Net 1626.84 ml         Laboratory  Recent Labs     11/08/23  1057 11/08/23  2245 11/10/23  0705   WBC  --  14.8* 9.1   RBC  --  4.58* 4.36*   HEMOGLOBIN 13.6 13.1* 12.3*   HEMATOCRIT 40.8 40.4* 38.2*   MCV 85 88.2 87.6   MCH 28.2 28.6 28.2   MCHC 33.3 32.4 32.2*   RDW 13.7 46.9 46.5   PLATELETCT 366 355 218   MPV  --  9.3 8.9*       Recent Labs     11/08/23  2245 11/10/23  0705   SODIUM 139 139   POTASSIUM 4.8 3.7   CHLORIDE 108 106   CO2 19* 22   GLUCOSE 108* 85   BUN 18 11   CREATININE 0.74 0.66   CALCIUM 9.5 8.6                     Imaging  DX-ABDOMEN FOR TUBE PLACEMENT   Final Result         1.  Nonspecific bowel gas pattern in the upper abdomen.   2.  Nasogastric tube tip terminates overlying the expected location of the gastric body.   3.  Hazy left lung base opacity suggesting subtle infiltrate.      DX-ABDOMEN FOR TUBE PLACEMENT   Final Result         1.  Nonspecific bowel gas pattern in the upper abdomen.   2.  Nasogastric tube with side-port in the distal esophagus, recommend advancement.      DX-ABDOMEN FOR TUBE PLACEMENT   Final Result      Enteric feeding tube tip terminates in the left upper quadrant projecting over the expected location of the stomach.      CT-ABDOMEN-PELVIS WITH   Final Result         1.  Mild fluid-filled distention of small bowel suggesting component of ileus and/or enteritis.   2.  Low-density along the gallbladder fossa, most compatible with a postoperative fluid collection which could include seroma, abscess, or possibly small biloma.   3.  Fluid-filled distention of the distal esophagus, consider gastroesophageal reflux or dysmotility.   4.  Small fat-containing bilateral inguinal hernias   5.   Diverticulosis   6.  Atherosclerosis      DX-CHEST-PORTABLE (1 VIEW)   Final Result         1.  No acute cardiopulmonary disease.           Assessment/Plan  * Ileus (HCC)- (present on admission)  Assessment & Plan  Patient coming in with significant abdominal pain, symptoms all began 09/2023 he had acute cholecystitis and had a cholecystectomy at the time.  He was improving but continued to have abdominal pain which never resolved.  Repeat CT scan at outside facility showed fluid collection concerning for abscess at the time due to leukocytosis and complaints of fever.  He was treated with IV antibiotics and discharged with cefdinir and metronidazole which she completed  Continue to have pain so gastric emptying study was done which was negative as well as repeat CT scan which showed seroma in the gallbladder fossa but no signs of acute inflammation  He has been having diarrhea for the past 6 days, nausea/vomiting  CT scan in the ED personally reviewed and shows findings consistent with ileus.  He has been having bowel movements, but due to distended stomach on imaging and continued nausea NG tube ordered    11/10/23  Appears to be improving.  Nasogastric tube removed.  Diet advanced to full liquids.    Biloma  Assessment & Plan  Noted seroma/biloma on gallbladder fossa; no inflammation noted  Patient was placed on a series of antibiotics and had finish course    Diarrhea- (present on admission)  Assessment & Plan  Diarrhea for the past 6 days, recently on antibiotics for possible postoperative abscess which she has completed  Findings of ileus on CT scan.  Will be concern for C. difficile, labs ordered    11/10/23  Improving  Continue to monitor  IV fluids with NS at 83 milliliters per hour    Primary hypertension- (present on admission)  Assessment & Plan  11/10/23  Controlled  Continue amlodipine, losartan    Seizure disorder (HCC)- (present on admission)  Assessment & Plan  Continue Depakote         VTE  prophylaxis:    enoxaparin ppx      I have performed a physical exam and reviewed and updated ROS and Plan today (11/10/2023). In review of yesterday's note (11/9/2023), there are no changes except as documented above.

## 2023-11-11 NOTE — CARE PLAN
The patient is Stable - Low risk of patient condition declining or worsening    Shift Goals  Clinical Goals: pain control  Patient Goals: get ng out    Progress made toward(s) clinical / shift goals:  pt notified RN if pain >3 and RN medicated per mar. Pt aware he wont go home on IV medication therefore he's trying to use po.   Pts ng came     Patient is not progressing towards the following goals:

## 2023-11-11 NOTE — DISCHARGE SUMMARY
Discharge Summary    CHIEF COMPLAINT ON ADMISSION  Chief Complaint   Patient presents with    Abdominal Pain     RUQ 10/10 throbbing pain, worsened tonight. +n/v/d for 1 day. Cholecystectomy done in September, states admitted a month ago for sepsis.  Appendix removed in 2003.        Reason for Admission  Abd pain     Admission Date  11/8/2023    CODE STATUS  Prior    HPI & HOSPITAL COURSE  This is a 53 y.o. adult here with history of abdominal pain, migraine, seizure disorder, hypertension, alcohol abuse.    Patient presented for evaluation of his ongoing abdominal pain.  He has been having this for some time, and had a laparoscopic cholecystectomy done at [facility] in September.  Subsequent to this he was found to have a fluid collection which was thought possibly to be an abscess or biloma.  He was treated with antibiotics and completed course.    Patient presented here for evaluation of his abdominal pain on November 9.  He had a CT scan done which confirmed the previously known fluid collection which appears to be stable in size compared to study from [facility].  He was also noted to have changes consistent with an ileus.  Patient has been experiencing diarrhea, and so he was admitted for treatment of his ileus, as well as further evaluation of his abdominal pain.    In-house the patient has done well.  He did initially require NGT decompression however this has been removed, and he has continued to do well.  His abdominal pain has subsided back down to its chronic nature, and the diarrhea has resolved.  On the morning of discharge he is tolerating a normal diet, and having normal bowel movements.  His abdominal pain is noted to persist, it is however unchanged from what it was a month ago.    As regards abdominal pain, he has been evaluated with a H[facility] scan at [facility] which I have reviewed, this showed no evidence of bile leak.  It is possible he still has a residual  biloma, or seroma.  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  Is possibly 1 or both of these things are contributing to his chronic pain.  However there is no evidence of an acute surgical or medical issue at this point.  We will therefore discharge him to follow-up with Dr. Gonzales with whom he has a scheduled follow-up.      Therefore, he is discharged in good and stable condition to home with close outpatient follow-up.    The patient met 2-midnight criteria for an inpatient stay at the time of discharge.    Discharge Date  11/11/2023    FOLLOW UP ITEMS POST DISCHARGE  Scheduled follow-up with Dr. Gonzales of general surgery    DISCHARGE DIAGNOSES  Principal Problem (Resolved):    Ileus (HCC) (POA: Yes)  Active Problems:    Seizure disorder (HCC) (POA: Yes)    Primary hypertension (POA: Yes)    Biloma (POA: Unknown)  Resolved Problems:    Diarrhea (POA: Yes)      FOLLOW UP  No future appointments.  No follow-up provider specified.    MEDICATIONS ON DISCHARGE     Medication List        CHANGE how you take these medications        Instructions   oxyCODONE immediate-release 5 MG Tabs  What changed:   medication strength  how much to take  how to take this  when to take this  reasons to take this  Commonly known as: Roxicodone   1 Tablet by Enteral Tube route every four hours as needed for Severe Pain for up to 7 days.  Dose: 5 mg            CONTINUE taking these medications        Instructions   amLODIPine 5 MG Tabs  Commonly known as: Norvasc   Take 1 Tablet by mouth every day.  Dose: 1 Tablet     baclofen 10 MG Tabs  Commonly known as: Lioresal   Take 10 mg by mouth 3 times a day as needed. Indications: Muscle Spasm  Dose: 10 mg     Depakote 500 MG Tbec  Generic drug: divalproex   Take 500 mg by mouth 2 times a day.  Dose: 500 mg     losartan 50 MG Tabs  Commonly known as: Cozaar   Take 50 mg by mouth every day.  Dose: 50 mg     nicotine 21 MG/24HR Pt24  Commonly known as:  Nicoderm   Place 1 Patch on the skin every 24 hours.  Dose: 1 Patch     ondansetron 4 MG Tabs tablet  Commonly known as: Zofran   Take 4 mg by mouth every 8 hours as needed for Nausea/Vomiting.  Dose: 4 mg     pantoprazole 40 MG Tbec  Commonly known as: Protonix   Take 40 mg by mouth 2 times a day.  Dose: 40 mg     sumatriptan 100 MG tablet  Commonly known as: Imitrex   Take 100 mg by mouth one time as needed for Migraine.  Dose: 100 mg            STOP taking these medications      cefdinir 300 MG Caps  Commonly known as: Omnicef     HYDROcodone-acetaminophen 5-325 MG Tabs per tablet  Commonly known as: Norco     HYDROmorphone 2 MG Tabs  Commonly known as: Dilaudid     metroNIDAZOLE 500 MG Tabs  Commonly known as: Flagyl              Allergies  Allergies   Allergen Reactions    Penicillins Unspecified     Unknown childhood reaction     Penicillins Unspecified     Unknown. Per patient, he knows he has one because his mom told him       DIET  No orders of the defined types were placed in this encounter.      ACTIVITY  As tolerated.  Weight bearing as tolerated    CONSULTATIONS  General surgery    PROCEDURES  None    LABORATORY  Lab Results   Component Value Date    SODIUM 138 11/11/2023    POTASSIUM 3.9 11/11/2023    CHLORIDE 104 11/11/2023    CO2 23 11/11/2023    GLUCOSE 89 11/11/2023    BUN 13 11/11/2023    CREATININE 0.60 11/11/2023        Lab Results   Component Value Date    WBC 7.7 11/11/2023    HEMOGLOBIN 12.8 (L) 11/11/2023    HEMATOCRIT 38.6 (L) 11/11/2023    PLATELETCT 230 11/11/2023        Total time of the discharge process exceeds 40 minutes.  I spent the majority of that time with the patient reviewing discharge planning and instructions, including follow-up issues and reasons to come back emergently.  As noted on the day of discharge the patient's pain is markedly improved, he is tolerating p.o., and having normal bowel movements.  He is also up and ambulating, without issues.

## 2023-11-11 NOTE — PROGRESS NOTES
Discharge lounge orders placed. Discharge lounge RN to provide discharge education, paperwork, and medications. No questions or concerns at this time.

## 2023-11-11 NOTE — CARE PLAN
The patient is Stable - Low risk of patient condition declining or worsening    Shift Goals  Clinical Goals: tolerate diet, pain control  Patient Goals: pain control    Progress made toward(s) clinical / shift goals:  Premedicated for nausea d/t pt stating narcotics causing nausea. Pain controlled with PRN medications. Patient able to tolerate regular diet for dinner, resting comfortably in bed.  Problem: Pain - Standard  Goal: Alleviation of pain or a reduction in pain to the patient’s comfort goal  Description: Target End Date:  Prior to discharge or change in level of care    Document on Vitals flowsheet    1.  Document pain using the appropriate pain scale per order or unit policy  2.  Educate and implement non-pharmacologic comfort measures (i.e. relaxation, distraction, massage, cold/heat therapy, etc.)  3.  Pain management medications as ordered  4.  Reassess pain after pain med administration per policy  5.  If opiods administered assess patient's response to pain medication is appropriate per POSS sedation scale  6.  Follow pain management plan developed in collaboration with patient and interdisciplinary team (including palliative care or pain specialists if applicable)  Outcome: Progressing     Problem: Knowledge Deficit - Standard  Goal: Patient and family/care givers will demonstrate understanding of plan of care, disease process/condition, diagnostic tests and medications  Description: Target End Date:  1-3 days or as soon as patient condition allows    Document in Patient Education    1.  Patient and family/caregiver oriented to unit, equipment, visitation policy and means for communicating concern  2.  Complete/review Learning Assessment  3.  Assess knowledge level of disease process/condition, treatment plan, diagnostic tests and medications  4.  Explain disease process/condition, treatment plan, diagnostic tests and medications  Outcome: Progressing     Problem: Fall Risk  Goal: Patient will remain  free from falls  Description: Target End Date:  Prior to discharge or change in level of care    Document interventions on the Natalie Godfrey Fall Risk Assessment    1.  Assess for fall risk factors  2.  Implement fall precautions  Outcome: Progressing       Patient is not progressing towards the following goals:

## 2023-11-20 ENCOUNTER — APPOINTMENT (OUTPATIENT)
Dept: ADMISSIONS | Facility: MEDICAL CENTER | Age: 53
End: 2023-11-20
Attending: INTERNAL MEDICINE
Payer: COMMERCIAL

## 2024-02-14 ENCOUNTER — APPOINTMENT (OUTPATIENT)
Dept: RADIOLOGY | Facility: MEDICAL CENTER | Age: 54
End: 2024-02-14
Attending: STUDENT IN AN ORGANIZED HEALTH CARE EDUCATION/TRAINING PROGRAM
Payer: COMMERCIAL

## 2024-02-14 ENCOUNTER — HOSPITAL ENCOUNTER (OUTPATIENT)
Facility: MEDICAL CENTER | Age: 54
End: 2024-02-16
Attending: STUDENT IN AN ORGANIZED HEALTH CARE EDUCATION/TRAINING PROGRAM | Admitting: STUDENT IN AN ORGANIZED HEALTH CARE EDUCATION/TRAINING PROGRAM
Payer: COMMERCIAL

## 2024-02-14 ENCOUNTER — APPOINTMENT (OUTPATIENT)
Dept: RADIOLOGY | Facility: MEDICAL CENTER | Age: 54
End: 2024-02-14
Payer: COMMERCIAL

## 2024-02-14 DIAGNOSIS — R11.2 INTRACTABLE NAUSEA AND VOMITING: ICD-10-CM

## 2024-02-14 DIAGNOSIS — K59.03 DRUG-INDUCED CONSTIPATION: ICD-10-CM

## 2024-02-14 DIAGNOSIS — Z76.5 DRUG-SEEKING BEHAVIOR: ICD-10-CM

## 2024-02-14 DIAGNOSIS — R10.9 ABDOMINAL PAIN, UNSPECIFIED ABDOMINAL LOCATION: ICD-10-CM

## 2024-02-14 DIAGNOSIS — J18.9 RECURRENT PNEUMONIA: ICD-10-CM

## 2024-02-14 DIAGNOSIS — K70.10 ALCOHOLIC HEPATITIS WITHOUT ASCITES: ICD-10-CM

## 2024-02-14 DIAGNOSIS — K92.0 HEMATEMESIS, UNSPECIFIED WHETHER NAUSEA PRESENT: ICD-10-CM

## 2024-02-14 LAB
ALBUMIN SERPL BCP-MCNC: 4.3 G/DL (ref 3.2–4.9)
ALBUMIN/GLOB SERPL: 1.2 G/DL
ALP SERPL-CCNC: 94 U/L (ref 30–99)
ALT SERPL-CCNC: 20 U/L (ref 2–50)
ANION GAP SERPL CALC-SCNC: 17 MMOL/L (ref 7–16)
AST SERPL-CCNC: 21 U/L (ref 12–45)
BASOPHILS # BLD AUTO: 0.8 % (ref 0–1.8)
BASOPHILS # BLD: 0.06 K/UL (ref 0–0.12)
BILIRUB SERPL-MCNC: 0.3 MG/DL (ref 0.1–1.5)
BUN SERPL-MCNC: 13 MG/DL (ref 8–22)
CALCIUM ALBUM COR SERPL-MCNC: 9.3 MG/DL (ref 8.5–10.5)
CALCIUM SERPL-MCNC: 9.5 MG/DL (ref 8.5–10.5)
CHLORIDE SERPL-SCNC: 105 MMOL/L (ref 96–112)
CO2 SERPL-SCNC: 18 MMOL/L (ref 20–33)
CREAT SERPL-MCNC: 0.92 MG/DL (ref 0.5–1.4)
EKG IMPRESSION: NORMAL
EOSINOPHIL # BLD AUTO: 0.42 K/UL (ref 0–0.51)
EOSINOPHIL NFR BLD: 5.4 % (ref 0–6.9)
ERYTHROCYTE [DISTWIDTH] IN BLOOD BY AUTOMATED COUNT: 39.3 FL (ref 35.9–50)
GFR SERPLBLD CREATININE-BSD FMLA CKD-EPI: 100 ML/MIN/1.73 M 2
GLOBULIN SER CALC-MCNC: 3.5 G/DL (ref 1.9–3.5)
GLUCOSE SERPL-MCNC: 80 MG/DL (ref 65–99)
HCT VFR BLD AUTO: 36.8 % (ref 42–52)
HGB BLD-MCNC: 12.2 G/DL (ref 14–18)
IMM GRANULOCYTES # BLD AUTO: 0.03 K/UL (ref 0–0.11)
IMM GRANULOCYTES NFR BLD AUTO: 0.4 % (ref 0–0.9)
LACTATE SERPL-SCNC: 0.8 MMOL/L (ref 0.5–2)
LIPASE SERPL-CCNC: 24 U/L (ref 11–82)
LYMPHOCYTES # BLD AUTO: 2.73 K/UL (ref 1–4.8)
LYMPHOCYTES NFR BLD: 34.8 % (ref 22–41)
MCH RBC QN AUTO: 26.2 PG (ref 27–33)
MCHC RBC AUTO-ENTMCNC: 33.2 G/DL (ref 32.3–36.5)
MCV RBC AUTO: 79.1 FL (ref 81.4–97.8)
MONOCYTES # BLD AUTO: 0.71 K/UL (ref 0–0.85)
MONOCYTES NFR BLD AUTO: 9.1 % (ref 0–13.4)
NEUTROPHILS # BLD AUTO: 3.89 K/UL (ref 1.82–7.42)
NEUTROPHILS NFR BLD: 49.5 % (ref 44–72)
NRBC # BLD AUTO: 0 K/UL
NRBC BLD-RTO: 0 /100 WBC (ref 0–0.2)
PLATELET # BLD AUTO: 260 K/UL (ref 164–446)
PMV BLD AUTO: 9.2 FL (ref 9–12.9)
POTASSIUM SERPL-SCNC: 3.7 MMOL/L (ref 3.6–5.5)
PROT SERPL-MCNC: 7.8 G/DL (ref 6–8.2)
RBC # BLD AUTO: 4.65 M/UL (ref 4.7–6.1)
SODIUM SERPL-SCNC: 140 MMOL/L (ref 135–145)
TROPONIN T SERPL-MCNC: 6 NG/L (ref 6–19)
TROPONIN T SERPL-MCNC: 7 NG/L (ref 6–19)
WBC # BLD AUTO: 7.8 K/UL (ref 4.8–10.8)

## 2024-02-14 PROCEDURE — 84466 ASSAY OF TRANSFERRIN: CPT

## 2024-02-14 PROCEDURE — 96376 TX/PRO/DX INJ SAME DRUG ADON: CPT

## 2024-02-14 PROCEDURE — 74177 CT ABD & PELVIS W/CONTRAST: CPT

## 2024-02-14 PROCEDURE — 82728 ASSAY OF FERRITIN: CPT

## 2024-02-14 PROCEDURE — 700105 HCHG RX REV CODE 258: Mod: UD | Performed by: STUDENT IN AN ORGANIZED HEALTH CARE EDUCATION/TRAINING PROGRAM

## 2024-02-14 PROCEDURE — 83690 ASSAY OF LIPASE: CPT

## 2024-02-14 PROCEDURE — 96375 TX/PRO/DX INJ NEW DRUG ADDON: CPT

## 2024-02-14 PROCEDURE — 93005 ELECTROCARDIOGRAM TRACING: CPT

## 2024-02-14 PROCEDURE — 83540 ASSAY OF IRON: CPT

## 2024-02-14 PROCEDURE — 83605 ASSAY OF LACTIC ACID: CPT

## 2024-02-14 PROCEDURE — 700111 HCHG RX REV CODE 636 W/ 250 OVERRIDE (IP): Mod: UD | Performed by: STUDENT IN AN ORGANIZED HEALTH CARE EDUCATION/TRAINING PROGRAM

## 2024-02-14 PROCEDURE — 700117 HCHG RX CONTRAST REV CODE 255: Mod: UD | Performed by: STUDENT IN AN ORGANIZED HEALTH CARE EDUCATION/TRAINING PROGRAM

## 2024-02-14 PROCEDURE — 96374 THER/PROPH/DIAG INJ IV PUSH: CPT | Mod: XU

## 2024-02-14 PROCEDURE — 99285 EMERGENCY DEPT VISIT HI MDM: CPT

## 2024-02-14 PROCEDURE — 83550 IRON BINDING TEST: CPT

## 2024-02-14 PROCEDURE — 36415 COLL VENOUS BLD VENIPUNCTURE: CPT

## 2024-02-14 PROCEDURE — G0378 HOSPITAL OBSERVATION PER HR: HCPCS

## 2024-02-14 PROCEDURE — 85046 RETICYTE/HGB CONCENTRATE: CPT

## 2024-02-14 PROCEDURE — 84484 ASSAY OF TROPONIN QUANT: CPT

## 2024-02-14 PROCEDURE — 99223 1ST HOSP IP/OBS HIGH 75: CPT | Performed by: STUDENT IN AN ORGANIZED HEALTH CARE EDUCATION/TRAINING PROGRAM

## 2024-02-14 PROCEDURE — 71045 X-RAY EXAM CHEST 1 VIEW: CPT

## 2024-02-14 PROCEDURE — 85025 COMPLETE CBC W/AUTO DIFF WBC: CPT

## 2024-02-14 PROCEDURE — 83010 ASSAY OF HAPTOGLOBIN QUANT: CPT

## 2024-02-14 PROCEDURE — 80053 COMPREHEN METABOLIC PANEL: CPT

## 2024-02-14 PROCEDURE — 93005 ELECTROCARDIOGRAM TRACING: CPT | Performed by: STUDENT IN AN ORGANIZED HEALTH CARE EDUCATION/TRAINING PROGRAM

## 2024-02-14 RX ORDER — IPRATROPIUM BROMIDE AND ALBUTEROL SULFATE 2.5; .5 MG/3ML; MG/3ML
3 SOLUTION RESPIRATORY (INHALATION)
Status: DISCONTINUED | OUTPATIENT
Start: 2024-02-14 | End: 2024-02-16 | Stop reason: HOSPADM

## 2024-02-14 RX ORDER — ACETAMINOPHEN 500 MG
1000 TABLET ORAL EVERY 6 HOURS
Status: DISCONTINUED | OUTPATIENT
Start: 2024-02-15 | End: 2024-02-15

## 2024-02-14 RX ORDER — DIVALPROEX SODIUM 500 MG/1
500 TABLET, DELAYED RELEASE ORAL DAILY
Status: DISCONTINUED | OUTPATIENT
Start: 2024-02-15 | End: 2024-02-15

## 2024-02-14 RX ORDER — SODIUM CHLORIDE, SODIUM LACTATE, POTASSIUM CHLORIDE, AND CALCIUM CHLORIDE .6; .31; .03; .02 G/100ML; G/100ML; G/100ML; G/100ML
500 INJECTION, SOLUTION INTRAVENOUS
Status: DISCONTINUED | OUTPATIENT
Start: 2024-02-14 | End: 2024-02-16 | Stop reason: HOSPADM

## 2024-02-14 RX ORDER — OXYCODONE HYDROCHLORIDE 5 MG/1
5 TABLET ORAL
Status: DISCONTINUED | OUTPATIENT
Start: 2024-02-14 | End: 2024-02-15

## 2024-02-14 RX ORDER — ACETAMINOPHEN 325 MG/1
650 TABLET ORAL EVERY 6 HOURS PRN
Status: DISCONTINUED | OUTPATIENT
Start: 2024-02-14 | End: 2024-02-14

## 2024-02-14 RX ORDER — RIMEGEPANT SULFATE 75 MG/75MG
75 TABLET, ORALLY DISINTEGRATING ORAL
COMMUNITY

## 2024-02-14 RX ORDER — ONDANSETRON 2 MG/ML
4 INJECTION INTRAMUSCULAR; INTRAVENOUS EVERY 4 HOURS PRN
Status: DISCONTINUED | OUTPATIENT
Start: 2024-02-14 | End: 2024-02-15

## 2024-02-14 RX ORDER — AMLODIPINE BESYLATE 5 MG/1
5 TABLET ORAL DAILY
Status: DISCONTINUED | OUTPATIENT
Start: 2024-02-15 | End: 2024-02-16 | Stop reason: HOSPADM

## 2024-02-14 RX ORDER — LACOSAMIDE 200 MG/1
200 TABLET ORAL DAILY
COMMUNITY
End: 2024-09-14

## 2024-02-14 RX ORDER — LACOSAMIDE 200 MG/1
1 TABLET ORAL 2 TIMES DAILY
Status: SHIPPED | COMMUNITY
End: 2024-02-14

## 2024-02-14 RX ORDER — PROCHLORPERAZINE EDISYLATE 5 MG/ML
5 INJECTION INTRAMUSCULAR; INTRAVENOUS ONCE
Status: COMPLETED | OUTPATIENT
Start: 2024-02-14 | End: 2024-02-14

## 2024-02-14 RX ORDER — MORPHINE SULFATE 4 MG/ML
4 INJECTION INTRAVENOUS
Status: DISCONTINUED | OUTPATIENT
Start: 2024-02-14 | End: 2024-02-16 | Stop reason: HOSPADM

## 2024-02-14 RX ORDER — OXYCODONE HYDROCHLORIDE 10 MG/1
10 TABLET ORAL
Status: DISCONTINUED | OUTPATIENT
Start: 2024-02-14 | End: 2024-02-15

## 2024-02-14 RX ORDER — SODIUM CHLORIDE, SODIUM LACTATE, POTASSIUM CHLORIDE, CALCIUM CHLORIDE 600; 310; 30; 20 MG/100ML; MG/100ML; MG/100ML; MG/100ML
INJECTION, SOLUTION INTRAVENOUS CONTINUOUS
Status: DISCONTINUED | OUTPATIENT
Start: 2024-02-15 | End: 2024-02-15

## 2024-02-14 RX ORDER — PROMETHAZINE HYDROCHLORIDE 25 MG/1
12.5-25 SUPPOSITORY RECTAL EVERY 4 HOURS PRN
Status: DISCONTINUED | OUTPATIENT
Start: 2024-02-14 | End: 2024-02-16 | Stop reason: HOSPADM

## 2024-02-14 RX ORDER — GUAIFENESIN/DEXTROMETHORPHAN 100-10MG/5
10 SYRUP ORAL EVERY 6 HOURS PRN
Status: DISCONTINUED | OUTPATIENT
Start: 2024-02-14 | End: 2024-02-16 | Stop reason: HOSPADM

## 2024-02-14 RX ORDER — SODIUM CHLORIDE, SODIUM LACTATE, POTASSIUM CHLORIDE, CALCIUM CHLORIDE 600; 310; 30; 20 MG/100ML; MG/100ML; MG/100ML; MG/100ML
1000 INJECTION, SOLUTION INTRAVENOUS ONCE
Status: COMPLETED | OUTPATIENT
Start: 2024-02-14 | End: 2024-02-14

## 2024-02-14 RX ORDER — DIVALPROEX SODIUM 500 MG/1
500 TABLET, DELAYED RELEASE ORAL DAILY
COMMUNITY
End: 2024-09-14

## 2024-02-14 RX ORDER — PANTOPRAZOLE SODIUM 40 MG/10ML
40 INJECTION, POWDER, LYOPHILIZED, FOR SOLUTION INTRAVENOUS 2 TIMES DAILY
Status: DISCONTINUED | OUTPATIENT
Start: 2024-02-15 | End: 2024-02-16 | Stop reason: HOSPADM

## 2024-02-14 RX ORDER — KETOROLAC TROMETHAMINE 15 MG/ML
15 INJECTION, SOLUTION INTRAMUSCULAR; INTRAVENOUS EVERY 6 HOURS
Status: DISCONTINUED | OUTPATIENT
Start: 2024-02-15 | End: 2024-02-14

## 2024-02-14 RX ORDER — PROCHLORPERAZINE EDISYLATE 5 MG/ML
5-10 INJECTION INTRAMUSCULAR; INTRAVENOUS EVERY 4 HOURS PRN
Status: DISCONTINUED | OUTPATIENT
Start: 2024-02-14 | End: 2024-02-16 | Stop reason: HOSPADM

## 2024-02-14 RX ORDER — PANTOPRAZOLE SODIUM 40 MG/1
40 TABLET, DELAYED RELEASE ORAL 2 TIMES DAILY
COMMUNITY
End: 2024-03-28

## 2024-02-14 RX ORDER — ACETAMINOPHEN 500 MG
1000 TABLET ORAL EVERY 6 HOURS PRN
Status: DISCONTINUED | OUTPATIENT
Start: 2024-02-20 | End: 2024-02-15

## 2024-02-14 RX ORDER — PROMETHAZINE HYDROCHLORIDE 25 MG/1
12.5-25 TABLET ORAL EVERY 4 HOURS PRN
Status: DISCONTINUED | OUTPATIENT
Start: 2024-02-14 | End: 2024-02-16 | Stop reason: HOSPADM

## 2024-02-14 RX ORDER — ONDANSETRON 4 MG/1
4 TABLET, ORALLY DISINTEGRATING ORAL EVERY 4 HOURS PRN
Status: DISCONTINUED | OUTPATIENT
Start: 2024-02-14 | End: 2024-02-15

## 2024-02-14 RX ORDER — LOSARTAN POTASSIUM 50 MG/1
50 TABLET ORAL DAILY
Status: DISCONTINUED | OUTPATIENT
Start: 2024-02-15 | End: 2024-02-16 | Stop reason: HOSPADM

## 2024-02-14 RX ORDER — NICOTINE 21 MG/24HR
21 PATCH, TRANSDERMAL 24 HOURS TRANSDERMAL
Status: DISCONTINUED | OUTPATIENT
Start: 2024-02-15 | End: 2024-02-16 | Stop reason: HOSPADM

## 2024-02-14 RX ORDER — LABETALOL HYDROCHLORIDE 5 MG/ML
10 INJECTION, SOLUTION INTRAVENOUS EVERY 4 HOURS PRN
Status: DISCONTINUED | OUTPATIENT
Start: 2024-02-14 | End: 2024-02-16 | Stop reason: HOSPADM

## 2024-02-14 RX ORDER — ONDANSETRON 2 MG/ML
4 INJECTION INTRAMUSCULAR; INTRAVENOUS ONCE
Status: COMPLETED | OUTPATIENT
Start: 2024-02-14 | End: 2024-02-14

## 2024-02-14 RX ORDER — DIPHENHYDRAMINE HYDROCHLORIDE 50 MG/ML
25 INJECTION INTRAMUSCULAR; INTRAVENOUS ONCE
Status: COMPLETED | OUTPATIENT
Start: 2024-02-14 | End: 2024-02-14

## 2024-02-14 RX ADMIN — DIPHENHYDRAMINE HYDROCHLORIDE 25 MG: 50 INJECTION, SOLUTION INTRAMUSCULAR; INTRAVENOUS at 22:56

## 2024-02-14 RX ADMIN — MORPHINE SULFATE 6 MG: 10 INJECTION INTRAVENOUS at 21:53

## 2024-02-14 RX ADMIN — SODIUM CHLORIDE, POTASSIUM CHLORIDE, SODIUM LACTATE AND CALCIUM CHLORIDE 1000 ML: 600; 310; 30; 20 INJECTION, SOLUTION INTRAVENOUS at 21:54

## 2024-02-14 RX ADMIN — PROCHLORPERAZINE EDISYLATE 5 MG: 5 INJECTION, SOLUTION INTRAMUSCULAR; INTRAVENOUS at 22:56

## 2024-02-14 RX ADMIN — ONDANSETRON 4 MG: 2 INJECTION INTRAMUSCULAR; INTRAVENOUS at 21:53

## 2024-02-14 RX ADMIN — FAMOTIDINE 20 MG: 10 INJECTION, SOLUTION INTRAVENOUS at 21:54

## 2024-02-14 RX ADMIN — MORPHINE SULFATE 6 MG: 10 INJECTION INTRAVENOUS at 23:26

## 2024-02-14 RX ADMIN — IOHEXOL 100 ML: 350 INJECTION, SOLUTION INTRAVENOUS at 23:15

## 2024-02-14 ASSESSMENT — FIBROSIS 4 INDEX: FIB4 SCORE: 0.92

## 2024-02-15 PROBLEM — K92.2 GI BLEED: Status: ACTIVE | Noted: 2024-02-15

## 2024-02-15 PROBLEM — F17.290 VAPING NICOTINE DEPENDENCE, TOBACCO PRODUCT: Status: ACTIVE | Noted: 2024-02-15

## 2024-02-15 PROBLEM — D62 ACUTE BLOOD LOSS ANEMIA: Status: ACTIVE | Noted: 2024-02-15

## 2024-02-15 LAB
ALBUMIN SERPL BCP-MCNC: 4.2 G/DL (ref 3.2–4.9)
ALBUMIN/GLOB SERPL: 1.4 G/DL
ALP SERPL-CCNC: 115 U/L (ref 30–99)
ALT SERPL-CCNC: 37 U/L (ref 2–50)
ANION GAP SERPL CALC-SCNC: 13 MMOL/L (ref 7–16)
AST SERPL-CCNC: 51 U/L (ref 12–45)
BASOPHILS # BLD AUTO: 1 % (ref 0–1.8)
BASOPHILS # BLD: 0.08 K/UL (ref 0–0.12)
BILIRUB SERPL-MCNC: 0.4 MG/DL (ref 0.1–1.5)
BUN SERPL-MCNC: 13 MG/DL (ref 8–22)
CALCIUM ALBUM COR SERPL-MCNC: 8.8 MG/DL (ref 8.5–10.5)
CALCIUM SERPL-MCNC: 9 MG/DL (ref 8.5–10.5)
CHLORIDE SERPL-SCNC: 103 MMOL/L (ref 96–112)
CO2 SERPL-SCNC: 22 MMOL/L (ref 20–33)
CREAT SERPL-MCNC: 1.07 MG/DL (ref 0.5–1.4)
EOSINOPHIL # BLD AUTO: 0.37 K/UL (ref 0–0.51)
EOSINOPHIL NFR BLD: 4.8 % (ref 0–6.9)
ERYTHROCYTE [DISTWIDTH] IN BLOOD BY AUTOMATED COUNT: 40.8 FL (ref 35.9–50)
FERRITIN SERPL-MCNC: 17.7 NG/ML (ref 22–322)
GFR SERPLBLD CREATININE-BSD FMLA CKD-EPI: 84 ML/MIN/1.73 M 2
GLOBULIN SER CALC-MCNC: 3 G/DL (ref 1.9–3.5)
GLUCOSE SERPL-MCNC: 73 MG/DL (ref 65–99)
HCT VFR BLD AUTO: 36 % (ref 42–52)
HGB BLD-MCNC: 11.8 G/DL (ref 14–18)
HGB RETIC QN AUTO: 30.9 PG/CELL (ref 29–35)
IMM GRANULOCYTES # BLD AUTO: 0.03 K/UL (ref 0–0.11)
IMM GRANULOCYTES NFR BLD AUTO: 0.4 % (ref 0–0.9)
IMM RETICS NFR: 10.8 % (ref 2.6–16.1)
IRON SATN MFR SERPL: 13 % (ref 15–55)
IRON SERPL-MCNC: 52 UG/DL (ref 50–180)
LYMPHOCYTES # BLD AUTO: 2.72 K/UL (ref 1–4.8)
LYMPHOCYTES NFR BLD: 35.3 % (ref 22–41)
MAGNESIUM SERPL-MCNC: 2 MG/DL (ref 1.5–2.5)
MCH RBC QN AUTO: 26.6 PG (ref 27–33)
MCHC RBC AUTO-ENTMCNC: 32.8 G/DL (ref 32.3–36.5)
MCV RBC AUTO: 81.3 FL (ref 81.4–97.8)
MONOCYTES # BLD AUTO: 0.69 K/UL (ref 0–0.85)
MONOCYTES NFR BLD AUTO: 8.9 % (ref 0–13.4)
NEUTROPHILS # BLD AUTO: 3.82 K/UL (ref 1.82–7.42)
NEUTROPHILS NFR BLD: 49.6 % (ref 44–72)
NRBC # BLD AUTO: 0 K/UL
NRBC BLD-RTO: 0 /100 WBC (ref 0–0.2)
PHOSPHATE SERPL-MCNC: 3.9 MG/DL (ref 2.5–4.5)
PLATELET # BLD AUTO: 230 K/UL (ref 164–446)
PMV BLD AUTO: 9.1 FL (ref 9–12.9)
POTASSIUM SERPL-SCNC: 3.7 MMOL/L (ref 3.6–5.5)
PROT SERPL-MCNC: 7.2 G/DL (ref 6–8.2)
RBC # BLD AUTO: 4.43 M/UL (ref 4.7–6.1)
RETICS # AUTO: 0.09 M/UL (ref 0.04–0.12)
RETICS/RBC NFR: 1.9 % (ref 0.8–2.6)
SODIUM SERPL-SCNC: 138 MMOL/L (ref 135–145)
TIBC SERPL-MCNC: 395 UG/DL (ref 250–450)
TRANSFERRIN SERPL-MCNC: 283 MG/DL (ref 200–370)
TROPONIN T SERPL-MCNC: <6 NG/L (ref 6–19)
UIBC SERPL-MCNC: 343 UG/DL (ref 110–370)
WBC # BLD AUTO: 7.7 K/UL (ref 4.8–10.8)

## 2024-02-15 PROCEDURE — A9270 NON-COVERED ITEM OR SERVICE: HCPCS | Mod: UD | Performed by: INTERNAL MEDICINE

## 2024-02-15 PROCEDURE — 84484 ASSAY OF TROPONIN QUANT: CPT

## 2024-02-15 PROCEDURE — 700105 HCHG RX REV CODE 258: Mod: UD | Performed by: STUDENT IN AN ORGANIZED HEALTH CARE EDUCATION/TRAINING PROGRAM

## 2024-02-15 PROCEDURE — 700102 HCHG RX REV CODE 250 W/ 637 OVERRIDE(OP): Mod: UD | Performed by: INTERNAL MEDICINE

## 2024-02-15 PROCEDURE — 80053 COMPREHEN METABOLIC PANEL: CPT

## 2024-02-15 PROCEDURE — 99233 SBSQ HOSP IP/OBS HIGH 50: CPT | Performed by: INTERNAL MEDICINE

## 2024-02-15 PROCEDURE — 84100 ASSAY OF PHOSPHORUS: CPT

## 2024-02-15 PROCEDURE — 700111 HCHG RX REV CODE 636 W/ 250 OVERRIDE (IP): Mod: JZ,UD | Performed by: STUDENT IN AN ORGANIZED HEALTH CARE EDUCATION/TRAINING PROGRAM

## 2024-02-15 PROCEDURE — C9113 INJ PANTOPRAZOLE SODIUM, VIA: HCPCS | Mod: UD | Performed by: STUDENT IN AN ORGANIZED HEALTH CARE EDUCATION/TRAINING PROGRAM

## 2024-02-15 PROCEDURE — G0378 HOSPITAL OBSERVATION PER HR: HCPCS

## 2024-02-15 PROCEDURE — 96376 TX/PRO/DX INJ SAME DRUG ADON: CPT

## 2024-02-15 PROCEDURE — 700102 HCHG RX REV CODE 250 W/ 637 OVERRIDE(OP): Mod: UD | Performed by: STUDENT IN AN ORGANIZED HEALTH CARE EDUCATION/TRAINING PROGRAM

## 2024-02-15 PROCEDURE — 700111 HCHG RX REV CODE 636 W/ 250 OVERRIDE (IP): Mod: JZ,UD | Performed by: INTERNAL MEDICINE

## 2024-02-15 PROCEDURE — A9270 NON-COVERED ITEM OR SERVICE: HCPCS | Mod: UD | Performed by: STUDENT IN AN ORGANIZED HEALTH CARE EDUCATION/TRAINING PROGRAM

## 2024-02-15 PROCEDURE — 83735 ASSAY OF MAGNESIUM: CPT

## 2024-02-15 PROCEDURE — 85025 COMPLETE CBC W/AUTO DIFF WBC: CPT

## 2024-02-15 RX ORDER — HYDROCODONE BITARTRATE AND ACETAMINOPHEN 5; 325 MG/1; MG/1
2 TABLET ORAL
Qty: 20 TABLET | Refills: 0 | Status: CANCELLED | OUTPATIENT
Start: 2024-02-15

## 2024-02-15 RX ORDER — SUCRALFATE ORAL 1 G/10ML
1 SUSPENSION ORAL EVERY 6 HOURS
Status: DISCONTINUED | OUTPATIENT
Start: 2024-02-15 | End: 2024-02-16 | Stop reason: HOSPADM

## 2024-02-15 RX ORDER — HYDROCODONE BITARTRATE AND ACETAMINOPHEN 5; 325 MG/1; MG/1
2 TABLET ORAL
Status: ON HOLD | COMMUNITY
End: 2024-02-16

## 2024-02-15 RX ORDER — DIVALPROEX SODIUM 500 MG/1
500 TABLET, DELAYED RELEASE ORAL DAILY
Status: DISCONTINUED | OUTPATIENT
Start: 2024-02-15 | End: 2024-02-16 | Stop reason: HOSPADM

## 2024-02-15 RX ORDER — LACOSAMIDE 100 MG/1
200 TABLET ORAL DAILY
Status: DISCONTINUED | OUTPATIENT
Start: 2024-02-15 | End: 2024-02-16 | Stop reason: HOSPADM

## 2024-02-15 RX ORDER — HYDROCODONE BITARTRATE AND ACETAMINOPHEN 10; 325 MG/1; MG/1
1 TABLET ORAL EVERY 4 HOURS PRN
Status: DISCONTINUED | OUTPATIENT
Start: 2024-02-15 | End: 2024-02-16 | Stop reason: HOSPADM

## 2024-02-15 RX ORDER — ACETAMINOPHEN 500 MG
1000 TABLET ORAL EVERY 8 HOURS
Status: DISCONTINUED | OUTPATIENT
Start: 2024-02-15 | End: 2024-02-16 | Stop reason: HOSPADM

## 2024-02-15 RX ORDER — FLUCONAZOLE 100 MG/1
100 TABLET ORAL 4 TIMES DAILY
COMMUNITY
End: 2024-02-20

## 2024-02-15 RX ORDER — METOCLOPRAMIDE HYDROCHLORIDE 5 MG/ML
10 INJECTION INTRAMUSCULAR; INTRAVENOUS
Status: DISCONTINUED | OUTPATIENT
Start: 2024-02-15 | End: 2024-02-16 | Stop reason: HOSPADM

## 2024-02-15 RX ORDER — ACETAMINOPHEN 500 MG
1000 TABLET ORAL EVERY 6 HOURS PRN
Status: DISCONTINUED | OUTPATIENT
Start: 2024-02-19 | End: 2024-02-16 | Stop reason: HOSPADM

## 2024-02-15 RX ADMIN — PROMETHAZINE HYDROCHLORIDE 12.5 MG: 25 TABLET ORAL at 08:11

## 2024-02-15 RX ADMIN — OXYCODONE HYDROCHLORIDE 10 MG: 10 TABLET ORAL at 01:58

## 2024-02-15 RX ADMIN — OXYCODONE HYDROCHLORIDE 10 MG: 10 TABLET ORAL at 08:12

## 2024-02-15 RX ADMIN — PANTOPRAZOLE SODIUM 40 MG: 40 INJECTION, POWDER, FOR SOLUTION INTRAVENOUS at 16:59

## 2024-02-15 RX ADMIN — METOCLOPRAMIDE 10 MG: 5 INJECTION, SOLUTION INTRAMUSCULAR; INTRAVENOUS at 10:50

## 2024-02-15 RX ADMIN — SUCRALFATE ORAL 1 G: 1 SUSPENSION ORAL at 16:24

## 2024-02-15 RX ADMIN — MOMETASONE FUROATE AND FORMOTEROL FUMARATE DIHYDRATE 2 PUFF: 200; 5 AEROSOL RESPIRATORY (INHALATION) at 21:31

## 2024-02-15 RX ADMIN — ACETAMINOPHEN 1000 MG: 500 TABLET ORAL at 08:13

## 2024-02-15 RX ADMIN — MORPHINE SULFATE 4 MG: 4 INJECTION, SOLUTION INTRAMUSCULAR; INTRAVENOUS at 04:12

## 2024-02-15 RX ADMIN — SODIUM CHLORIDE, POTASSIUM CHLORIDE, SODIUM LACTATE AND CALCIUM CHLORIDE: 600; 310; 30; 20 INJECTION, SOLUTION INTRAVENOUS at 02:50

## 2024-02-15 RX ADMIN — METOCLOPRAMIDE 10 MG: 5 INJECTION, SOLUTION INTRAMUSCULAR; INTRAVENOUS at 21:26

## 2024-02-15 RX ADMIN — MORPHINE SULFATE 4 MG: 4 INJECTION, SOLUTION INTRAMUSCULAR; INTRAVENOUS at 12:12

## 2024-02-15 RX ADMIN — DIVALPROEX SODIUM 500 MG: 500 TABLET, DELAYED RELEASE ORAL at 05:07

## 2024-02-15 RX ADMIN — LOSARTAN POTASSIUM 50 MG: 50 TABLET, FILM COATED ORAL at 05:08

## 2024-02-15 RX ADMIN — ACETAMINOPHEN 1000 MG: 500 TABLET ORAL at 01:58

## 2024-02-15 RX ADMIN — LACOSAMIDE 200 MG: 100 TABLET, FILM COATED ORAL at 08:11

## 2024-02-15 RX ADMIN — AMLODIPINE BESYLATE 5 MG: 5 TABLET ORAL at 05:08

## 2024-02-15 RX ADMIN — NICOTINE TRANSDERMAL SYSTEM 21 MG: 21 PATCH, EXTENDED RELEASE TRANSDERMAL at 05:08

## 2024-02-15 RX ADMIN — PANTOPRAZOLE SODIUM 40 MG: 40 INJECTION, POWDER, FOR SOLUTION INTRAVENOUS at 05:09

## 2024-02-15 RX ADMIN — ACETAMINOPHEN 1000 MG: 500 TABLET ORAL at 16:24

## 2024-02-15 RX ADMIN — SUCRALFATE ORAL 1 G: 1 SUSPENSION ORAL at 12:12

## 2024-02-15 RX ADMIN — MOMETASONE FUROATE AND FORMOTEROL FUMARATE DIHYDRATE 2 PUFF: 200; 5 AEROSOL RESPIRATORY (INHALATION) at 10:50

## 2024-02-15 RX ADMIN — METOCLOPRAMIDE 10 MG: 5 INJECTION, SOLUTION INTRAMUSCULAR; INTRAVENOUS at 16:23

## 2024-02-15 RX ADMIN — HYDROCODONE BITARTRATE AND ACETAMINOPHEN 1 TABLET: 10; 325 TABLET ORAL at 11:00

## 2024-02-15 RX ADMIN — SODIUM CHLORIDE, POTASSIUM CHLORIDE, SODIUM LACTATE AND CALCIUM CHLORIDE: 600; 310; 30; 20 INJECTION, SOLUTION INTRAVENOUS at 12:17

## 2024-02-15 RX ADMIN — HYDROCODONE BITARTRATE AND ACETAMINOPHEN 1 TABLET: 10; 325 TABLET ORAL at 18:30

## 2024-02-15 ASSESSMENT — ENCOUNTER SYMPTOMS
BLURRED VISION: 0
CONSTIPATION: 0
FEVER: 0
HEARTBURN: 1
BRUISES/BLEEDS EASILY: 0
MYALGIAS: 1
VOMITING: 1
WEAKNESS: 1
DOUBLE VISION: 0
DEPRESSION: 0
SHORTNESS OF BREATH: 0
CHILLS: 0
PALPITATIONS: 0
NAUSEA: 1
DIZZINESS: 0
MYALGIAS: 0
COUGH: 0
ABDOMINAL PAIN: 1
HEADACHES: 0

## 2024-02-15 ASSESSMENT — LIFESTYLE VARIABLES
ALCOHOL_USE: NO
ON A TYPICAL DAY WHEN YOU DRINK ALCOHOL HOW MANY DRINKS DO YOU HAVE: 0
DOES PATIENT WANT TO STOP DRINKING: NO
TOTAL SCORE: 0
EVER FELT BAD OR GUILTY ABOUT YOUR DRINKING: NO
HAVE PEOPLE ANNOYED YOU BY CRITICIZING YOUR DRINKING: NO
CONSUMPTION TOTAL: NEGATIVE
HAVE YOU EVER FELT YOU SHOULD CUT DOWN ON YOUR DRINKING: NO
AVERAGE NUMBER OF DAYS PER WEEK YOU HAVE A DRINK CONTAINING ALCOHOL: 0
TOTAL SCORE: 0
EVER HAD A DRINK FIRST THING IN THE MORNING TO STEADY YOUR NERVES TO GET RID OF A HANGOVER: NO
TOTAL SCORE: 0
HOW MANY TIMES IN THE PAST YEAR HAVE YOU HAD 5 OR MORE DRINKS IN A DAY: 0
SUBSTANCE_ABUSE: 0

## 2024-02-15 ASSESSMENT — PAIN DESCRIPTION - PAIN TYPE
TYPE: ACUTE PAIN
TYPE: ACUTE PAIN;CHRONIC PAIN

## 2024-02-15 ASSESSMENT — FIBROSIS 4 INDEX: FIB4 SCORE: 0.92

## 2024-02-15 NOTE — ED NOTES
"While preparing to obtain EKG, patient he did have a primarily tonic seizure lasting approximately 8 seconds while in wheelchair, the patient did slide out of the wheelchair , this tech was able to maintain support of the patient's body preventing any hard impacts from occurring and protected his head. Additional staff responded for \"assistance call\" and patient was lifted into [facility]; EKG was obtained while triage RN obtained a Room assignment from Charge RN; patient did have another seizure lasting approximately 5 seconds. Patient then roomed to [facility] with spouse at bedside.  "

## 2024-02-15 NOTE — CARE PLAN
The patient is Stable - Low risk of patient condition declining or worsening    Shift Goals  Clinical Goals: monitor neuro status  Patient Goals: pain management    Progress made toward(s) clinical / shift goals:    Problem: Hemodynamics  Goal: Patient's hemodynamics, fluid balance and neurologic status will be stable or improve  Outcome: Progressing     Problem: Respiratory  Goal: Patient will achieve/maintain optimum respiratory ventilation and gas exchange  Outcome: Progressing   Patient on room air.   Problem: Pain - Standard  Goal: Alleviation of pain or a reduction in pain to the patient’s comfort goal  Outcome: Progressing   PRN medication given as ordered.     Patient is not progressing towards the following goals:

## 2024-02-15 NOTE — ED NOTES
Medication history reviewed with patient and patients wife at bedside.   Med rec is complete  Allergies reviewed.   Patient was on fluconazole, last dose was 2 weeks ago per patients wife, rx was finished.   Anticoagulants: No       Haroldo Jefferson

## 2024-02-15 NOTE — ED NOTES
Received direct from triage; post ictal; seizure prec initiated; Patient AOX3 disoriented to situation; on room air; attached to cardiac monitor

## 2024-02-15 NOTE — ED PROVIDER NOTES
ED Provider Note    CHIEF COMPLAINT  No chief complaint on file.      EXTERNAL RECORDS REVIEWED  Inpatient Notes discharge summary from 11/11/2023 noted history of migraine, seizure disorder, hypertension, alcohol abuse admitted for abdominal pain found to have stable fluid collection    HPI/ROS  LIMITATION TO HISTORY     OUTSIDE HISTORIAN(S):      Donal Carpio is a 53 y.o. adult who presents with multiple complaints.  Patient reports that since being discharged from outside hospital he has been having ongoing abdominal pain, chest discomfort, recurrent seizures.  Patient and his partner stated that he has had multiple signs of coffee-ground emesis as well as dark black stool.    PAST MEDICAL HISTORY   has a past medical history of Alcohol withdrawal delirium (HCC) (9/19/2022), Drug-seeking behavior, Peptic ulcer, Seizure (HCC), Seizure disorder (HCC), and Ulcer of abdomen wall (HCC).    SURGICAL HISTORY   has a past surgical history that includes appendectomy; upper gi endoscopy,diagnosis (N/A, 6/30/2022); upper gi endoscopy,biopsy (N/A, 6/30/2022); lap,diagnostic abdomen (N/A, 7/7/2022); and wound exploration ortho (Right, 7/7/2022).    FAMILY HISTORY  No family history on file.    SOCIAL HISTORY  Social History     Tobacco Use    Smoking status: Never    Smokeless tobacco: Never   Vaping Use    Vaping Use: Every day    Substances: Nicotine   Substance and Sexual Activity    Alcohol use: Not Currently    Drug use: Yes     Types: Inhaled     Comment: cannabis    Sexual activity: Not on file       CURRENT MEDICATIONS  Home Medications       Reviewed by Mikie Scott R.N. (Registered Nurse) on 02/15/24 at 0149  Med List Status: Complete     Medication Last Dose Status   amLODIPine (NORVASC) 5 MG Tab 2/11/2024 Active   divalproex (DEPAKOTE) 500 MG Tablet Delayed Response 2/11/2024 Active   fluconazole (DIFLUCAN) 100 MG Tab 2 weeks ago Active   HYDROcodone-acetaminophen (NORCO) 5-325 MG Tab per tablet 2 weeks  "ago Active   lacosamide (VIMPAT) 200 MG Tab tablet 2/11/2024 Active   losartan (COZAAR) 50 MG Tab 2/11/2024 Active   Multiple Vitamins-Minerals (MULTIVITAMIN MEN) Tab 2/11/2024 Active   nicotine (NICODERM) 21 MG/24HR PATCH 24 HR 2/14/2024 Active   pantoprazole (PROTONIX) 40 MG Tablet Delayed Response 2/14/2024 Active   Rimegepant Sulfate (NURTEC) 75 MG TABLET DISPERSIBLE 2/13/2024 Active                    ALLERGIES  Allergies   Allergen Reactions    Penicillins Unspecified     Unknown childhood reaction        PHYSICAL EXAM  VITAL SIGNS: /87   Pulse 81   Temp 37 °C (98.6 °F)   Resp 20   Ht 1.753 m (5' 9\")   Wt 92.8 kg (204 lb 9.4 oz)   SpO2 95%   BMI 30.21 kg/m²    Constitutional: Alert in no apparent distress.  HENT: No signs of trauma, Bilateral external ears normal, Nose normal.   Eyes: Pupils are equal and reactive, Conjunctiva normal, Non-icteric.   Neck: Normal range of motion, No tenderness, Supple, No stridor.   Lymphatic: No lymphadenopathy noted.   Cardiovascular: Regular rate and rhythm, no murmurs.   Thorax & Lungs: Normal breath sounds, No respiratory distress, No wheezing, No chest tenderness.   Abdomen: Bowel sounds normal, Soft, No tenderness, No masses, No pulsatile masses.  Rectal: Performed with chaperone thin brown stool no melena  Skin: Warm, Dry, No erythema, No rash.   Back: No bony tenderness, No CVA tenderness.   Extremities: Intact distal pulses, No edema, No tenderness, No cyanosis  Musculoskeletal: Good range of motion in all major joints. No tenderness to palpation or major deformities noted.   Neurologic: Alert , Normal motor function, Normal sensory function, No focal deficits noted.   Psychiatric: Affect normal, Judgment normal, Mood normal.    DIAGNOSTIC STUDIES / PROCEDURES  EKG  I have independently interpreted this EKG  Results for orders placed or performed during the hospital encounter of 02/14/24   EKG   Result Value Ref Range    Report       [facility] " [facility] Emergency Dept.    Test Date:  2024  Pt Name:    JERICHO ALBA                  Department: ER  MRN:        [id number]                      Room:  Gender:     Male                         Technician: [id number]  :        1970                   Requested By:ER TRIAGE PROTOCOL  Order #:    [id number]                    Reading MD: Prosper Larkin    Measurements  Intervals                                Axis  Rate:       81                           P:          34  MA:         137                          QRS:        36  QRSD:       199                          T:          57  QT:         369  QTc:        429    Interpretive Statements    Interpreted by me: Sinus rhythm, rate 81, prolonged QRS, normal QTc, motion  artifact, no signs of acute ischemia when compared to prior  Electronically Signed On 2024 21:08:25 PST by Prosper Larkin           LABS  Labs Reviewed   CBC WITH DIFFERENTIAL - Abnormal; Notable for the following components:       Result Value    RBC 4.65 (*)     Hemoglobin 12.2 (*)     Hematocrit 36.8 (*)     MCV 79.1 (*)     MCH 26.2 (*)     All other components within normal limits    Narrative:     Biotin intake of greater than 5 mg per day may interfere with  troponin levels, causing false low values.   COMP METABOLIC PANEL - Abnormal; Notable for the following components:    Co2 18 (*)     Anion Gap 17.0 (*)     All other components within normal limits    Narrative:     Biotin intake of greater than 5 mg per day may interfere with  troponin levels, causing false low values.   FERRITIN - Abnormal; Notable for the following components:    Ferritin 17.7 (*)     All other components within normal limits   IRON/TOTAL IRON BIND - Abnormal; Notable for the following components:    % Saturation 13 (*)     All other components within normal limits    Narrative:     Biotin intake of greater than 5 mg per day may interfere with  troponin levels, causing false low values.    TROPONIN    Narrative:     Biotin intake of greater than 5 mg per day may interfere with  troponin levels, causing false low values.   TROPONIN    Narrative:     Biotin intake of greater than 5 mg per day may interfere with  troponin levels, causing false low values.   LACTIC ACID   ESTIMATED GFR    Narrative:     Biotin intake of greater than 5 mg per day may interfere with  troponin levels, causing false low values.   LIPASE    Narrative:     Biotin intake of greater than 5 mg per day may interfere with  troponin levels, causing false low values.   RETICULOCYTES COUNT   TRANSFERRIN   URINALYSIS   OCCULT BLOOD STOOL   CBC WITH DIFFERENTIAL   COMP METABOLIC PANEL   MAGNESIUM   PHOSPHORUS   HAPTOGLOBIN   TROPONIN         RADIOLOGY  I have independently interpreted the diagnostic imaging associated with this visit and am waiting the final reading from the radiologist.   My preliminary interpretation is as follows: No pneumothorax  Radiologist interpretation:   CT-ABDOMEN-PELVIS WITH   Final Result         1.  Scattered hazy left lower lobe infiltrates   2.  Small fat-containing bilateral inguinal hernias   3.  Diverticulosis   4.  Small hiatal hernia      DX-CHEST-PORTABLE (1 VIEW)   Final Result         1.  No acute cardiopulmonary disease.            COURSE & MEDICAL DECISION MAKING    ED Observation Status?     INITIAL ASSESSMENT, COURSE AND PLAN  Care Narrative: On arrival vital signs within normal limits.  Reviewed recent hospitalization at outside hospital where patient had unremarkable CTA of abdomen no signs of active bleeding.  Patient has had recent endoscopies and colonoscopies without any clear source of bleeding.  Patient currently followed by neurology outpatient already on AEDs for seizures currently has nonfocal exam no signs of postictal state.  Patient does have generalized abdominal tenderness no peritoneal signs given patient's previous viscus perforation and intra-abdominal fluid collections  will obtain CT ab/pelvis as well as blood work to assess for metabolic or hematologic derangements.  Chest pain nonspecific ECG 9 ischemic overall low suspicion for PE or ACS.    CT without acute abnormality.  Blood work with mild metabolic acidosis.  Patient has had an intractable abdominal pain and frequent dry heaving.  Given patient's uncontrolled symptoms and early reports of hematemesis spoke with hospitalist regarding admission.  Patient given additional analgesics.  HYDRATION: Based on the patient's presentation of Dehydration the patient was given IV fluids. IV Hydration was used because oral hydration was not adequate alone. Upon recheck following hydration, the patient was improved.      ADDITIONAL PROBLEM LIST    DISPOSITION AND DISCUSSIONS  I have discussed management of the patient with the following physicians and CAROLA's: Hospitalist      FINAL DIAGNOSIS  1. Abdominal pain, unspecified abdominal location    2. Hematemesis, unspecified whether nausea present           Electronically signed by: Prosper Larkin D.O., 2/14/2024 9:06 PM

## 2024-02-15 NOTE — PROGRESS NOTES
4 Eyes Skin Assessment Completed by CASPER Deluna and CASPER Martin.    Head WDL  Ears WDL  Nose WDL  Mouth WDL  Neck WDL  Breast/Chest WDL  Shoulder Blades WDL  Spine WDL  (R) Arm/Elbow/Hand Bruising  (L) Arm/Elbow/Hand Bruising  Abdomen Scar- healed  Groin WDL  Scrotum/Coccyx/Buttocks WDL  (R) Leg WDL  (L) Leg WDL  (R) Heel/Foot/Toe WDL  (L) Heel/Foot/Toe WDL          Devices In Places Pulse Ox and SCD's      Interventions In Place Pressure Redistribution Mattress    Possible Skin Injury No    Pictures Uploaded Into Epic N/A  Wound Consult Placed N/A  RN Wound Prevention Protocol Ordered No

## 2024-02-15 NOTE — ASSESSMENT & PLAN NOTE
Replace iron/B12/folate as needed  Trend H&H  Transfuse for hemoglobin less than 7 or hemodynamic instability

## 2024-02-15 NOTE — ASSESSMENT & PLAN NOTE
Cessation counseling provided: 4 minutes  Discussed vaping and tobacco smoking cessation due to concern of lung injury, detrimental effect to cardiovascular health, he expressed understanding.  Offered nicotine patch, nicotine gum, Chantix as alternative.  Nicotine patch ordered as requested.  Provided patient with standard tobacco cessation information per protocol

## 2024-02-15 NOTE — H&P
Hospital Medicine History & Physical Note    Date of Service  2/14/2024    Primary Care Physician  Pcp Not In Computer    Consultants  None    Code Status  Full Code    Chief Complaint  No chief complaint on file.  Abdominal pain  Bloody vomitus    History of Presenting Illness  Donal Carpio is a 53 y.o. adult with history of, abdominal pain, seizure, hypertension, alcohol abuse, PUD who presented 2/14/2024 with evaluation for intractable abdominal pain.  Patient has had laparoscopic cholecystectomy completed at [facility] in September 2023.  Since then, he has had multiple ER visits and hospitalizations at [facility] and [facility].  He presented today to ER due to abdominal pain and reported to have hematemesis.  No hematemesis seen in the ER, initial hemoglobin 12.2, improved from yesterday hemoglobin 10.9.  He received multiple doses of narcotic, still endorsed pain.  CTAP noted inguinal hernia, no acute concerning etiology to explain his pain.  Admission requested by ERP for intractable abdominal pain.  Admitted to medicine service.    I discussed the plan of care with patient, bedside RN, and pharmacy.    Review of Systems  Review of Systems   Constitutional:  Negative for chills and fever.   HENT:  Negative for hearing loss and tinnitus.    Eyes:  Negative for blurred vision and double vision.   Respiratory:  Negative for cough and shortness of breath.    Cardiovascular:  Positive for chest pain. Negative for palpitations.   Gastrointestinal:  Positive for abdominal pain, heartburn, nausea and vomiting. Negative for constipation.        Hematemesis, bloody vomitus   Genitourinary:  Negative for dysuria and hematuria.   Musculoskeletal:  Positive for myalgias. Negative for joint pain.   Skin:  Negative for itching and rash.   Neurological:  Positive for weakness. Negative for dizziness and headaches.   Endo/Heme/Allergies:  Negative for environmental allergies. Does not bruise/bleed easily.    Psychiatric/Behavioral:  Negative for depression and substance abuse.    All other systems reviewed and are negative.      Past Medical History   has a past medical history of Alcohol withdrawal delirium (HCC) (9/19/2022), Drug-seeking behavior, Peptic ulcer, Seizure (HCC), Seizure disorder (HCC), and Ulcer of abdomen wall (HCC).    Surgical History   has a past surgical history that includes appendectomy; pr upper gi endoscopy,diagnosis (N/A, 6/30/2022); pr upper gi endoscopy,biopsy (N/A, 6/30/2022); pr lap,diagnostic abdomen (N/A, 7/7/2022); and wound exploration ortho (Right, 7/7/2022).     Family History  family history is not on file.   Family history reviewed with patient. There is no family history that is pertinent to the chief complaint.     Social History   reports that he has never smoked. He has never used smokeless tobacco. He reports that he does not currently use alcohol. He reports current drug use. Drug: Inhaled.    Allergies  Allergies   Allergen Reactions    Penicillins Unspecified     Unknown childhood reaction        Medications  Prior to Admission Medications   Prescriptions Last Dose Informant Patient Reported? Taking?   DEPAKOTE 500 MG Tablet Delayed Response  Patient Yes No   Sig: Take 500 mg by mouth 2 times a day.   amLODIPine (NORVASC) 5 MG Tab  Patient Yes No   Sig: Take 1 Tablet by mouth every day.   losartan (COZAAR) 50 MG Tab  Patient Yes No   Sig: Take 50 mg by mouth every day.   nicotine (NICODERM) 21 MG/24HR PATCH 24 HR  Patient Yes No   Sig: Place 1 Patch on the skin every 24 hours.      Facility-Administered Medications: None       Physical Exam  Temp:  [36.6 °C (97.9 °F)-37 °C (98.6 °F)] 37 °C (98.6 °F)  Pulse:  [] 81  Resp:  [16-20] 20  BP: (125-145)/(65-89) 145/89  SpO2:  [93 %-98 %] 95 %  Blood Pressure: 134/65   Temperature: 36.6 °C (97.9 °F)   Pulse: 72   Respiration: 19   Pulse Oximetry: 95 %       Physical Exam  Vitals and nursing note reviewed.   Constitutional:        General: He is not in acute distress.  HENT:      Head: Atraumatic.      Nose: Nose normal.      Mouth/Throat:      Pharynx: Oropharynx is clear.   Eyes:      General: No scleral icterus.     Extraocular Movements: Extraocular movements intact.   Cardiovascular:      Rate and Rhythm: Normal rate and regular rhythm.      Pulses: Normal pulses.      Heart sounds:      No friction rub.   Pulmonary:      Effort: No respiratory distress.      Breath sounds: No wheezing or rales.   Chest:      Chest wall: No tenderness.   Abdominal:      General: There is distension.      Tenderness: There is no abdominal tenderness. There is no guarding or rebound.   Musculoskeletal:         General: Normal range of motion.      Cervical back: Neck supple. No tenderness.      Comments: Trace LE edema   Skin:     General: Skin is warm and dry.      Capillary Refill: Capillary refill takes less than 2 seconds.   Neurological:      General: No focal deficit present.      Mental Status: He is alert and oriented to person, place, and time.   Psychiatric:         Mood and Affect: Mood normal.         Laboratory:  Recent Labs     02/12/24 0531 02/12/24 1415 02/13/24 0433 02/14/24 1952   WBC 13.20*  --  9.60 7.8   RBC 5.41  --  4.16* 4.65*   HEMOGLOBIN 13.8 11.4* 10.9* 12.2*   HEMATOCRIT 42.1 34.4* 32.3* 36.8*   MCV 77.8*  --  77.7* 79.1*   MCH 25.6*  --  26.2* 26.2*   MCHC 32.9*  --  33.7 33.2   RDW 15.0  --  14.6 39.3   PLATELETCT 371  --  224 260   MPV 7.3*  --  7.1* 9.2     Recent Labs     02/12/24 0531 02/12/24  1415 02/13/24 0434 02/14/24 1952   SODIUM 141  --  141 140   POTASSIUM 3.9  --  3.9 3.7   CHLORIDE 109  --  109 105   CO2 21.0  --  23.0 18*   GLUCOSE 152*  --  93 80   BUN 16.0  --  10.0 13   CREATININE 1.20  --  0.90 0.92   CALCIUM 12.1* 9.4 8.9 9.5     Recent Labs     02/12/24 0531 02/13/24 0434 02/14/24 1952 02/14/24  2155   ALTSGPT 18 22 20  --    ASTSGOT 22 33 21  --    ALKPHOSPHAT 91 77 94  --    TBILIRUBIN 0.4  "0.3 0.3  --    LIPASE  --   --   --  24   GLUCOSE 152* 93 80  --          No results for input(s): \"NTPROBNP\" in the last 72 hours.      Recent Labs     02/14/24 1952 02/14/24  2155   TROPONINT 7 6       Imaging:  CT-ABDOMEN-PELVIS WITH   Final Result         1.  Scattered hazy left lower lobe infiltrates   2.  Small fat-containing bilateral inguinal hernias   3.  Diverticulosis   4.  Small hiatal hernia      DX-CHEST-PORTABLE (1 VIEW)   Final Result         1.  No acute cardiopulmonary disease.          X-Ray:  I have personally reviewed the images and compared with prior images.  EKG:  I have personally reviewed the images and compared with prior images.    Assessment/Plan:  Justification for Admission Status  I anticipate this patient is appropriate for observation status at this time.    * Abdominal pain- (present on admission)  Assessment & Plan  CTAP noted bilateral inguinal hernias, diverticulosis, no acute etiology to explain his pain  IVF  Supportive pain control    GI bleed  Assessment & Plan  Complaining of bloody vomitus  No bloody vomitus seen in ER, hemoglobin stable  Trend H&H, transfuse if needed  IV Protonix for now    May consider GI consult in a.m. if decline in hemoglobin or if nurys bleeding occurs    Acute blood loss anemia  Assessment & Plan  Replace iron/B12/folate as needed  Trend H&H  Transfuse for hemoglobin less than 7 or hemodynamic instability    Hypertension- (present on admission)  Assessment & Plan  Amlodipine, losartan    Seizure disorder (HCC)- (present on admission)  Assessment & Plan  Depakote, Vimpat    Vaping nicotine dependence, tobacco product  Assessment & Plan  Cessation counseling provided: 4 minutes  Discussed vaping and tobacco smoking cessation due to concern of lung injury, detrimental effect to cardiovascular health, he expressed understanding.  Offered nicotine patch, nicotine gum, Chantix as alternative.  Nicotine patch ordered as requested.  Provided patient with " standard tobacco cessation information per protocol    Alcohol abuse- (present on admission)  Assessment & Plan  Cessation advised  Multivitamins        VTE prophylaxis: SCDs/TEDs

## 2024-02-15 NOTE — PROGRESS NOTES
[facility] Medicine Daily Progress Note    Date of Service  2/15/2024    Chief Complaint  Donal Carpio is a 53 y.o. adult admitted 2/14/2024 with abdominal pain.     Hospital Course  53 y.o. adult with history of, abdominal pain, seizure, hypertension, alcohol abuse, PUD who presented 2/14/2024 with evaluation for intractable abdominal pain.  Patient has had laparoscopic cholecystectomy completed at [facility] in September 2023.  Since then, he has had multiple ER visits and hospitalizations at [facility] and [facility].  He presented today to ER due to abdominal pain and reported to have hematemesis.  No hematemesis seen in the ER, initial hemoglobin 12.2, improved from yesterday hemoglobin 10.9.  He received multiple doses of narcotic, still endorsed pain.  CTAP noted inguinal hernia, no acute concerning etiology to explain his pain.     Interval Problem Update  Vitals stable on room air   Patient reports he was discharged from [facility] before he was able to tolerate a diet has been having nausea and vomiting for weeks.  They did discharge him with Reglan, he got morphine this morning unable to do a gastric emptying study.  I have scheduled Reglan to see if this helps with his nausea and vomiting.  Patient reports that he has been having abdominal pain since he had his cholecystectomy and was intubated.  CT scan was negative.  He denies ever having an EGD, does have follow-up scheduled with GI consultants on 2/18.  Fecal occult pending, no hematemesis since admission.  Continue IV PPI for now, add Carafate   Patient is also supposed to see pulmonology as he reports recurrent pneumonia has had multiple courses of antibiotics.  On CT scan here still shows some lower lobe infiltrates.  They are also concerned that he might have asthma, he is on inhalers and supposed to follow-up with outpatient pulmonology for PFTs.  He wants to change all of his physicians to [facility] instead of going to [facility], I placed a  new referral for pulmonology.  Changed pain meds to Norco 10 mg as patient reports this has the based pain control for him.  Discussed at length the need for outpatient pain management as he has had multiple scripts after hospitalization and then has to return for continued pain.  Referral placed for outpatient pain management    I have discussed this patient's plan of care and discharge plan at IDT rounds today with Case Management, Nursing, Nursing leadership, and other members of the IDT team.    Consultants/Specialty  none    Code Status  Full Code    Disposition  The patient is not medically cleared for discharge to home or a post-acute facility.  Anticipate discharge to: home with close outpatient follow-up    I have placed the appropriate orders for post-discharge needs.    Review of Systems  Review of Systems   Constitutional:  Negative for chills and fever.   Respiratory:  Negative for shortness of breath.    Cardiovascular:  Negative for chest pain.   Gastrointestinal:  Positive for abdominal pain, nausea and vomiting.   Musculoskeletal:  Negative for myalgias.   Skin:  Negative for rash.   Neurological:  Negative for dizziness and headaches.   Psychiatric/Behavioral:  Negative for depression.    All other systems reviewed and are negative.       Physical Exam  Temp:  [36.6 °C (97.9 °F)-37 °C (98.6 °F)] 36.7 °C (98.1 °F)  Pulse:  [] 89  Resp:  [16-20] 18  BP: (125-150)/(65-91) 150/91  SpO2:  [93 %-98 %] 94 %    Physical Exam  Vitals and nursing note reviewed.   Constitutional:       General: He is not in acute distress.     Appearance: He is obese. He is not ill-appearing.   HENT:      Head: Normocephalic and atraumatic.   Eyes:      Conjunctiva/sclera: Conjunctivae normal.   Cardiovascular:      Rate and Rhythm: Normal rate and regular rhythm.      Heart sounds: Normal heart sounds.   Pulmonary:      Effort: Pulmonary effort is normal. No respiratory distress.      Breath sounds: Normal breath  sounds. No wheezing.   Abdominal:      General: There is distension (mild).      Palpations: Abdomen is soft.      Tenderness: There is abdominal tenderness.   Musculoskeletal:         General: Normal range of motion.      Cervical back: Normal range of motion and neck supple.      Right lower leg: No edema.      Left lower leg: No edema.   Skin:     General: Skin is warm and dry.   Neurological:      General: No focal deficit present.      Mental Status: He is alert and oriented to person, place, and time.      Cranial Nerves: No cranial nerve deficit.   Psychiatric:         Mood and Affect: Mood is anxious. Affect is tearful.         Behavior: Behavior normal.         Fluids    Intake/Output Summary (Last 24 hours) at 2/15/2024 1631  Last data filed at 2/15/2024 1600  Gross per 24 hour   Intake 1682.2 ml   Output 2000 ml   Net -317.8 ml       Laboratory  Recent Labs     02/13/24  0433 02/14/24  1952 02/15/24  0534   WBC 9.60 7.8 7.7   RBC 4.16* 4.65* 4.43*   HEMOGLOBIN 10.9* 12.2* 11.8*   HEMATOCRIT 32.3* 36.8* 36.0*   MCV 77.7* 79.1* 81.3*   MCH 26.2* 26.2* 26.6*   MCHC 33.7 33.2 32.8   RDW 14.6 39.3 40.8   PLATELETCT 224 260 230   MPV 7.1* 9.2 9.1     Recent Labs     02/13/24  0434 02/14/24  1952 02/15/24  0534   SODIUM 141 140 138   POTASSIUM 3.9 3.7 3.7   CHLORIDE 109 105 103   CO2 23.0 18* 22   GLUCOSE 93 80 73   BUN 10.0 13 13   CREATININE 0.90 0.92 1.07   CALCIUM 8.9 9.5 9.0                   Imaging  CT-ABDOMEN-PELVIS WITH   Final Result         1.  Scattered hazy left lower lobe infiltrates   2.  Small fat-containing bilateral inguinal hernias   3.  Diverticulosis   4.  Small hiatal hernia      DX-CHEST-PORTABLE (1 VIEW)   Final Result         1.  No acute cardiopulmonary disease.           Assessment/Plan  * Abdominal pain- (present on admission)  Assessment & Plan  CTAP noted bilateral inguinal hernias, diverticulosis, no acute etiology to explain his pain  S/p IVF  Protonix, Carafate  Supportive pain  control  Referral placed for outpatient pain management    Seizure disorder (HCC)- (present on admission)  Assessment & Plan  Depakote, Vimpat    Acute blood loss anemia  Assessment & Plan  Replace iron/B12/folate as needed  Trend H&H  Transfuse for hemoglobin less than 7 or hemodynamic instability    Vaping nicotine dependence, tobacco product  Assessment & Plan  Cessation counseling provided: 4 minutes  Discussed vaping and tobacco smoking cessation due to concern of lung injury, detrimental effect to cardiovascular health, he expressed understanding.  Offered nicotine patch, nicotine gum, Chantix as alternative.  Nicotine patch ordered as requested.  Provided patient with standard tobacco cessation information per protocol    GI bleed  Assessment & Plan  Complaining of bloody vomitus  No bloody vomitus seen in ER, hemoglobin stable  Trend H&H, transfuse if needed  IV Protonix for now    No recurrence of hematemesis, H&H stable, fecal occult pending  If fecal occult was positive will consider GI consult as patient reports he has not had a endoscopy before  Did have a colonoscopy with 4 different polyps removed  Has an outpatient follow-up with GI consultants on 2/18    Alcohol abuse- (present on admission)  Assessment & Plan  Cessation advised  Multivitamins    Intractable nausea and vomiting  Assessment & Plan  Patient reports he was discharged from [facility] when he was still unable to tolerate an oral diet  Has had nausea and vomiting since then so his primary care told him to come back to the ER at [facility]  Was discharged on Reglan, will schedule IV Reglan for now to see if this improves his pain  Will defer gastric emptying study to outpatient as he just had IV opiate pain therapy  Follow-up with GI    Hypertension- (present on admission)  Assessment & Plan  Amlodipine, losartan         VTE prophylaxis: SCDs/TEDs    I have performed a physical exam and reviewed and updated ROS and Plan today (2/15/2024). In  review of yesterday's note (2/14/2024), there are no changes except as documented above.

## 2024-02-15 NOTE — CARE PLAN
The patient is Stable - Low risk of patient condition declining or worsening    Shift Goals  Clinical Goals: Safety/mobility  Patient Goals: pain management    Progress made toward(s) clinical / shift goals:    Problem: Fall Risk  Goal: Patient will remain free from falls  Outcome: Progressing   Fall precautions in place, Call light, bedside table, and pt belongings within reach. Pt able to demonstrate the use of call light prior to getting out of bed.     Problem: Pain - Standard  Goal: Alleviation of pain or a reduction in pain to the patient’s comfort goal  Outcome: Progressing   0-10 pain scale in place. Pt medicated per MAR.    Patient is not progressing towards the following goals: N/A

## 2024-02-15 NOTE — ASSESSMENT & PLAN NOTE
Complaining of bloody vomitus  No bloody vomitus seen in ER, hemoglobin stable  Trend H&H, transfuse if needed  IV Protonix for now    No recurrence of hematemesis, H&H stable, fecal occult pending  If fecal occult was positive will consider GI consult as patient reports he has not had a endoscopy before  Did have a colonoscopy with 4 different polyps removed  Has an outpatient follow-up with GI consultants on 2/18

## 2024-02-15 NOTE — ASSESSMENT & PLAN NOTE
CTAP noted bilateral inguinal hernias, diverticulosis, no acute etiology to explain his pain  S/p IVF  Protonix, Carafate  Supportive pain control  Referral placed for outpatient pain management

## 2024-02-16 ENCOUNTER — PHARMACY VISIT (OUTPATIENT)
Dept: PHARMACY | Facility: MEDICAL CENTER | Age: 54
End: 2024-02-16
Payer: COMMERCIAL

## 2024-02-16 VITALS
TEMPERATURE: 97.5 F | SYSTOLIC BLOOD PRESSURE: 97 MMHG | RESPIRATION RATE: 18 BRPM | HEART RATE: 65 BPM | HEIGHT: 69 IN | DIASTOLIC BLOOD PRESSURE: 67 MMHG | OXYGEN SATURATION: 94 % | WEIGHT: 204.59 LBS | BODY MASS INDEX: 30.3 KG/M2

## 2024-02-16 LAB
ALBUMIN SERPL BCP-MCNC: 3.8 G/DL (ref 3.2–4.9)
BUN SERPL-MCNC: 11 MG/DL (ref 8–22)
CALCIUM ALBUM COR SERPL-MCNC: 8.8 MG/DL (ref 8.5–10.5)
CALCIUM SERPL-MCNC: 8.6 MG/DL (ref 8.5–10.5)
CHLORIDE SERPL-SCNC: 103 MMOL/L (ref 96–112)
CO2 SERPL-SCNC: 25 MMOL/L (ref 20–33)
CREAT SERPL-MCNC: 0.99 MG/DL (ref 0.5–1.4)
ERYTHROCYTE [DISTWIDTH] IN BLOOD BY AUTOMATED COUNT: 38.7 FL (ref 35.9–50)
GFR SERPLBLD CREATININE-BSD FMLA CKD-EPI: 92 ML/MIN/1.73 M 2
GLUCOSE SERPL-MCNC: 83 MG/DL (ref 65–99)
HAPTOGLOB SERPL-MCNC: 196 MG/DL (ref 30–200)
HCT VFR BLD AUTO: 36 % (ref 42–52)
HGB BLD-MCNC: 11.6 G/DL (ref 14–18)
MAGNESIUM SERPL-MCNC: 2.3 MG/DL (ref 1.5–2.5)
MCH RBC QN AUTO: 25.8 PG (ref 27–33)
MCHC RBC AUTO-ENTMCNC: 32.2 G/DL (ref 32.3–36.5)
MCV RBC AUTO: 80 FL (ref 81.4–97.8)
PHOSPHATE SERPL-MCNC: 5.4 MG/DL (ref 2.5–4.5)
PLATELET # BLD AUTO: 225 K/UL (ref 164–446)
PMV BLD AUTO: 8.6 FL (ref 9–12.9)
POTASSIUM SERPL-SCNC: 3.7 MMOL/L (ref 3.6–5.5)
RBC # BLD AUTO: 4.5 M/UL (ref 4.7–6.1)
SODIUM SERPL-SCNC: 139 MMOL/L (ref 135–145)
WBC # BLD AUTO: 5.8 K/UL (ref 4.8–10.8)

## 2024-02-16 PROCEDURE — RXMED WILLOW AMBULATORY MEDICATION CHARGE: Performed by: INTERNAL MEDICINE

## 2024-02-16 PROCEDURE — 36415 COLL VENOUS BLD VENIPUNCTURE: CPT

## 2024-02-16 PROCEDURE — 96376 TX/PRO/DX INJ SAME DRUG ADON: CPT

## 2024-02-16 PROCEDURE — C9113 INJ PANTOPRAZOLE SODIUM, VIA: HCPCS | Mod: UD | Performed by: STUDENT IN AN ORGANIZED HEALTH CARE EDUCATION/TRAINING PROGRAM

## 2024-02-16 PROCEDURE — A9270 NON-COVERED ITEM OR SERVICE: HCPCS | Mod: UD | Performed by: STUDENT IN AN ORGANIZED HEALTH CARE EDUCATION/TRAINING PROGRAM

## 2024-02-16 PROCEDURE — A9270 NON-COVERED ITEM OR SERVICE: HCPCS | Mod: UD | Performed by: INTERNAL MEDICINE

## 2024-02-16 PROCEDURE — 700102 HCHG RX REV CODE 250 W/ 637 OVERRIDE(OP): Mod: UD | Performed by: INTERNAL MEDICINE

## 2024-02-16 PROCEDURE — 80069 RENAL FUNCTION PANEL: CPT

## 2024-02-16 PROCEDURE — 85027 COMPLETE CBC AUTOMATED: CPT

## 2024-02-16 PROCEDURE — 700102 HCHG RX REV CODE 250 W/ 637 OVERRIDE(OP): Mod: UD | Performed by: STUDENT IN AN ORGANIZED HEALTH CARE EDUCATION/TRAINING PROGRAM

## 2024-02-16 PROCEDURE — 700111 HCHG RX REV CODE 636 W/ 250 OVERRIDE (IP): Mod: JZ,UD | Performed by: STUDENT IN AN ORGANIZED HEALTH CARE EDUCATION/TRAINING PROGRAM

## 2024-02-16 PROCEDURE — 99239 HOSP IP/OBS DSCHRG MGMT >30: CPT | Performed by: INTERNAL MEDICINE

## 2024-02-16 PROCEDURE — 83735 ASSAY OF MAGNESIUM: CPT

## 2024-02-16 PROCEDURE — G0378 HOSPITAL OBSERVATION PER HR: HCPCS

## 2024-02-16 PROCEDURE — 700111 HCHG RX REV CODE 636 W/ 250 OVERRIDE (IP): Mod: JZ,UD | Performed by: INTERNAL MEDICINE

## 2024-02-16 RX ORDER — SUCRALFATE 1 G/1
1 TABLET ORAL
Qty: 120 TABLET | Refills: 0 | Status: SHIPPED | OUTPATIENT
Start: 2024-02-16 | End: 2024-04-26 | Stop reason: SDUPTHER

## 2024-02-16 RX ORDER — METOCLOPRAMIDE 10 MG/1
10 TABLET ORAL 4 TIMES DAILY
Qty: 56 TABLET | Refills: 0 | Status: SHIPPED | OUTPATIENT
Start: 2024-02-16 | End: 2024-03-01

## 2024-02-16 RX ORDER — HYDROCODONE BITARTRATE AND ACETAMINOPHEN 10; 325 MG/1; MG/1
1 TABLET ORAL EVERY 4 HOURS PRN
Qty: 18 TABLET | Refills: 0 | Status: SHIPPED | OUTPATIENT
Start: 2024-02-16 | End: 2024-02-16

## 2024-02-16 RX ORDER — CYCLOBENZAPRINE HCL 10 MG
10 TABLET ORAL 3 TIMES DAILY PRN
Qty: 30 TABLET | Refills: 0 | Status: SHIPPED | OUTPATIENT
Start: 2024-02-16 | End: 2024-03-28

## 2024-02-16 RX ORDER — BACLOFEN 10 MG/1
10 TABLET ORAL 3 TIMES DAILY PRN
Status: DISCONTINUED | OUTPATIENT
Start: 2024-02-16 | End: 2024-02-16

## 2024-02-16 RX ORDER — CYCLOBENZAPRINE HCL 10 MG
10 TABLET ORAL 3 TIMES DAILY PRN
Status: DISCONTINUED | OUTPATIENT
Start: 2024-02-16 | End: 2024-02-16 | Stop reason: HOSPADM

## 2024-02-16 RX ORDER — HYDROCODONE BITARTRATE AND ACETAMINOPHEN 5; 325 MG/1; MG/1
2 TABLET ORAL EVERY 4 HOURS PRN
Qty: 36 TABLET | Refills: 0 | Status: SHIPPED | OUTPATIENT
Start: 2024-02-16 | End: 2024-02-19

## 2024-02-16 RX ADMIN — ACETAMINOPHEN 1000 MG: 500 TABLET ORAL at 13:12

## 2024-02-16 RX ADMIN — SUCRALFATE ORAL 1 G: 1 SUSPENSION ORAL at 00:19

## 2024-02-16 RX ADMIN — MOMETASONE FUROATE AND FORMOTEROL FUMARATE DIHYDRATE 2 PUFF: 200; 5 AEROSOL RESPIRATORY (INHALATION) at 11:57

## 2024-02-16 RX ADMIN — SUCRALFATE ORAL 1 G: 1 SUSPENSION ORAL at 11:16

## 2024-02-16 RX ADMIN — DIVALPROEX SODIUM 500 MG: 500 TABLET, DELAYED RELEASE ORAL at 05:50

## 2024-02-16 RX ADMIN — PANTOPRAZOLE SODIUM 40 MG: 40 INJECTION, POWDER, FOR SOLUTION INTRAVENOUS at 05:51

## 2024-02-16 RX ADMIN — ACETAMINOPHEN 1000 MG: 500 TABLET ORAL at 05:51

## 2024-02-16 RX ADMIN — METOCLOPRAMIDE 10 MG: 5 INJECTION, SOLUTION INTRAMUSCULAR; INTRAVENOUS at 10:21

## 2024-02-16 RX ADMIN — MORPHINE SULFATE 4 MG: 4 INJECTION, SOLUTION INTRAMUSCULAR; INTRAVENOUS at 06:00

## 2024-02-16 RX ADMIN — NICOTINE TRANSDERMAL SYSTEM 21 MG: 21 PATCH, EXTENDED RELEASE TRANSDERMAL at 05:51

## 2024-02-16 RX ADMIN — LACOSAMIDE 200 MG: 100 TABLET, FILM COATED ORAL at 05:51

## 2024-02-16 RX ADMIN — SUCRALFATE ORAL 1 G: 1 SUSPENSION ORAL at 05:51

## 2024-02-16 RX ADMIN — CYCLOBENZAPRINE 10 MG: 10 TABLET, FILM COATED ORAL at 12:30

## 2024-02-16 RX ADMIN — HYDROCODONE BITARTRATE AND ACETAMINOPHEN 1 TABLET: 10; 325 TABLET ORAL at 00:19

## 2024-02-16 ASSESSMENT — PAIN DESCRIPTION - PAIN TYPE
TYPE: ACUTE PAIN

## 2024-02-16 NOTE — ASSESSMENT & PLAN NOTE
Patient reports he was discharged from [facility] when he was still unable to tolerate an oral diet  Has had nausea and vomiting since then so his primary care told him to come back to the ER at [facility]  Was discharged on Reglan, will schedule IV Reglan for now to see if this improves his pain  Will defer gastric emptying study to outpatient as he just had IV opiate pain therapy  Follow-up with GI

## 2024-02-16 NOTE — CARE PLAN
The patient is Stable - Low risk of patient condition declining or worsening    Shift Goals  Clinical Goals: safety/pain management  Patient Goals: pain managemet  Family Goals: erl    Progress made toward(s) clinical / shift goals:    Problem: Hemodynamics  Goal: Patient's hemodynamics, fluid balance and neurologic status will be stable or improve  Outcome: Progressing     Problem: Respiratory  Goal: Patient will achieve/maintain optimum respiratory ventilation and gas exchange  Outcome: Progressing     Problem: Pain - Standard  Goal: Alleviation of pain or a reduction in pain to the patient’s comfort goal  Outcome: Progressing   Patient PRN medication given as ordered.   Problem: Fall Risk  Goal: Patient will remain free from falls  Outcome: Progressing   Fall precautions in bed. Bed locked and in lowest position.     Patient is not progressing towards the following goals:

## 2024-02-16 NOTE — DISCHARGE SUMMARY
Discharge Summary    CHIEF COMPLAINT ON ADMISSION  Abdominal pain      Reason for Admission  Intractable abdominal pain     Admission Date  2/14/2024    CODE STATUS  Full Code    HPI & HOSPITAL COURSE  This is a 53 y.o. adult here with abdominal pain.      53 y.o. adult with history of, abdominal pain, seizure, hypertension, alcohol abuse, PUD who presented 2/14/2024 with evaluation for intractable abdominal pain.  Patient has had laparoscopic cholecystectomy completed at [facility] in September 2023.  Since then, he has had multiple ER visits and hospitalizations at [facility] and [facility].  He presented today to ER due to abdominal pain and reported to have hematemesis.  No hematemesis seen in the ER, initial hemoglobin 12.2, improved from yesterday hemoglobin 10.9.  He received multiple doses of narcotic, still endorsed pain.  CTAP noted inguinal hernia, no acute concerning etiology to explain his pain.      Patient reports he was discharged from [facility] before he was able to tolerate a diet has been having nausea and vomiting for weeks.  They did discharge him with Reglan, he got morphine this morning unable to do a gastric emptying study.  Scheduled reglan with meals.   Patient reports that he has been having abdominal pain since he had his cholecystectomy and was intubated.  CT scan was negative.  He denies ever having an EGD, does have follow-up scheduled with GI consultants on 2/18.  No hematemesis since admission.  Continue IV PPI for now, add Carafate   Patient is also supposed to see pulmonology as he reports recurrent pneumonia has had multiple courses of antibiotics.  On CT scan here still shows some lower lobe infiltrates.  They are also concerned that he might have asthma, he is on inhalers and supposed to follow-up with outpatient pulmonology for PFTs.  He wants to change all of his physicians to [facility] instead of going to [facility], I placed a new referral for pulmonology.  Changed pain meds  to Norco 10 mg as patient reports this has the based pain control for him. Discussed at length the need for outpatient pain management as he has had multiple scripts after hospitalization and then has to return for continued pain.  Referral placed for outpatient pain management  Patient able to tolerate breakfast without nausea. He continues to complain of abdominal pain, improved with flexeril and norco. I have given him a 2 days supply to go home with. I had our schedulers make him an appointment to establish care with a [facility] on 2/21. Patient's vital signs are stable and he is ready for discharge home.     Therefore, he is discharged in good and stable condition to home with close outpatient follow-up.      Discharge Date  2/16/2024    FOLLOW UP ITEMS POST DISCHARGE  Follow up with primary care   Referral placed for pain management, pulmonary   Follow up with GI for consideration of EGD     DISCHARGE DIAGNOSES  Principal Problem:    Abdominal pain (POA: Yes)  Active Problems:    Seizure disorder (HCC) (POA: Yes)    Hypertension (POA: Yes)    Intractable nausea and vomiting (POA: Unknown)    Alcohol abuse (POA: Yes)    GI bleed (POA: Unknown)    Vaping nicotine dependence, tobacco product (POA: Unknown)    Acute blood loss anemia (POA: Unknown)  Resolved Problems:    * No resolved hospital problems. *      FOLLOW UP  No future appointments.  [facility] [address] [address] [phone]  Follow up  Follow up with gastroenterology for consideration of endoscopy    Primary care    Follow up  Follow up with primary care in 1 week      MEDICATIONS ON DISCHARGE     Medication List        START taking these medications        Instructions   cyclobenzaprine 10 mg Tabs  Commonly known as: Flexeril   Take 1 Tablet by mouth 3 times a day as needed for Muscle Spasms.  Dose: 10 mg     metoclopramide 10 MG Tabs  Commonly known as: Reglan   Take 1 Tablet by  mouth 4 times a day for 14 days.  Dose: 10 mg     sucralfate 1 GM Tabs  Commonly known as: Carafate   Take 1 Tablet by mouth 4 Times a Day,Before Meals and at Bedtime.  Dose: 1 g            CHANGE how you take these medications        Instructions   HYDROcodone-acetaminophen 5-325 MG Tabs per tablet  What changed:   when to take this  reasons to take this  Commonly known as: Norco   Take 2 Tablets by mouth every four hours as needed (severe pain) for up to 3 days.  Dose: 2 Tablet            CONTINUE taking these medications        Instructions   amLODIPine 5 MG Tabs  Commonly known as: Norvasc   Take 1 Tablet by mouth every day.  Dose: 1 Tablet     divalproex 500 MG Tbec  Commonly known as: Depakote   Take 500 mg by mouth every day.  Dose: 500 mg     fluconazole 100 MG Tabs  Commonly known as: Diflucan   Take 400 mg by mouth every day. 4 tablets=400mg  Dose: 400 mg     lacosamide 200 MG Tabs tablet  Commonly known as: Vimpat   Take 200 mg by mouth every day.  Dose: 200 mg     losartan 50 MG Tabs  Commonly known as: Cozaar   Take 50 mg by mouth every day.  Dose: 50 mg     Multivitamin Men Tabs   Take 1 Tablet by mouth every day.  Dose: 1 Tablet     nicotine 21 MG/24HR Pt24  Commonly known as: Nicoderm   Place 1 Patch on the skin every 24 hours.  Dose: 1 Patch     Nurtec 75 MG Tbdp  Generic drug: Rimegepant Sulfate   Take 75 mg by mouth every 48 hours.  Dose: 75 mg     pantoprazole 40 MG Tbec  Commonly known as: Protonix   Take 40 mg by mouth 2 times a day.  Dose: 40 mg              Allergies  Allergies   Allergen Reactions    Penicillins Unspecified     Unknown childhood reaction        DIET  Orders Placed This Encounter   Procedures    Diet Order Diet: Low Fiber(GI Soft)     Standing Status:   Standing     Number of Occurrences:   1     Order Specific Question:   Diet:     Answer:   Low Fiber(GI Soft) [2]       ACTIVITY  As tolerated.  Weight bearing as tolerated    CONSULTATIONS  None     PROCEDURES  None      LABORATORY  Lab Results   Component Value Date    SODIUM 139 02/16/2024    POTASSIUM 3.7 02/16/2024    CHLORIDE 103 02/16/2024    CO2 25 02/16/2024    GLUCOSE 83 02/16/2024    BUN 11 02/16/2024    CREATININE 0.99 02/16/2024        Lab Results   Component Value Date    WBC 5.8 02/16/2024    HEMOGLOBIN 11.6 (L) 02/16/2024    HEMATOCRIT 36.0 (L) 02/16/2024    PLATELETCT 225 02/16/2024        Total time of the discharge process exceeds 34 minutes.

## 2024-02-16 NOTE — DISCHARGE INSTRUCTIONS
Abdominal Pain, Adult  Many things can cause belly (abdominal) pain. Most times, belly pain is not dangerous. Many cases of belly pain can be watched and treated at home. Sometimes, though, belly pain is serious. Your doctor will try to find the cause of your belly pain.  Follow these instructions at home:    Medicines  Take over-the-counter and prescription medicines only as told by your doctor.  Do not take medicines that help you poop (laxatives) unless told by your doctor.  General instructions  Watch your belly pain for any changes.  Drink enough fluid to keep your pee (urine) pale yellow.  Keep all follow-up visits as told by your doctor. This is important.  Contact a doctor if:  Your belly pain changes or gets worse.  You are not hungry, or you lose weight without trying.  You are having trouble pooping (constipated) or have watery poop (diarrhea) for more than 2-3 days.  You have pain when you pee or poop.  Your belly pain wakes you up at night.  Your pain gets worse with meals, after eating, or with certain foods.  You are vomiting and cannot keep anything down.  You have a fever.  You have blood in your pee.  Get help right away if:  Your pain does not go away as soon as your doctor says it should.  You cannot stop vomiting.  Your pain is only in areas of your belly, such as the right side or the left lower part of the belly.  You have bloody or black poop, or poop that looks like tar.  You have very bad pain, cramping, or bloating in your belly.  You have signs of not having enough fluid or water in your body (dehydration), such as:  Dark pee, very little pee, or no pee.  Cracked lips.  Dry mouth.  Sunken eyes.  Sleepiness.  Weakness.  You have trouble breathing or chest pain.  Summary  Many cases of belly pain can be watched and treated at home.  Watch your belly pain for any changes.  Take over-the-counter and prescription medicines only as told by your doctor.  Contact a doctor if your belly pain  changes or gets worse.  Get help right away if you have very bad pain, cramping, or bloating in your belly.  This information is not intended to replace advice given to you by your health care provider. Make sure you discuss any questions you have with your health care provider.  Document Revised: 04/27/2020 Document Reviewed: 04/27/2020  Elsevier Patient Education © 2023 [employer]

## 2024-02-20 ENCOUNTER — HOSPITAL ENCOUNTER (EMERGENCY)
Facility: MEDICAL CENTER | Age: 54
End: 2024-02-20
Attending: EMERGENCY MEDICINE
Payer: COMMERCIAL

## 2024-02-20 VITALS
HEART RATE: 91 BPM | SYSTOLIC BLOOD PRESSURE: 151 MMHG | HEIGHT: 69 IN | WEIGHT: 210 LBS | DIASTOLIC BLOOD PRESSURE: 86 MMHG | RESPIRATION RATE: 16 BRPM | TEMPERATURE: 98 F | OXYGEN SATURATION: 96 % | BODY MASS INDEX: 31.1 KG/M2

## 2024-02-20 DIAGNOSIS — G89.29 CHRONIC ABDOMINAL PAIN: ICD-10-CM

## 2024-02-20 DIAGNOSIS — R10.9 CHRONIC ABDOMINAL PAIN: ICD-10-CM

## 2024-02-20 LAB
ALBUMIN SERPL BCP-MCNC: 4.5 G/DL (ref 3.2–4.9)
ALBUMIN/GLOB SERPL: 1.2 G/DL
ALP SERPL-CCNC: 107 U/L (ref 30–99)
ALT SERPL-CCNC: 18 U/L (ref 2–50)
AMPHET UR QL SCN: NEGATIVE
ANION GAP SERPL CALC-SCNC: 15 MMOL/L (ref 7–16)
AST SERPL-CCNC: 16 U/L (ref 12–45)
BARBITURATES UR QL SCN: NEGATIVE
BASOPHILS # BLD AUTO: 0.7 % (ref 0–1.8)
BASOPHILS # BLD: 0.1 K/UL (ref 0–0.12)
BENZODIAZ UR QL SCN: NEGATIVE
BILIRUB SERPL-MCNC: <0.2 MG/DL (ref 0.1–1.5)
BUN SERPL-MCNC: 14 MG/DL (ref 8–22)
BZE UR QL SCN: NEGATIVE
CALCIUM ALBUM COR SERPL-MCNC: 9.5 MG/DL (ref 8.5–10.5)
CALCIUM SERPL-MCNC: 9.9 MG/DL (ref 8.4–10.2)
CANNABINOIDS UR QL SCN: POSITIVE
CHLORIDE SERPL-SCNC: 104 MMOL/L (ref 96–112)
CO2 SERPL-SCNC: 24 MMOL/L (ref 20–33)
CREAT SERPL-MCNC: 1.04 MG/DL (ref 0.5–1.4)
EKG IMPRESSION: NORMAL
EOSINOPHIL # BLD AUTO: 0.23 K/UL (ref 0–0.51)
EOSINOPHIL NFR BLD: 1.7 % (ref 0–6.9)
ERYTHROCYTE [DISTWIDTH] IN BLOOD BY AUTOMATED COUNT: 40.2 FL (ref 35.9–50)
ETHANOL BLD-MCNC: <10.1 MG/DL
FENTANYL UR QL: NEGATIVE
GFR SERPLBLD CREATININE-BSD FMLA CKD-EPI: 87 ML/MIN/1.73 M 2
GLOBULIN SER CALC-MCNC: 3.7 G/DL (ref 1.9–3.5)
GLUCOSE BLD STRIP.AUTO-MCNC: 117 MG/DL (ref 65–99)
GLUCOSE SERPL-MCNC: 126 MG/DL (ref 65–99)
HCT VFR BLD AUTO: 38.9 % (ref 42–52)
HGB BLD-MCNC: 12.6 G/DL (ref 14–18)
IMM GRANULOCYTES # BLD AUTO: 0.08 K/UL (ref 0–0.11)
IMM GRANULOCYTES NFR BLD AUTO: 0.6 % (ref 0–0.9)
LIPASE SERPL-CCNC: 23 U/L (ref 11–82)
LYMPHOCYTES # BLD AUTO: 1.98 K/UL (ref 1–4.8)
LYMPHOCYTES NFR BLD: 14.7 % (ref 22–41)
MCH RBC QN AUTO: 25.5 PG (ref 27–33)
MCHC RBC AUTO-ENTMCNC: 32.4 G/DL (ref 32.3–36.5)
MCV RBC AUTO: 78.7 FL (ref 81.4–97.8)
METHADONE UR QL SCN: NEGATIVE
MONOCYTES # BLD AUTO: 0.8 K/UL (ref 0–0.85)
MONOCYTES NFR BLD AUTO: 6 % (ref 0–13.4)
NEUTROPHILS # BLD AUTO: 10.25 K/UL (ref 1.82–7.42)
NEUTROPHILS NFR BLD: 76.3 % (ref 44–72)
NRBC # BLD AUTO: 0 K/UL
NRBC BLD-RTO: 0 /100 WBC (ref 0–0.2)
OPIATES UR QL SCN: NEGATIVE
OXYCODONE UR QL SCN: NEGATIVE
PCP UR QL SCN: NEGATIVE
PLATELET # BLD AUTO: 312 K/UL (ref 164–446)
PMV BLD AUTO: 8.9 FL (ref 9–12.9)
POTASSIUM SERPL-SCNC: 3.9 MMOL/L (ref 3.6–5.5)
PROPOXYPH UR QL SCN: NEGATIVE
PROT SERPL-MCNC: 8.2 G/DL (ref 6–8.2)
RBC # BLD AUTO: 4.94 M/UL (ref 4.7–6.1)
SODIUM SERPL-SCNC: 143 MMOL/L (ref 135–145)
TROPONIN T SERPL-MCNC: <6 NG/L (ref 6–19)
WBC # BLD AUTO: 13.4 K/UL (ref 4.8–10.8)

## 2024-02-20 PROCEDURE — 99285 EMERGENCY DEPT VISIT HI MDM: CPT

## 2024-02-20 PROCEDURE — 93005 ELECTROCARDIOGRAM TRACING: CPT | Performed by: EMERGENCY MEDICINE

## 2024-02-20 PROCEDURE — 96365 THER/PROPH/DIAG IV INF INIT: CPT

## 2024-02-20 PROCEDURE — 96375 TX/PRO/DX INJ NEW DRUG ADDON: CPT

## 2024-02-20 PROCEDURE — 700111 HCHG RX REV CODE 636 W/ 250 OVERRIDE (IP): Mod: JZ | Performed by: EMERGENCY MEDICINE

## 2024-02-20 PROCEDURE — 82077 ASSAY SPEC XCP UR&BREATH IA: CPT

## 2024-02-20 PROCEDURE — 82962 GLUCOSE BLOOD TEST: CPT

## 2024-02-20 PROCEDURE — 36415 COLL VENOUS BLD VENIPUNCTURE: CPT

## 2024-02-20 PROCEDURE — 96376 TX/PRO/DX INJ SAME DRUG ADON: CPT

## 2024-02-20 PROCEDURE — 700101 HCHG RX REV CODE 250: Performed by: EMERGENCY MEDICINE

## 2024-02-20 PROCEDURE — 83690 ASSAY OF LIPASE: CPT

## 2024-02-20 PROCEDURE — 84484 ASSAY OF TROPONIN QUANT: CPT

## 2024-02-20 PROCEDURE — 85025 COMPLETE CBC W/AUTO DIFF WBC: CPT

## 2024-02-20 PROCEDURE — 80307 DRUG TEST PRSMV CHEM ANLYZR: CPT

## 2024-02-20 PROCEDURE — 700105 HCHG RX REV CODE 258: Performed by: EMERGENCY MEDICINE

## 2024-02-20 PROCEDURE — 80053 COMPREHEN METABOLIC PANEL: CPT

## 2024-02-20 PROCEDURE — C9113 INJ PANTOPRAZOLE SODIUM, VIA: HCPCS | Performed by: EMERGENCY MEDICINE

## 2024-02-20 RX ORDER — DIPHENHYDRAMINE HYDROCHLORIDE 50 MG/ML
25 INJECTION INTRAMUSCULAR; INTRAVENOUS ONCE
Status: COMPLETED | OUTPATIENT
Start: 2024-02-20 | End: 2024-02-20

## 2024-02-20 RX ORDER — SODIUM CHLORIDE, SODIUM LACTATE, POTASSIUM CHLORIDE, CALCIUM CHLORIDE 600; 310; 30; 20 MG/100ML; MG/100ML; MG/100ML; MG/100ML
1000 INJECTION, SOLUTION INTRAVENOUS ONCE
Status: COMPLETED | OUTPATIENT
Start: 2024-02-20 | End: 2024-02-20

## 2024-02-20 RX ORDER — SUCRALFATE ORAL 1 G/10ML
1 SUSPENSION ORAL 4 TIMES DAILY
Status: SHIPPED | COMMUNITY
End: 2024-03-28

## 2024-02-20 RX ORDER — HYDROMORPHONE HYDROCHLORIDE 1 MG/ML
1 INJECTION, SOLUTION INTRAMUSCULAR; INTRAVENOUS; SUBCUTANEOUS ONCE
Status: COMPLETED | OUTPATIENT
Start: 2024-02-20 | End: 2024-02-20

## 2024-02-20 RX ORDER — HYDROCODONE BITARTRATE AND ACETAMINOPHEN 5; 325 MG/1; MG/1
1 TABLET ORAL EVERY 4 HOURS PRN
Status: SHIPPED | COMMUNITY
End: 2024-03-28

## 2024-02-20 RX ORDER — METOCLOPRAMIDE HYDROCHLORIDE 5 MG/ML
10 INJECTION INTRAMUSCULAR; INTRAVENOUS ONCE
Status: COMPLETED | OUTPATIENT
Start: 2024-02-20 | End: 2024-02-20

## 2024-02-20 RX ORDER — ONDANSETRON 2 MG/ML
4 INJECTION INTRAMUSCULAR; INTRAVENOUS ONCE
Status: COMPLETED | OUTPATIENT
Start: 2024-02-20 | End: 2024-02-20

## 2024-02-20 RX ORDER — PANTOPRAZOLE SODIUM 40 MG/10ML
80 INJECTION, POWDER, LYOPHILIZED, FOR SOLUTION INTRAVENOUS ONCE
Status: COMPLETED | OUTPATIENT
Start: 2024-02-20 | End: 2024-02-20

## 2024-02-20 RX ADMIN — SODIUM CHLORIDE, POTASSIUM CHLORIDE, SODIUM LACTATE AND CALCIUM CHLORIDE 1000 ML: 600; 310; 30; 20 INJECTION, SOLUTION INTRAVENOUS at 08:55

## 2024-02-20 RX ADMIN — HYDROMORPHONE HYDROCHLORIDE 1 MG: 1 INJECTION, SOLUTION INTRAMUSCULAR; INTRAVENOUS; SUBCUTANEOUS at 12:40

## 2024-02-20 RX ADMIN — PANTOPRAZOLE SODIUM 80 MG: 40 INJECTION, POWDER, FOR SOLUTION INTRAVENOUS at 10:46

## 2024-02-20 RX ADMIN — ONDANSETRON 4 MG: 2 INJECTION INTRAMUSCULAR; INTRAVENOUS at 08:55

## 2024-02-20 RX ADMIN — METOCLOPRAMIDE 10 MG: 5 INJECTION, SOLUTION INTRAMUSCULAR; INTRAVENOUS at 11:46

## 2024-02-20 RX ADMIN — HYDROMORPHONE HYDROCHLORIDE 1 MG: 1 INJECTION, SOLUTION INTRAMUSCULAR; INTRAVENOUS; SUBCUTANEOUS at 09:20

## 2024-02-20 RX ADMIN — DIPHENHYDRAMINE HYDROCHLORIDE 25 MG: 50 INJECTION, SOLUTION INTRAMUSCULAR; INTRAVENOUS at 11:46

## 2024-02-20 RX ADMIN — FAMOTIDINE 20 MG: 10 INJECTION INTRAVENOUS at 09:19

## 2024-02-20 RX ADMIN — KETAMINE HYDROCHLORIDE 25 MG: 10 INJECTION INTRAMUSCULAR; INTRAVENOUS at 11:49

## 2024-02-20 ASSESSMENT — FIBROSIS 4 INDEX: FIB4 SCORE: 1.9

## 2024-02-20 NOTE — ED NOTES
PIV placed via US, blood work collected & sent to lab, Pt given warm blanket, Pt medicated per MAR, Pt denied having any needs at this time, no distress noted;

## 2024-02-20 NOTE — ED PROVIDER NOTES
ED Provider Note    CHIEF COMPLAINT  Chief Complaint   Patient presents with    Abdominal Pain     C/o ongoing generalized abd pain x 5 months  Sent to ED by GI    N/V       EXTERNAL RECORDS REVIEWED  Reviewed extensive hospitalization records laboratory studies CT scan of the abdomen pelvis regarding admissions at both this facility and [facility] within the last few weeks    HPI/ROS  LIMITATION TO HISTORY   None  OUTSIDE HISTORIAN(S):  Wife provided additional history    Donal Carpio is a 53 y.o. adult who presents evaluation of ongoing epigastric discomfort postprandial dyspepsia nausea and vomiting without hematemesis.  The patient has a complex medical history as listed below.  He apparently has a known history of peptic ulcer disease.  He was hospitalized at both [facility] and [facility] within the last few weeks.  During his last hospitalization they felt that he likely had gastritis and/or peptic ulcer disease and urgently referred him to gastroenterology consultants.  He went there this morning with ongoing symptoms and they ultimately referred him to the ER as they felt he may require higher level of care.  He specifically denies hematemesis or melena.  No report of any fevers or chills.  He did report epigastric pain and nausea and vomiting.  He has had significant imaging studies including CT scan of the abdomen pelvis numerous times this month which was unremarkable.  He also has a seizure disorder    PAST MEDICAL HISTORY   has a past medical history of Alcohol withdrawal delirium (HCC) (9/19/2022), Drug-seeking behavior, Peptic ulcer, Seizure (HCC), Seizure disorder (HCC), and Ulcer of abdomen wall (HCC).    SURGICAL HISTORY   has a past surgical history that includes appendectomy; upper gi endoscopy,diagnosis (N/A, 6/30/2022); upper gi endoscopy,biopsy (N/A, 6/30/2022); lap,diagnostic abdomen (N/A, 7/7/2022); and wound exploration ortho (Right, 7/7/2022).    FAMILY  HISTORY  History reviewed. No pertinent family history.    SOCIAL HISTORY  Social History     Tobacco Use    Smoking status: Never    Smokeless tobacco: Never   Vaping Use    Vaping Use: Every day    Substances: Nicotine   Substance and Sexual Activity    Alcohol use: Not Currently    Drug use: Yes     Types: Inhaled     Comment: cannabis    Sexual activity: Not on file       CURRENT MEDICATIONS    Current Facility-Administered Medications:     pantoprazole (Protonix) injection 80 mg, 80 mg, Intravenous, Once, Donal Clay M.D.    Current Outpatient Medications:     sucralfate (CARAFATE) 1 GM Tab, Take 1 Tablet by mouth 4 Times a Day,Before Meals and at Bedtime., Disp: 120 Tablet, Rfl: 0    metoclopramide (REGLAN) 10 MG Tab, Take 1 Tablet by mouth 4 times a day for 14 days., Disp: 56 Tablet, Rfl: 0    cyclobenzaprine (FLEXERIL) 10 mg Tab, Take 1 Tablet by mouth 3 times a day as needed for Muscle Spasms., Disp: 30 Tablet, Rfl: 0    fluconazole (DIFLUCAN) 100 MG Tab, Take 400 mg by mouth every day. 4 tablets=400mg, Disp: , Rfl:     Multiple Vitamins-Minerals (MULTIVITAMIN MEN) Tab, Take 1 Tablet by mouth every day., Disp: , Rfl:     divalproex (DEPAKOTE) 500 MG Tablet Delayed Response, Take 500 mg by mouth every day., Disp: , Rfl:     lacosamide (VIMPAT) 200 MG Tab tablet, Take 200 mg by mouth every day., Disp: , Rfl:     pantoprazole (PROTONIX) 40 MG Tablet Delayed Response, Take 40 mg by mouth 2 times a day., Disp: , Rfl:     Rimegepant Sulfate (NURTEC) 75 MG TABLET DISPERSIBLE, Take 75 mg by mouth every 48 hours., Disp: , Rfl:     amLODIPine (NORVASC) 5 MG Tab, Take 1 Tablet by mouth every day., Disp: , Rfl:     nicotine (NICODERM) 21 MG/24HR PATCH 24 HR, Place 1 Patch on the skin every 24 hours., Disp: , Rfl:     losartan (COZAAR) 50 MG Tab, Take 50 mg by mouth every day., Disp: , Rfl:       ALLERGIES  Allergies   Allergen Reactions    Penicillins Unspecified     Unknown childhood reaction        PHYSICAL  "EXAM  VITAL SIGNS: BP (!) 148/81   Pulse 88   Temp 36.7 °C (98 °F) (Temporal)   Resp 20   Ht 1.753 m (5' 9\")   Wt 95.3 kg (210 lb)   SpO2 92%   BMI 31.01 kg/m²    Pulse ox interpretation: I interpret this pulse ox as normal.  Constitutional: Alert and oriented x 3, moderate to severe distress  HEENT: Atraumatic normocephalic, pupils are equal round reactive to light extraocular movements are intact. The nares is clear, external ears are normal, mouth shows dry mucous membranes normal dentition for age  Neck: Supple, no JVD no tracheal deviation  Cardiovascular: Regular rate and rhythm no murmur rub or gallop 2+ pulses peripherally x4  Thorax & Lungs: No respiratory distress, no wheezes rales or rhonchi, No chest tenderness.   GI: Soft diffuse epigastric discomfort without rebound guarding or rigidity.  No peritoneal signs  Skin: Warm dry no acute rash or lesion  Musculoskeletal: Moving all extremities with full range and 5 of 5 strength no acute  deformity  Neurologic: Cranial nerves III through XII are grossly intact no sensory deficit no cerebellar dysfunction   Psychiatric: Anxious      DIAGNOSTIC STUDIES / PROCEDURES    LABS  Results for orders placed or performed during the hospital encounter of 02/20/24   CBC WITH DIFFERENTIAL   Result Value Ref Range    WBC 13.4 (H) 4.8 - 10.8 K/uL    RBC 4.94 4.70 - 6.10 M/uL    Hemoglobin 12.6 (L) 14.0 - 18.0 g/dL    Hematocrit 38.9 (L) 42.0 - 52.0 %    MCV 78.7 (L) 81.4 - 97.8 fL    MCH 25.5 (L) 27.0 - 33.0 pg    MCHC 32.4 32.3 - 36.5 g/dL    RDW 40.2 35.9 - 50.0 fL    Platelet Count 312 164 - 446 K/uL    MPV 8.9 (L) 9.0 - 12.9 fL    Neutrophils-Polys 76.30 (H) 44.00 - 72.00 %    Lymphocytes 14.70 (L) 22.00 - 41.00 %    Monocytes 6.00 0.00 - 13.40 %    Eosinophils 1.70 0.00 - 6.90 %    Basophils 0.70 0.00 - 1.80 %    Immature Granulocytes 0.60 0.00 - 0.90 %    Nucleated RBC 0.00 0.00 - 0.20 /100 WBC    Neutrophils (Absolute) 10.25 (H) 1.82 - 7.42 K/uL    Lymphs " (Absolute) 1.98 1.00 - 4.80 K/uL    Monos (Absolute) 0.80 0.00 - 0.85 K/uL    Eos (Absolute) 0.23 0.00 - 0.51 K/uL    Baso (Absolute) 0.10 0.00 - 0.12 K/uL    Immature Granulocytes (abs) 0.08 0.00 - 0.11 K/uL    NRBC (Absolute) 0.00 K/uL   Comp Metabolic Panel   Result Value Ref Range    Sodium 143 135 - 145 mmol/L    Potassium 3.9 3.6 - 5.5 mmol/L    Chloride 104 96 - 112 mmol/L    Co2 24 20 - 33 mmol/L    Anion Gap 15.0 7.0 - 16.0    Glucose 126 (H) 65 - 99 mg/dL    Bun 14 8 - 22 mg/dL    Creatinine 1.04 0.50 - 1.40 mg/dL    Calcium 9.9 8.4 - 10.2 mg/dL    Correct Calcium 9.5 8.5 - 10.5 mg/dL    AST(SGOT) 16 12 - 45 U/L    ALT(SGPT) 18 2 - 50 U/L    Alkaline Phosphatase 107 (H) 30 - 99 U/L    Total Bilirubin <0.2 0.1 - 1.5 mg/dL    Albumin 4.5 3.2 - 4.9 g/dL    Total Protein 8.2 6.0 - 8.2 g/dL    Globulin 3.7 (H) 1.9 - 3.5 g/dL    A-G Ratio 1.2 g/dL   LIPASE   Result Value Ref Range    Lipase 23 11 - 82 U/L   TROPONIN   Result Value Ref Range    Troponin T <6 6 - 19 ng/L   ETHYL ALCOHOL (BLOOD)   Result Value Ref Range    Diagnostic Alcohol <10.1 <10.1 mg/dL   ESTIMATED GFR   Result Value Ref Range    GFR (CKD-EPI) 87 >60 mL/min/1.73 m 2   EKG   Result Value Ref Range    Report       [facility]    Test Date:  2024  Pt Name:    JERICHO ALBA                  Department: [facility]  MRN:        [id number]                      Room:  Gender:     Male                         Technician: ALEJANDRA  :        1970                   Requested By:ER TRIAGE PROTOCOL  Order #:    [id number]                    Reading MD:    Measurements  Intervals                                Axis  Rate:       92                           P:          33  AL:         150                          QRS:        -17  QRSD:       92                           T:          7  QT:         354  QTc:        438    Interpretive Statements  Sinus rhythm  Probable left atrial enlargement  Borderline left axis  deviation  Low voltage, precordial leads  Borderline T abnormalities, anterior leads  Compared to ECG 02/14/2024 19:40:38  Low QRS voltage now present  T-wave abnormality now present  Possible ischemia no longer present     POCT glucose device results   Result Value Ref Range    POC Glucose, Blood 117 (H) 65 - 99 mg/dL      EKG twelve-lead interpretation by me rate 92 sinus rhythm left atrial enlargement low voltage nonspecific T wave abnormalities without STEMI morphology no evidence of arrhythmia heart block or obvious ischemia  RADIOLOGY      COURSE & MEDICAL DECISION MAKING    ED Observation Status? No; Patient does not meet criteria for ED Observation.     INITIAL ASSESSMENT, COURSE AND PLAN  Care Narrative:     This is a very challenging case.  This is a 53-year-old gentleman who has a history of recurrent epigastric discomfort.  He has been hospitalized both at [facility] in our facility for similar presentation.  I reviewed his extensive records including laboratory studies and numerous CT scans of the abdomen pelvis performed just this month.  Of note he has had the following studies performed: Nuclear medicine gastric emptying study performed at [facility] in November, CT scan of the abdomen pelvis in November, CT scan of the chest abdomen pelvis in December CT scan of the abdomen pelvis on February 12, CT scan of the abdomen and pelvis performed on February 14, MRI of the brain performed on February 12.  Patient here had slight elevation of white blood cell count but his hemoglobin is stable.  Metabolic panel including liver function test and lipase is normal.  EKG does not suggest any obvious ischemia.  He was given several rounds of parenteral nausea and pain medication.  Per the patient's report and previous hospitalizations he indicated that he was having upper gastrointestinal bleeding and he has not had an upper endoscopy.  I initially felt that this was odd given his numerous hospitalizations  and therefore I consulted gastroenterology consultants.  Dr. Batres physician assistant was gracious enough to call back and performed extensive chart review.  The patient was actually evaluated at their facility 3 weeks ago and had both upper and lower endoscopy which did not show any significant findings.  When I confronted the patient regarding these findings he indicated that he did not remember that.  I did consider repeat CT scan of the abdomen pelvis but considering the patient has had numerous dedicated CT scans of the abdomen pelvis just within the last week I did not feel that this is clinically indicated.  I suspect there is a component of nonmedical emergent/ nonsurgical emergent abdominal pain as well as possible issues with pain management.      HYDRATION: Based on the patient's presentation of Acute Vomiting the patient was given IV fluids. IV Hydration was used because oral hydration was not adequate alone. Upon recheck following hydration, the patient was improved.      ADDITIONAL PROBLEM LIST    DISPOSITION AND DISCUSSIONS  I have discussed management of the patient with the following physicians and CAROLA's: Discussed with gastroenterology    Discussion of management with other QHP or appropriate source(s): None    Escalation of care considered, and ultimately not performed: Considered admission and/or hospitalization    Barriers to care at this time, including but not limited to: None    Decision tools and prescription drugs considered including, but not limited to: None    FINAL DIAGNOSIS    1. Chronic abdominal pain                 Electronically signed by: Donal Clay M.D., 2/20/2024 9:58 AM

## 2024-02-20 NOTE — DISCHARGE PLANNING
ER CM met with pt at bedside. Aox4Hugo Prince. Has been in [facility] and at [facility] recently. He was assigned Dr ALFORD as PCPafter a 24 hr admission at [facility] per his report but she declined him and told him to go to [facility] and get a PCP. RX [facility]. [facility] no longer active. Recommend he follow with [employer] [employer] for assigned PCP. Outpt followups/ ER CM notes on last DC paperwork AVS PCP appt for 2.21.24 Feb 21, 2024  2:00 PM  New Patient with Bobby Batres M.D.  [facility] ([facility]) [address] [place] [id number]  [phone]     Please bring Photo ID, Insurance Cards, All Medication Bottles and copies of any legal documents (such as Living Will or Power of )  If you speak another language other than English, please indicate so upon registration and we will facilitate a qualified  at no cost to you.  Please arrive 30 minutes prior for check in and registration.  You will be receiving a confirmation call a few days before your appointment from our automated call confirmation system.      Care Transition Team Assessment    Information Source  Orientation Level: Oriented X4  Information Given By: Patient  Informant's Name: Donal  Who is responsible for making decisions for patient? : Patient         Elopement Risk  Legal Hold: No  Ambulatory or Self Mobile in Wheelchair: No-Not an Elopement Risk    Interdisciplinary Discharge Planning  Primary Care Physician: Dr ALFORD at [facility] (She told him to get PCP at [facility])  Lives with - Patient's Self Care Capacity: Spouse  Support Systems: Spouse / Significant Other  Housing / Facility: 1 Story House  Do You Take your Prescribed Medications Regularly: Yes  Durable Medical Equipment: Not Applicable    Discharge Preparedness  What is your plan after discharge?: Home with help  What are your discharge supports?: Spouse  Prior Functional Level: Ambulatory, Independent with Activities of Daily Living, Independent  with Medication Management    Functional Assesment  Prior Functional Level: Ambulatory, Independent with Activities of Daily Living, Independent with Medication Management    Finances  Prescription Coverage: Yes                   Domestic Abuse  Have you ever been the victim of abuse or violence?: No              Anticipated Discharge Information  Discharge Disposition: Discharged to home/self care (01)

## 2024-02-20 NOTE — ED NOTES
Assisted w/ discharge.  Reviewed discharge instructions w/ pt, verbalized understanding to information provided including follow up care w/ GI, and return precautions, denied questions/concerns.  Pt assisted to ED via WC, waiting for spouse to provide ride home.

## 2024-02-20 NOTE — ED TRIAGE NOTES
"Chief Complaint   Patient presents with    Abdominal Pain     C/o ongoing generalized abd pain x 5 months  Sent to ED by GI    N/V     BP (!) 163/102   Pulse (!) 130   Temp 36.7 °C (98 °F) (Temporal)   Resp 16   Ht 1.753 m (5' 9\")   Wt 95.3 kg (210 lb)   SpO2 97%   BMI 31.01 kg/m²     Pt to ED via gurney for c/o abd pain and N/V, states has been ongoing for five months, saw GI this am send to ED for further work up.  Pt was reported to have SZ like activity while in Triage, brought to ED 2.  Pt alert and oriented, following commands, active N/V.    "

## 2024-02-20 NOTE — ED NOTES
"Pt states he has been throwing up for over a week. Pt states he has had pneumonia for 5 months after being ventilated for four days after a \"respiratory failure.\" Pt state he has chest pain and shortness of breath. Pt states he has upper right abdominal pain as well. Pt states he feels dizzy and is having blurred vision.  "

## 2024-02-21 ENCOUNTER — APPOINTMENT (OUTPATIENT)
Dept: INTERNAL MEDICINE | Facility: OTHER | Age: 54
End: 2024-02-21
Payer: COMMERCIAL

## 2024-03-22 ENCOUNTER — TELEPHONE (OUTPATIENT)
Dept: HEALTH INFORMATION MANAGEMENT | Facility: OTHER | Age: 54
End: 2024-03-22
Payer: COMMERCIAL

## 2024-03-23 ENCOUNTER — HOSPITAL ENCOUNTER (EMERGENCY)
Facility: MEDICAL CENTER | Age: 54
End: 2024-03-23
Attending: EMERGENCY MEDICINE
Payer: MEDICAID

## 2024-03-23 ENCOUNTER — APPOINTMENT (OUTPATIENT)
Dept: RADIOLOGY | Facility: MEDICAL CENTER | Age: 54
End: 2024-03-23
Attending: STUDENT IN AN ORGANIZED HEALTH CARE EDUCATION/TRAINING PROGRAM
Payer: MEDICAID

## 2024-03-23 VITALS
RESPIRATION RATE: 16 BRPM | HEART RATE: 88 BPM | BODY MASS INDEX: 29.62 KG/M2 | OXYGEN SATURATION: 98 % | DIASTOLIC BLOOD PRESSURE: 71 MMHG | SYSTOLIC BLOOD PRESSURE: 124 MMHG | HEIGHT: 69 IN | WEIGHT: 200 LBS | TEMPERATURE: 97.9 F

## 2024-03-23 DIAGNOSIS — E86.0 DEHYDRATION: ICD-10-CM

## 2024-03-23 DIAGNOSIS — R10.9 BILATERAL FLANK PAIN: ICD-10-CM

## 2024-03-23 DIAGNOSIS — R10.30 LOWER ABDOMINAL PAIN: ICD-10-CM

## 2024-03-23 DIAGNOSIS — R56.9 SEIZURE (HCC): ICD-10-CM

## 2024-03-23 DIAGNOSIS — N28.89 RENAL MASS: ICD-10-CM

## 2024-03-23 LAB
ALBUMIN SERPL BCP-MCNC: 3.9 G/DL (ref 3.2–4.9)
ALBUMIN/GLOB SERPL: 1.1 G/DL
ALP SERPL-CCNC: 107 U/L (ref 30–99)
ALT SERPL-CCNC: 24 U/L (ref 2–50)
ANION GAP SERPL CALC-SCNC: 16 MMOL/L (ref 7–16)
APPEARANCE UR: CLEAR
AST SERPL-CCNC: 32 U/L (ref 12–45)
BASOPHILS # BLD AUTO: 0.7 % (ref 0–1.8)
BASOPHILS # BLD: 0.06 K/UL (ref 0–0.12)
BILIRUB SERPL-MCNC: <0.2 MG/DL (ref 0.1–1.5)
BILIRUB UR QL STRIP.AUTO: NEGATIVE
BUN SERPL-MCNC: 11 MG/DL (ref 8–22)
CALCIUM ALBUM COR SERPL-MCNC: 9.4 MG/DL (ref 8.5–10.5)
CALCIUM SERPL-MCNC: 9.3 MG/DL (ref 8.5–10.5)
CHLORIDE SERPL-SCNC: 111 MMOL/L (ref 96–112)
CO2 SERPL-SCNC: 16 MMOL/L (ref 20–33)
COLOR UR: ABNORMAL
CREAT SERPL-MCNC: 0.93 MG/DL (ref 0.5–1.4)
EOSINOPHIL # BLD AUTO: 0.2 K/UL (ref 0–0.51)
EOSINOPHIL NFR BLD: 2.4 % (ref 0–6.9)
ERYTHROCYTE [DISTWIDTH] IN BLOOD BY AUTOMATED COUNT: 39.6 FL (ref 35.9–50)
GFR SERPLBLD CREATININE-BSD FMLA CKD-EPI: 99 ML/MIN/1.73 M 2
GLOBULIN SER CALC-MCNC: 3.4 G/DL (ref 1.9–3.5)
GLUCOSE SERPL-MCNC: 98 MG/DL (ref 65–99)
GLUCOSE UR STRIP.AUTO-MCNC: NEGATIVE MG/DL
HCT VFR BLD AUTO: 31.5 % (ref 42–52)
HGB BLD-MCNC: 10 G/DL (ref 14–18)
IMM GRANULOCYTES # BLD AUTO: 0.06 K/UL (ref 0–0.11)
IMM GRANULOCYTES NFR BLD AUTO: 0.7 % (ref 0–0.9)
KETONES UR STRIP.AUTO-MCNC: ABNORMAL MG/DL
LEUKOCYTE ESTERASE UR QL STRIP.AUTO: NEGATIVE
LIPASE SERPL-CCNC: 29 U/L (ref 11–82)
LYMPHOCYTES # BLD AUTO: 1.89 K/UL (ref 1–4.8)
LYMPHOCYTES NFR BLD: 22.9 % (ref 22–41)
MCH RBC QN AUTO: 24.4 PG (ref 27–33)
MCHC RBC AUTO-ENTMCNC: 31.7 G/DL (ref 32.3–36.5)
MCV RBC AUTO: 77 FL (ref 81.4–97.8)
MICRO URNS: ABNORMAL
MONOCYTES # BLD AUTO: 0.52 K/UL (ref 0–0.85)
MONOCYTES NFR BLD AUTO: 6.3 % (ref 0–13.4)
NEUTROPHILS # BLD AUTO: 5.51 K/UL (ref 1.82–7.42)
NEUTROPHILS NFR BLD: 67 % (ref 44–72)
NITRITE UR QL STRIP.AUTO: NEGATIVE
NRBC # BLD AUTO: 0 K/UL
NRBC BLD-RTO: 0 /100 WBC (ref 0–0.2)
PH UR STRIP.AUTO: 7.5 [PH] (ref 5–8)
PLATELET # BLD AUTO: 213 K/UL (ref 164–446)
PMV BLD AUTO: 9.9 FL (ref 9–12.9)
POTASSIUM SERPL-SCNC: 4.9 MMOL/L (ref 3.6–5.5)
PROT SERPL-MCNC: 7.3 G/DL (ref 6–8.2)
PROT UR QL STRIP: NEGATIVE MG/DL
RBC # BLD AUTO: 4.09 M/UL (ref 4.7–6.1)
RBC UR QL AUTO: NEGATIVE
SODIUM SERPL-SCNC: 143 MMOL/L (ref 135–145)
SP GR UR STRIP.AUTO: 1.03
UROBILINOGEN UR STRIP.AUTO-MCNC: 0.2 MG/DL
WBC # BLD AUTO: 8.2 K/UL (ref 4.8–10.8)

## 2024-03-23 PROCEDURE — 99285 EMERGENCY DEPT VISIT HI MDM: CPT

## 2024-03-23 PROCEDURE — 83690 ASSAY OF LIPASE: CPT

## 2024-03-23 PROCEDURE — 96375 TX/PRO/DX INJ NEW DRUG ADDON: CPT

## 2024-03-23 PROCEDURE — 87086 URINE CULTURE/COLONY COUNT: CPT

## 2024-03-23 PROCEDURE — 71045 X-RAY EXAM CHEST 1 VIEW: CPT

## 2024-03-23 PROCEDURE — 700111 HCHG RX REV CODE 636 W/ 250 OVERRIDE (IP): Mod: JZ,UD | Performed by: STUDENT IN AN ORGANIZED HEALTH CARE EDUCATION/TRAINING PROGRAM

## 2024-03-23 PROCEDURE — 81003 URINALYSIS AUTO W/O SCOPE: CPT

## 2024-03-23 PROCEDURE — 36415 COLL VENOUS BLD VENIPUNCTURE: CPT

## 2024-03-23 PROCEDURE — 700102 HCHG RX REV CODE 250 W/ 637 OVERRIDE(OP): Performed by: EMERGENCY MEDICINE

## 2024-03-23 PROCEDURE — 80053 COMPREHEN METABOLIC PANEL: CPT

## 2024-03-23 PROCEDURE — 700111 HCHG RX REV CODE 636 W/ 250 OVERRIDE (IP): Performed by: EMERGENCY MEDICINE

## 2024-03-23 PROCEDURE — 74176 CT ABD & PELVIS W/O CONTRAST: CPT

## 2024-03-23 PROCEDURE — 700105 HCHG RX REV CODE 258: Mod: UD | Performed by: STUDENT IN AN ORGANIZED HEALTH CARE EDUCATION/TRAINING PROGRAM

## 2024-03-23 PROCEDURE — 85025 COMPLETE CBC W/AUTO DIFF WBC: CPT

## 2024-03-23 PROCEDURE — A9270 NON-COVERED ITEM OR SERVICE: HCPCS | Performed by: EMERGENCY MEDICINE

## 2024-03-23 PROCEDURE — 96374 THER/PROPH/DIAG INJ IV PUSH: CPT

## 2024-03-23 RX ORDER — HYDROMORPHONE HYDROCHLORIDE 1 MG/ML
1 INJECTION, SOLUTION INTRAMUSCULAR; INTRAVENOUS; SUBCUTANEOUS ONCE
Status: COMPLETED | OUTPATIENT
Start: 2024-03-23 | End: 2024-03-23

## 2024-03-23 RX ORDER — HYDROCODONE BITARTRATE AND ACETAMINOPHEN 5; 325 MG/1; MG/1
1 TABLET ORAL ONCE
Status: COMPLETED | OUTPATIENT
Start: 2024-03-23 | End: 2024-03-23

## 2024-03-23 RX ORDER — LACOSAMIDE 100 MG/1
200 TABLET ORAL ONCE
Status: DISCONTINUED | OUTPATIENT
Start: 2024-03-24 | End: 2024-03-23

## 2024-03-23 RX ORDER — LACOSAMIDE 100 MG/1
200 TABLET ORAL ONCE
Status: COMPLETED | OUTPATIENT
Start: 2024-03-23 | End: 2024-03-23

## 2024-03-23 RX ORDER — SODIUM CHLORIDE 9 MG/ML
1000 INJECTION, SOLUTION INTRAVENOUS ONCE
Status: COMPLETED | OUTPATIENT
Start: 2024-03-23 | End: 2024-03-23

## 2024-03-23 RX ORDER — DIVALPROEX SODIUM 500 MG/1
500 TABLET, DELAYED RELEASE ORAL ONCE
Status: COMPLETED | OUTPATIENT
Start: 2024-03-23 | End: 2024-03-23

## 2024-03-23 RX ORDER — HYDROCODONE BITARTRATE AND ACETAMINOPHEN 5; 325 MG/1; MG/1
1 TABLET ORAL EVERY 6 HOURS PRN
Qty: 10 TABLET | Refills: 0 | Status: SHIPPED | OUTPATIENT
Start: 2024-03-23 | End: 2024-03-26

## 2024-03-23 RX ORDER — SODIUM CHLORIDE, SODIUM LACTATE, POTASSIUM CHLORIDE, CALCIUM CHLORIDE 600; 310; 30; 20 MG/100ML; MG/100ML; MG/100ML; MG/100ML
1000 INJECTION, SOLUTION INTRAVENOUS ONCE
Status: DISCONTINUED | OUTPATIENT
Start: 2024-03-23 | End: 2024-03-23

## 2024-03-23 RX ORDER — SODIUM CHLORIDE 9 MG/ML
INJECTION, SOLUTION INTRAVENOUS CONTINUOUS
Status: DISCONTINUED | OUTPATIENT
Start: 2024-03-23 | End: 2024-03-23 | Stop reason: HOSPADM

## 2024-03-23 RX ORDER — HYDROCODONE BITARTRATE AND ACETAMINOPHEN 5; 325 MG/1; MG/1
1 TABLET ORAL EVERY 6 HOURS PRN
Qty: 10 TABLET | Refills: 0 | Status: SHIPPED | OUTPATIENT
Start: 2024-03-23 | End: 2024-03-23

## 2024-03-23 RX ORDER — ONDANSETRON 2 MG/ML
4 INJECTION INTRAMUSCULAR; INTRAVENOUS ONCE
Status: COMPLETED | OUTPATIENT
Start: 2024-03-23 | End: 2024-03-23

## 2024-03-23 RX ORDER — KETOROLAC TROMETHAMINE 15 MG/ML
15 INJECTION, SOLUTION INTRAMUSCULAR; INTRAVENOUS ONCE
Status: COMPLETED | OUTPATIENT
Start: 2024-03-23 | End: 2024-03-23

## 2024-03-23 RX ADMIN — HYDROCODONE BITARTRATE AND ACETAMINOPHEN 1 TABLET: 5; 325 TABLET ORAL at 17:13

## 2024-03-23 RX ADMIN — SODIUM CHLORIDE 1000 ML: 9 INJECTION, SOLUTION INTRAVENOUS at 15:18

## 2024-03-23 RX ADMIN — HYDROMORPHONE HYDROCHLORIDE 1 MG: 1 INJECTION, SOLUTION INTRAMUSCULAR; INTRAVENOUS; SUBCUTANEOUS at 14:49

## 2024-03-23 RX ADMIN — LACOSAMIDE 200 MG: 100 TABLET, FILM COATED ORAL at 17:17

## 2024-03-23 RX ADMIN — ONDANSETRON 4 MG: 2 INJECTION INTRAMUSCULAR; INTRAVENOUS at 14:49

## 2024-03-23 RX ADMIN — KETOROLAC TROMETHAMINE 15 MG: 15 INJECTION, SOLUTION INTRAMUSCULAR; INTRAVENOUS at 15:18

## 2024-03-23 RX ADMIN — DIVALPROEX SODIUM 500 MG: 500 TABLET, DELAYED RELEASE ORAL at 17:13

## 2024-03-23 ASSESSMENT — FIBROSIS 4 INDEX: FIB4 SCORE: 0.62

## 2024-03-23 NOTE — ED NOTES
Pt refused respiratory viral swab because he had one done at [facility] yesterday and he was negative for all. ERP aware.

## 2024-03-23 NOTE — ED PROVIDER NOTES
ED Provider Note    CHIEF COMPLAINT  Chief Complaint   Patient presents with    Seizure     BIBA. Pt has a witnessed tonic clonic seizure that lasted approximately 30sec.  Pt had an additional seizure witnessed by EMS that lasted approximately 10 seconds.  Pt states he has not taken his dose of seizure medications today due to nausea but it otherwise compliant with medications.     Abdominal Pain     Pt c/o abdominal cramping in the abdomin and back.  Pt states he had a CT scan w/o contrast.  Pt unsure what they saw.   Per EMS, pt was started on antibiotics for UTI. Pt states he took a dose today.  Pt states he has been noticing some blood in his urine.        EXTERNAL RECORDS REVIEWED  Other 1653 -wife has CT noncontrast available from yesterday that includes hyperdensity in the bladder concerning for blood.  CT with IV contrast abdomen and pelvis from the day prior that was unremarkable except for renal mass.    HPI/ROS  LIMITATION TO HISTORY   Select: : None  OUTSIDE HISTORIAN(S):  Significant other      Donal Carpio is a 53 y.o. male that presents to the emergency department today after experiencing right lower quadrant abdominal pain, right upper quadrant abdominal pain, bilateral lower back pain, and hematuria.  Monday and Tuesday this week, the patient reported having any abdominal pain on the right side.  On Wednesday, he reported having associated abdominal pain with vomiting.  The patient's partner reported approximately 2 soda cans in volume of emesis on Wednesday.  There was very minimal blood present in the emesis.  On Thursday and Friday, the patient continued having dry heaves.  The patient was taking his antiepileptic medications throughout this week, however is unclear how much he has been able to keep down.  The patient said he was unable to tolerate his antiepileptic medications today.  The patient's partner reported approximately 6 13-second seizures and full body in nature.  He reports  having episodes of confusion following seizures.  He denied oral trauma and incontinence to stool and urine.  The patient's seizures have increased since May 2023.  Prior to this seizure in May, the patient had not had a seizure since 2016.  The patient is currently being treated with Macrobid for presumed urinary tract infection as the patient was found to have blood in his urine.  He reports distant tobacco use.  He denies recreational drugs alcohol use.  He describes his urine as orange in color.  He reports associated fever and chills.    PAST MEDICAL HISTORY   has a past medical history of Alcohol withdrawal delirium (HCC) (9/19/2022), Drug-seeking behavior, Peptic ulcer, Seizure (HCC), Seizure disorder (HCC), and Ulcer of abdomen wall (HCC).    SURGICAL HISTORY   has a past surgical history that includes appendectomy; upper gi endoscopy,diagnosis (N/A, 6/30/2022); upper gi endoscopy,biopsy (N/A, 6/30/2022); lap,diagnostic abdomen (N/A, 7/7/2022); and wound exploration ortho (Right, 7/7/2022).    FAMILY HISTORY  No family history on file.    SOCIAL HISTORY  Social History     Tobacco Use    Smoking status: Never    Smokeless tobacco: Never   Vaping Use    Vaping Use: Every day    Substances: Nicotine   Substance and Sexual Activity    Alcohol use: Not Currently    Drug use: Yes     Types: Inhaled     Comment: cannabis    Sexual activity: Not on file       CURRENT MEDICATIONS  Home Medications       Reviewed by Lis Brown R.N. (Registered Nurse) on 03/23/24 at 1330  Med List Status: Not Addressed     Medication Last Dose Status   amLODIPine (NORVASC) 5 MG Tab  Active   cyclobenzaprine (FLEXERIL) 10 mg Tab  Active   divalproex (DEPAKOTE) 500 MG Tablet Delayed Response  Active   HYDROcodone-acetaminophen (NORCO) 5-325 MG Tab per tablet  Active   lacosamide (VIMPAT) 200 MG Tab tablet  Active   losartan (COZAAR) 50 MG Tab  Active   Multiple Vitamins-Minerals (MULTIVITAMIN MEN) Tab  Active   nicotine  "(NICODERM) 21 MG/24HR PATCH 24 HR  Active   pantoprazole (PROTONIX) 40 MG Tablet Delayed Response  Active   Rimegepant Sulfate (NURTEC) 75 MG TABLET DISPERSIBLE  Active   sucralfate (CARAFATE) 1 GM Tab  Active   sucralfate (CARAFATE) 1 GM/10ML Suspension  Active                    ALLERGIES  Allergies   Allergen Reactions    Penicillins Unspecified     Unknown childhood reaction        PHYSICAL EXAM  VITAL SIGNS: /75   Pulse 87   Temp 37.3 °C (99.1 °F) (Temporal)   Resp 18   Ht 1.753 m (5' 9\")   Wt 90.7 kg (200 lb)   SpO2 94%   BMI 29.53 kg/m²    Physical Exam  Constitutional:       General: He is awake.      Appearance: He is ill-appearing.   HENT:      Mouth/Throat:      Mouth: Mucous membranes are dry.   Eyes:      Extraocular Movements: Extraocular movements intact.   Cardiovascular:      Rate and Rhythm: Normal rate and regular rhythm.      Heart sounds: Normal heart sounds.   Pulmonary:      Effort: Pulmonary effort is normal. No respiratory distress.      Breath sounds: Normal breath sounds.   Abdominal:      General: Abdomen is flat. Bowel sounds are normal.      Palpations: Abdomen is soft.      Tenderness: There is abdominal tenderness in the right upper quadrant and right lower quadrant. There is left CVA tenderness. There is no right CVA tenderness.   Musculoskeletal:      Cervical back: Normal.      Thoracic back: Normal.        Back:    Skin:     General: Skin is dry.      Coloration: Skin is pale.   Neurological:      General: No focal deficit present.      Mental Status: He is alert.       DIAGNOSTIC STUDIES / PROCEDURES  EKG  None    LABS  CBC completed and revealed hemoglobin at 10.  Baseline appears to be between 11-13.  MCV 77.  CMP unrevealing.    RADIOLOGY  I have independently interpreted the diagnostic imaging associated with this visit and am waiting the final reading from the radiologist.   My preliminary interpretation is as follows: Chest x-ray completed and revealed no acute " cardiopulmonary abnormality.    Radiologist interpretation:   CT-RENAL COLIC EVALUATION(A/P W/O)   Final Result      1.  Small indeterminate exophytic lesions upper pole right kidney and lower pole left kidney which may be mildly hyperdense cyst although recommend ultrasound for confirmation and to exclude masses.      DX-CHEST-PORTABLE (1 VIEW)   Final Result      No acute cardiac or pulmonary abnormalities are identified.            COURSE & MEDICAL DECISION MAKING    ED Observation Status? No; Patient does not meet criteria for ED Observation.     INITIAL ASSESSMENT, COURSE AND PLAN    Patient reported abdominal pain radiating to his back for approximately 6 days.  He reported vomiting on Wednesday with subsequent dry heaving on Thursday, Friday, and Saturday.  The patient reported being diagnosed with his presumed UTI with hematuria for which he had taken 1 day of Macrobid.  He was unable to take Macrobid today as result of his driving and abdominal pain.  Increased concern for my concern for nephrolithiasis is increased as there has been such a significant minimal blood initiated with nurys blood present in his urine.  As such, the patient will undergo CT renal colic evaluation.  Additionally, the patient will repeat urinalysis with urine culture to assess for urinary tract infection.  Because the patient has a chronic cough, the patient  underwent a chest x-ray which was negative.  Patient underwent a CBC as he does appear pale on physical exam.  It appears that his hemoglobin has decreased as compared to baseline bleeding and is microcytic anemia in nature.  Seeing as the patient's mucous membranes are dry, and he had a recent history of vomiting, the patient was provided fluid bolus with maintenance IV fluids.  He was provided Zofran for his nausea and Toradol with Dilaudid for his abdominal pain.  CT scans unrevealing.  Redemonstration of renal cyst versus mass which encourage outpatient follow-up however  unlikely the source of his lower abdominal pain.  Seeing as the patient missed his morning dose of antiepileptics today, the patient was provided Depakote and Vimpat in the emergency department.  No recurrent seizure-like activity.  Neurologically intact and nonfocal.        ADDITIONAL PROBLEM LIST  Microcytic anemia    DISPOSITION AND DISCUSSIONS  Above note completed by PGY 3 family medicine resident     ED evaluation for low abdominal pain, bilateral flank pain and hematuria is unrevealing.  Labs are unremarkable although he is clinically dehydrated with a CO2 of 16 but this was replaced with IV fluid and now tolerating oral fluids without nausea or vomiting.  Urinalysis unremarkable, no blood or infection.  CT without obstructive process or other explanation of his ongoing pain.  He does have bilateral renal exophytic processes mass versus cyst but this can be followed as an outpatient, patient and his significant other are aware of and will follow with primary care for further workup or specialty referral.  History of seizures, normally compliant with her medications but had too much pain and her episode of vomiting this morning to tolerate.  He did take his Nurtec.  No further seizure-like activity in the emergency department.  Neurologically intact and nonfocal.  Tolerating Depakote and Vimpat.  No evidence for trauma neurostatus.  Hemodynamically stable otherwise.  Ongoing request of narcotics.  Although he has multiple refills it has been some months.  Single dose of Norco in the emergency department and will discharge with small quantity until follow-up can be obtained on Monday.    Discussion of management with other QHP or appropriate source(s): None     Escalation of care considered, and ultimately not performed:acute inpatient care management, however at this time, the patient is most appropriate for outpatient management    Decision tools and prescription drugs considered including, but not  limited to: Pain Medications for uncontrolled symptoms and per request until follow-up on Monday .    In prescribing controlled substances to this patient, I certify that I have obtained and reviewed the medical history of Donal Carpio. I have also made a good demetris effort to obtain applicable records from other providers who have treated the patient and records did not demonstrate any increased risk of substance abuse that would prevent me from prescribing controlled substances.     I have conducted a physical exam and documented it. I have reviewed Mr. Carpio’s prescription history as maintained by the [place] Prescription Monitoring Program.     I have assessed the patient’s risk for abuse, dependency, and addiction using the validated Opioid Risk Tool available at https://www.mdcalc.com/oxdndv-wive-yjba-ort-narcotic-abuse.     Given the above, I believe the benefits of controlled substance therapy outweigh the risks. The reasons for prescribing controlled substances include non-narcotic, oral analgesic alternatives have been inadequate for pain control. Accordingly, I have discussed the risk and benefits, treatment plan, and alternative therapies with the patient.       FINAL DIAGNOSIS  1. Lower abdominal pain    2. Bilateral flank pain    3. Dehydration    4. Seizure (HCC)    5. Renal mass      Electronically signed by: Brooke Billingsley M.D., 3/23/2024 2:39 PM

## 2024-03-23 NOTE — ED TRIAGE NOTES
"Chief Complaint   Patient presents with    Seizure     BIBA. Pt has a witnessed tonic clonic seizure that lasted approximately 30sec.  Pt had an additional seizure witnessed by EMS that lasted approximately 10 seconds.  Pt states he has not taken his dose of seizure medications today due to nausea but it otherwise compliant with medications.     Abdominal Pain     Pt c/o abdominal cramping in the abdomin and back.  Pt states he had a CT scan w/o contrast.  Pt unsure what they saw.   Per EMS, pt was started on antibiotics for UTI. Pt states he took a dose today.  Pt states he has been noticing some blood in his urine.      /71   Pulse 82   Temp 37.3 °C (99.1 °F) (Temporal)   Resp 15   Ht 1.753 m (5' 9\")   Wt 90.7 kg (200 lb)   SpO2 98%   BMI 29.53 kg/m²     Pt GCS 15 upon arrival. Pt placed on the monitor.  Seizure precautions in place.  Abdominal pain protocol ordered. Pt received 200mcg fentanyl and 4mg zofran IV from EMS.     "

## 2024-03-24 NOTE — DISCHARGE INSTRUCTIONS
Follow-up with primary care on Monday for reevaluation, medication management and further workup bilateral renal mass versus cyst and referral to urology if indicated.    Continue any home medications as previously indicated.  Strongly encourage compliance with seizure medications.  Tylenol or ibuprofen as needed for discomfort.  Norco every 6 hours as needed for breakthrough pain (do not take Tylenol while taking Norco).    Encourage oral fluid hydration.  Diet and activity as tolerated.    Return to the emergency department for intractable pain, fever, vomiting, recurrent hematuria, syncope or other new concerns.

## 2024-03-24 NOTE — ED NOTES
Pt has discharge orders. Pt educated on discharge instructions and new prescriptions.  Pt verbalizes understanding.  PIV removed. Pt wheeled to lobby with WC. Pt going home with spouse.

## 2024-03-25 LAB
BACTERIA UR CULT: NORMAL
SIGNIFICANT IND 70042: NORMAL
SITE SITE: NORMAL
SOURCE SOURCE: NORMAL

## 2024-03-27 SDOH — HEALTH STABILITY: PHYSICAL HEALTH: ON AVERAGE, HOW MANY DAYS PER WEEK DO YOU ENGAGE IN MODERATE TO STRENUOUS EXERCISE (LIKE A BRISK WALK)?: 0 DAYS

## 2024-03-27 SDOH — ECONOMIC STABILITY: FOOD INSECURITY: WITHIN THE PAST 12 MONTHS, YOU WORRIED THAT YOUR FOOD WOULD RUN OUT BEFORE YOU GOT MONEY TO BUY MORE.: NEVER TRUE

## 2024-03-27 SDOH — ECONOMIC STABILITY: HOUSING INSECURITY
IN THE LAST 12 MONTHS, WAS THERE A TIME WHEN YOU DID NOT HAVE A STEADY PLACE TO SLEEP OR SLEPT IN A SHELTER (INCLUDING NOW)?: NO

## 2024-03-27 SDOH — HEALTH STABILITY: MENTAL HEALTH
STRESS IS WHEN SOMEONE FEELS TENSE, NERVOUS, ANXIOUS, OR CAN'T SLEEP AT NIGHT BECAUSE THEIR MIND IS TROUBLED. HOW STRESSED ARE YOU?: NOT AT ALL

## 2024-03-27 SDOH — ECONOMIC STABILITY: INCOME INSECURITY: HOW HARD IS IT FOR YOU TO PAY FOR THE VERY BASICS LIKE FOOD, HOUSING, MEDICAL CARE, AND HEATING?: NOT VERY HARD

## 2024-03-27 SDOH — ECONOMIC STABILITY: TRANSPORTATION INSECURITY
IN THE PAST 12 MONTHS, HAS THE LACK OF TRANSPORTATION KEPT YOU FROM MEDICAL APPOINTMENTS OR FROM GETTING MEDICATIONS?: NO

## 2024-03-27 SDOH — ECONOMIC STABILITY: FOOD INSECURITY: WITHIN THE PAST 12 MONTHS, THE FOOD YOU BOUGHT JUST DIDN'T LAST AND YOU DIDN'T HAVE MONEY TO GET MORE.: NEVER TRUE

## 2024-03-27 SDOH — ECONOMIC STABILITY: HOUSING INSECURITY: IN THE LAST 12 MONTHS, HOW MANY PLACES HAVE YOU LIVED?: 2

## 2024-03-27 SDOH — HEALTH STABILITY: PHYSICAL HEALTH: ON AVERAGE, HOW MANY MINUTES DO YOU ENGAGE IN EXERCISE AT THIS LEVEL?: 0 MIN

## 2024-03-27 SDOH — ECONOMIC STABILITY: TRANSPORTATION INSECURITY
IN THE PAST 12 MONTHS, HAS LACK OF RELIABLE TRANSPORTATION KEPT YOU FROM MEDICAL APPOINTMENTS, MEETINGS, WORK OR FROM GETTING THINGS NEEDED FOR DAILY LIVING?: NO

## 2024-03-27 SDOH — ECONOMIC STABILITY: INCOME INSECURITY: IN THE LAST 12 MONTHS, WAS THERE A TIME WHEN YOU WERE NOT ABLE TO PAY THE MORTGAGE OR RENT ON TIME?: NO

## 2024-03-27 SDOH — ECONOMIC STABILITY: TRANSPORTATION INSECURITY
IN THE PAST 12 MONTHS, HAS LACK OF TRANSPORTATION KEPT YOU FROM MEETINGS, WORK, OR FROM GETTING THINGS NEEDED FOR DAILY LIVING?: NO

## 2024-03-27 ASSESSMENT — SOCIAL DETERMINANTS OF HEALTH (SDOH)
HOW OFTEN DO YOU HAVE A DRINK CONTAINING ALCOHOL: MONTHLY OR LESS
HOW OFTEN DO YOU ATTENT MEETINGS OF THE CLUB OR ORGANIZATION YOU BELONG TO?: PATIENT DECLINED
HOW OFTEN DO YOU GET TOGETHER WITH FRIENDS OR RELATIVES?: ONCE A WEEK
HOW OFTEN DO YOU GET TOGETHER WITH FRIENDS OR RELATIVES?: ONCE A WEEK
IN A TYPICAL WEEK, HOW MANY TIMES DO YOU TALK ON THE PHONE WITH FAMILY, FRIENDS, OR NEIGHBORS?: TWICE A WEEK
ARE YOU MARRIED, WIDOWED, DIVORCED, SEPARATED, NEVER MARRIED, OR LIVING WITH A PARTNER?: LIVING WITH PARTNER
ARE YOU MARRIED, WIDOWED, DIVORCED, SEPARATED, NEVER MARRIED, OR LIVING WITH A PARTNER?: LIVING WITH PARTNER
HOW OFTEN DO YOU ATTEND CHURCH OR RELIGIOUS SERVICES?: NEVER
DO YOU BELONG TO ANY CLUBS OR ORGANIZATIONS SUCH AS CHURCH GROUPS UNIONS, FRATERNAL OR ATHLETIC GROUPS, OR SCHOOL GROUPS?: NO
HOW OFTEN DO YOU ATTEND CHURCH OR RELIGIOUS SERVICES?: NEVER
WITHIN THE PAST 12 MONTHS, YOU WORRIED THAT YOUR FOOD WOULD RUN OUT BEFORE YOU GOT THE MONEY TO BUY MORE: NEVER TRUE
HOW MANY DRINKS CONTAINING ALCOHOL DO YOU HAVE ON A TYPICAL DAY WHEN YOU ARE DRINKING: PATIENT DECLINED
HOW OFTEN DO YOU HAVE SIX OR MORE DRINKS ON ONE OCCASION: NEVER
DO YOU BELONG TO ANY CLUBS OR ORGANIZATIONS SUCH AS CHURCH GROUPS UNIONS, FRATERNAL OR ATHLETIC GROUPS, OR SCHOOL GROUPS?: NO
HOW OFTEN DO YOU ATTENT MEETINGS OF THE CLUB OR ORGANIZATION YOU BELONG TO?: PATIENT DECLINED
IN A TYPICAL WEEK, HOW MANY TIMES DO YOU TALK ON THE PHONE WITH FAMILY, FRIENDS, OR NEIGHBORS?: TWICE A WEEK
HOW HARD IS IT FOR YOU TO PAY FOR THE VERY BASICS LIKE FOOD, HOUSING, MEDICAL CARE, AND HEATING?: NOT VERY HARD

## 2024-03-27 ASSESSMENT — LIFESTYLE VARIABLES
SKIP TO QUESTIONS 9-10: 0
HOW OFTEN DO YOU HAVE A DRINK CONTAINING ALCOHOL: MONTHLY OR LESS
HOW OFTEN DO YOU HAVE SIX OR MORE DRINKS ON ONE OCCASION: NEVER
AUDIT-C TOTAL SCORE: -1
HOW MANY STANDARD DRINKS CONTAINING ALCOHOL DO YOU HAVE ON A TYPICAL DAY: PATIENT DECLINED

## 2024-03-28 ENCOUNTER — OFFICE VISIT (OUTPATIENT)
Dept: INTERNAL MEDICINE | Facility: OTHER | Age: 54
End: 2024-03-28
Attending: INTERNAL MEDICINE
Payer: MEDICAID

## 2024-03-28 ENCOUNTER — TELEPHONE (OUTPATIENT)
Dept: INTERNAL MEDICINE | Facility: OTHER | Age: 54
End: 2024-03-28

## 2024-03-28 VITALS
WEIGHT: 210.2 LBS | HEIGHT: 69 IN | OXYGEN SATURATION: 97 % | BODY MASS INDEX: 31.13 KG/M2 | DIASTOLIC BLOOD PRESSURE: 81 MMHG | TEMPERATURE: 98 F | SYSTOLIC BLOOD PRESSURE: 123 MMHG | HEART RATE: 98 BPM

## 2024-03-28 DIAGNOSIS — I10 PRIMARY HYPERTENSION: ICD-10-CM

## 2024-03-28 DIAGNOSIS — Z11.4 SCREENING FOR HIV (HUMAN IMMUNODEFICIENCY VIRUS): ICD-10-CM

## 2024-03-28 DIAGNOSIS — R19.5 DARK STOOLS: ICD-10-CM

## 2024-03-28 DIAGNOSIS — K27.9 PEPTIC ULCER: ICD-10-CM

## 2024-03-28 DIAGNOSIS — R10.9 ABDOMINAL PAIN, UNSPECIFIED ABDOMINAL LOCATION: ICD-10-CM

## 2024-03-28 DIAGNOSIS — Z23 NEED FOR VACCINATION: ICD-10-CM

## 2024-03-28 DIAGNOSIS — R31.9 HEMATURIA, UNSPECIFIED TYPE: ICD-10-CM

## 2024-03-28 DIAGNOSIS — D64.9 ANEMIA, UNSPECIFIED TYPE: ICD-10-CM

## 2024-03-28 DIAGNOSIS — R30.0 DYSURIA: ICD-10-CM

## 2024-03-28 PROBLEM — R00.0 TACHYCARDIA: Status: RESOLVED | Noted: 2022-06-28 | Resolved: 2024-03-28

## 2024-03-28 PROBLEM — E87.29 HIGH ANION GAP METABOLIC ACIDOSIS: Status: RESOLVED | Noted: 2022-06-08 | Resolved: 2024-03-28

## 2024-03-28 PROBLEM — R07.9 CHEST PAIN: Status: RESOLVED | Noted: 2022-06-28 | Resolved: 2024-03-28

## 2024-03-28 PROBLEM — K59.03 DRUG-INDUCED CONSTIPATION: Status: RESOLVED | Noted: 2022-06-18 | Resolved: 2024-03-28

## 2024-03-28 PROBLEM — K92.1 MELENA: Status: RESOLVED | Noted: 2022-06-28 | Resolved: 2024-03-28

## 2024-03-28 PROBLEM — E87.6 HYPOKALEMIA: Status: RESOLVED | Noted: 2022-09-18 | Resolved: 2024-03-28

## 2024-03-28 PROBLEM — Z78.9 ALCOHOL USE: Status: RESOLVED | Noted: 2022-07-07 | Resolved: 2024-03-28

## 2024-03-28 PROBLEM — G40.909 SEIZURE DISORDER (HCC): Status: RESOLVED | Noted: 2022-07-07 | Resolved: 2024-03-28

## 2024-03-28 PROBLEM — E83.39 HYPOPHOSPHATEMIA: Status: RESOLVED | Noted: 2022-09-19 | Resolved: 2024-03-28

## 2024-03-28 PROBLEM — F10.229 ALCOHOL INTOXICATION IN ACTIVE ALCOHOLIC WITH COMPLICATION (HCC): Status: RESOLVED | Noted: 2022-06-28 | Resolved: 2024-03-28

## 2024-03-28 PROBLEM — R11.2 INTRACTABLE NAUSEA AND VOMITING: Status: RESOLVED | Noted: 2022-06-17 | Resolved: 2024-03-28

## 2024-03-28 PROBLEM — E87.20 LACTIC ACIDOSIS: Status: RESOLVED | Noted: 2022-06-06 | Resolved: 2024-03-28

## 2024-03-28 PROBLEM — R79.89 LFT ELEVATION: Status: RESOLVED | Noted: 2022-06-06 | Resolved: 2024-03-28

## 2024-03-28 PROBLEM — E87.1 HYPONATREMIA: Status: RESOLVED | Noted: 2022-08-30 | Resolved: 2024-03-28

## 2024-03-28 RX ORDER — AMLODIPINE BESYLATE 5 MG/1
5 TABLET ORAL DAILY
Qty: 30 TABLET | Refills: 2 | Status: SHIPPED | OUTPATIENT
Start: 2024-03-28

## 2024-03-28 RX ORDER — LANSOPRAZOLE 30 MG/1
30 CAPSULE, DELAYED RELEASE ORAL DAILY
Qty: 30 CAPSULE | Refills: 2 | Status: SHIPPED | OUTPATIENT
Start: 2024-03-28

## 2024-03-28 RX ORDER — LANSOPRAZOLE 30 MG/1
30 CAPSULE, DELAYED RELEASE ORAL DAILY
COMMUNITY

## 2024-03-28 RX ORDER — FERROUS SULFATE 325(65) MG
325 TABLET ORAL DAILY
Qty: 30 TABLET | Refills: 2 | Status: SHIPPED | OUTPATIENT
Start: 2024-03-28

## 2024-03-28 ASSESSMENT — ENCOUNTER SYMPTOMS
FLANK PAIN: 1
EYES NEGATIVE: 1
ABDOMINAL PAIN: 1
CONSTITUTIONAL NEGATIVE: 1
PSYCHIATRIC NEGATIVE: 1
CARDIOVASCULAR NEGATIVE: 1
SEIZURES: 1
RESPIRATORY NEGATIVE: 1
MYALGIAS: 1

## 2024-03-28 ASSESSMENT — FIBROSIS 4 INDEX: FIB4 SCORE: 1.56

## 2024-03-28 NOTE — PROGRESS NOTES
Established Patient    Patient Care Team:  Bobby Batres M.D. as PCP - General (Internal Medicine)  Pcp Pt States None (Family Medicine)    Donal Carpio is a 53 y.o. adult who presents today with the following Chief Complaint(s): Follow up for Diagnoses of Peptic ulcer, Hematuria, unspecified type, Dark stools, Anemia, unspecified type, Need for vaccination, Primary hypertension, Abdominal pain, unspecified abdominal location, Dysuria, and Screening for HIV (human immunodeficiency virus) were pertinent to this visit.    HPI:  Mr. Donal Carpio is a 52 y/o male patient here to establish care at this visit. Pt has a history of alcohol use disorder, seizure disorder (following with [facility] w/ Dr. Padmini Smith on 3/29/2024). Pt went to [facility] ED for management of seizure. During the ED visit, pt was found to have increasing bacteria in his Urine, started on macrobid at that time. When home, pt noticed blood on his underwear coming from the urethral meatus. Pt was taken to [facility] for further evaluation, during which he had multiple seizures witnessed by EMS.     Pt has a history of seizure disorder. Reports first seizure at age 7; does not recall details surrounding the event. However, he reports his next seizure occurred in 2016, after which he has been having recurrent seizures since. Pt was previously initiated on Keppra, however, was discontinued 7-8 months ago. He has since been taking Depakote, lacosamide, rimegepant (since December 2023).    Pt had an upper GI bleed in the last month. Endoscopy was done. Pt does not recall the details. Entire situation occurred at [facility]. Pt started on lansoprazole, sucralfate at that time. Pt's chart mentions PUD. Pt does not recall doing a breath test during this time. Pt follows [facility]. Pt underwent colonoscopy in January; had removal of some polyps, was told to follow up in 3 years for repeat colonoscopy. Pt denies blood  "in stools, however, does report darkening of stools for 1 week, which appears to be worsening. Denies associated SOB, fatigue, chest pain, other signs of anemia at this time. At this visit, pt reports left-sided abdominal pain. He reports taking opioids for management of pain. Pt requested refill of opioid medication at this visit, and was also open to pain management referral. Pt reports normal bowel habits, with daily BM. Denies constipation/diarrhea at this time.    /81 (BP Location: Left arm, Patient Position: Sitting, BP Cuff Size: Adult)   Pulse 98   Temp 36.7 °C (98 °F) (Temporal)   Ht 1.745 m (5' 8.7\")   Wt 95.3 kg (210 lb 3.2 oz)   SpO2 97%   BMI 31.31 kg/m²   Review of Systems   Constitutional: Negative.    HENT: Negative.     Eyes: Negative.    Respiratory: Negative.     Cardiovascular: Negative.    Gastrointestinal:  Positive for abdominal pain.   Genitourinary:  Positive for dysuria, flank pain, hematuria and urgency.   Musculoskeletal:  Positive for myalgias.   Skin: Negative.    Neurological:  Positive for seizures.   Endo/Heme/Allergies: Negative.    Psychiatric/Behavioral: Negative.         Past Medical History:   Diagnosis Date    Alcohol withdrawal delirium (HCC) 9/19/2022    Drug-seeking behavior     Peptic ulcer     Seizure (HCC)     Seizure disorder (HCC)     Ulcer of abdomen wall (HCC)      Social History     Tobacco Use    Smoking status: Never    Smokeless tobacco: Never   Vaping Use    Vaping Use: Former    Substances: Nicotine   Substance Use Topics    Alcohol use: Not Currently    Drug use: Yes     Types: Inhaled, Marijuana     Comment: cannabis     Current Outpatient Medications   Medication Sig Dispense Refill    lansoprazole (PREVACID) 30 MG CAPSULE DELAYED RELEASE Take 30 mg by mouth every day.      lansoprazole (PREVACID) 30 MG CAPSULE DELAYED RELEASE Take 1 Capsule by mouth every day. 30 Capsule 2    amLODIPine (NORVASC) 5 MG Tab Take 1 Tablet by mouth every day. 30 " Tablet 2    ferrous sulfate 325 (65 Fe) MG tablet Take 1 Tablet by mouth every day. 30 Tablet 2    sucralfate (CARAFATE) 1 GM Tab Take 1 Tablet by mouth 4 Times a Day,Before Meals and at Bedtime. 120 Tablet 0    Multiple Vitamins-Minerals (MULTIVITAMIN MEN) Tab Take 1 Tablet by mouth every day.      divalproex (DEPAKOTE) 500 MG Tablet Delayed Response Take 500 mg by mouth every day.      lacosamide (VIMPAT) 200 MG Tab tablet Take 200 mg by mouth every day.      Rimegepant Sulfate (NURTEC) 75 MG TABLET DISPERSIBLE Take 75 mg by mouth every 48 hours.      nicotine (NICODERM) 21 MG/24HR PATCH 24 HR Place 1 Patch on the skin every 24 hours.      losartan (COZAAR) 50 MG Tab Take 50 mg by mouth every day.       No current facility-administered medications for this visit.         Physical Exam  Constitutional:       General: He is in acute distress.      Appearance: Normal appearance. He is obese.   HENT:      Right Ear: Tympanic membrane and ear canal normal.      Left Ear: Tympanic membrane and ear canal normal.      Nose: Nose normal.   Cardiovascular:      Rate and Rhythm: Normal rate and regular rhythm.      Pulses: Normal pulses.      Heart sounds: Normal heart sounds.   Pulmonary:      Effort: Pulmonary effort is normal.      Breath sounds: Normal breath sounds.   Abdominal:      General: Bowel sounds are normal. There is distension.      Palpations: Abdomen is soft.      Tenderness: There is abdominal tenderness. There is rebound.   Musculoskeletal:         General: Normal range of motion.      Cervical back: Normal range of motion and neck supple.   Skin:     General: Skin is warm.   Neurological:      General: No focal deficit present.      Mental Status: He is alert and oriented to person, place, and time. Mental status is at baseline.   Psychiatric:         Mood and Affect: Mood normal.         Thought Content: Thought content normal.         Judgment: Judgment normal.         Assessment and Plan:   1. Peptic  ulcer  Pt reports UGIB in the last month, for which he was evaluated and found to have peptic ulcer disease.. Pt's records are not in chart, and release of record form was filled today. Pt requested refill of lansoprazole for continued management/prevention of UGIB. Pt also referred to gasteroenterology for further monitoring. Will f/u at next visit.  - lansoprazole (PREVACID) 30 MG CAPSULE DELAYED RELEASE; Take 1 Capsule by mouth every day.  Dispense: 30 Capsule; Refill: 2    2. Hematuria, unspecified type  Pt reports 7 day history of hematuria + blood at urethral meatus. Pt denies associated trauma, fevers, chills, nausea, vomiting, abdominal pain, back pain. On physical examination, there is right sided abdominal pain, however, no suprapubic pain at this time. Pt has undergone imaging of the abdomen; cysts vs mass on the upper pole of the right kidney and lower pole of the left kidney have been visualized. Ordering renal u/s to differentiate b/w cyst vs mass. Referring patient to urology for further evaluation of hematuria + dysuria.  - Referral to Urology  - US-RENAL; Future  - Referral to Urology    3. Dark stools  Pt reports dark stools for 1 week. Denies constipation, diarrhea. Also denies SOB, chest pain, fatigue, weakness, and other signs of anemia at this time. Reports normal bowel habits. Ordering FOBT for further evaluation for lower GI bleed.  - OCCULT BLOOD,FECAL,IMMUNOASSAY    4. Anemia, unspecified type  In setting of anemia with low MCV + ferritin, starting patient on ferrous sulfate 325 mg tablet 1x daily.  - ferrous sulfate 325 (65 Fe) MG tablet; Take 1 Tablet by mouth every day.  Dispense: 30 Tablet; Refill: 2    5. Need for vaccination  Pt elected to receive the flu shot and pneumococcal vaccination at this visit.  - INFLUENZA VACCINE QUAD INJ (PF)  - Pneumococcal Conjugate Vaccine 20-Valent (6 wks+)    6. Primary hypertension  Pt requested refill for amlodipine 5 mg at this visit.  - amLODIPine  (NORVASC) 5 MG Tab; Take 1 Tablet by mouth every day.  Dispense: 30 Tablet; Refill: 2    7. Abdominal pain, unspecified abdominal location  Pt complains of ongoing abdominal pain on the left side that started after the patient underwent cholecystectomy for reduced gall bladder function. Previous imaging not revealing of gastrointestinal pathology, however does reveal renal mass vs cyst on the right side ~2cm. Pt referred to urology for further evaluation. For pain management, patient instructed to use tylenol at this time in the setting of normal liver function tests, and has agreed to pursue pain management evaluation. Will f/u at next visit.  - Referral to Pain Management    8. Dysuria  Pt complains of ongoing dysuria over 1 week. Pt initially treated for UTI at [facility] ED with macrobid x 7 day course that ends on 3/28/2024. Continues to complain of urinary symptoms at this time. UA from [facility] + [facility] were unconcerning for urinary tract infection. In setting of ongoing hematuria, dysuria, and finding of renal cysts vs masses, would like pt to be evaluated by urology. Will f/u at next visit.  - Referral to Urology        Orders Placed This Encounter    US-RENAL    INFLUENZA VACCINE QUAD INJ (PF)    Pneumococcal Conjugate Vaccine 20-Valent (6 wks+)    OCCULT BLOOD,FECAL,IMMUNOASSAY    HIV AG/AB COMBO ASSAY SCREENING    Referral to Urology    Referral to Pain Management    Referral to Urology    lansoprazole (PREVACID) 30 MG CAPSULE DELAYED RELEASE    lansoprazole (PREVACID) 30 MG CAPSULE DELAYED RELEASE    amLODIPine (NORVASC) 5 MG Tab    ferrous sulfate 325 (65 Fe) MG tablet       No follow-ups on file.    Bobby Batres M.D. PGY I  Internal Medicine  [facility]

## 2024-03-28 NOTE — LETTER
[facility]  Bobby Batres M.D.  [address] [place] [address]  Fax: [phone]   Authorization for Release/Disclosure of   Protected Health Information   Name: DONAL CARPIO : 1970 SSN: [id number]   Address: [address] [place] [address]   [facility] [address] Phone:    [phone] (home)    I authorize the entity listed below to release/disclose the PHI below to:   [facility]/Bobby Batres M.D. and Bobby Batres M.D.   Provider or Entity Name:     Address   City, State, Zip   Phone:      Fax:     Reason for request: continuity of care   Information to be released:    [  ] LAST COLONOSCOPY,  including any PATH REPORT and follow-up  [  ] LAST FIT/COLOGUARD RESULT [  ] LAST DEXA  [  ] LAST MAMMOGRAM  [  ] LAST PAP  [  ] LAST LABS [  ] RETINA EXAM REPORT  [  ] IMMUNIZATION RECORDS  [  ] Release all info      [  ] Check here and initial the line next to each item to release ALL health information INCLUDING  _____ Care and treatment for drug and / or alcohol abuse  _____ HIV testing, infection status, or AIDS  _____ Genetic Testing    DATES OF SERVICE OR TIME PERIOD TO BE DISCLOSED: _____________  I understand and acknowledge that:  * This Authorization may be revoked at any time by you in writing, except if your health information has already been used or disclosed.  * Your health information that will be used or disclosed as a result of you signing this authorization could be re-disclosed by the recipient. If this occurs, your re-disclosed health information may no longer be protected by State or Federal laws.  * You may refuse to sign this Authorization. Your refusal will not affect your ability to obtain treatment.  * This Authorization becomes effective upon signing and will  on (date) __________.      If no date is indicated, this Authorization will  one (1) year from the signature date.    Name: Donal Carpio  Signature: Date:   3/28/2024     PLEASE FAX REQUESTED RECORDS  BACK TO: [phone]

## 2024-03-29 DIAGNOSIS — K27.9 PEPTIC ULCER: ICD-10-CM

## 2024-03-29 NOTE — TELEPHONE ENCOUNTER
Received request via: Pharmacy requesting bid  daily sig refill    Was the patient seen in the last year in this department? Yes    Does the patient have an active prescription (recently filled or refills available) for medication(s) requested?  yes    [facility]    Does the patient have halfway Plus and need 100 day supply (blood pressure, diabetes and cholesterol meds only)? Patient does not have SCP

## 2024-04-01 DIAGNOSIS — J30.2 SEASONAL ALLERGIES: ICD-10-CM

## 2024-04-01 RX ORDER — BUDESONIDE AND FORMOTEROL FUMARATE DIHYDRATE 80; 4.5 UG/1; UG/1
2 AEROSOL RESPIRATORY (INHALATION) 2 TIMES DAILY
Qty: 6.9 G | Refills: 5 | Status: SHIPPED | OUTPATIENT
Start: 2024-04-01 | End: 2024-04-02

## 2024-04-01 RX ORDER — LANSOPRAZOLE 30 MG/1
30 CAPSULE, DELAYED RELEASE ORAL DAILY
Qty: 60 CAPSULE | Refills: 1 | Status: SHIPPED | OUTPATIENT
Start: 2024-04-01 | End: 2024-04-30 | Stop reason: CLARIF

## 2024-04-02 DIAGNOSIS — J30.2 SEASONAL ALLERGIES: ICD-10-CM

## 2024-04-02 RX ORDER — BUDESONIDE AND FORMOTEROL FUMARATE DIHYDRATE 80; 4.5 UG/1; UG/1
2 AEROSOL RESPIRATORY (INHALATION) 2 TIMES DAILY
Qty: 6.9 G | Refills: 5 | Status: SHIPPED | OUTPATIENT
Start: 2024-04-02

## 2024-04-08 ENCOUNTER — HOSPITAL ENCOUNTER (EMERGENCY)
Facility: MEDICAL CENTER | Age: 54
End: 2024-04-09
Attending: EMERGENCY MEDICINE
Payer: MEDICAID

## 2024-04-08 ENCOUNTER — APPOINTMENT (OUTPATIENT)
Dept: RADIOLOGY | Facility: MEDICAL CENTER | Age: 54
End: 2024-04-08
Attending: EMERGENCY MEDICINE
Payer: MEDICAID

## 2024-04-08 DIAGNOSIS — B34.9 VIRAL SYNDROME: ICD-10-CM

## 2024-04-08 DIAGNOSIS — R11.2 NAUSEA VOMITING AND DIARRHEA: ICD-10-CM

## 2024-04-08 DIAGNOSIS — G40.909 SEIZURE DISORDER (HCC): ICD-10-CM

## 2024-04-08 DIAGNOSIS — R19.7 NAUSEA VOMITING AND DIARRHEA: ICD-10-CM

## 2024-04-08 DIAGNOSIS — R10.11 RIGHT UPPER QUADRANT ABDOMINAL PAIN: ICD-10-CM

## 2024-04-08 LAB
ALBUMIN SERPL BCP-MCNC: 3.8 G/DL (ref 3.2–4.9)
ALBUMIN/GLOB SERPL: 1.1 G/DL
ALP SERPL-CCNC: 90 U/L (ref 30–99)
ALT SERPL-CCNC: 15 U/L (ref 2–50)
ANION GAP SERPL CALC-SCNC: 12 MMOL/L (ref 7–16)
AST SERPL-CCNC: 15 U/L (ref 12–45)
BASOPHILS # BLD AUTO: 0.4 % (ref 0–1.8)
BASOPHILS # BLD: 0.05 K/UL (ref 0–0.12)
BILIRUB SERPL-MCNC: 0.2 MG/DL (ref 0.1–1.5)
BUN SERPL-MCNC: 9 MG/DL (ref 8–22)
CALCIUM ALBUM COR SERPL-MCNC: 9.2 MG/DL (ref 8.5–10.5)
CALCIUM SERPL-MCNC: 9 MG/DL (ref 8.5–10.5)
CHLORIDE SERPL-SCNC: 108 MMOL/L (ref 96–112)
CO2 SERPL-SCNC: 22 MMOL/L (ref 20–33)
CREAT SERPL-MCNC: 0.83 MG/DL (ref 0.5–1.4)
EKG IMPRESSION: NORMAL
EOSINOPHIL # BLD AUTO: 0.15 K/UL (ref 0–0.51)
EOSINOPHIL NFR BLD: 1.2 % (ref 0–6.9)
ERYTHROCYTE [DISTWIDTH] IN BLOOD BY AUTOMATED COUNT: 39.8 FL (ref 35.9–50)
ETHANOL BLD-MCNC: <10.1 MG/DL
GFR SERPLBLD CREATININE-BSD FMLA CKD-EPI: 105 ML/MIN/1.73 M 2
GLOBULIN SER CALC-MCNC: 3.5 G/DL (ref 1.9–3.5)
GLUCOSE BLD STRIP.AUTO-MCNC: 96 MG/DL (ref 65–99)
GLUCOSE SERPL-MCNC: 93 MG/DL (ref 65–99)
HCT VFR BLD AUTO: 36.1 % (ref 42–52)
HGB BLD-MCNC: 11.4 G/DL (ref 14–18)
IMM GRANULOCYTES # BLD AUTO: 0.06 K/UL (ref 0–0.11)
IMM GRANULOCYTES NFR BLD AUTO: 0.5 % (ref 0–0.9)
LACTATE SERPL-SCNC: 1.3 MMOL/L (ref 0.5–2)
LIPASE SERPL-CCNC: 37 U/L (ref 11–82)
LYMPHOCYTES # BLD AUTO: 3.39 K/UL (ref 1–4.8)
LYMPHOCYTES NFR BLD: 27.4 % (ref 22–41)
MAGNESIUM SERPL-MCNC: 1.9 MG/DL (ref 1.5–2.5)
MCH RBC QN AUTO: 23.9 PG (ref 27–33)
MCHC RBC AUTO-ENTMCNC: 31.6 G/DL (ref 32.3–36.5)
MCV RBC AUTO: 75.7 FL (ref 81.4–97.8)
MONOCYTES # BLD AUTO: 0.75 K/UL (ref 0–0.85)
MONOCYTES NFR BLD AUTO: 6.1 % (ref 0–13.4)
NEUTROPHILS # BLD AUTO: 7.96 K/UL (ref 1.82–7.42)
NEUTROPHILS NFR BLD: 64.4 % (ref 44–72)
NRBC # BLD AUTO: 0 K/UL
NRBC BLD-RTO: 0 /100 WBC (ref 0–0.2)
NT-PROBNP SERPL IA-MCNC: 87 PG/ML (ref 0–125)
PHOSPHATE SERPL-MCNC: 2.1 MG/DL (ref 2.5–4.5)
PLATELET # BLD AUTO: 357 K/UL (ref 164–446)
PMV BLD AUTO: 8.4 FL (ref 9–12.9)
POTASSIUM SERPL-SCNC: 3.6 MMOL/L (ref 3.6–5.5)
PROT SERPL-MCNC: 7.3 G/DL (ref 6–8.2)
RBC # BLD AUTO: 4.77 M/UL (ref 4.7–6.1)
SODIUM SERPL-SCNC: 142 MMOL/L (ref 135–145)
TROPONIN T SERPL-MCNC: <6 NG/L (ref 6–19)
WBC # BLD AUTO: 12.4 K/UL (ref 4.8–10.8)

## 2024-04-08 PROCEDURE — 700105 HCHG RX REV CODE 258: Mod: UD | Performed by: EMERGENCY MEDICINE

## 2024-04-08 PROCEDURE — 84484 ASSAY OF TROPONIN QUANT: CPT

## 2024-04-08 PROCEDURE — 700111 HCHG RX REV CODE 636 W/ 250 OVERRIDE (IP): Mod: JZ,UD | Performed by: EMERGENCY MEDICINE

## 2024-04-08 PROCEDURE — 83690 ASSAY OF LIPASE: CPT

## 2024-04-08 PROCEDURE — 96375 TX/PRO/DX INJ NEW DRUG ADDON: CPT | Mod: XU

## 2024-04-08 PROCEDURE — 71275 CT ANGIOGRAPHY CHEST: CPT

## 2024-04-08 PROCEDURE — 700117 HCHG RX CONTRAST REV CODE 255: Mod: UD | Performed by: EMERGENCY MEDICINE

## 2024-04-08 PROCEDURE — 87040 BLOOD CULTURE FOR BACTERIA: CPT | Mod: 91

## 2024-04-08 PROCEDURE — 83735 ASSAY OF MAGNESIUM: CPT

## 2024-04-08 PROCEDURE — 83605 ASSAY OF LACTIC ACID: CPT

## 2024-04-08 PROCEDURE — 36415 COLL VENOUS BLD VENIPUNCTURE: CPT

## 2024-04-08 PROCEDURE — 85025 COMPLETE CBC W/AUTO DIFF WBC: CPT

## 2024-04-08 PROCEDURE — 82077 ASSAY SPEC XCP UR&BREATH IA: CPT

## 2024-04-08 PROCEDURE — 83880 ASSAY OF NATRIURETIC PEPTIDE: CPT

## 2024-04-08 PROCEDURE — 82962 GLUCOSE BLOOD TEST: CPT

## 2024-04-08 PROCEDURE — 96374 THER/PROPH/DIAG INJ IV PUSH: CPT | Mod: XU

## 2024-04-08 PROCEDURE — 80053 COMPREHEN METABOLIC PANEL: CPT

## 2024-04-08 PROCEDURE — 99285 EMERGENCY DEPT VISIT HI MDM: CPT

## 2024-04-08 PROCEDURE — 84100 ASSAY OF PHOSPHORUS: CPT

## 2024-04-08 PROCEDURE — 71045 X-RAY EXAM CHEST 1 VIEW: CPT

## 2024-04-08 PROCEDURE — 93005 ELECTROCARDIOGRAM TRACING: CPT | Performed by: EMERGENCY MEDICINE

## 2024-04-08 PROCEDURE — 0241U HCHG SARS-COV-2 COVID-19 NFCT DS RESP RNA 4 TRGT ED POC: CPT

## 2024-04-08 RX ORDER — SODIUM CHLORIDE, SODIUM LACTATE, POTASSIUM CHLORIDE, CALCIUM CHLORIDE 600; 310; 30; 20 MG/100ML; MG/100ML; MG/100ML; MG/100ML
2000 INJECTION, SOLUTION INTRAVENOUS ONCE
Status: COMPLETED | OUTPATIENT
Start: 2024-04-08 | End: 2024-04-09

## 2024-04-08 RX ORDER — LORAZEPAM 2 MG/ML
2 INJECTION INTRAMUSCULAR ONCE
Status: COMPLETED | OUTPATIENT
Start: 2024-04-08 | End: 2024-04-08

## 2024-04-08 RX ORDER — MORPHINE SULFATE 4 MG/ML
4 INJECTION INTRAVENOUS ONCE
Status: COMPLETED | OUTPATIENT
Start: 2024-04-08 | End: 2024-04-08

## 2024-04-08 RX ADMIN — LORAZEPAM 2 MG: 2 INJECTION INTRAMUSCULAR; INTRAVENOUS at 22:44

## 2024-04-08 RX ADMIN — MORPHINE SULFATE 4 MG: 4 INJECTION, SOLUTION INTRAMUSCULAR; INTRAVENOUS at 23:28

## 2024-04-08 RX ADMIN — IOHEXOL 100 ML: 350 INJECTION, SOLUTION INTRAVENOUS at 23:09

## 2024-04-08 RX ADMIN — SODIUM CHLORIDE, POTASSIUM CHLORIDE, SODIUM LACTATE AND CALCIUM CHLORIDE 2000 ML: 600; 310; 30; 20 INJECTION, SOLUTION INTRAVENOUS at 22:44

## 2024-04-08 ASSESSMENT — FIBROSIS 4 INDEX: FIB4 SCORE: 1.56

## 2024-04-09 VITALS
RESPIRATION RATE: 14 BRPM | DIASTOLIC BLOOD PRESSURE: 76 MMHG | HEIGHT: 68 IN | HEART RATE: 80 BPM | BODY MASS INDEX: 31.83 KG/M2 | OXYGEN SATURATION: 95 % | TEMPERATURE: 97.4 F | SYSTOLIC BLOOD PRESSURE: 134 MMHG | WEIGHT: 210 LBS

## 2024-04-09 LAB
APPEARANCE UR: CLEAR
BILIRUB UR QL STRIP.AUTO: NEGATIVE
COLOR UR: YELLOW
FLUAV RNA SPEC QL NAA+PROBE: NEGATIVE
FLUBV RNA SPEC QL NAA+PROBE: NEGATIVE
GLUCOSE UR STRIP.AUTO-MCNC: NEGATIVE MG/DL
KETONES UR STRIP.AUTO-MCNC: NEGATIVE MG/DL
LEUKOCYTE ESTERASE UR QL STRIP.AUTO: NEGATIVE
MICRO URNS: ABNORMAL
NITRITE UR QL STRIP.AUTO: NEGATIVE
PH UR STRIP.AUTO: 6 [PH] (ref 5–8)
PROT UR QL STRIP: NEGATIVE MG/DL
RBC UR QL AUTO: NEGATIVE
RSV RNA SPEC QL NAA+PROBE: NEGATIVE
SARS-COV-2 RNA RESP QL NAA+PROBE: NOTDETECTED
SP GR UR STRIP.AUTO: >=1.045
UROBILINOGEN UR STRIP.AUTO-MCNC: 0.2 MG/DL

## 2024-04-09 PROCEDURE — 700111 HCHG RX REV CODE 636 W/ 250 OVERRIDE (IP): Mod: JZ,UD | Performed by: EMERGENCY MEDICINE

## 2024-04-09 PROCEDURE — 87086 URINE CULTURE/COLONY COUNT: CPT

## 2024-04-09 PROCEDURE — 96375 TX/PRO/DX INJ NEW DRUG ADDON: CPT | Mod: XU

## 2024-04-09 PROCEDURE — 81003 URINALYSIS AUTO W/O SCOPE: CPT

## 2024-04-09 RX ORDER — HALOPERIDOL 5 MG/ML
5 INJECTION INTRAMUSCULAR ONCE
Status: COMPLETED | OUTPATIENT
Start: 2024-04-09 | End: 2024-04-09

## 2024-04-09 RX ADMIN — HALOPERIDOL LACTATE 5 MG: 5 INJECTION, SOLUTION INTRAMUSCULAR at 00:30

## 2024-04-09 NOTE — ED NOTES
Reviewed discharge instructions with pt. Verbalized understanding of instructions. Will follow up as directed. Out of the ER via WC per pt's request.

## 2024-04-09 NOTE — ED NOTES
Bedside report received from off going RN/tech: CASPER Ulloa, assumed care of patient.  POC discussed with patient. Call light within reach, all needs addressed at this time.       Fall risk interventions in place: Move the patient closer to the nurse's station, Patient's personal possessions are with in their safe reach, Place socks on patient, Give patient urinal if applicable, and Keep floor surfaces clean and dry (all applicable per [facility] Fall risk assessment)   Continuous monitoring: Cardiac Leads, Pulse Ox, or Blood Pressure  IVF/IV medications: Not Applicable   Oxygen: Room Air  Bedside sitter: Not Applicable   Isolation: Not Applicable

## 2024-04-09 NOTE — ED PROVIDER NOTES
ED Provider Note    CHIEF COMPLAINT  Chief Complaint   Patient presents with    Abdominal Pain     Patient biba from home for severe abdominal pain and seizure like activity. Pt has history of seizure, tonic clonic state for 45-60 seconds upon arrival to ER L: 182/81 R: 138/67       EXTERNAL RECORDS REVIEWED  Inpatient Notes patient has a history of alcohol withdrawal and peptic ulcer disease as well as intractable abdominal pain.  Was last hospitalized here in February 2024.  He did have concern for hematemesis at that time.    HPI/ROS  LIMITATION TO HISTORY   Select: Altered mental status / Confusion  OUTSIDE HISTORIAN(S):  EMS patient brought in from home for severe abdominal pain.  Apparently had a seizure and then woke up and was complaining of abdominal pain.  And then had a another seizure here on arrival.  He was alert and oriented between transport.    Donal Carpio is a 53 y.o. adult who presents to the [facility] part with severe abdominal pain.  According to EMS he had a seizure and then woke up complaining of severe abdominal pain.  And route here to the emergency department on arrival he had another 30 to 40-second seizure.  He is mildly postictal on my examination but is awake and alert complaining of abdominal pain.  Otherwise not a great historian at this time.    Glucose was normal.  Unfortunately blood pressures were significantly different in bilateral arms so a code aorta was called    PAST MEDICAL HISTORY   has a past medical history of Alcohol withdrawal delirium (HCC) (9/19/2022), Drug-seeking behavior, Peptic ulcer, Seizure (HCC), Seizure disorder (HCC), and Ulcer of abdomen wall (HCC).    SURGICAL HISTORY   has a past surgical history that includes appendectomy; upper gi endoscopy,diagnosis (N/A, 6/30/2022); upper gi endoscopy,biopsy (N/A, 6/30/2022); lap,diagnostic abdomen (N/A, 7/7/2022); and wound exploration ortho (Right, 7/7/2022).    FAMILY HISTORY  No family history on  "file.    SOCIAL HISTORY  Social History     Tobacco Use    Smoking status: Never    Smokeless tobacco: Never   Vaping Use    Vaping Use: Former    Substances: Nicotine   Substance and Sexual Activity    Alcohol use: Not Currently    Drug use: Yes     Types: Inhaled, Marijuana     Comment: cannabis    Sexual activity: Not on file       CURRENT MEDICATIONS  Home Medications    **Home medications have not yet been reviewed for this encounter**         ALLERGIES  Allergies   Allergen Reactions    Penicillins Unspecified     Unknown childhood reaction        PHYSICAL EXAM  VITAL SIGNS: /76   Pulse 80   Temp 36.1 °C (96.9 °F) (Temporal)   Resp 14   Ht 1.727 m (5' 8\")   Wt 95.3 kg (210 lb)   SpO2 95%   BMI 31.93 kg/m²    Pulse OX: Pulse Oxygen level is within normal limits  Constitutional: Awake but in distress postictal  HENT: Normocephalic, Atraumatic, dry mucous membranes  Eyes: PERound. Conjunctiva normal, non-icteric.   Heart: Regular rate and rhythm, intact distal pulses   Lungs: Symmetrical movement, no resp distress   Abdomen: Generalized tenderness with generalized guarding no rebound non-distended, normal bowel sounds  EXT/Back no edema  Skin: Warm, Dry, No erythema, No rash.   Neurologic: Alert and oriented, Grossly non-focal.       EKG/LABS  Labs Reviewed   CBC WITH DIFFERENTIAL - Abnormal; Notable for the following components:       Result Value    WBC 12.4 (*)     Hemoglobin 11.4 (*)     Hematocrit 36.1 (*)     MCV 75.7 (*)     MCH 23.9 (*)     MCHC 31.6 (*)     MPV 8.4 (*)     Neutrophils (Absolute) 7.96 (*)     All other components within normal limits   URINALYSIS - Abnormal; Notable for the following components:    Specific Gravity >=1.045 (*)     All other components within normal limits   PHOSPHORUS - Abnormal; Notable for the following components:    Phosphorus 2.1 (*)     All other components within normal limits   LACTIC ACID   COMP METABOLIC PANEL   LIPASE   MAGNESIUM   TROPONIN "   PROBRAIN NATRIURETIC PEPTIDE, NT   DIAGNOSTIC ALCOHOL   ESTIMATED GFR   URINE CULTURE(NEW)   BLOOD CULTURE   BLOOD CULTURE   POCT COV-2, FLU A/B, RSV BY PCR   POCT GLUCOSE DEVICE RESULTS   POC COV-2, FLU A/B, RSV BY PCR     Results for orders placed or performed during the hospital encounter of 24   EKG (NOW)   Result Value Ref Range    Report       [facility] Emergency Dept.    Test Date:  2024  Pt Name:    JERICHO ALBA                  Department: ER  MRN:        [id number]                      Room:       [facility]  Gender:     Male                         Technician: [id number]  :        1970                   Requested By:JORY EMERY  Order #:    [id number]                    Reading MD: Jory Emery MD    Measurements  Intervals                                Axis  Rate:       95                           P:          11  KS:         140                          QRS:        -14  QRSD:       108                          T:          26  QT:         334  QTc:        420    Interpretive Statements  Sinus rhythm At a rate of 95 no ST elevation or ST depressions no abnormal T  wave versions or Q waves normal intervals normal axis  Compared to ECG 2024 09:25:29  no sig change  Electronically Signed On 2024 22:44:45 PDT by Jory Emery MD         I have independently interpreted this EKG    RADIOLOGY  I have independently interpreted the diagnostic imaging associated with this visit and am waiting the final reading from the radiologist.   My preliminary interpretation is as follows:     Chest x-ray without pneumonia or effusion  CT aortogram unremarkable no evidence of bowel inflammation perforation diverticulitis appendicitis free fluid or free air    Radiologist interpretation:  CT-CTA COMPLETE THORACOABDOMINAL AORTA   Final Result      1.  Negative CT angiogram of the chest and abdomen      2.  Abnormal pulmonary parenchyma with multifocal bilateral airspace  process. Differential diagnosis is broad including edema, atypical infection, or other etiology      3.  Hepatomegaly      4.  Prior cholecystectomy                        DX-CHEST-PORTABLE (1 VIEW)   Final Result      No acute cardiopulmonary abnormality identified.          COURSE & MEDICAL DECISION MAKING    ASSESSMENT, COURSE AND PLAN  Care Narrative:     Patient presents emerged department with seizure-like activity diffuse abdominal pain.  Blood pressures running going on bilateral arms.  Looking back through his history and chart review I doubt he is having aortic dissection warm likely be a perfect diverticulitis or peripheral peptic ulcer disease.  However will evaluate with aorta due to the abnormal blood pressures.  EKG is unremarkable.  He is slowly coming out of his postictal state.  DISPOSITION AND DISCUSSIONS  12:04 AM  Fortunately CT aorta is within normal limits laboratory and Alysis also within normal limits just mildly elevated blood cell count.  Patient has a mild anemia which is actually improved from his prior laboratory analysis.  Patient's wife states that he has had nausea vomiting diarrhea for the past 3 or 4 days and has been not taking his seizure meds as normal because he been able to keep them down.  They have tried Zofran at home with some relief of symptoms but not complete relief.  Then he had a seizure this night and every time he has a seizure he wakes up with severe abdominal pain.  He has a gastric emptying study ordered and has follow-up with GI consultants but not for a while.  He has not had any bloody stools today.    Patient was given pain management after CT scan which is slowly improving but he still having some discomfort so we discussed potential use of Haldol at this time as this may be a gastroparesis with normal laboratory analysis and no signs of GI bleed.    1:31 AM  Patient is feeling somewhat improved after the Haldol.  Here has follow-up he is already on  medications at home we discussed return precautions importance of taking his antiepileptic medications.  There was not status epilepticus as he was back to his baseline between seizures.  His abdomen is soft no signs of bacterial infection or GI bleed and he will be discharged home with primary care follow-up.    Hydration: HYDRATION: Based on the patient's presentation of Acute Vomiting the patient was given IV fluids. IV Hydration was used because oral hydration was not as rapid as required. Upon recheck following hydration, the patient was improved.      I have discussed management of the patient with the following physicians and CAROLA's:  none    Discussion of management with other QHP or appropriate source(s): None     Escalation of care considered, and ultimately not performed:acute inpatient care management, however at this time, the patient is most appropriate for outpatient management    Barriers to care at this time, including but not limited to:  na .       The patient will return for new or worsening symptoms and is stable at the time of discharge.    The patient is referred to a primary physician for blood pressure management, diabetic screening, and for all other preventative health concerns.    DISPOSITION:  Patient will be discharged home in stable condition.    FOLLOW UP:  Bobby Batres M.D.  [address] [facility] [address]  [phone]    Schedule an appointment as soon as possible for a visit       [facility], Emergency Dept  [address] [facility] [id number] [phone]    If symptoms worsen      OUTPATIENT MEDICATIONS:  Discharge Medication List as of 4/9/2024  1:33 AM          Decision tools and prescription drugs considered including, but not limited to:  na .    FINAL DIAGNOSIS  1. Right upper quadrant abdominal pain    2. Nausea vomiting and diarrhea    3. Seizure disorder (HCC)    4. Viral syndrome           Electronically signed by: Judith Pickett,  M.D., 4/8/2024 10:35 PM

## 2024-04-09 NOTE — ED TRIAGE NOTES
"Chief Complaint   Patient presents with    Abdominal Pain     Patient biba from home for severe abdominal pain and seizure like activity. Pt has history of seizure, tonic clonic state for 45-60 seconds upon arrival to ER L: 182/81 R: 138/67     BP (!) 182/81   Pulse (!) 106   Resp (!) 22   Ht 1.727 m (5' 8\")   Wt 95.3 kg (210 lb)   SpO2 98%     Code aorta activated   "

## 2024-04-09 NOTE — ED NOTES
Rounded on patient medicated per mar, updated on poc, vss, denies current needs, wife at bedside.

## 2024-04-10 ENCOUNTER — HOSPITAL ENCOUNTER (EMERGENCY)
Facility: MEDICAL CENTER | Age: 54
End: 2024-04-10
Attending: EMERGENCY MEDICINE
Payer: MEDICAID

## 2024-04-10 ENCOUNTER — APPOINTMENT (OUTPATIENT)
Dept: RADIOLOGY | Facility: MEDICAL CENTER | Age: 54
End: 2024-04-10
Attending: EMERGENCY MEDICINE
Payer: MEDICAID

## 2024-04-10 VITALS
BODY MASS INDEX: 31.83 KG/M2 | RESPIRATION RATE: 26 BRPM | DIASTOLIC BLOOD PRESSURE: 68 MMHG | WEIGHT: 210 LBS | HEART RATE: 89 BPM | HEIGHT: 68 IN | SYSTOLIC BLOOD PRESSURE: 126 MMHG | OXYGEN SATURATION: 95 % | TEMPERATURE: 98.1 F

## 2024-04-10 DIAGNOSIS — G40.909 SEIZURE DISORDER (HCC): ICD-10-CM

## 2024-04-10 DIAGNOSIS — R10.9 ABDOMINAL PAIN, UNSPECIFIED ABDOMINAL LOCATION: ICD-10-CM

## 2024-04-10 LAB
ALBUMIN SERPL BCP-MCNC: 3.9 G/DL (ref 3.2–4.9)
ALBUMIN/GLOB SERPL: 1.1 G/DL
ALP SERPL-CCNC: 85 U/L (ref 30–99)
ALT SERPL-CCNC: 15 U/L (ref 2–50)
ANION GAP SERPL CALC-SCNC: 11 MMOL/L (ref 7–16)
APPEARANCE UR: CLEAR
AST SERPL-CCNC: 13 U/L (ref 12–45)
BASOPHILS # BLD AUTO: 0.4 % (ref 0–1.8)
BASOPHILS # BLD: 0.06 K/UL (ref 0–0.12)
BILIRUB SERPL-MCNC: 0.2 MG/DL (ref 0.1–1.5)
BILIRUB UR QL STRIP.AUTO: NEGATIVE
BUN SERPL-MCNC: 15 MG/DL (ref 8–22)
CALCIUM ALBUM COR SERPL-MCNC: 10 MG/DL (ref 8.5–10.5)
CALCIUM SERPL-MCNC: 9.9 MG/DL (ref 8.5–10.5)
CHLORIDE SERPL-SCNC: 103 MMOL/L (ref 96–112)
CO2 SERPL-SCNC: 24 MMOL/L (ref 20–33)
COLOR UR: YELLOW
CREAT SERPL-MCNC: 0.77 MG/DL (ref 0.5–1.4)
EOSINOPHIL # BLD AUTO: 0.22 K/UL (ref 0–0.51)
EOSINOPHIL NFR BLD: 1.6 % (ref 0–6.9)
ERYTHROCYTE [DISTWIDTH] IN BLOOD BY AUTOMATED COUNT: 39.3 FL (ref 35.9–50)
EXTRA TUBE BLU BLU: NORMAL
GFR SERPLBLD CREATININE-BSD FMLA CKD-EPI: 108 ML/MIN/1.73 M 2
GLOBULIN SER CALC-MCNC: 3.4 G/DL (ref 1.9–3.5)
GLUCOSE SERPL-MCNC: 103 MG/DL (ref 65–99)
GLUCOSE UR STRIP.AUTO-MCNC: NEGATIVE MG/DL
HCT VFR BLD AUTO: 34.3 % (ref 42–52)
HGB BLD-MCNC: 10.9 G/DL (ref 14–18)
IMM GRANULOCYTES # BLD AUTO: 0.08 K/UL (ref 0–0.11)
IMM GRANULOCYTES NFR BLD AUTO: 0.6 % (ref 0–0.9)
KETONES UR STRIP.AUTO-MCNC: NEGATIVE MG/DL
LEUKOCYTE ESTERASE UR QL STRIP.AUTO: NEGATIVE
LYMPHOCYTES # BLD AUTO: 1.95 K/UL (ref 1–4.8)
LYMPHOCYTES NFR BLD: 14.3 % (ref 22–41)
MCH RBC QN AUTO: 23.5 PG (ref 27–33)
MCHC RBC AUTO-ENTMCNC: 31.8 G/DL (ref 32.3–36.5)
MCV RBC AUTO: 73.9 FL (ref 81.4–97.8)
MICRO URNS: NORMAL
MONOCYTES # BLD AUTO: 0.86 K/UL (ref 0–0.85)
MONOCYTES NFR BLD AUTO: 6.3 % (ref 0–13.4)
NEUTROPHILS # BLD AUTO: 10.45 K/UL (ref 1.82–7.42)
NEUTROPHILS NFR BLD: 76.8 % (ref 44–72)
NITRITE UR QL STRIP.AUTO: NEGATIVE
NRBC # BLD AUTO: 0 K/UL
NRBC BLD-RTO: 0 /100 WBC (ref 0–0.2)
PH UR STRIP.AUTO: 7.5 [PH] (ref 5–8)
PLATELET # BLD AUTO: 342 K/UL (ref 164–446)
PMV BLD AUTO: 8.5 FL (ref 9–12.9)
POTASSIUM SERPL-SCNC: 4.2 MMOL/L (ref 3.6–5.5)
PROT SERPL-MCNC: 7.3 G/DL (ref 6–8.2)
PROT UR QL STRIP: NEGATIVE MG/DL
RBC # BLD AUTO: 4.64 M/UL (ref 4.7–6.1)
RBC UR QL AUTO: NEGATIVE
SODIUM SERPL-SCNC: 138 MMOL/L (ref 135–145)
SP GR UR STRIP.AUTO: 1.01
UROBILINOGEN UR STRIP.AUTO-MCNC: 0.2 MG/DL
VALPROATE SERPL-MCNC: 23.8 UG/ML (ref 50–100)
WBC # BLD AUTO: 13.6 K/UL (ref 4.8–10.8)

## 2024-04-10 PROCEDURE — 700105 HCHG RX REV CODE 258: Mod: UD | Performed by: EMERGENCY MEDICINE

## 2024-04-10 PROCEDURE — 700111 HCHG RX REV CODE 636 W/ 250 OVERRIDE (IP): Mod: UD

## 2024-04-10 PROCEDURE — 80053 COMPREHEN METABOLIC PANEL: CPT

## 2024-04-10 PROCEDURE — 99285 EMERGENCY DEPT VISIT HI MDM: CPT

## 2024-04-10 PROCEDURE — 80164 ASSAY DIPROPYLACETIC ACD TOT: CPT

## 2024-04-10 PROCEDURE — 700111 HCHG RX REV CODE 636 W/ 250 OVERRIDE (IP): Mod: UD | Performed by: EMERGENCY MEDICINE

## 2024-04-10 PROCEDURE — 81003 URINALYSIS AUTO W/O SCOPE: CPT

## 2024-04-10 PROCEDURE — 96375 TX/PRO/DX INJ NEW DRUG ADDON: CPT

## 2024-04-10 PROCEDURE — 96365 THER/PROPH/DIAG IV INF INIT: CPT

## 2024-04-10 PROCEDURE — 85025 COMPLETE CBC W/AUTO DIFF WBC: CPT

## 2024-04-10 PROCEDURE — 71045 X-RAY EXAM CHEST 1 VIEW: CPT

## 2024-04-10 PROCEDURE — 36415 COLL VENOUS BLD VENIPUNCTURE: CPT

## 2024-04-10 RX ORDER — LORAZEPAM 2 MG/ML
2 INJECTION INTRAMUSCULAR ONCE
Status: COMPLETED | OUTPATIENT
Start: 2024-04-10 | End: 2024-04-10

## 2024-04-10 RX ORDER — LORAZEPAM 2 MG/ML
INJECTION INTRAMUSCULAR
Status: COMPLETED
Start: 2024-04-10 | End: 2024-04-10

## 2024-04-10 RX ADMIN — LORAZEPAM 2 MG: 2 INJECTION INTRAMUSCULAR; INTRAVENOUS at 10:23

## 2024-04-10 RX ADMIN — VALPROATE SODIUM 2000 MG: 100 INJECTION, SOLUTION INTRAVENOUS at 12:10

## 2024-04-10 RX ADMIN — LORAZEPAM 2 MG: 2 INJECTION INTRAMUSCULAR at 10:23

## 2024-04-10 ASSESSMENT — FIBROSIS 4 INDEX: FIB4 SCORE: .5532833351724881265

## 2024-04-10 NOTE — ED TRIAGE NOTES
Chief Complaint   Patient presents with    Seizure     3 since EMS arrived , 3mg versed IM given  Hx depakote  and vimpat for home med  No oral trauma     Abdominal Pain     Was at office where he was getting cystoscopy for abd pain, Lido given and pt seized.

## 2024-04-10 NOTE — ED NOTES
POC explained to patient at wife. Wife at bedside leaving for work however states wants phone call if patient discharged as she wants to be the one to pick patient up.     Urine sample sent to lab.     Pt mentation status improving. Able to ask for things and respond to questions.   Medication requested from central.

## 2024-04-10 NOTE — ED PROVIDER NOTES
ED Provider Note    CHIEF COMPLAINT  Chief Complaint   Patient presents with    Seizure     3 since EMS arrived , 3mg versed IM given  Hx depakote  and vimpat for home med  No oral trauma     Abdominal Pain     Was at office where he was getting cystoscopy for abd pain, Lido given and pt seized.        EXTERNAL RECORDS REVIEWED  External ED Note multiple emergency department visits for intractable abdominal pain and seizures and Inpatient Notes most recent admission from February of this year 2 months prior for intractable abdominal pain and seizures.  Patient had unremarkable workup at that time and was sent out on Carafate Flexeril and Norco.    HPI/ROS  LIMITATION TO HISTORY     OUTSIDE HISTORIAN(S):  Family wife at bedside    Donal Carpio is a 53 y.o. adult who presents to the emergency department with report of seizure.  Patient was at urology having cystoscopy today for reported hematuria.  Following the procedure patient then had witnessed tonic-clonic seizure.  This began at approximately 930.  He there was no fall no oral trauma did not hit his head.  EMS was contacted and gave 3 mg of IM Versed.  Patient's had intermittent tonic-clonic movements since that time 3 times.  Wife reports that he has not fully returned to his baseline during these events.  Patient takes Depakote and Vimpat at home and has reportedly been compliant with all of his medication regimen.  Prior to the seizures today was feeling well no fevers no chills no cough chest pain shortness of breath history of present illness otherwise limited by patient's acute altered mental status    PAST MEDICAL HISTORY   has a past medical history of Alcohol withdrawal delirium (HCC) (9/19/2022), Drug-seeking behavior, Peptic ulcer, Seizure (HCC), Seizure disorder (HCC), and Ulcer of abdomen wall (HCC).    SURGICAL HISTORY   has a past surgical history that includes appendectomy; upper gi endoscopy,diagnosis (N/A, 6/30/2022); upper gi  "endoscopy,biopsy (N/A, 6/30/2022); lap,diagnostic abdomen (N/A, 7/7/2022); and wound exploration ortho (Right, 7/7/2022).    FAMILY HISTORY  No family history on file.    SOCIAL HISTORY  Social History     Tobacco Use    Smoking status: Never    Smokeless tobacco: Never   Vaping Use    Vaping Use: Former    Substances: Nicotine   Substance and Sexual Activity    Alcohol use: Not Currently    Drug use: Yes     Types: Inhaled, Marijuana     Comment: cannabis    Sexual activity: Not on file       CURRENT MEDICATIONS  Home Medications       Reviewed by Ramandeep Tang R.N. (Registered Nurse) on 04/10/24 at 1011  Med List Status: Partial     Medication Last Dose Status   amLODIPine (NORVASC) 5 MG Tab  Active   budesonide-formoterol (SYMBICORT) 80-4.5 MCG/ACT Aerosol  Active   divalproex (DEPAKOTE) 500 MG Tablet Delayed Response  Active   ferrous sulfate 325 (65 Fe) MG tablet  Active   lacosamide (VIMPAT) 200 MG Tab tablet  Active   lansoprazole (PREVACID) 30 MG CAPSULE DELAYED RELEASE  Active   lansoprazole (PREVACID) 30 MG CAPSULE DELAYED RELEASE  Active   losartan (COZAAR) 50 MG Tab  Active   Multiple Vitamins-Minerals (MULTIVITAMIN MEN) Tab  Active   nicotine (NICODERM) 21 MG/24HR PATCH 24 HR  Active   Rimegepant Sulfate (NURTEC) 75 MG TABLET DISPERSIBLE  Active   sucralfate (CARAFATE) 1 GM Tab  Active                    ALLERGIES  Allergies   Allergen Reactions    Penicillins Unspecified     Unknown childhood reaction        PHYSICAL EXAM  VITAL SIGNS: /78   Pulse 94   Resp (!) 37   Ht 1.727 m (5' 8\")   Wt 95.3 kg (210 lb)   SpO2 93%   BMI 31.93 kg/m²      Pulse ox interpretation: I interpret this pulse ox as normal.  Constitutional: Somnolent but responds to noxious stimuli, moderate distress  HEENT: Atraumatic normocephalic, pupils are equal round reactive to light extraocular movements are intact. The nares is clear, external ears are normal, mouth shows moist mucous membranes normal dentition for " age  Neck: Supple, no JVD no tracheal deviation  Cardiovascular: Regular rate and rhythm no murmur rub or gallop 2+ pulses peripherally x4  Thorax & Lungs: No respiratory distress, no wheezes rales or rhonchi, No chest tenderness.   GI: Soft nontender nondistended positive bowel sounds, no peritoneal signs  Skin: Warm dry no acute rash or lesion  Musculoskeletal: Moving all extremities with full range and 5 of 5 strength no acute  deformity  Neurologic: Patient has a slight nystagmus on exam horizontal only there is no active tonic-clonic type seizure however patient will not track with his eyes and will not communicate.  He does avoid his face if arm is dropped.  Psychiatric: Appropriate affect for situation at this time      EKG/LABS  Results for orders placed or performed during the hospital encounter of 04/10/24   VALPROIC ACID   Result Value Ref Range    Valproic Acid 23.8 (L) 50.0 - 100.0 ug/mL   CBC WITH DIFFERENTIAL   Result Value Ref Range    WBC 13.6 (H) 4.8 - 10.8 K/uL    RBC 4.64 (L) 4.70 - 6.10 M/uL    Hemoglobin 10.9 (L) 14.0 - 18.0 g/dL    Hematocrit 34.3 (L) 42.0 - 52.0 %    MCV 73.9 (L) 81.4 - 97.8 fL    MCH 23.5 (L) 27.0 - 33.0 pg    MCHC 31.8 (L) 32.3 - 36.5 g/dL    RDW 39.3 35.9 - 50.0 fL    Platelet Count 342 164 - 446 K/uL    MPV 8.5 (L) 9.0 - 12.9 fL    Neutrophils-Polys 76.80 (H) 44.00 - 72.00 %    Lymphocytes 14.30 (L) 22.00 - 41.00 %    Monocytes 6.30 0.00 - 13.40 %    Eosinophils 1.60 0.00 - 6.90 %    Basophils 0.40 0.00 - 1.80 %    Immature Granulocytes 0.60 0.00 - 0.90 %    Nucleated RBC 0.00 0.00 - 0.20 /100 WBC    Neutrophils (Absolute) 10.45 (H) 1.82 - 7.42 K/uL    Lymphs (Absolute) 1.95 1.00 - 4.80 K/uL    Monos (Absolute) 0.86 (H) 0.00 - 0.85 K/uL    Eos (Absolute) 0.22 0.00 - 0.51 K/uL    Baso (Absolute) 0.06 0.00 - 0.12 K/uL    Immature Granulocytes (abs) 0.08 0.00 - 0.11 K/uL    NRBC (Absolute) 0.00 K/uL   COMP METABOLIC PANEL   Result Value Ref Range    Sodium 138 135 - 145  mmol/L    Potassium 4.2 3.6 - 5.5 mmol/L    Chloride 103 96 - 112 mmol/L    Co2 24 20 - 33 mmol/L    Anion Gap 11.0 7.0 - 16.0    Glucose 103 (H) 65 - 99 mg/dL    Bun 15 8 - 22 mg/dL    Creatinine 0.77 0.50 - 1.40 mg/dL    Calcium 9.9 8.5 - 10.5 mg/dL    Correct Calcium 10.0 8.5 - 10.5 mg/dL    AST(SGOT) 13 12 - 45 U/L    ALT(SGPT) 15 2 - 50 U/L    Alkaline Phosphatase 85 30 - 99 U/L    Total Bilirubin 0.2 0.1 - 1.5 mg/dL    Albumin 3.9 3.2 - 4.9 g/dL    Total Protein 7.3 6.0 - 8.2 g/dL    Globulin 3.4 1.9 - 3.5 g/dL    A-G Ratio 1.1 g/dL   URINALYSIS,CULTURE IF INDICATED    Specimen: Urine, Clean Catch   Result Value Ref Range    Color Yellow     Character Clear     Specific Gravity 1.007 <1.035    Ph 7.5 5.0 - 8.0    Glucose Negative Negative mg/dL    Ketones Negative Negative mg/dL    Protein Negative Negative mg/dL    Bilirubin Negative Negative    Urobilinogen, Urine 0.2 Negative    Nitrite Negative Negative    Leukocyte Esterase Negative Negative    Occult Blood Negative Negative    Micro Urine Req see below    EXTRA TUBE,GRUPO   Result Value Ref Range    Extra Tube, Blue SORTED    ESTIMATED GFR   Result Value Ref Range    GFR (CKD-EPI) 108 >60 mL/min/1.73 m 2       I have independently interpreted this EKG    RADIOLOGY  I have independently interpreted the diagnostic imaging associated with this visit and am waiting the final reading from the radiologist.   My preliminary interpretation is as follows: No focal consolidation      Radiologist interpretation:  DX-CHEST-PORTABLE (1 VIEW)   Final Result      Minimal left basilar atelectasis. No focal consolidation or pleural effusions.          COURSE & MEDICAL DECISION MAKING    ASSESSMENT, COURSE AND PLAN  Care Narrative: After extensive chart review my suspicion for drug-seeking behavior is elevated.  Patient had no head injury during this no oral trauma I see no indication for CT head.  He was back to baseline after dose of Ativan and immediately is requesting  "pain medication.  I reviewed his most recent emergency department visit which time patient had CTA of the chest abdomen pelvis that was unremarkable.  Several charts in the record which are very concerning for drug-seeking behavior of benzodiazepines and narcotics.  Patient's vital signs remain benign here he is returned to his baseline status his abdominal exam is benign the rest of his workup is unremarkable with the exception of subtherapeutic Depakote level which she has been bolused here.  Given instructions to return all to all of his other epileptic medicines return for worsening symptom change or concern otherwise discharged in stable and improved condition.            ADDITIONAL PROBLEMS MANAGED      DISPOSITION AND DISCUSSIONS  I have discussed management of the patient with the following physicians and CAROLA's:      Discussion of management with other QHP or appropriate source(s): Pharmacy   and Case Management        Escalation of care considered, and ultimately not performed:acute inpatient care management, however at this time, the patient is most appropriate for outpatient management    Barriers to care at this time, including but not limited to: .     Decision tools and prescription drugs considered including, but not limited to: .  /68   Pulse 89   Temp 36.7 °C (98.1 °F)   Resp (!) 26   Ht 1.727 m (5' 8\")   Wt 95.3 kg (210 lb)   SpO2 95%   BMI 31.93 kg/m²     Bobby Batres M.D.  [address] [facility] [id number] [phone]          [facility], Emergency Dept  [address] [address] [phone]    in 12-24 hours if symptoms persist, immediately If symptoms worsen, or if you develop any other symptoms or concerns      FINAL DIAGNOSIS  1. Seizure disorder (HCC) Active   2. Abdominal pain, unspecified abdominal location Active          Electronically signed by: Chauncey Edwards M.D.      "

## 2024-04-10 NOTE — ED NOTES
ERP at bedside now.   Wife escorted to bedside. Pt not responsive to this RN questions at this time.   Pt on bed alarm.   Assist RN at bedside starting PIV. Pt vitals stable on RA. No oral trauma noted.   Pt moaning and holding abdomen.     Seizure precautions in place.

## 2024-04-10 NOTE — ED NOTES
Discharge orders placed. Wife called and notified. Infusion will continue for another 15 minutes then pt's wife to pick him up. Wife asked to not have pt wait in lobby, asked to let him stay in room until she gets here.

## 2024-04-11 LAB
BACTERIA UR CULT: NORMAL
SIGNIFICANT IND 70042: NORMAL
SITE SITE: NORMAL
SOURCE SOURCE: NORMAL

## 2024-04-21 ENCOUNTER — HOSPITAL ENCOUNTER (OUTPATIENT)
Dept: RADIOLOGY | Facility: MEDICAL CENTER | Age: 54
End: 2024-04-21
Payer: MEDICAID

## 2024-04-21 DIAGNOSIS — R31.9 HEMATURIA, UNSPECIFIED TYPE: ICD-10-CM

## 2024-04-21 PROCEDURE — 76775 US EXAM ABDO BACK WALL LIM: CPT

## 2024-04-25 ENCOUNTER — TELEPHONE (OUTPATIENT)
Dept: INTERNAL MEDICINE | Facility: OTHER | Age: 54
End: 2024-04-25
Payer: MEDICAID

## 2024-04-25 NOTE — TELEPHONE ENCOUNTER
Next time the patient comes in, please have him fill out an SKIP for [facility]. We tried to fax one out to them, but it failed to go through, and we do not have the original paper with his signature anymore.

## 2024-04-26 DIAGNOSIS — R10.9 ABDOMINAL PAIN, UNSPECIFIED ABDOMINAL LOCATION: ICD-10-CM

## 2024-04-26 NOTE — TELEPHONE ENCOUNTER
Received request via: Patient    Was the patient seen in the last year in this department? Yes    Does the patient have an active prescription (recently filled or refills available) for medication(s) requested? No    Pharmacy Name: [facility]/Pharmacy #[id number] - [place], [place] - [address]    Does the patient have shelter Plus and need 100 day supply (blood pressure, diabetes and cholesterol meds only)? Patient does not have SCP

## 2024-04-27 RX ORDER — SUCRALFATE 1 G/1
1 TABLET ORAL
Qty: 120 TABLET | Refills: 1 | Status: SHIPPED | OUTPATIENT
Start: 2024-04-27

## 2024-04-30 DIAGNOSIS — K27.9 PEPTIC ULCER: ICD-10-CM

## 2024-04-30 RX ORDER — OMEPRAZOLE 40 MG/1
40 CAPSULE, DELAYED RELEASE ORAL 2 TIMES DAILY
Qty: 60 CAPSULE | Refills: 2 | Status: SHIPPED | OUTPATIENT
Start: 2024-04-30

## 2024-06-18 DIAGNOSIS — I10 PRIMARY HYPERTENSION: ICD-10-CM

## 2024-06-18 RX ORDER — AMLODIPINE BESYLATE 5 MG/1
5 TABLET ORAL DAILY
Qty: 30 TABLET | Refills: 2 | Status: SHIPPED | OUTPATIENT
Start: 2024-06-18

## 2024-06-18 NOTE — TELEPHONE ENCOUNTER
Received request via: Pharmacy    Was the patient seen in the last year in this department? Yes    Does the patient have an active prescription (recently filled or refills available) for medication(s) requested? No    Pharmacy Name:   To be filled at: [facility]/pharmacy #[id number] - [place], [place] - [address]          Does the patient have care home Plus and need 100 day supply (blood pressure, diabetes and cholesterol meds only)? Patient does not have SCP

## 2024-06-28 DIAGNOSIS — R10.9 ABDOMINAL PAIN, UNSPECIFIED ABDOMINAL LOCATION: ICD-10-CM

## 2024-06-28 RX ORDER — SUCRALFATE 1 G/1
1 TABLET ORAL
Qty: 120 TABLET | Refills: 1 | Status: SHIPPED | OUTPATIENT
Start: 2024-06-28

## 2024-06-28 NOTE — TELEPHONE ENCOUNTER
[facility] Call Center    Phone Message    May a detailed message be left on voicemail: yes     Reason for Call Patient stated He can't stand up . Please call Patient   Action Taken: Message routed to:  [facility] hermes    Travel Screening: Not Applicable                                                                       Received request via: Patient    Was the patient seen in the last year in this department? Yes    Does the patient have an active prescription (recently filled or refills available) for medication(s) requested? No    Pharmacy Name: [facility]    Does the patient have jail Plus and need 100 day supply (blood pressure, diabetes and cholesterol meds only)? Patient does not have SCP

## 2024-07-16 DIAGNOSIS — D64.9 ANEMIA, UNSPECIFIED TYPE: ICD-10-CM

## 2024-07-17 RX ORDER — FERROUS SULFATE 325(65) MG
325 TABLET ORAL DAILY
Qty: 30 TABLET | Refills: 2 | Status: SHIPPED | OUTPATIENT
Start: 2024-07-17

## 2024-07-21 DIAGNOSIS — K27.9 PEPTIC ULCER: ICD-10-CM

## 2024-07-23 RX ORDER — OMEPRAZOLE 40 MG/1
40 CAPSULE, DELAYED RELEASE ORAL 2 TIMES DAILY
Qty: 60 CAPSULE | Refills: 2 | Status: SHIPPED | OUTPATIENT
Start: 2024-07-23

## 2024-08-21 ENCOUNTER — PATIENT MESSAGE (OUTPATIENT)
Dept: INTERNAL MEDICINE | Facility: OTHER | Age: 54
End: 2024-08-21
Payer: MEDICAID

## 2024-08-23 RX ORDER — LOSARTAN POTASSIUM 50 MG/1
50 TABLET ORAL DAILY
Qty: 30 TABLET | Refills: 2 | Status: SHIPPED | OUTPATIENT
Start: 2024-08-23

## 2024-08-23 RX ORDER — NICOTINE 21 MG/24HR
1 PATCH, TRANSDERMAL 24 HOURS TRANSDERMAL EVERY 24 HOURS
Qty: 30 PATCH | Refills: 1 | Status: SHIPPED | OUTPATIENT
Start: 2024-08-23

## 2024-08-26 NOTE — DISCHARGE PLANNING
Case Management Discharge Planning    Admission Date: 9/19/2022  GMLOS: 3.4  ALOS: 1    6-Clicks ADL Score: 18  6-Clicks Mobility Score: 18      Anticipated Discharge Dispo: Discharge Disposition: Discharged to home/self care (01)    DME Needed: No    Action(s) Taken: Discussed pt in IDT rounds. Pt here for EtOH withdrawal. Per MD, anticipate d/c in 1-2 days.     LSW notified by MD trying meds to beds for tomorrow.     LSW called [facility] at [phone], spoke to Raiza. Per Raiza they do not contract with [employer] insurance. Per Raiza, cost for all 5 medications is $212.68. LSW to speak with pt and call Raiza back.     LSW called supervisor Melita and got approval for approved service if pt cannot afford medications.     LSW spoke with pt at bedside, pt very pleasant. Pt states he will be discharging back to his home. Pt not normally on oxygen. LSW explained cost of medications. Pt states in previous admissions we were able to bill [employer]. Pt states he would be able to pay for medications on Monday (?). Pt states he will need a ride home.    LSW called [facility] back, spoke to Yoly. Per Yoly, pt picked up medications under a discount program, not under [facility]. LSW informed Yoly, we would do approved services to ensure payment.      LSW faxed approved service to [facility] at [phone].    Escalations Completed: None    Medically Clear: No    Next Steps: LSW to follow and assist as needed. LSW to set up ride tomorrow.     Barriers to Discharge: None    Is the patient up for discharge tomorrow: Yes             [Negative] : Heme/Lymph

## 2024-08-28 DIAGNOSIS — R10.9 ABDOMINAL PAIN, UNSPECIFIED ABDOMINAL LOCATION: ICD-10-CM

## 2024-08-29 RX ORDER — SUCRALFATE 1 G/1
1 TABLET ORAL
Qty: 120 TABLET | Refills: 1 | Status: SHIPPED | OUTPATIENT
Start: 2024-08-29

## 2024-09-11 DIAGNOSIS — I10 PRIMARY HYPERTENSION: ICD-10-CM

## 2024-09-11 RX ORDER — AMLODIPINE BESYLATE 5 MG/1
5 TABLET ORAL DAILY
Qty: 30 TABLET | Refills: 2 | Status: SHIPPED | OUTPATIENT
Start: 2024-09-11

## 2024-09-11 NOTE — TELEPHONE ENCOUNTER
Received request via: Pharmacy    Was the patient seen in the last year in this department? Yes    Does the patient have an active prescription (recently filled or refills available) for medication(s) requested? No    Pharmacy Name:   To be filled at: [facility]/pharmacy #[id number] - [place], [place] - [address]          Does the patient have [facility] Plus and need 100-day supply? (This applies to ALL medications) Patient does not have SCP

## 2024-09-14 ENCOUNTER — APPOINTMENT (OUTPATIENT)
Dept: RADIOLOGY | Facility: MEDICAL CENTER | Age: 54
DRG: 101 | End: 2024-09-14
Attending: EMERGENCY MEDICINE
Payer: COMMERCIAL

## 2024-09-14 ENCOUNTER — HOSPITAL ENCOUNTER (INPATIENT)
Facility: MEDICAL CENTER | Age: 54
LOS: 4 days | DRG: 101 | End: 2024-09-18
Attending: EMERGENCY MEDICINE | Admitting: STUDENT IN AN ORGANIZED HEALTH CARE EDUCATION/TRAINING PROGRAM
Payer: COMMERCIAL

## 2024-09-14 DIAGNOSIS — R56.9 SEIZURE (HCC): ICD-10-CM

## 2024-09-14 DIAGNOSIS — G40.909 RECURRENT SEIZURES (HCC): ICD-10-CM

## 2024-09-14 LAB
ALBUMIN SERPL BCP-MCNC: 4.1 G/DL (ref 3.2–4.9)
ALBUMIN/GLOB SERPL: 1.3 G/DL
ALP SERPL-CCNC: 106 U/L (ref 30–99)
ALT SERPL-CCNC: 48 U/L (ref 2–50)
ANION GAP SERPL CALC-SCNC: 13 MMOL/L (ref 7–16)
APPEARANCE UR: CLEAR
AST SERPL-CCNC: 64 U/L (ref 12–45)
BASOPHILS # BLD AUTO: 0.7 % (ref 0–1.8)
BASOPHILS # BLD: 0.07 K/UL (ref 0–0.12)
BILIRUB SERPL-MCNC: 0.2 MG/DL (ref 0.1–1.5)
BILIRUB UR QL STRIP.AUTO: NEGATIVE
BUN SERPL-MCNC: 15 MG/DL (ref 8–22)
CALCIUM ALBUM COR SERPL-MCNC: 9.3 MG/DL (ref 8.5–10.5)
CALCIUM SERPL-MCNC: 9.4 MG/DL (ref 8.5–10.5)
CHLORIDE SERPL-SCNC: 106 MMOL/L (ref 96–112)
CO2 SERPL-SCNC: 23 MMOL/L (ref 20–33)
COLOR UR: YELLOW
CREAT SERPL-MCNC: 0.94 MG/DL (ref 0.5–1.4)
EKG IMPRESSION: NORMAL
EOSINOPHIL # BLD AUTO: 0.21 K/UL (ref 0–0.51)
EOSINOPHIL NFR BLD: 2.1 % (ref 0–6.9)
ERYTHROCYTE [DISTWIDTH] IN BLOOD BY AUTOMATED COUNT: 48.3 FL (ref 35.9–50)
FLUAV RNA SPEC QL NAA+PROBE: NEGATIVE
FLUBV RNA SPEC QL NAA+PROBE: NEGATIVE
GFR SERPLBLD CREATININE-BSD FMLA CKD-EPI: 97 ML/MIN/1.73 M 2
GLOBULIN SER CALC-MCNC: 3.1 G/DL (ref 1.9–3.5)
GLUCOSE SERPL-MCNC: 93 MG/DL (ref 65–99)
GLUCOSE UR STRIP.AUTO-MCNC: NEGATIVE MG/DL
HCT VFR BLD AUTO: 47.7 % (ref 42–52)
HGB BLD-MCNC: 15.6 G/DL (ref 14–18)
IMM GRANULOCYTES # BLD AUTO: 0.05 K/UL (ref 0–0.11)
IMM GRANULOCYTES NFR BLD AUTO: 0.5 % (ref 0–0.9)
KETONES UR STRIP.AUTO-MCNC: ABNORMAL MG/DL
LEUKOCYTE ESTERASE UR QL STRIP.AUTO: NEGATIVE
LYMPHOCYTES # BLD AUTO: 2.5 K/UL (ref 1–4.8)
LYMPHOCYTES NFR BLD: 25.1 % (ref 22–41)
MCH RBC QN AUTO: 28.2 PG (ref 27–33)
MCHC RBC AUTO-ENTMCNC: 32.7 G/DL (ref 32.3–36.5)
MCV RBC AUTO: 86.3 FL (ref 81.4–97.8)
MICRO URNS: ABNORMAL
MONOCYTES # BLD AUTO: 1.06 K/UL (ref 0–0.85)
MONOCYTES NFR BLD AUTO: 10.7 % (ref 0–13.4)
NEUTROPHILS # BLD AUTO: 6.06 K/UL (ref 1.82–7.42)
NEUTROPHILS NFR BLD: 60.9 % (ref 44–72)
NITRITE UR QL STRIP.AUTO: NEGATIVE
NRBC # BLD AUTO: 0 K/UL
NRBC BLD-RTO: 0 /100 WBC (ref 0–0.2)
PH UR STRIP.AUTO: 5.5 [PH] (ref 5–8)
PLATELET # BLD AUTO: 252 K/UL (ref 164–446)
PMV BLD AUTO: 8.9 FL (ref 9–12.9)
POTASSIUM SERPL-SCNC: 4 MMOL/L (ref 3.6–5.5)
PROT SERPL-MCNC: 7.2 G/DL (ref 6–8.2)
PROT UR QL STRIP: NEGATIVE MG/DL
RBC # BLD AUTO: 5.53 M/UL (ref 4.7–6.1)
RBC UR QL AUTO: NEGATIVE
RSV RNA SPEC QL NAA+PROBE: NEGATIVE
SARS-COV-2 RNA RESP QL NAA+PROBE: NOTDETECTED
SODIUM SERPL-SCNC: 142 MMOL/L (ref 135–145)
SP GR UR STRIP.AUTO: >=1.045
UROBILINOGEN UR STRIP.AUTO-MCNC: 1 MG/DL
VALPROATE SERPL-MCNC: 41.8 UG/ML (ref 50–100)
WBC # BLD AUTO: 10 K/UL (ref 4.8–10.8)

## 2024-09-14 PROCEDURE — 99223 1ST HOSP IP/OBS HIGH 75: CPT | Performed by: STUDENT IN AN ORGANIZED HEALTH CARE EDUCATION/TRAINING PROGRAM

## 2024-09-14 PROCEDURE — 74177 CT ABD & PELVIS W/CONTRAST: CPT

## 2024-09-14 PROCEDURE — 700111 HCHG RX REV CODE 636 W/ 250 OVERRIDE (IP)

## 2024-09-14 PROCEDURE — 700111 HCHG RX REV CODE 636 W/ 250 OVERRIDE (IP): Mod: JZ | Performed by: STUDENT IN AN ORGANIZED HEALTH CARE EDUCATION/TRAINING PROGRAM

## 2024-09-14 PROCEDURE — 700117 HCHG RX CONTRAST REV CODE 255: Performed by: EMERGENCY MEDICINE

## 2024-09-14 PROCEDURE — 0241U HCHG SARS-COV-2 COVID-19 NFCT DS RESP RNA 4 TRGT ED POC: CPT

## 2024-09-14 PROCEDURE — 96375 TX/PRO/DX INJ NEW DRUG ADDON: CPT

## 2024-09-14 PROCEDURE — 93005 ELECTROCARDIOGRAM TRACING: CPT | Performed by: EMERGENCY MEDICINE

## 2024-09-14 PROCEDURE — 81003 URINALYSIS AUTO W/O SCOPE: CPT

## 2024-09-14 PROCEDURE — 36415 COLL VENOUS BLD VENIPUNCTURE: CPT

## 2024-09-14 PROCEDURE — 700105 HCHG RX REV CODE 258: Performed by: EMERGENCY MEDICINE

## 2024-09-14 PROCEDURE — 96376 TX/PRO/DX INJ SAME DRUG ADON: CPT

## 2024-09-14 PROCEDURE — 700102 HCHG RX REV CODE 250 W/ 637 OVERRIDE(OP): Performed by: STUDENT IN AN ORGANIZED HEALTH CARE EDUCATION/TRAINING PROGRAM

## 2024-09-14 PROCEDURE — 99285 EMERGENCY DEPT VISIT HI MDM: CPT

## 2024-09-14 PROCEDURE — A9270 NON-COVERED ITEM OR SERVICE: HCPCS | Performed by: STUDENT IN AN ORGANIZED HEALTH CARE EDUCATION/TRAINING PROGRAM

## 2024-09-14 PROCEDURE — 85025 COMPLETE CBC W/AUTO DIFF WBC: CPT

## 2024-09-14 PROCEDURE — 71045 X-RAY EXAM CHEST 1 VIEW: CPT

## 2024-09-14 PROCEDURE — 96374 THER/PROPH/DIAG INJ IV PUSH: CPT

## 2024-09-14 PROCEDURE — 93005 ELECTROCARDIOGRAM TRACING: CPT

## 2024-09-14 PROCEDURE — 770001 HCHG ROOM/CARE - MED/SURG/GYN PRIV*

## 2024-09-14 PROCEDURE — 80053 COMPREHEN METABOLIC PANEL: CPT

## 2024-09-14 PROCEDURE — 80164 ASSAY DIPROPYLACETIC ACD TOT: CPT

## 2024-09-14 PROCEDURE — 700111 HCHG RX REV CODE 636 W/ 250 OVERRIDE (IP): Mod: JZ | Performed by: EMERGENCY MEDICINE

## 2024-09-14 RX ORDER — DIAZEPAM 5 MG
5 TABLET ORAL DAILY
COMMUNITY

## 2024-09-14 RX ORDER — LORAZEPAM 2 MG/ML
4 INJECTION INTRAMUSCULAR
Status: DISCONTINUED | OUTPATIENT
Start: 2024-09-14 | End: 2024-09-16

## 2024-09-14 RX ORDER — AMLODIPINE BESYLATE 5 MG/1
5 TABLET ORAL DAILY
Status: DISCONTINUED | OUTPATIENT
Start: 2024-09-15 | End: 2024-09-18 | Stop reason: HOSPADM

## 2024-09-14 RX ORDER — SUCRALFATE 1 G/1
1 TABLET ORAL 2 TIMES DAILY
COMMUNITY

## 2024-09-14 RX ORDER — MORPHINE SULFATE 4 MG/ML
2 INJECTION INTRAVENOUS ONCE
Status: COMPLETED | OUTPATIENT
Start: 2024-09-14 | End: 2024-09-14

## 2024-09-14 RX ORDER — DIVALPROEX SODIUM 250 MG/1
750 TABLET, FILM COATED, EXTENDED RELEASE ORAL 2 TIMES DAILY
Status: DISCONTINUED | OUTPATIENT
Start: 2024-09-14 | End: 2024-09-16

## 2024-09-14 RX ORDER — DIAZEPAM 5 MG
5 TABLET ORAL DAILY
Status: DISCONTINUED | OUTPATIENT
Start: 2024-09-15 | End: 2024-09-18 | Stop reason: HOSPADM

## 2024-09-14 RX ORDER — SODIUM CHLORIDE, SODIUM LACTATE, POTASSIUM CHLORIDE, CALCIUM CHLORIDE 600; 310; 30; 20 MG/100ML; MG/100ML; MG/100ML; MG/100ML
1000 INJECTION, SOLUTION INTRAVENOUS ONCE
Status: COMPLETED | OUTPATIENT
Start: 2024-09-14 | End: 2024-09-14

## 2024-09-14 RX ORDER — LORAZEPAM 2 MG/ML
INJECTION INTRAMUSCULAR
Status: COMPLETED
Start: 2024-09-14 | End: 2024-09-14

## 2024-09-14 RX ORDER — DIVALPROEX SODIUM 250 MG/1
250 TABLET, FILM COATED, EXTENDED RELEASE ORAL 2 TIMES DAILY
Status: DISCONTINUED | OUTPATIENT
Start: 2024-09-14 | End: 2024-09-14

## 2024-09-14 RX ORDER — DIVALPROEX SODIUM 250 MG/1
250 TABLET, FILM COATED, EXTENDED RELEASE ORAL 2 TIMES DAILY
Status: ON HOLD | COMMUNITY
End: 2024-09-18

## 2024-09-14 RX ORDER — LOSARTAN POTASSIUM 50 MG/1
50 TABLET ORAL DAILY
Status: DISCONTINUED | OUTPATIENT
Start: 2024-09-15 | End: 2024-09-18 | Stop reason: HOSPADM

## 2024-09-14 RX ORDER — LACOSAMIDE 100 MG/1
100 TABLET ORAL DAILY
Status: DISCONTINUED | OUTPATIENT
Start: 2024-09-15 | End: 2024-09-18 | Stop reason: HOSPADM

## 2024-09-14 RX ORDER — MORPHINE SULFATE 4 MG/ML
4 INJECTION INTRAVENOUS ONCE
Status: COMPLETED | OUTPATIENT
Start: 2024-09-14 | End: 2024-09-14

## 2024-09-14 RX ORDER — ONDANSETRON 2 MG/ML
4 INJECTION INTRAMUSCULAR; INTRAVENOUS ONCE
Status: COMPLETED | OUTPATIENT
Start: 2024-09-14 | End: 2024-09-14

## 2024-09-14 RX ORDER — LORAZEPAM 2 MG/ML
2 INJECTION INTRAMUSCULAR ONCE
Status: COMPLETED | OUTPATIENT
Start: 2024-09-14 | End: 2024-09-14

## 2024-09-14 RX ORDER — DIVALPROEX SODIUM 500 MG/1
500 TABLET, FILM COATED, EXTENDED RELEASE ORAL 2 TIMES DAILY
Status: ON HOLD | COMMUNITY
End: 2024-09-18

## 2024-09-14 RX ORDER — NICOTINE 21 MG/24HR
14 PATCH, TRANSDERMAL 24 HOURS TRANSDERMAL
Status: DISCONTINUED | OUTPATIENT
Start: 2024-09-14 | End: 2024-09-18 | Stop reason: HOSPADM

## 2024-09-14 RX ORDER — DIAZEPAM 2 MG
2 TABLET ORAL DAILY
Status: DISCONTINUED | OUTPATIENT
Start: 2024-09-15 | End: 2024-09-18 | Stop reason: HOSPADM

## 2024-09-14 RX ORDER — LACOSAMIDE 100 MG/1
100 TABLET ORAL DAILY
COMMUNITY

## 2024-09-14 RX ADMIN — MOMETASONE FUROATE AND FORMOTEROL FUMARATE DIHYDRATE 2 PUFF: 200; 5 AEROSOL RESPIRATORY (INHALATION) at 22:13

## 2024-09-14 RX ADMIN — DIVALPROEX SODIUM 750 MG: 250 TABLET, EXTENDED RELEASE ORAL at 22:11

## 2024-09-14 RX ADMIN — ONDANSETRON 4 MG: 2 INJECTION INTRAMUSCULAR; INTRAVENOUS at 18:12

## 2024-09-14 RX ADMIN — SODIUM CHLORIDE, POTASSIUM CHLORIDE, SODIUM LACTATE AND CALCIUM CHLORIDE 1000 ML: 600; 310; 30; 20 INJECTION, SOLUTION INTRAVENOUS at 18:26

## 2024-09-14 RX ADMIN — MORPHINE SULFATE 4 MG: 4 INJECTION, SOLUTION INTRAMUSCULAR; INTRAVENOUS at 18:12

## 2024-09-14 RX ADMIN — LORAZEPAM 2 MG: 2 INJECTION INTRAMUSCULAR at 18:55

## 2024-09-14 RX ADMIN — LORAZEPAM 2 MG: 2 INJECTION INTRAMUSCULAR; INTRAVENOUS at 18:55

## 2024-09-14 RX ADMIN — MORPHINE SULFATE 2 MG: 4 INJECTION, SOLUTION INTRAMUSCULAR; INTRAVENOUS at 23:47

## 2024-09-14 RX ADMIN — NICOTINE 14 MG: 14 PATCH TRANSDERMAL at 21:04

## 2024-09-14 RX ADMIN — IOHEXOL 100 ML: 350 INJECTION, SOLUTION INTRAVENOUS at 19:30

## 2024-09-14 ASSESSMENT — PAIN DESCRIPTION - PAIN TYPE: TYPE: ACUTE PAIN

## 2024-09-14 ASSESSMENT — ENCOUNTER SYMPTOMS: SEIZURES: 1

## 2024-09-14 ASSESSMENT — FIBROSIS 4 INDEX: FIB4 SCORE: 0.95

## 2024-09-15 LAB
CK SERPL-CCNC: 175 U/L (ref 0–154)
LACTATE SERPL-SCNC: 0.8 MMOL/L (ref 0.5–2)
LACTATE SERPL-SCNC: 1.2 MMOL/L (ref 0.5–2)

## 2024-09-15 PROCEDURE — A9270 NON-COVERED ITEM OR SERVICE: HCPCS | Performed by: STUDENT IN AN ORGANIZED HEALTH CARE EDUCATION/TRAINING PROGRAM

## 2024-09-15 PROCEDURE — 4A10X4Z MONITORING OF CENTRAL NERVOUS ELECTRICAL ACTIVITY, EXTERNAL APPROACH: ICD-10-PCS | Performed by: PSYCHIATRY & NEUROLOGY

## 2024-09-15 PROCEDURE — 700102 HCHG RX REV CODE 250 W/ 637 OVERRIDE(OP)

## 2024-09-15 PROCEDURE — 82550 ASSAY OF CK (CPK): CPT

## 2024-09-15 PROCEDURE — 99222 1ST HOSP IP/OBS MODERATE 55: CPT | Mod: 25

## 2024-09-15 PROCEDURE — 770001 HCHG ROOM/CARE - MED/SURG/GYN PRIV*

## 2024-09-15 PROCEDURE — 700102 HCHG RX REV CODE 250 W/ 637 OVERRIDE(OP): Performed by: STUDENT IN AN ORGANIZED HEALTH CARE EDUCATION/TRAINING PROGRAM

## 2024-09-15 PROCEDURE — 95816 EEG AWAKE AND DROWSY: CPT | Performed by: PSYCHIATRY & NEUROLOGY

## 2024-09-15 PROCEDURE — A9270 NON-COVERED ITEM OR SERVICE: HCPCS

## 2024-09-15 PROCEDURE — 95714 VEEG EA 12-26 HR UNMNTR: CPT | Performed by: PSYCHIATRY & NEUROLOGY

## 2024-09-15 PROCEDURE — 99232 SBSQ HOSP IP/OBS MODERATE 35: CPT | Performed by: STUDENT IN AN ORGANIZED HEALTH CARE EDUCATION/TRAINING PROGRAM

## 2024-09-15 PROCEDURE — 83605 ASSAY OF LACTIC ACID: CPT | Mod: 91

## 2024-09-15 PROCEDURE — 700111 HCHG RX REV CODE 636 W/ 250 OVERRIDE (IP): Mod: JZ | Performed by: STUDENT IN AN ORGANIZED HEALTH CARE EDUCATION/TRAINING PROGRAM

## 2024-09-15 PROCEDURE — 700111 HCHG RX REV CODE 636 W/ 250 OVERRIDE (IP): Performed by: STUDENT IN AN ORGANIZED HEALTH CARE EDUCATION/TRAINING PROGRAM

## 2024-09-15 PROCEDURE — 95819 EEG AWAKE AND ASLEEP: CPT | Mod: 26 | Performed by: PSYCHIATRY & NEUROLOGY

## 2024-09-15 PROCEDURE — 95700 EEG CONT REC W/VID EEG TECH: CPT | Performed by: PSYCHIATRY & NEUROLOGY

## 2024-09-15 PROCEDURE — 36415 COLL VENOUS BLD VENIPUNCTURE: CPT

## 2024-09-15 PROCEDURE — 80235 DRUG ASSAY LACOSAMIDE: CPT

## 2024-09-15 RX ORDER — MORPHINE SULFATE 4 MG/ML
3 INJECTION INTRAVENOUS EVERY 4 HOURS PRN
Status: DISCONTINUED | OUTPATIENT
Start: 2024-09-15 | End: 2024-09-16

## 2024-09-15 RX ORDER — OXYCODONE HYDROCHLORIDE 5 MG/1
5 TABLET ORAL EVERY 4 HOURS PRN
Status: DISCONTINUED | OUTPATIENT
Start: 2024-09-15 | End: 2024-09-18 | Stop reason: HOSPADM

## 2024-09-15 RX ORDER — LANOLIN ALCOHOL/MO/W.PET/CERES
400 CREAM (GRAM) TOPICAL DAILY
Status: DISCONTINUED | OUTPATIENT
Start: 2024-09-15 | End: 2024-09-18 | Stop reason: HOSPADM

## 2024-09-15 RX ORDER — CYCLOBENZAPRINE HCL 10 MG
10 TABLET ORAL ONCE
Status: COMPLETED | OUTPATIENT
Start: 2024-09-15 | End: 2024-09-15

## 2024-09-15 RX ADMIN — NICOTINE 14 MG: 14 PATCH TRANSDERMAL at 07:19

## 2024-09-15 RX ADMIN — MORPHINE SULFATE 3 MG: 4 INJECTION, SOLUTION INTRAMUSCULAR; INTRAVENOUS at 16:49

## 2024-09-15 RX ADMIN — DIAZEPAM 2 MG: 2 TABLET ORAL at 06:42

## 2024-09-15 RX ADMIN — MORPHINE SULFATE 3 MG: 4 INJECTION, SOLUTION INTRAMUSCULAR; INTRAVENOUS at 10:37

## 2024-09-15 RX ADMIN — MOMETASONE FUROATE AND FORMOTEROL FUMARATE DIHYDRATE 2 PUFF: 200; 5 AEROSOL RESPIRATORY (INHALATION) at 06:44

## 2024-09-15 RX ADMIN — DIVALPROEX SODIUM 750 MG: 250 TABLET, EXTENDED RELEASE ORAL at 06:44

## 2024-09-15 RX ADMIN — MOMETASONE FUROATE AND FORMOTEROL FUMARATE DIHYDRATE 2 PUFF: 200; 5 AEROSOL RESPIRATORY (INHALATION) at 16:50

## 2024-09-15 RX ADMIN — OXYCODONE HYDROCHLORIDE 5 MG: 5 TABLET ORAL at 08:27

## 2024-09-15 RX ADMIN — LACOSAMIDE 100 MG: 100 TABLET, FILM COATED ORAL at 06:38

## 2024-09-15 RX ADMIN — MORPHINE SULFATE 3 MG: 4 INJECTION, SOLUTION INTRAMUSCULAR; INTRAVENOUS at 21:06

## 2024-09-15 RX ADMIN — DIVALPROEX SODIUM 750 MG: 250 TABLET, EXTENDED RELEASE ORAL at 16:50

## 2024-09-15 RX ADMIN — CYCLOBENZAPRINE 10 MG: 10 TABLET, FILM COATED ORAL at 03:24

## 2024-09-15 RX ADMIN — AMLODIPINE BESYLATE 5 MG: 5 TABLET ORAL at 06:44

## 2024-09-15 RX ADMIN — Medication 400 MG: at 06:44

## 2024-09-15 RX ADMIN — LOSARTAN POTASSIUM 50 MG: 50 TABLET, FILM COATED ORAL at 06:41

## 2024-09-15 RX ADMIN — DIAZEPAM 5 MG: 5 TABLET ORAL at 06:41

## 2024-09-15 RX ADMIN — LORAZEPAM 4 MG: 2 INJECTION INTRAMUSCULAR; INTRAVENOUS at 15:40

## 2024-09-15 ASSESSMENT — COGNITIVE AND FUNCTIONAL STATUS - GENERAL
DAILY ACTIVITIY SCORE: 24
SUGGESTED CMS G CODE MODIFIER MOBILITY: CJ
WALKING IN HOSPITAL ROOM: A LITTLE
SUGGESTED CMS G CODE MODIFIER DAILY ACTIVITY: CH
MOBILITY SCORE: 22
CLIMB 3 TO 5 STEPS WITH RAILING: A LITTLE

## 2024-09-15 ASSESSMENT — LIFESTYLE VARIABLES
EVER HAD A DRINK FIRST THING IN THE MORNING TO STEADY YOUR NERVES TO GET RID OF A HANGOVER: NO
HOW MANY TIMES IN THE PAST YEAR HAVE YOU HAD 5 OR MORE DRINKS IN A DAY: 0
HAVE PEOPLE ANNOYED YOU BY CRITICIZING YOUR DRINKING: NO
ALCOHOL_USE: NO
AVERAGE NUMBER OF DAYS PER WEEK YOU HAVE A DRINK CONTAINING ALCOHOL: 0
EVER FELT BAD OR GUILTY ABOUT YOUR DRINKING: NO
CONSUMPTION TOTAL: NEGATIVE
HAVE YOU EVER FELT YOU SHOULD CUT DOWN ON YOUR DRINKING: NO
TOTAL SCORE: 0
DOES PATIENT WANT TO STOP DRINKING: NO
ON A TYPICAL DAY WHEN YOU DRINK ALCOHOL HOW MANY DRINKS DO YOU HAVE: 0

## 2024-09-15 ASSESSMENT — SOCIAL DETERMINANTS OF HEALTH (SDOH)
WITHIN THE PAST 12 MONTHS, YOU WORRIED THAT YOUR FOOD WOULD RUN OUT BEFORE YOU GOT THE MONEY TO BUY MORE: NEVER TRUE
IN THE PAST 12 MONTHS, HAS THE ELECTRIC, GAS, OIL, OR WATER COMPANY THREATENED TO SHUT OFF SERVICE IN YOUR HOME?: NO
WITHIN THE LAST YEAR, HAVE YOU BEEN KICKED, HIT, SLAPPED, OR OTHERWISE PHYSICALLY HURT BY YOUR PARTNER OR EX-PARTNER?: NO
WITHIN THE LAST YEAR, HAVE YOU BEEN AFRAID OF YOUR PARTNER OR EX-PARTNER?: NO
WITHIN THE LAST YEAR, HAVE TO BEEN RAPED OR FORCED TO HAVE ANY KIND OF SEXUAL ACTIVITY BY YOUR PARTNER OR EX-PARTNER?: NO
WITHIN THE PAST 12 MONTHS, THE FOOD YOU BOUGHT JUST DIDN'T LAST AND YOU DIDN'T HAVE MONEY TO GET MORE: NEVER TRUE
WITHIN THE LAST YEAR, HAVE YOU BEEN HUMILIATED OR EMOTIONALLY ABUSED IN OTHER WAYS BY YOUR PARTNER OR EX-PARTNER?: NO

## 2024-09-15 ASSESSMENT — PAIN DESCRIPTION - PAIN TYPE
TYPE: ACUTE PAIN

## 2024-09-15 ASSESSMENT — FIBROSIS 4 INDEX: FIB4 SCORE: 1.91

## 2024-09-15 ASSESSMENT — PATIENT HEALTH QUESTIONNAIRE - PHQ9
SUM OF ALL RESPONSES TO PHQ9 QUESTIONS 1 AND 2: 0
1. LITTLE INTEREST OR PLEASURE IN DOING THINGS: NOT AT ALL
2. FEELING DOWN, DEPRESSED, IRRITABLE, OR HOPELESS: NOT AT ALL

## 2024-09-15 NOTE — ED NOTES
Patient given medication as per MAR, education given, patient verbalize understanding.      [No] : No [] : No [No falls in past year] : Patient reported no falls in the past year [0] : 2) Feeling down, depressed, or hopeless: Not at all (0) [[id number]] : 0

## 2024-09-15 NOTE — CONSULTS
Neurology Initial Consult H&P  Neurohospitalist Service, [facility]    Referring Physician: Phoenix Marin M.D.    Chief Complaint   Patient presents with    Seizure     Pt BIB EMS. Per report pt was at work, he's a  at a [employer] in [place]. Pt found by staff having a seizure outside, pt with hx of seizures. Pt comes to [facility] A&O x 4, complaining of ABD pain. Which he states happens after having a seizure due to his contractions. Pt given 15mg Versed PTA (5mg IN/ 5mg IM/ 5mg IV).  PTA.     Abdominal Pain       HPI: Donal Carpio is a 54 y.o. person  presenting with recurrent unwitnessed seizure-like activity for whom neurology has been consulted for further work up and evaluation. Patient has a significant past medical history for seizure-like spells, ETOH withdrawal delirium, and chronic abdominal pain. Patient states that he has had seizures since he was 6 years old. Reports to not having seizures for over 20 years until 2020 he began having frequent seizures. He states he is being followed by [facility] neurology and sees Dr. Smith who has been managing his seizures. He reports that his seizure frequency has increased the past 3 weeks with multiple episodes in one day. One week ago reports Dr. Smith had decreased his Vimpat dose. He denies medication changes to his AED's for the past one year but states he has been getting worsening frequency of seizures over the past year. No change in daily activities or increased stress. Reports to getting good sleep without difficulty. He denies any ETOH. However, does state during conversation that he enjoys his pizza and beer. He does have a significant history for ETOH abuse, unsure if patient has sustained from this. He also reports to having flu-like symptoms for the past one week as well with associated diarrhea. Currently, he reports to pain in his abdomen which he states he always has post seizure-like activity. He states he had  multiple seizure episodes this morning, nursing staff report to not witnessing these events. No other acute neurological events noted on exam aside from above.     Review of systems: In addition to what is detailed in the HPI above, all other systems reviewed and are negative.    Past Medical History:    has a past medical history of Alcohol withdrawal delirium (HCC) (9/19/2022), Drug-seeking behavior, Peptic ulcer, Seizure (HCC), Seizure disorder (HCC), and Ulcer of abdomen wall (HCC).    FHx:  family history is not on file.    SHx:   reports that he has never smoked. He has never used smokeless tobacco. He reports that he does not currently use alcohol. He reports current drug use. Drugs: Inhaled and Marijuana.    Allergies:  Allergies   Allergen Reactions    Penicillins Unspecified     Unknown childhood reaction        Medications:    Current Facility-Administered Medications:     magnesium oxide tablet 400 mg, 400 mg, Oral, DAILY, JACKLYN CoxN.KIMBERLEE., 400 mg at 09/15/24 0644    oxyCODONE immediate-release (Roxicodone) tablet 5 mg, 5 mg, Oral, Q4HRS PRN, Phoenix Marin M.D., 5 mg at 09/15/24 0827    morphine 4 MG/ML injection 3 mg, 3 mg, Intravenous, Q4HRS PRN, Phoenix Marin M.D.    amLODIPine (Norvasc) tablet 5 mg, 5 mg, Oral, DAILY, Ceferino Jones M.D., 5 mg at 09/15/24 0644    mometasone-formoterol (Dulera) 200-5 MCG/ACT inhaler 2 Puff, 2 Puff, Inhalation, BID, Ceferino Jones M.D., 2 Puff at 09/15/24 0644    diazePAM (Valium) tablet 5 mg, 5 mg, Oral, DAILY, Ceferino Jones M.D., 5 mg at 09/15/24 0641    diazePAM (Valium) tablet 2 mg, 2 mg, Oral, DAILY, Ceferino Jones M.D., 2 mg at 09/15/24 0642    divalproex ER (Depakote ER) tablet 750 mg, 750 mg, Oral, BID, Ceferino Jones M.D., 750 mg at 09/15/24 0644    lacosamide (Vimpat) tablet 100 mg, 100 mg, Oral, DAILY, Ceferino Jones M.D., 100 mg at 09/15/24 0638    losartan (Cozaar) tablet 50 mg, 50 mg, Oral, DAILY, Ceferino Jones M.D., 50 mg at 09/15/24 0641     "LORazepam (Ativan) injection 4 mg, 4 mg, Intravenous, Q10 MIN PRN, Ceferino Jones M.D.    nicotine (Nicoderm) 14 MG/24HR 14 mg, 14 mg, Transdermal, Daily-0600, Ceferino Jones M.D., 14 mg at 09/15/24 0719    Physical Examination:   /69   Pulse 69   Temp 36.4 °C (97.5 °F) (Temporal)   Resp 20   Ht 1.753 m (5' 9\")   Wt 86.2 kg (190 lb)   SpO2 95%   BMI 28.06 kg/m²       General: Patient is awake and in no acute distress  Neck: There is normal range of motion  CV: Regular rate   Extremities:  Warm, dry, and intact, without peripheral lower extremity edema    NEUROLOGICAL EXAM:     Mental status: Awake, alert and fully oriented, follows commands  Speech and language: Speech is clear and fluent. The patient is able to name and repeat.  Cranial nerve exam:    CN II-III: PERRLA   CN II: Visual Fields Intact   CN III, IV, VI: EOMI w/o Nystagmus   CN V: Facial sensation intact, full strength with mastication   CN VII: Symmetrical facial movement   CN VIII: Hearing grossly normal   CN IX, X: Palate elevates at midline   CN XI: Symmetric shoulder shrug   CN XII: Tongue midline    Motor exam: There is sustained antigravity with no downward drift in bilateral arms and legs.  Normal tone/bulk. No tremors or pronator drift       Upper Extremity  Myotome R L   Shoulder flexion C5 5/5 5/5   Elbow flexion C5 5/5 5/5   Wrist extension C6 5/5 5/5   Elbow extension C7 5/5 5/5   Finger flexion C8 5/5 5/5   Finger abduction T1 5/5 5/5     Lower Extremity Myotome R L   Hip flexion L2 5/5 5/5   Knee extension L3 5/5 5/5   Ankle dorsiflexion L4 5/5 5/5   Ankle plantarflexion S1 5/5 5/5       Reflexes:   Brachioradialus  R 2/ L 2   Patellar R 2/ L 2   Achilles R 2/ L 2   Toes down going bilaterally, No clonus       Sensory exam:  Reacts to tactile in all 4 extremities, there is no neglect to double stim  Coordination: No ataxia on bilateral FTN testing      Objective Data:    Labs:  Lab Results   Component Value Date/Time    " "PROTHROMBTM 12.6 08/27/2022 06:49 AM    INR 0.95 08/27/2022 06:49 AM      Lab Results   Component Value Date/Time    WBC 10.0 09/14/2024 06:26 PM    RBC 5.53 09/14/2024 06:26 PM    HEMOGLOBIN 15.6 09/14/2024 06:26 PM    HEMATOCRIT 47.7 09/14/2024 06:26 PM    MCV 86.3 09/14/2024 06:26 PM    MCH 28.2 09/14/2024 06:26 PM    MCHC 32.7 09/14/2024 06:26 PM    MPV 8.9 (L) 09/14/2024 06:26 PM    NEUTSPOLYS 60.90 09/14/2024 06:26 PM    LYMPHOCYTES 25.10 09/14/2024 06:26 PM    MONOCYTES 10.70 09/14/2024 06:26 PM    EOSINOPHILS 2.10 09/14/2024 06:26 PM    BASOPHILS 0.70 09/14/2024 06:26 PM      Lab Results   Component Value Date/Time    SODIUM 142 09/14/2024 06:26 PM    POTASSIUM 4.0 09/14/2024 06:26 PM    CHLORIDE 106 09/14/2024 06:26 PM    CO2 23 09/14/2024 06:26 PM    GLUCOSE 93 09/14/2024 06:26 PM    BUN 15 09/14/2024 06:26 PM    CREATININE 0.94 09/14/2024 06:26 PM    BUNCREATRAT 17.8 07/20/2024 07:13 PM      No results found for: \"CHOLSTRLTOT\", \"LDL\", \"HDL\", \"TRIGLYCERIDE\"    Lab Results   Component Value Date/Time    ALKPHOSPHAT 106 (H) 09/14/2024 06:26 PM    ASTSGOT 64 (H) 09/14/2024 06:26 PM    ALTSGPT 48 09/14/2024 06:26 PM    TBILIRUBIN 0.2 09/14/2024 06:26 PM        Imaging/Testing:    I interpreted and/or reviewed the patient's neuroimaging    CT-ABDOMEN-PELVIS WITH   Final Result         1. Several decompressed small bowel loops in the mid abdomen with mild wall thickening. This could be due to underdistention or mild enteritis.   2. No bowel obstruction. No free fluid or free air in the abdomen.   3. Small hiatal hernia.      DX-CHEST-PORTABLE (1 VIEW)   Final Result      No acute cardiopulmonary disease.          Impression and Recommendations: Donal Carpio is a 54 y.o. person presenting for whom neurology has been consulted for seizure like activity. He has a significant history for seizures and is being followed by [facility] neurology with Dr. Smith. Unable to find any external records from [facility] neurology. A " chart biopsy was done and patient has a noted history for psychogenic nonepileptic seizures and was seen at [facility] in 07/2024 for a headache and seizure like activity. He denies any recent increased stress or sleep disturbances. EEG from 2022 shows no epileptiform activity. EEG ordered for this admission and is pending. He reports that his neurologist has spoken to patient about being admitted to the EMU and therefore changing medications to further treat and manage his seizure like activity. He denies any double vision, visual changes or photophobia. He also denies any fevers but does report to flu-like symptoms over the past one week with associated diarrhea. No oral trauma noted. Otherwise, no other neurological symptoms aside from above. Patient is requesting medication to help his abdominal pain, will defer this to primary team. At this time, will need to change Vimpat dosage to what he was on prior to being lowered and will need to follow up with his outpatient neurologist for titration of medications for his spells.     -EEG- pending   -Increase Vimpat dosage back to 100 mg daily   -Continue home regimen of AED's   -Will need to follow up with outpatient neurologist for further management and titration of medications  -Seizure precautions- bed in lowest position, pad bilateral side rails, call light in reach to patient.   -Will review EEG when completed       VERONICA Mcdaniels  Acute Neurology      The evaluation of the patient, and recommended management, was discussed with Dr. Ashley  Neurohospitalist, and attending provider Phoenix Marin M.D.          Please note that this dictation was created using voice recognition software.  I have made every reasonable attempt to correct obvious errors, but I expect that there are errors of grammar and possibly content that I did not discover before finalizing the note.

## 2024-09-15 NOTE — ED NOTES
Assumed care of patient, patient bedside report received from RN KEYANA . Pt AAO X 4 , respirations even and unlabored, on 2 Liters O2 via nasal canula . Introduced self as pt RN, POC discussed, call light in reach, Oxygen safety measures in place and RN traced the line, Fall risk interventions in place.

## 2024-09-15 NOTE — CARE PLAN
The patient is Stable - Low risk of patient condition declining or worsening    Shift Goals  Clinical Goals: Neuro stability; safety; pain control  Patient Goals: pain control  Family Goals: dyllan    Progress made toward(s) clinical / shift goals:  Pt AOX4; pleasant and cooperative; c/o pain to abdomen which is normal for him after seizures; he states the pain will last for approx 24 hrs; pt was given flexeril and magnesium for cramping pain; discussed POC w/ pt who states understanding; Bed low and locked; call bell and belongings in reach    Problem: Pain - Standard  Goal: Alleviation of pain or a reduction in pain to the patient’s comfort goal  Description: Target End Date:  Prior to discharge or change in level of care    Document on Vitals flowsheet    1.  Document pain using the appropriate pain scale per order or unit policy  2.  Educate and implement non-pharmacologic comfort measures (i.e. relaxation, distraction, massage, cold/heat therapy, etc.)  3.  Pain management medications as ordered  4.  Reassess pain after pain med administration per policy  5.  If opiods administered assess patient's response to pain medication is appropriate per POSS sedation scale  6.  Follow pain management plan developed in collaboration with patient and interdisciplinary team (including palliative care or pain specialists if applicable)  Outcome: Progressing     Problem: Knowledge Deficit - Standard  Goal: Patient and family/care givers will demonstrate understanding of plan of care, disease process/condition, diagnostic tests and medications  Description: Target End Date:  1-3 days or as soon as patient condition allows    Document in Patient Education    1.  Patient and family/caregiver oriented to unit, equipment, visitation policy and means for communicating concern  2.  Complete/review Learning Assessment  3.  Assess knowledge level of disease process/condition, treatment plan, diagnostic tests and medications  4.  Explain  disease process/condition, treatment plan, diagnostic tests and medications  Outcome: Progressing

## 2024-09-15 NOTE — ED PROVIDER NOTES
ED PHYSICIAN NOTE    CHIEF COMPLAINT  Chief Complaint   Patient presents with    Seizure     Pt BIB EMS. Per report pt was at work, he's a  at a [employer] in [place]. Pt found by staff having a seizure outside, pt with hx of seizures. Pt comes to [facility] A&O x 4, complaining of ABD pain. Which he states happens after having a seizure due to his contractions. Pt given 15mg Versed PTA (5mg IN/ 5mg IM/ 5mg IV).  PTA.     Abdominal Pain       EXTERNAL RECORDS REVIEWED  External ED Note patient has encounters at [facility] with diagnosis of psychogenic nonepileptic seizure .  Has been in this department with abdominal pain and seizures    HPI/ROS    OUTSIDE HISTORIAN(S):  EMS report patient had a seizure at work.  Seizure duration about 9 minutes generalized tonic-clonic activity.  Given 15 mg of Versed prior to arrival.    Donal Carpio is a 54 y.o. person who presents after having several seizures.  He has been sick over the last week with cough, congestion, runny nose.  Over the last few days he has had nausea vomiting diarrhea with dark stool.  He complains of abdominal pain but says that he generally gets abdominal pain after having a seizure from abdominal wall contractions.  He has not had a fever that he is aware of.  Denies a headache.  Denies focal weakness numbness.  He denies drugs or alcohol.    PAST MEDICAL HISTORY  Past Medical History:   Diagnosis Date    Alcohol withdrawal delirium (HCC) 9/19/2022    Drug-seeking behavior     Peptic ulcer     Seizure (HCC)     Seizure disorder (HCC)     Ulcer of abdomen wall (HCC)        SOCIAL HISTORY  Social History     Tobacco Use    Smoking status: Never    Smokeless tobacco: Never   Vaping Use    Vaping status: Former    Substances: Nicotine   Substance Use Topics    Alcohol use: Not Currently    Drug use: Yes     Types: Inhaled, Marijuana     Comment: cannabis       CURRENT MEDICATIONS  Home Medications    **Home medications have not yet been  "reviewed for this encounter**       Audit from Redirected Encounters    **Home medications have not yet been reviewed for this encounter**         ALLERGIES  Allergies   Allergen Reactions    Penicillins Unspecified     Unknown childhood reaction        PHYSICAL EXAM  VITAL SIGNS: /70   Pulse 94   Temp 36.7 °C (98 °F) (Temporal)   Resp 20   Ht 1.753 m (5' 9\")   Wt 86.2 kg (190 lb)   SpO2 92%   BMI 28.06 kg/m²    Constitutional: Awake and alert  HENT: Normal inspection  Eyes: Normal inspection  Neck: Grossly normal range of motion.  Cardiovascular: Normal heart rate, Normal rhythm.  Symmetric peripheral pulses.   Thorax & Lungs: No respiratory distress, No wheezing, No rales, No rhonchi, No chest tenderness.   Abdomen: Bowel sounds normal, soft, non-distended, nontender, no mass  Skin: No obvious rash.  Back: No tenderness, No CVA tenderness.   Extremities: No clubbing, cyanosis, edema, no Homans or cords.  Neurologic: Awake alert oriented.  Symmetric motor and sensory.  Psychiatric: Normal for situation     DIAGNOSTIC STUDIES / PROCEDURES  LABS/EKG  Results for orders placed or performed during the hospital encounter of 09/14/24   CBC WITH DIFFERENTIAL   Result Value Ref Range    WBC 10.0 4.8 - 10.8 K/uL    RBC 5.53 4.70 - 6.10 M/uL    Hemoglobin 15.6 14.0 - 18.0 g/dL    Hematocrit 47.7 42.0 - 52.0 %    MCV 86.3 81.4 - 97.8 fL    MCH 28.2 27.0 - 33.0 pg    MCHC 32.7 32.3 - 36.5 g/dL    RDW 48.3 35.9 - 50.0 fL    Platelet Count 252 164 - 446 K/uL    MPV 8.9 (L) 9.0 - 12.9 fL    Neutrophils-Polys 60.90 44.00 - 72.00 %    Lymphocytes 25.10 22.00 - 41.00 %    Monocytes 10.70 0.00 - 13.40 %    Eosinophils 2.10 0.00 - 6.90 %    Basophils 0.70 0.00 - 1.80 %    Immature Granulocytes 0.50 0.00 - 0.90 %    Nucleated RBC 0.00 0.00 - 0.20 /100 WBC    Neutrophils (Absolute) 6.06 1.82 - 7.42 K/uL    Lymphs (Absolute) 2.50 1.00 - 4.80 K/uL    Monos (Absolute) 1.06 (H) 0.00 - 0.85 K/uL    Eos (Absolute) 0.21 0.00 - " 0.51 K/uL    Baso (Absolute) 0.07 0.00 - 0.12 K/uL    Immature Granulocytes (abs) 0.05 0.00 - 0.11 K/uL    NRBC (Absolute) 0.00 K/uL   COMP METABOLIC PANEL   Result Value Ref Range    Sodium 142 135 - 145 mmol/L    Potassium 4.0 3.6 - 5.5 mmol/L    Chloride 106 96 - 112 mmol/L    Co2 23 20 - 33 mmol/L    Anion Gap 13.0 7.0 - 16.0    Glucose 93 65 - 99 mg/dL    Bun 15 8 - 22 mg/dL    Creatinine 0.94 0.50 - 1.40 mg/dL    Calcium 9.4 8.5 - 10.5 mg/dL    Correct Calcium 9.3 8.5 - 10.5 mg/dL    AST(SGOT) 64 (H) 12 - 45 U/L    ALT(SGPT) 48 2 - 50 U/L    Alkaline Phosphatase 106 (H) 30 - 99 U/L    Total Bilirubin 0.2 0.1 - 1.5 mg/dL    Albumin 4.1 3.2 - 4.9 g/dL    Total Protein 7.2 6.0 - 8.2 g/dL    Globulin 3.1 1.9 - 3.5 g/dL    A-G Ratio 1.3 g/dL   ESTIMATED GFR   Result Value Ref Range    GFR (CKD-EPI) 97 >60 mL/min/1.73 m 2   EKG   Result Value Ref Range    Report       [facility] Emergency [facility]    Test Date:  2024  Pt Name:    JERICHO ALBA                  Department: ER  MRN:        [id number]                      Room:        02  Gender:     Male                         Technician: [id number]  :        1970                   Requested By:ER TRIAGE PROTOCOL  Order #:    [id number]                    Reading MD: DUSTIN SHELDON MD    Measurements  Intervals                                Axis  Rate:       121                          P:          51  MS:         140                          QRS:        -16  QRSD:       90                           T:          1  QT:         301  QTc:        427    Interpretive Statements  Sinus tachycardia  Probable left atrial enlargement  Borderline left axis deviation  Low voltage, precordial leads  Minimal ST depression, lateral leads  Baseline wander in lead(s) V2  Compared to ECG 2024 22:36:16  Low QRS voltage now present  Sinus rhythm no longer present  T-wave abnormality no l onger present  Q waves no longer present  ST (T wave) deviation  still present  Electronically Signed On 09- 20:10:16 PDT by DUSTIN SHELDON MD     POC CoV-2, FLU A/B, RSV by PCR   Result Value Ref Range    POC Influenza A RNA, PCR Negative Negative    POC Influenza B RNA, PCR Negative Negative    POC RSV, by PCR Negative Negative    POC SARS-CoV-2, PCR NotDetected NotDetected      I have independently interpreted this EKG as documented above    Rhythm strip interpretation-sinus rhythm    RADIOLOGY  CT-ABDOMEN-PELVIS WITH   Final Result         1. Several decompressed small bowel loops in the mid abdomen with mild wall thickening. This could be due to underdistention or mild enteritis.   2. No bowel obstruction. No free fluid or free air in the abdomen.   3. Small hiatal hernia.      DX-CHEST-PORTABLE (1 VIEW)   Final Result      No acute cardiopulmonary disease.            COURSE & MEDICAL DECISION MAKING    INITIAL ASSESSMENT, COURSE AND PLAN  Care Narrative: Patient presents after having seizure activity.  Reported seizure disorder although also has documented psychogenic nonepileptic seizure.  He is on valproic acid and Vimpat.  Reports compliance with his medications.  He is neurologically intact although tachycardic complaining of pain.  Could have breakthrough seizures secondary to lower seizure threshold with illness.  Will evaluate for cause of illness.  Ordered laboratory data.  Will obtain CT abdomen given degree of pain.  Given that he is neurologically intact no indication for CT imaging of the brain.  He will be given IV fluid as he is tachycardic.  Treat abdominal pain with morphine Zofran.    Laboratory data returned unalarming.  Await valproic acid level.    While patient was in CT scan he had a generalized tonic-clonic seizure.  Called emergently to the bedside.  He was attended to immediately.  He was given 2 mg of IV lorazepam.  Seizure aborted.    Patient reports that his neurologist had spoken with him about hospital admission for EEG.  Given  repeated seizures I believe admission is indicated at this time.  Neurology can be consulted for evaluation.  Hospitalist was yasmeen.    Discussed case with Dr. Winter    Interventions  Medications   LR infusion (bolus) (1,000 mL Intravenous New Bag 9/14/24 1826)   morphine 4 MG/ML injection 4 mg (4 mg Intravenous Given 9/14/24 1812)   ondansetron (Zofran) syringe/vial injection 4 mg (4 mg Intravenous Given 9/14/24 1812)   iohexol (OMNIPAQUE) 350 mg/mL (IV) (100 mL Intravenous Given 9/14/24 1930)   LORazepam (Ativan) injection 2 mg (2 mg Intravenous Given 9/14/24 1855)           DISPOSITION AND DISCUSSIONS  I have discussed management of the patient with the following physicians and CAROLA's:  as noted above      FINAL IMPRESSION  1.  Recurrent seizures  2.  Abdominal pain  3.  Enteritis    CRITICAL CARE  The very real possibilty of a deterioration of this patient's condition required the highest level of my preparedness for sudden, emergent intervention.  I provided critical care services, which included medication orders, frequent reevaluations of the patient's condition and response to treatment, ordering and reviewing test results, and discussing the case with various consultants.  The critical care time associated with the care of the patient was 40 minutes. Review chart for interventions. This time is exclusive of any other billable procedures.       This dictation was created using voice recognition software. The accuracy of the dictation is limited to the abilities of the software. I expect there may be some errors of grammar and possibly content. The nursing notes were reviewed and certain aspects of this information were incorporated into this note.    Electronically signed by: Abran Gamez M.D., 9/14/2024

## 2024-09-15 NOTE — ED NOTES
Seizure Precautions in Place:   Two siderails up  Cushioned/padded side rails  Bed in low position  Low environmental stimuli   O2 on, within reach  Suction on, equipment ready and within reach

## 2024-09-15 NOTE — ED TRIAGE NOTES
Chief Complaint   Patient presents with    Seizure     Pt BIB EMS. Per report pt was at work, he's a  at a [employer] in [place]. Pt found by staff having a seizure outside, pt with hx of seizures. Pt comes to [facility] A&O x 4, complaining of ABD pain. Which he states happens after having a seizure due to his contractions. Pt given 15mg Versed PTA (5mg IN/ 5mg IM/ 5mg IV).  PTA.     Abdominal Pain     EKG performed.

## 2024-09-15 NOTE — PROGRESS NOTES
Notified that patient was seizing by nursing staff. Prior to arriving to see the patient he was administered 4mg of IV Ativan which was reported to stop this event.     Charge RN witnessed this episode and describes it as full body tremors with an up and to the left gaze (conjugate) but then quickly closed his eyes for the remainder of the seizure like activity. He also was reported to be arching his back during this episode, patients wife at bedside also witnessed event and reports that this is how his normal presentation is during these spells. Sometimes wife at bedside reports he has the same right upper extremity, hand tremor, though consciousness is preserved during it.     Patient did awake to verbal stimuli after a few minutes and reported to abdominal pain for which he states ice packs and morphine help his pain. Ice pack provided to patient by nursing staff to assist in his abdominal pain.     Continuous EEG to be placed for spell characterization.

## 2024-09-15 NOTE — ED NOTES
Bedside report to Alysha RN.  POC discussed with patient. Call light within reach, all needs addressed at this time.         Fall risk interventions in place: In place(all applicable per [place] Fall risk assessment)   Continuous monitoring: Cardiac Leads, Pulse Ox, or Blood Pressure  IVF/IV medications: Not Applicable   Oxygen: Room Air  Bedside sitter: Not Applicable   Isolation: Not Applicable

## 2024-09-15 NOTE — PROCEDURES
VIDEO ELECTROENCEPHALOGRAM REPORT      Referring provider: Dr. Marin    DOS: 09/15/24 (total recording of 32 minutes).     INDICATION:  Donal Carpio 54 y.o. person presenting with seizure     CURRENT ANTIEPILEPTIC REGIMEN:   LCM  VPA  Valium    TECHNIQUE: 30 channel video electroencephalogram (EEG) was performed in accordance with the international 10-20 system. The study was reviewed in bipolar and referential montages. The recording examined the patient during   awake, drowsy and sleep states    DESCRIPTION OF THE RECORD:  During the wakefulness, the background showed a symmetrical 9 Hz alpha activity posteriorly with amplitude of 70 mV.  There was reactivity to eye closure/opening.  A normal anterior-posterior gradient was noted with faster beta frequencies seen anteriorly.  During drowsiness, increased theta/beta frequencies were seen.    During the sleep state,symmetrical sleep spindles and vertex sharps were seen in the leads over the central regions. No slow wave stage seen.     ACTIVATION PROCEDURES:     None     ICTAL AND/OR INTERICTAL FINDINGS:   No focal or generalized epileptiform activity noted. No regional slowing was seen during this routine study.  No clinical events or seizures were reported or recorded during the study.     EKG: sampling of the EKG recording demonstrated sinus rhythm.     EVENTS: none    INTERPRETATION:    This is a normal video EEG recording in the awake, drowsy/sleep state(s).    Note: A normal EEG does not rule out epilepsy.Clinical correlation is recommended.       Roscoe Adams MD  Diplomate in Neurology&Epilepsy  Office: [phone]  Fax: [phone]

## 2024-09-15 NOTE — ED NOTES
Patient report to RN, Patient transfer to the floor, accompanied by transport, patient on 2 liters O2 via nasal canula, patient AAO X4, respirations even and unlabored, patient in possession of personal belongings.

## 2024-09-15 NOTE — H&P
Hospital Medicine History & Physical Note    Date of Service  9/14/2024    Primary Care Physician  Bobby Batres M.D.    Consultants  None     Code Status  Full Code    Chief Complaint  Chief Complaint   Patient presents with    Seizure     Pt BIB EMS. Per report pt was at work, he's a  at a [employer] in [place]. Pt found by staff having a seizure outside, pt with hx of seizures. [facility], complaining of ABD pain. Which he states happens after having a seizure due to his contractions. Pt given 15mg Versed PTA (5mg IN/ 5mg IM/ 5mg IV).  PTA.     Abdominal Pain       History of Presenting Illness  Donal Carpio is a 54 y.o. person with past medical history of seizure disorder who presented 9/14/2024 with seizure at work.  Patient reports he is a  reports bystanders told him it was 7 minutes long.  He denies any tongue biting or urinary or fecal incontinence.  Does report having flulike symptoms.  He was given 15 mg of Versed prior to arrival.  In the ER, patient noted to have another seizure given lorazepam.  On chart review it appears patient supposed to have a continuous video EEG.  Will hospitalize the patient on neurology floor given his recurrent seizures.    I discussed the plan of care with patient and ERP .    Review of Systems  Review of Systems   Neurological:  Positive for seizures.       Past Medical History   has a past medical history of Alcohol withdrawal delirium (HCC) (9/19/2022), Drug-seeking behavior, Peptic ulcer, Seizure (HCC), Seizure disorder (HCC), and Ulcer of abdomen wall (HCC).    Surgical History   has a past surgical history that includes appendectomy; pr upper gi endoscopy,diagnosis (N/A, 6/30/2022); pr upper gi endoscopy,biopsy (N/A, 6/30/2022); pr lap,diagnostic abdomen (N/A, 7/7/2022); and wound exploration ortho (Right, 7/7/2022).     Family History  family history is not on file.   Family history reviewed with patient. There is no family  history that is pertinent to the chief complaint.     Social History   reports that he has never smoked. He has never used smokeless tobacco. He reports that he does not currently use alcohol. He reports current drug use. Drugs: Inhaled and Marijuana.    Allergies  Allergies   Allergen Reactions    Penicillins Unspecified     Unknown childhood reaction        Medications  Prior to Admission Medications   Prescriptions Last Dose Informant Patient Reported? Taking?   Multiple Vitamins-Minerals (MULTIVITAMIN MEN) Tab 9/14/2024 at 0900 Patient Yes Yes   Sig: Take 1 Tablet by mouth every day.   Rimegepant Sulfate (NURTEC) 75 MG TABLET DISPERSIBLE 9/13/2024 at 0900 Patient Yes Yes   Sig: Take 75 mg by mouth every 48 hours.   amLODIPine (NORVASC) 5 MG Tab 9/14/2024 at 0900  No Yes   Sig: TAKE 1 TABLET BY MOUTH EVERY DAY   budesonide-formoterol (SYMBICORT) 80-4.5 MCG/ACT Aerosol 9/14/2024 at 1000  No Yes   Sig: Inhale 2 Puffs 2 times a day.   diazePAM (VALIUM) 5 MG Tab 9/14/2024 at 0900  Yes Yes   Sig: Take 5 mg by mouth every day. Take with Diazepam 2 mg = 7 mg   diazePAM (VALIUM) 5 MG Tab 9/14/2024 at 0900  Yes Yes   Sig: Take 5 mg by mouth every day. Take with diazepam 2 mg = 5 mg   divalproex ER (DEPAKOTE ER) 250 MG TABLET SR 24 HR 9/14/2024 at 0900  Yes Yes   Sig: Take 250 mg by mouth 2 times a day. Take with Depakote  mg = 750 mg   divalproex ER (DEPAKOTE ER) 500 MG TABLET SR 24 HR 9/14/2024 at 0900  Yes Yes   Sig: Take 500 mg by mouth 2 times a day. Take with Depakote  mg = 750 mg   ferrous sulfate 325 (65 Fe) MG tablet 9/14/2024 at 0900  No Yes   Sig: TAKE 1 TABLET BY MOUTH EVERY DAY   lacosamide (VIMPAT) 100 MG Tab tablet 9/14/2024 at 0900  Yes Yes   Sig: Take 100 mg by mouth every day.   losartan (COZAAR) 50 MG Tab 9/14/2024 at 0900  No Yes   Sig: Take 1 Tablet by mouth every day.   nicotine (NICODERM) 21 MG/24HR PATCH 24 HR 9/13/2024 at 1000  No Yes   Sig: Place 1 Patch on the skin every 24 hours.    omeprazole (PRILOSEC) 40 MG delayed-release capsule 9/14/2024 at 0900  No Yes   Sig: TAKE 1 CAPSULE BY MOUTH TWICE A DAY   sucralfate (CARAFATE) 1 GM Tab 9/14/2024 at 0900  Yes Yes   Sig: Take 1 g by mouth 2 times a day.      Facility-Administered Medications: None       Physical Exam  Temp:  [36.7 °C (98 °F)] 36.7 °C (98 °F)  Pulse:  [] 90  Resp:  [18-24] 18  BP: (123-136)/(70-80) 123/80  SpO2:  [89 %-92 %] 89 %  Blood Pressure: 123/80   Temperature: 36.7 °C (98 °F)   Pulse: 90   Respiration: 18   Pulse Oximetry: 89 %       Physical Exam  Vitals and nursing note reviewed.   Constitutional:       Appearance: Normal appearance.   HENT:      Head: Normocephalic and atraumatic.      Right Ear: Tympanic membrane normal.      Left Ear: Tympanic membrane normal.      Nose: Nose normal.      Mouth/Throat:      Mouth: Mucous membranes are moist.      Pharynx: Oropharynx is clear.   Eyes:      Extraocular Movements: Extraocular movements intact.      Pupils: Pupils are equal, round, and reactive to light.   Cardiovascular:      Rate and Rhythm: Normal rate and regular rhythm.      Pulses: Normal pulses.      Heart sounds: Normal heart sounds.   Pulmonary:      Effort: Pulmonary effort is normal.      Breath sounds: Normal breath sounds.   Abdominal:      General: Bowel sounds are normal. There is no distension.      Palpations: Abdomen is soft. There is no mass.   Musculoskeletal:         General: Normal range of motion.      Cervical back: Neck supple.   Skin:     General: Skin is warm.      Capillary Refill: Capillary refill takes less than 2 seconds.   Neurological:      General: No focal deficit present.      Mental Status: He is alert and oriented to person, place, and time. Mental status is at baseline.   Psychiatric:         Mood and Affect: Mood normal.         Behavior: Behavior normal.         Laboratory:  Recent Labs     09/14/24  1826   WBC 10.0   RBC 5.53   HEMOGLOBIN 15.6   HEMATOCRIT 47.7   MCV 86.3  "  MCH 28.2   MCHC 32.7   RDW 48.3   PLATELETCT 252   MPV 8.9*     Recent Labs     09/14/24  1826   SODIUM 142   POTASSIUM 4.0   CHLORIDE 106   CO2 23   GLUCOSE 93   BUN 15   CREATININE 0.94   CALCIUM 9.4     Recent Labs     09/14/24  1826   ALTSGPT 48   ASTSGOT 64*   ALKPHOSPHAT 106*   TBILIRUBIN 0.2   GLUCOSE 93         No results for input(s): \"NTPROBNP\" in the last 72 hours.      No results for input(s): \"TROPONINT\" in the last 72 hours.    Imaging:  CT-ABDOMEN-PELVIS WITH   Final Result         1. Several decompressed small bowel loops in the mid abdomen with mild wall thickening. This could be due to underdistention or mild enteritis.   2. No bowel obstruction. No free fluid or free air in the abdomen.   3. Small hiatal hernia.      DX-CHEST-PORTABLE (1 VIEW)   Final Result      No acute cardiopulmonary disease.          X-Ray:  I have personally reviewed the images and compared with prior images.    Assessment/Plan:  Justification for Admission Status  I anticipate this patient will require at least two midnights for appropriate medical management, necessitating inpatient admission because recurrent seizures    Patient will need a Med/Surg bed on NEUROLOGY service .  The need is secondary to see above.    * Recurrent seizures (HCC)- (present on admission)  Assessment & Plan  Continue Vimpat and Depakote  EEG  IV lorazepam as needed  Seizure precautions  IV fluid received in ER    Nicotine use- (present on admission)  Assessment & Plan  Nicotine patch ordered    Hypertension- (present on admission)  Assessment & Plan  Continue with losartan and amlodipine        VTE prophylaxis: SCDs/TEDs  "

## 2024-09-15 NOTE — ED NOTES
Med rec updated and complete. Allergies reviewed.   Placed phone call to family ( wife) to confirm current medications and last doses taken.    No outpatient antibiotic use in last 30 days.    No anticoagulant or antiplatelet medications.    Preferred Pharmacy  [facility] [phone]    Prescriptions last filled at   [facility] = [phone]

## 2024-09-15 NOTE — PROGRESS NOTES
@[id number] staff notified by pt family member that pt was having a seizure. Family member states that he had generalized shaking. Charge RN into see pt and gave 4mg IV ativan. Dr Marin notified. Neuro at bedside to see patent. Vital signs stable

## 2024-09-15 NOTE — ASSESSMENT & PLAN NOTE
Continue Vimpat and Depakote, valium  Continuous EEG negative after 24 hours, discontinued  IV lorazepam as needed  Seizure precautions  Supportive care  Follow up with patients neurologist as outpatient    Continue supportive treatment.   Hopefully dc in am with [facility].

## 2024-09-15 NOTE — ED NOTES
Hourly round completed, patient resting with unlabored respirations. No current needs identified.  Gurney in low position, side rail up for pt safety. Will continue to monitor patient.

## 2024-09-15 NOTE — PROGRESS NOTES
Hospital Medicine Daily Progress Note    Date of Service  9/15/2024    Chief Complaint  Donal Carpio is a 54 y.o. person admitted 9/14/2024 with seizure.    Hospital Course    Donal Carpio is a 54 y.o. person with past medical history of seizure disorder who presented 9/14/2024 with seizure at work.  Patient reports he is a  reports bystanders told him it was 7 minutes long.  He denies any tongue biting or urinary or fecal incontinence.  Does report having flulike symptoms.  He was given 15 mg of Versed prior to arrival.  In the ER, patient noted to have another seizure given lorazepam.  On chart review it appears patient supposed to have a continuous video EEG.  Will hospitalize the patient on neurology floor given his recurrent seizures.       Interval Problem Update  No acute events overnight.  Patient reports body aches and abdominal pain post seizure.  Pain regimen adjusted.  Continue home AEDs.  Neurology consulted for recommendations.  EEG shows no active seizure activity.  Anticipate discharge to home tomorrow if remains seizure free and cleared by neurology service.    I have discussed this patient's plan of care and discharge plan at IDT rounds today with Case Management, Nursing, Nursing leadership, and other members of the IDT team.    Consultants/Specialty  neurology    Code Status  Full Code    Disposition  Medically Cleared  I have placed the appropriate orders for post-discharge needs.    Review of Systems  ROS     Physical Exam  Temp:  [36.4 °C (97.5 °F)-36.8 °C (98.2 °F)] 36.4 °C (97.5 °F)  Pulse:  [] 69  Resp:  [16-24] 20  BP: (122-151)/(66-80) 122/69  SpO2:  [90 %-96 %] 95 %    Physical Exam    Fluids    Intake/Output Summary (Last 24 hours) at 9/15/2024 1344  Last data filed at 9/15/2024 1000  Gross per 24 hour   Intake 1023 ml   Output 450 ml   Net 573 ml        Laboratory  Recent Labs     09/14/24  1826   WBC 10.0   RBC 5.53   HEMOGLOBIN 15.6   HEMATOCRIT 47.7   MCV 86.3    MCH 28.2   MCHC 32.7   RDW 48.3   PLATELETCT 252   MPV 8.9*     Recent Labs     09/14/24  1826   SODIUM 142   POTASSIUM 4.0   CHLORIDE 106   CO2 23   GLUCOSE 93   BUN 15   CREATININE 0.94   CALCIUM 9.4                   Imaging  CT-ABDOMEN-PELVIS WITH   Final Result         1. Several decompressed small bowel loops in the mid abdomen with mild wall thickening. This could be due to underdistention or mild enteritis.   2. No bowel obstruction. No free fluid or free air in the abdomen.   3. Small hiatal hernia.      DX-CHEST-PORTABLE (1 VIEW)   Final Result      No acute cardiopulmonary disease.           Assessment/Plan  * Recurrent seizures (HCC)- (present on admission)  Assessment & Plan  Continue Vimpat and Depakote  EEG  IV lorazepam as needed  Seizure precautions  IV fluid received in ER    Nicotine use- (present on admission)  Assessment & Plan  Nicotine patch ordered    Hypertension- (present on admission)  Assessment & Plan  Continue with losartan and amlodipine         VTE prophylaxis: VTE Selection    I have performed a physical exam and reviewed and updated ROS and Plan today (9/15/2024). In review of yesterday's note (9/14/2024), there are no changes except as documented above.

## 2024-09-15 NOTE — PROGRESS NOTES
4 Eyes Skin Assessment Completed by CASPER Arellano and CASPER Solorzano.    Head WDL  Ears WDL  Nose WDL  Mouth WDL  Neck WDL  Breast/Chest WDL  Shoulder Blades WDL  Spine WDL  (R) Arm/Elbow/Hand WDL  (L) Arm/Elbow/Hand WDL  Abdomen WDL  Groin Redness and Blanching  Scrotum/Coccyx/Buttocks WDL  (R) Leg WDL  (L) Leg WDL  (R) Heel/Foot/Toe WDL  (L) Heel/Foot/Toe WDL          Devices In Places Pulse Ox      Interventions In Place Pillows    Possible Skin Injury No    Pictures Uploaded Into Epic N/A  Wound Consult Placed N/A  RN Wound Prevention Protocol Ordered No

## 2024-09-15 NOTE — CARE PLAN
Problem: Pain - Standard  Goal: Alleviation of pain or a reduction in pain to the patient’s comfort goal  Outcome: Progressing     Problem: Knowledge Deficit - Standard  Goal: Patient and family/care givers will demonstrate understanding of plan of care, disease process/condition, diagnostic tests and medications  Outcome: Progressing   The patient is Stable - Low risk of patient condition declining or worsening    Shift Goals  Clinical Goals: safety, pain management  Patient Goals: pain management  Family Goals: dyllan    Progress made toward(s) clinical / shift goals:  F/A/S precautions in place. Medicated for pain PRN    Patient is not progressing towards the following goals:

## 2024-09-15 NOTE — ED NOTES
Verify with lab valporin acid was not running however appeared as collected on system, lab confirmed that they has no tube. Recollected and sent to lab. Patient encouraged to provide urine specimen.

## 2024-09-15 NOTE — ED NOTES
Patient complaining of abdominal pain, 10/10 on numeric pain scale.   Patient has jerky movement of right arm, HR is 94/minute as baseline, maintaining spo2, no other signs of seizures, patient AAO X4, and able to carry out conversation, Hospitalist notified.

## 2024-09-16 LAB — LACTATE SERPL-SCNC: 1.3 MMOL/L (ref 0.5–2)

## 2024-09-16 PROCEDURE — 700102 HCHG RX REV CODE 250 W/ 637 OVERRIDE(OP)

## 2024-09-16 PROCEDURE — 700111 HCHG RX REV CODE 636 W/ 250 OVERRIDE (IP): Mod: JZ | Performed by: STUDENT IN AN ORGANIZED HEALTH CARE EDUCATION/TRAINING PROGRAM

## 2024-09-16 PROCEDURE — 700102 HCHG RX REV CODE 250 W/ 637 OVERRIDE(OP): Performed by: STUDENT IN AN ORGANIZED HEALTH CARE EDUCATION/TRAINING PROGRAM

## 2024-09-16 PROCEDURE — 99232 SBSQ HOSP IP/OBS MODERATE 35: CPT

## 2024-09-16 PROCEDURE — A9270 NON-COVERED ITEM OR SERVICE: HCPCS

## 2024-09-16 PROCEDURE — A9270 NON-COVERED ITEM OR SERVICE: HCPCS | Performed by: STUDENT IN AN ORGANIZED HEALTH CARE EDUCATION/TRAINING PROGRAM

## 2024-09-16 PROCEDURE — 83605 ASSAY OF LACTIC ACID: CPT

## 2024-09-16 PROCEDURE — 770001 HCHG ROOM/CARE - MED/SURG/GYN PRIV*

## 2024-09-16 PROCEDURE — 36415 COLL VENOUS BLD VENIPUNCTURE: CPT

## 2024-09-16 PROCEDURE — 99232 SBSQ HOSP IP/OBS MODERATE 35: CPT | Performed by: STUDENT IN AN ORGANIZED HEALTH CARE EDUCATION/TRAINING PROGRAM

## 2024-09-16 RX ORDER — DIVALPROEX SODIUM 500 MG/1
1000 TABLET, FILM COATED, EXTENDED RELEASE ORAL 2 TIMES DAILY
Status: DISCONTINUED | OUTPATIENT
Start: 2024-09-16 | End: 2024-09-18 | Stop reason: HOSPADM

## 2024-09-16 RX ORDER — AMOXICILLIN 250 MG
2 CAPSULE ORAL EVERY EVENING
Status: DISCONTINUED | OUTPATIENT
Start: 2024-09-16 | End: 2024-09-18 | Stop reason: HOSPADM

## 2024-09-16 RX ORDER — POLYETHYLENE GLYCOL 3350 17 G/17G
1 POWDER, FOR SOLUTION ORAL
Status: DISCONTINUED | OUTPATIENT
Start: 2024-09-16 | End: 2024-09-18 | Stop reason: HOSPADM

## 2024-09-16 RX ORDER — LORAZEPAM 2 MG/ML
4 INJECTION INTRAMUSCULAR
Status: DISCONTINUED | OUTPATIENT
Start: 2024-09-16 | End: 2024-09-18 | Stop reason: HOSPADM

## 2024-09-16 RX ORDER — MORPHINE SULFATE 4 MG/ML
4 INJECTION INTRAVENOUS EVERY 4 HOURS PRN
Status: DISCONTINUED | OUTPATIENT
Start: 2024-09-16 | End: 2024-09-18 | Stop reason: HOSPADM

## 2024-09-16 RX ADMIN — SENNOSIDES AND DOCUSATE SODIUM 2 TABLET: 50; 8.6 TABLET ORAL at 17:05

## 2024-09-16 RX ADMIN — MORPHINE SULFATE 4 MG: 4 INJECTION, SOLUTION INTRAMUSCULAR; INTRAVENOUS at 16:38

## 2024-09-16 RX ADMIN — OXYCODONE HYDROCHLORIDE 5 MG: 5 TABLET ORAL at 19:54

## 2024-09-16 RX ADMIN — DIVALPROEX SODIUM 1000 MG: 500 TABLET, EXTENDED RELEASE ORAL at 17:05

## 2024-09-16 RX ADMIN — DIAZEPAM 2 MG: 2 TABLET ORAL at 05:28

## 2024-09-16 RX ADMIN — MORPHINE SULFATE 3 MG: 4 INJECTION, SOLUTION INTRAMUSCULAR; INTRAVENOUS at 01:10

## 2024-09-16 RX ADMIN — MOMETASONE FUROATE AND FORMOTEROL FUMARATE DIHYDRATE 2 PUFF: 200; 5 AEROSOL RESPIRATORY (INHALATION) at 05:29

## 2024-09-16 RX ADMIN — DIAZEPAM 5 MG: 5 TABLET ORAL at 05:29

## 2024-09-16 RX ADMIN — OXYCODONE HYDROCHLORIDE 5 MG: 5 TABLET ORAL at 08:45

## 2024-09-16 RX ADMIN — NICOTINE 14 MG: 14 PATCH TRANSDERMAL at 05:28

## 2024-09-16 RX ADMIN — DIVALPROEX SODIUM 750 MG: 250 TABLET, EXTENDED RELEASE ORAL at 05:29

## 2024-09-16 RX ADMIN — OXYCODONE HYDROCHLORIDE 5 MG: 5 TABLET ORAL at 14:37

## 2024-09-16 RX ADMIN — MORPHINE SULFATE 3 MG: 4 INJECTION, SOLUTION INTRAMUSCULAR; INTRAVENOUS at 05:27

## 2024-09-16 RX ADMIN — MORPHINE SULFATE 3 MG: 4 INJECTION, SOLUTION INTRAMUSCULAR; INTRAVENOUS at 12:24

## 2024-09-16 RX ADMIN — MORPHINE SULFATE 4 MG: 4 INJECTION, SOLUTION INTRAMUSCULAR; INTRAVENOUS at 21:02

## 2024-09-16 RX ADMIN — Medication 400 MG: at 05:29

## 2024-09-16 RX ADMIN — MOMETASONE FUROATE AND FORMOTEROL FUMARATE DIHYDRATE 2 PUFF: 200; 5 AEROSOL RESPIRATORY (INHALATION) at 17:05

## 2024-09-16 RX ADMIN — AMLODIPINE BESYLATE 5 MG: 5 TABLET ORAL at 05:28

## 2024-09-16 RX ADMIN — LACOSAMIDE 100 MG: 100 TABLET, FILM COATED ORAL at 05:29

## 2024-09-16 RX ADMIN — LOSARTAN POTASSIUM 50 MG: 50 TABLET, FILM COATED ORAL at 05:29

## 2024-09-16 ASSESSMENT — ENCOUNTER SYMPTOMS
WEAKNESS: 1
INSOMNIA: 0
FOCAL WEAKNESS: 0
COUGH: 0
DIZZINESS: 0
PALPITATIONS: 0
NAUSEA: 0
FALLS: 0
SEIZURES: 1
ABDOMINAL PAIN: 0
VOMITING: 0
CHILLS: 0
EYE PAIN: 0
SENSORY CHANGE: 0
HEADACHES: 0
BLURRED VISION: 0
SHORTNESS OF BREATH: 0
FEVER: 0
BACK PAIN: 0

## 2024-09-16 ASSESSMENT — FIBROSIS 4 INDEX: FIB4 SCORE: 1.91

## 2024-09-16 ASSESSMENT — PAIN DESCRIPTION - PAIN TYPE
TYPE: ACUTE PAIN

## 2024-09-16 ASSESSMENT — LIFESTYLE VARIABLES: SUBSTANCE_ABUSE: 0

## 2024-09-16 NOTE — PROGRESS NOTES
Hospital Medicine Daily Progress Note    Date of Service  9/16/2024    Chief Complaint  Donal Carpio is a 54 y.o. person admitted 9/14/2024 with seizure.    Hospital Course    Donal Carpio is a 54 y.o. person with past medical history of seizure disorder who presented 9/14/2024 with seizure at work.  Patient reports he is a  reports bystanders told him it was 7 minutes long.  He denies any tongue biting or urinary or fecal incontinence.  Does report having flulike symptoms the past week. He is compliant with home medications. In the ER, patient noted to have another seizure given lorazepam.  On chart review it appears patient supposed to have a continuous video EEG in epilepsy monitoring unit per his neurologist, this is being arranged outpatient.  Patient hospitalized for breakthrough seizure. EEG showing no active seizure activity. Patient had recurrent seizure and so continuous EEG started, however no active seizures seen on cEEG for 24 hours, this was discontinued. Neurology recommend increasing home depakote dose to 1000 mg BID which is ordered. At this time, will monitor patient overnight, if does well can discharge home tomorrow morning with new seizure regimen. He is to follow up with his neurologist as outpatient.       Interval Problem Update  Patient with reported seizure event yesterday, given IV ativan.  Started on continuous EEG.  cEEG with no active seizure activity seen, discontinued today by neurology.  Neurology recommend increasing depakote dose to 1000 mg BID, continuing vimpat 100 mg daily and valium 7 mg daily.  Will monitor patient overnight, if feeling improved by tomorrow can discharge to home tomorrow morning.    Addendum: notified by nursing that patient with another seizure episode. Assessed at bedside, patient responsive to voice. Able to tell me his name after 2 minutes. Some minor tremor of right arm, pupils reactive, arms with changing tone and resistance to  lifting/dropping. He is not having generalized tonic clonic seizure. Discussed with nursing, do not give IV ativan for seizure event unless sustained >5 minutes with associated loss of consciousness. Will continue to monitor. If IV ativan is used please notify hospitalist and neurology.      I have discussed this patient's plan of care and discharge plan at IDT rounds today with Case Management, Nursing, Nursing leadership, and other members of the IDT team.    Consultants/Specialty  neurology    Code Status  Full Code    Disposition  The patient is not medically cleared for discharge to home or a post-acute facility.  Anticipate discharge to: home with close outpatient follow-up    I have placed the appropriate orders for post-discharge needs.    Review of Systems  Review of Systems   Constitutional:  Negative for chills and fever.   Eyes:  Negative for blurred vision and pain.   Respiratory:  Negative for cough and shortness of breath.    Cardiovascular:  Negative for chest pain, palpitations and leg swelling.   Gastrointestinal:  Negative for abdominal pain, nausea and vomiting.   Genitourinary:  Negative for dysuria and urgency.   Musculoskeletal:  Negative for back pain and falls.   Skin:  Negative for itching and rash.   Neurological:  Positive for seizures and weakness. Negative for dizziness, sensory change, focal weakness and headaches.   Psychiatric/Behavioral:  Negative for substance abuse. The patient does not have insomnia.         Physical Exam  Temp:  [36.3 °C (97.3 °F)-36.6 °C (97.9 °F)] 36.3 °C (97.3 °F)  Pulse:  [74-91] 82  Resp:  [18-22] 18  BP: (111-133)/(63-79) 118/70  SpO2:  [90 %-97 %] 97 %    Physical Exam  Vitals reviewed.   Constitutional:       General: He is not in acute distress.     Appearance: He is not diaphoretic.   HENT:      Head: Normocephalic and atraumatic.      Right Ear: External ear normal.      Left Ear: External ear normal.      Nose: Nose normal. No congestion.       Mouth/Throat:      Pharynx: No oropharyngeal exudate or posterior oropharyngeal erythema.   Eyes:      Extraocular Movements: Extraocular movements intact.      Pupils: Pupils are equal, round, and reactive to light.   Cardiovascular:      Rate and Rhythm: Normal rate and regular rhythm.   Pulmonary:      Effort: Pulmonary effort is normal. No respiratory distress.      Breath sounds: Normal breath sounds. No wheezing.   Abdominal:      General: Bowel sounds are normal. There is no distension.      Palpations: Abdomen is soft.      Tenderness: There is no abdominal tenderness.   Musculoskeletal:         General: No swelling. Normal range of motion.      Cervical back: Normal range of motion and neck supple.      Right lower leg: No edema.      Left lower leg: No edema.   Skin:     General: Skin is warm and dry.   Neurological:      General: No focal deficit present.      Mental Status: He is alert and oriented to person, place, and time.      Cranial Nerves: No cranial nerve deficit.      Sensory: No sensory deficit.      Motor: No weakness.   Psychiatric:         Mood and Affect: Mood normal.         Behavior: Behavior normal.         Fluids    Intake/Output Summary (Last 24 hours) at 9/16/2024 1438  Last data filed at 9/16/2024 0709  Gross per 24 hour   Intake --   Output 810 ml   Net -810 ml        Laboratory  Recent Labs     09/14/24  1826   WBC 10.0   RBC 5.53   HEMOGLOBIN 15.6   HEMATOCRIT 47.7   MCV 86.3   MCH 28.2   MCHC 32.7   RDW 48.3   PLATELETCT 252   MPV 8.9*     Recent Labs     09/14/24  1826   SODIUM 142   POTASSIUM 4.0   CHLORIDE 106   CO2 23   GLUCOSE 93   BUN 15   CREATININE 0.94   CALCIUM 9.4                   Imaging  CT-ABDOMEN-PELVIS WITH   Final Result         1. Several decompressed small bowel loops in the mid abdomen with mild wall thickening. This could be due to underdistention or mild enteritis.   2. No bowel obstruction. No free fluid or free air in the abdomen.   3. Small hiatal  hernia.      DX-CHEST-PORTABLE (1 VIEW)   Final Result      No acute cardiopulmonary disease.           Assessment/Plan  * Recurrent seizures (HCC)- (present on admission)  Assessment & Plan  Continue Vimpat and Depakote, valium  Continuous EEG negative after 24 hours, discontinued  IV lorazepam as needed  Seizure precautions  Supportive care  Follow up with patients neurologist as outpatient    Nicotine use- (present on admission)  Assessment & Plan  Nicotine patch ordered    Hypertension- (present on admission)  Assessment & Plan  Continue with losartan and amlodipine         VTE prophylaxis: VTE Selection    I have performed a physical exam and reviewed and updated ROS and Plan today (9/16/2024). In review of yesterday's note (9/15/2024), there are no changes except as documented above.

## 2024-09-16 NOTE — EEG PROGRESS NOTE
cEEG update at 6:24 pm    Background consisted of excessive fast frequencies with intermixed theta and delta activities during clinical drowsy/sleep states. No seizure seen.     Neurology consult team was notified,     Formal report to follow,     Roscoe Adams MD

## 2024-09-16 NOTE — PROGRESS NOTES
"Acute Neurology Progress Note  Neurohospitalist Service, Formerly [facility]      Referring Physician: Phoenix Marin M.D.      Interval History: No acute events overnight.    Review of systems: In addition to what is detailed in the HPI and/or updated in the interval history, all other systems reviewed and are negative.    Past Medical History, Past Surgical History and Social History reviewed and unchanged from prior    Medications:    Current Facility-Administered Medications:     magnesium oxide tablet 400 mg, 400 mg, Oral, DAILY, Lia Mendoza D.N.P., 400 mg at 09/16/24 0529    oxyCODONE immediate-release (Roxicodone) tablet 5 mg, 5 mg, Oral, Q4HRS PRN, Phoenix Marin M.D., 5 mg at 09/16/24 0845    morphine 4 MG/ML injection 3 mg, 3 mg, Intravenous, Q4HRS PRN, Phoenix Marin M.D., 3 mg at 09/16/24 0527    amLODIPine (Norvasc) tablet 5 mg, 5 mg, Oral, DAILY, Ceferino Jones M.D., 5 mg at 09/16/24 0528    mometasone-formoterol (Dulera) 200-5 MCG/ACT inhaler 2 Puff, 2 Puff, Inhalation, BID, Ceferino Jones M.D., 2 Puff at 09/16/24 0529    diazePAM (Valium) tablet 5 mg, 5 mg, Oral, DAILY, Ceferino Jones M.D., 5 mg at 09/16/24 0529    diazePAM (Valium) tablet 2 mg, 2 mg, Oral, DAILY, Ceferino Jones M.D., 2 mg at 09/16/24 0528    divalproex ER (Depakote ER) tablet 750 mg, 750 mg, Oral, BID, Ceferino Jones M.D., 750 mg at 09/16/24 0529    lacosamide (Vimpat) tablet 100 mg, 100 mg, Oral, DAILY, Ceferino Jones M.D., 100 mg at 09/16/24 0529    losartan (Cozaar) tablet 50 mg, 50 mg, Oral, DAILY, Ceferino Jones M.D., 50 mg at 09/16/24 0529    LORazepam (Ativan) injection 4 mg, 4 mg, Intravenous, Q10 MIN PRN, Ceferino Jones M.D., 4 mg at 09/15/24 1540    nicotine (Nicoderm) 14 MG/24HR 14 mg, 14 mg, Transdermal, Daily-0600, Ceferino Jones M.D., 14 mg at 09/16/24 0528    Physical Examination:   /70   Pulse 82   Temp 36.3 °C (97.3 °F) (Temporal)   Resp 18   Ht 1.753 m (5' 9\")   Wt 90 kg (198 lb " 6.6 oz)   SpO2 97%   BMI 29.30 kg/m²       General: Patient is awake and in no acute distress  Neck: There is normal range of motion  CV: Regular rate   Extremities:  Warm, dry, and intact, without peripheral lower extremity edema    NEUROLOGICAL EXAM:     Mental status: Awake, alert and fully oriented, follows commands  Speech and language: Speech is clear and fluent. The patient is able to name and repeat.  Cranial nerve exam:    CN II-III: PERRLA   CN II: Visual Fields Intact   CN III, IV, VI: EOMI w/o Nystagmus   CN V: Facial sensation intact, full strength with mastication   CN VII: Symmetrical facial movement   CN VIII: Hearing grossly normal   CN IX, X: Palate elevates at midline   CN XI: Symmetric shoulder shrug   CN XII: Tongue midline    Motor exam: There is sustained antigravity with no downward drift in bilateral arms and legs.  Normal tone/bulk. No tremors or pronator drift   RUE 5/5   LUE 5/5   RLL 5/5   LLL 5/5     Reflexes:   Brachioradialus  R 2/ L 2   Biceps  R 2/ L 2   Triceps  R 2/ L 2   Patellar R 2/ L 2   Achilles R 2/ L 2   Toes down going bilaterally     Sensory exam:  Reacts to tactile in all 4 extremities, there is no neglect to double stim  Coordination: No ataxia on bilateral FTN testing        Objective Data:    Labs:  Lab Results   Component Value Date/Time    PROTHROMBTM 12.6 08/27/2022 06:49 AM    INR 0.95 08/27/2022 06:49 AM      Lab Results   Component Value Date/Time    WBC 10.0 09/14/2024 06:26 PM    RBC 5.53 09/14/2024 06:26 PM    HEMOGLOBIN 15.6 09/14/2024 06:26 PM    HEMATOCRIT 47.7 09/14/2024 06:26 PM    MCV 86.3 09/14/2024 06:26 PM    MCH 28.2 09/14/2024 06:26 PM    MCHC 32.7 09/14/2024 06:26 PM    MPV 8.9 (L) 09/14/2024 06:26 PM    NEUTSPOLYS 60.90 09/14/2024 06:26 PM    LYMPHOCYTES 25.10 09/14/2024 06:26 PM    MONOCYTES 10.70 09/14/2024 06:26 PM    EOSINOPHILS 2.10 09/14/2024 06:26 PM    BASOPHILS 0.70 09/14/2024 06:26 PM      Lab Results   Component Value Date/Time     "SODIUM 142 09/14/2024 06:26 PM    POTASSIUM 4.0 09/14/2024 06:26 PM    CHLORIDE 106 09/14/2024 06:26 PM    CO2 23 09/14/2024 06:26 PM    GLUCOSE 93 09/14/2024 06:26 PM    BUN 15 09/14/2024 06:26 PM    CREATININE 0.94 09/14/2024 06:26 PM    BUNCREATRAT 17.8 07/20/2024 07:13 PM      No results found for: \"CHOLSTRLTOT\", \"LDL\", \"HDL\", \"TRIGLYCERIDE\"    Lab Results   Component Value Date/Time    ALKPHOSPHAT 106 (H) 09/14/2024 06:26 PM    ASTSGOT 64 (H) 09/14/2024 06:26 PM    ALTSGPT 48 09/14/2024 06:26 PM    TBILIRUBIN 0.2 09/14/2024 06:26 PM        Imaging/Testing:    I interpreted and/or reviewed the patient's neuroimaging    CT-ABDOMEN-PELVIS WITH   Final Result         1. Several decompressed small bowel loops in the mid abdomen with mild wall thickening. This could be due to underdistention or mild enteritis.   2. No bowel obstruction. No free fluid or free air in the abdomen.   3. Small hiatal hernia.      DX-CHEST-PORTABLE (1 VIEW)   Final Result      No acute cardiopulmonary disease.        cEEG INTERPRETATION:  Normal video EEG recording in the awake, drowsy, and sleep state(s):  -There was excess diffuse fast/beta activity that can be associated with medication such as benzodiazepines.  - No clinical events reported during this period of recording.  - Epileptiform discharges: No definitive epileptiform discharges were seen.   - No seizures. Clinical correlation is recommended.    Impression & Recommendations:      Donal Carpio is a 54 y.o. presenting for whom neurology has been consulted for seizure like activity. He has a significant history for seizures and is being followed by [facility] neurology with Dr. Smith. Unable to find any external records from [facility] neurology. A chart biopsy was done and patient has a noted history for psychogenic nonepileptic seizures and was seen at [facility] in 07/2024 for a headache and seizure like activity. He denies any recent increased stress or sleep disturbances. EEG " from 2022 shows no epileptiform activity. EEG ordered for this admission and shows no epileptiform activity. He reports that his neurologist has spoken to patient about being admitted to the EMU and therefore changing medications to further treat and manage his seizure like activity. Yesterday evening, patient had a seizure like episode and was given 4mg Ativan. A cEEG was ordered by primary team, review of EEG shows no epileptiform activity. Otherwise, no other neurological symptoms aside from above. Will need to continue Vimpat dosage as 100 mg daily and increase Depakote to 1000 mg BID as his depakote level was subtherapeutic. Discussion with patient on medications and the need to get scheduled with the EMU. He verbalizes understanding and denies any questions at this time.    Plan:  -Increase Depakote to 1000 mg BID  -Continue increased dose of Vimpat of 100 mg daily   -OK to discontinue cEEG- epileptologist and EEG tech notified   -Seizure precautions- pad bilateral side rails, bed in lowest position, call light in reach   -Will need to follow up with his outpatient neurologist, Dr. Smith. Would suggest being referred to the EMU for further work up and evaluation   -Neurology will sign off at this time, please contact with any questions or concerns     VERONICA Mcdaniels  Acute Neurology        The evaluation of the patient, and recommended management, was discussed with Dr. Grewal, the attending Acute Neurologist. I have performed a physical exam and reviewed and updated ROS and Plan today (9/16/2024).      Please note that this dictation was created using voice recognition software.  I have made every reasonable attempt to correct obvious errors, but I expect that there are errors of grammar and possibly content that I did not discover before finalizing the note.

## 2024-09-16 NOTE — PROCEDURES
[facility]    Continuous Video Electroencephalogram Report          Patient Name: Donal Carpio  MRN: [id number]  #: [id number]  Date of Service: 1719 on 9/15/2024 to 1131 on 09/16/24  Total Recording Time: 17 hours.  Referring Provider: Phoenix Marin M.D.    INDICATION:  Donal Carpio 54 y.o. person presenting with unspecified convulsions    CURRENT ANTI-SEIZURE AND OTHER PERTINENT MEDICATIONS:   Valium, Depakote and Vimpat    TECHNIQUE: CVEEG was set up by a Neurodiagnostic technologist who performed education to the patient and staff. A minimum of 23 electrodes and 23 channel recording was setup and performed by Neurodiagnostic technologist, in accordance with the international 10-20 system. Impedence, electrode integrity, and technical impressions were documented a minimum of every 2-24 hour period by a Neurodiagnostic Technologist and reviewed by Interpreting Physician. The study was reviewed in bipolar and referential montages. The recording examined the patient in the awake, drowsy, and sleep state(s).     DESCRIPTION OF THE RECORD:  EEG background: During maximal wakefulness, the background was continuous, symmetrical, and 9-12 Hz posterior dominant rhythm.  Reactivity and state changes were present.  During drowsiness, a loss of myogenic artifact and theta/delta frequencies were seen.  There was excess diffuse beta activity.    Sleep was captured and was characterized by diffuse background delta/theta activity with a loss of myogenic artifact.  N2 sleep transients in the form of sleep spindles and vertex waves were seen in the leads over the central regions.     ACTIVATION PROCEDURES:       ICTAL AND INTERICTAL FINDINGS:   There was intermittent left temporal sharp transients with an archiform appearance at times occurring in a rhythmic pattern without evolution over the left temporal region. No definite focal or generalized epileptiform activity noted.     Rare intermittent asymmetry over the left  temporal region with focal theta and rare delta.    No electroclinical or electrographic seizures were reported or recorded during the study.     EKG: Sampling of the EKG recording : Regular    EVENTS:  One clinical event was captured on EEG for symptoms described as dry mouth and metallic taste. There was no clear EEG correlate.     INTERPRETATION:  Abnormal video EEG recording in the awake, drowsy, and sleep state(s):  -There was excess diffuse fast/beta activity that can be associated with medication such as benzodiazepines.  - There was rare intermittent focal slowing over the left temporal region.  - Clinical event described as dry mouth and metallic taste was captured. There was no clear EEG correlate.   - Epileptiform discharges: No definitive epileptiform discharges were seen.   - No seizures. Clinical correlation is recommended.      Tobias Curtis MD  Neurology Attending, Epilepsy Program  [facility]

## 2024-09-16 NOTE — CARE PLAN
The patient is Stable - Low risk of patient condition declining or worsening    Shift Goals  Clinical Goals: cont. eeg monitoring; pain management  Patient Goals: pain control  Family Goals: dyllan    Progress made toward(s) clinical / shift goals:    Problem: Pain - Standard  Goal: Alleviation of pain or a reduction in pain to the patient’s comfort goal  Outcome: Progressing     Problem: Knowledge Deficit - Standard  Goal: Patient and family/care givers will demonstrate understanding of plan of care, disease process/condition, diagnostic tests and medications  Outcome: Progressing       Patient is not progressing towards the following goals:

## 2024-09-16 NOTE — EEG PROGRESS NOTE
Push-button event this morning at 0822 for reported dry-mouth and metallic taste. There was no clear EEG correlate. Interictal EEG with subtle left temporal slowing. No definite epileptiform features.

## 2024-09-16 NOTE — CARE PLAN
Problem: Pain - Standard  Goal: Alleviation of pain or a reduction in pain to the patient’s comfort goal  Outcome: Progressing     Problem: Knowledge Deficit - Standard  Goal: Patient and family/care givers will demonstrate understanding of plan of care, disease process/condition, diagnostic tests and medications  Outcome: Progressing   The patient is Stable - Low risk of patient condition declining or worsening    Shift Goals  Clinical Goals: cont. eeg monitoring; pain management  Patient Goals: pain control  Family Goals: dyllan    Progress made toward(s) clinical / shift goals:  vEEG in place. Fall and seizure precautions     Patient is not progressing towards the following goals:

## 2024-09-17 ENCOUNTER — APPOINTMENT (OUTPATIENT)
Dept: RADIOLOGY | Facility: MEDICAL CENTER | Age: 54
DRG: 101 | End: 2024-09-17
Attending: HOSPITALIST
Payer: COMMERCIAL

## 2024-09-17 PROBLEM — R00.0 TACHYCARDIA: Status: ACTIVE | Noted: 2024-09-17

## 2024-09-17 PROBLEM — R10.9 ABDOMINAL PAIN: Status: ACTIVE | Noted: 2024-09-17

## 2024-09-17 LAB — EKG IMPRESSION: NORMAL

## 2024-09-17 PROCEDURE — 770001 HCHG ROOM/CARE - MED/SURG/GYN PRIV*

## 2024-09-17 PROCEDURE — A9270 NON-COVERED ITEM OR SERVICE: HCPCS

## 2024-09-17 PROCEDURE — 700111 HCHG RX REV CODE 636 W/ 250 OVERRIDE (IP): Mod: JZ | Performed by: STUDENT IN AN ORGANIZED HEALTH CARE EDUCATION/TRAINING PROGRAM

## 2024-09-17 PROCEDURE — 700102 HCHG RX REV CODE 250 W/ 637 OVERRIDE(OP): Performed by: STUDENT IN AN ORGANIZED HEALTH CARE EDUCATION/TRAINING PROGRAM

## 2024-09-17 PROCEDURE — 74018 RADEX ABDOMEN 1 VIEW: CPT

## 2024-09-17 PROCEDURE — 99233 SBSQ HOSP IP/OBS HIGH 50: CPT | Performed by: HOSPITALIST

## 2024-09-17 PROCEDURE — 93010 ELECTROCARDIOGRAM REPORT: CPT | Performed by: INTERNAL MEDICINE

## 2024-09-17 PROCEDURE — A9270 NON-COVERED ITEM OR SERVICE: HCPCS | Performed by: STUDENT IN AN ORGANIZED HEALTH CARE EDUCATION/TRAINING PROGRAM

## 2024-09-17 PROCEDURE — 700102 HCHG RX REV CODE 250 W/ 637 OVERRIDE(OP)

## 2024-09-17 PROCEDURE — 93005 ELECTROCARDIOGRAM TRACING: CPT | Performed by: HOSPITALIST

## 2024-09-17 RX ADMIN — MOMETASONE FUROATE AND FORMOTEROL FUMARATE DIHYDRATE 2 PUFF: 200; 5 AEROSOL RESPIRATORY (INHALATION) at 17:14

## 2024-09-17 RX ADMIN — LOSARTAN POTASSIUM 50 MG: 50 TABLET, FILM COATED ORAL at 05:38

## 2024-09-17 RX ADMIN — LACOSAMIDE 100 MG: 100 TABLET, FILM COATED ORAL at 05:37

## 2024-09-17 RX ADMIN — OXYCODONE HYDROCHLORIDE 5 MG: 5 TABLET ORAL at 08:11

## 2024-09-17 RX ADMIN — NICOTINE 14 MG: 14 PATCH TRANSDERMAL at 05:38

## 2024-09-17 RX ADMIN — DIVALPROEX SODIUM 1000 MG: 500 TABLET, EXTENDED RELEASE ORAL at 05:38

## 2024-09-17 RX ADMIN — OXYCODONE HYDROCHLORIDE 5 MG: 5 TABLET ORAL at 22:10

## 2024-09-17 RX ADMIN — OXYCODONE HYDROCHLORIDE 5 MG: 5 TABLET ORAL at 17:15

## 2024-09-17 RX ADMIN — SENNOSIDES AND DOCUSATE SODIUM 2 TABLET: 50; 8.6 TABLET ORAL at 17:14

## 2024-09-17 RX ADMIN — MORPHINE SULFATE 4 MG: 4 INJECTION, SOLUTION INTRAMUSCULAR; INTRAVENOUS at 01:03

## 2024-09-17 RX ADMIN — DIAZEPAM 5 MG: 5 TABLET ORAL at 05:37

## 2024-09-17 RX ADMIN — OXYCODONE HYDROCHLORIDE 5 MG: 5 TABLET ORAL at 03:41

## 2024-09-17 RX ADMIN — DIAZEPAM 2 MG: 2 TABLET ORAL at 05:37

## 2024-09-17 RX ADMIN — Medication 400 MG: at 05:37

## 2024-09-17 RX ADMIN — DIVALPROEX SODIUM 1000 MG: 500 TABLET, EXTENDED RELEASE ORAL at 17:14

## 2024-09-17 RX ADMIN — OXYCODONE HYDROCHLORIDE 5 MG: 5 TABLET ORAL at 13:13

## 2024-09-17 RX ADMIN — AMLODIPINE BESYLATE 5 MG: 5 TABLET ORAL at 05:37

## 2024-09-17 RX ADMIN — MOMETASONE FUROATE AND FORMOTEROL FUMARATE DIHYDRATE 2 PUFF: 200; 5 AEROSOL RESPIRATORY (INHALATION) at 05:46

## 2024-09-17 ASSESSMENT — PAIN DESCRIPTION - PAIN TYPE
TYPE: ACUTE PAIN

## 2024-09-17 ASSESSMENT — ENCOUNTER SYMPTOMS
SENSORY CHANGE: 0
INSOMNIA: 0
NAUSEA: 0
EYE PAIN: 0
WEAKNESS: 1
BLURRED VISION: 0
VOMITING: 0
BACK PAIN: 0
PALPITATIONS: 0
FOCAL WEAKNESS: 0
SEIZURES: 1
FEVER: 0
COUGH: 0
ABDOMINAL PAIN: 0
FALLS: 0
HEADACHES: 0
SHORTNESS OF BREATH: 0
DIZZINESS: 0
CHILLS: 0

## 2024-09-17 ASSESSMENT — LIFESTYLE VARIABLES: SUBSTANCE_ABUSE: 0

## 2024-09-17 NOTE — PROGRESS NOTES
Hospital Medicine Daily Progress Note    Date of Service  9/17/2024    Chief Complaint  Donal Carpio is a 54 y.o. person admitted 9/14/2024 with seizure.    Hospital Course    Donal Carpio is a 54 y.o. person with past medical history of seizure disorder who presented 9/14/2024 with seizure at work.  Patient reports he is a  reports bystanders told him it was 7 minutes long.  He denies any tongue biting or urinary or fecal incontinence.  Does report having flulike symptoms the past week. He is compliant with home medications. In the ER, patient noted to have another seizure given lorazepam.  On chart review it appears patient supposed to have a continuous video EEG in epilepsy monitoring unit per his neurologist, this is being arranged outpatient.  Patient hospitalized for breakthrough seizure. EEG showing no active seizure activity. Patient had recurrent seizure and so continuous EEG started, however no active seizures seen on cEEG for 24 hours, this was discontinued. Neurology recommend increasing home depakote dose to 1000 mg BID which is ordered. At this time, will monitor patient overnight, if does well can discharge home tomorrow morning with new seizure regimen. He is to follow up with his neurologist as outpatient.       Interval Problem Update  Patient with reported seizure event yesterday, given IV ativan.  Started on continuous EEG.  cEEG with no active seizure activity seen, discontinued today by neurology.  Neurology recommend increasing depakote dose to 1000 mg BID, continuing vimpat 100 mg daily and valium 7 mg daily.  Will monitor patient overnight, if feeling improved by tomorrow can discharge to home tomorrow morning.    Addendum: notified by nursing that patient with another seizure episode. Assessed at bedside, patient responsive to voice. Able to tell me his name after 2 minutes. Some minor tremor of right arm, pupils reactive, arms with changing tone and resistance to  lifting/dropping. He is not having generalized tonic clonic seizure. Discussed with nursing, do not give IV ativan for seizure event unless sustained >5 minutes with associated loss of consciousness. Will continue to monitor. If IV ativan is used please notify hospitalist and neurology.            9/17 patient is new to me today, patient is in bed, c/o feeling ill, patient having tachycardia, no sob or chest pain, c/o abdominal pain, patient had a minor fall today as per nurse staff, patient is able to tolerate diet, I have ordered and reviewed kub and EKG, reviewed notes from previous hospitalist and neurologist, discussed with bedside nurse, charge nurse, , pharmacist.     I have discussed this patient's plan of care and discharge plan at IDT rounds today with Case Management, Nursing, Nursing leadership, and other members of the IDT team.    Consultants/Specialty  neurology    Code Status  Full Code    Disposition  The patient is not medically cleared for discharge to home or a post-acute facility.      I have placed the appropriate orders for post-discharge needs.    Review of Systems  Review of Systems   Constitutional:  Negative for chills and fever.   Eyes:  Negative for blurred vision and pain.   Respiratory:  Negative for cough and shortness of breath.    Cardiovascular:  Negative for chest pain, palpitations and leg swelling.   Gastrointestinal:  Negative for abdominal pain, nausea and vomiting.   Genitourinary:  Negative for dysuria and urgency.   Musculoskeletal:  Negative for back pain and falls.   Skin:  Negative for itching and rash.   Neurological:  Positive for seizures and weakness. Negative for dizziness, sensory change, focal weakness and headaches.   Psychiatric/Behavioral:  Negative for substance abuse. The patient does not have insomnia.         Physical Exam  Temp:  [36.5 °C (97.7 °F)] 36.5 °C (97.7 °F)  Pulse:  [] 116  Resp:  [18-20] 20  BP: (120-164)/(65-79) 160/79  SpO2:   [90 %-96 %] 93 %    Physical Exam  Vitals reviewed.   Constitutional:       General: He is not in acute distress.     Appearance: He is not diaphoretic.   HENT:      Head: Normocephalic and atraumatic.      Right Ear: External ear normal.      Left Ear: External ear normal.      Nose: Nose normal. No congestion.      Mouth/Throat:      Pharynx: No oropharyngeal exudate or posterior oropharyngeal erythema.   Eyes:      Extraocular Movements: Extraocular movements intact.      Pupils: Pupils are equal, round, and reactive to light.   Cardiovascular:      Rate and Rhythm: Normal rate and regular rhythm.   Pulmonary:      Effort: Pulmonary effort is normal. No respiratory distress.      Breath sounds: Normal breath sounds. No wheezing.   Abdominal:      General: Bowel sounds are normal. There is no distension.      Palpations: Abdomen is soft.      Tenderness: There is no abdominal tenderness.   Musculoskeletal:         General: No swelling. Normal range of motion.      Cervical back: Normal range of motion and neck supple.      Right lower leg: No edema.      Left lower leg: No edema.   Skin:     General: Skin is warm and dry.   Neurological:      General: No focal deficit present.      Mental Status: He is alert and oriented to person, place, and time.      Cranial Nerves: No cranial nerve deficit.      Sensory: No sensory deficit.      Motor: No weakness.   Psychiatric:         Mood and Affect: Mood normal.         Behavior: Behavior normal.         Fluids    Intake/Output Summary (Last 24 hours) at 9/17/2024 1605  Last data filed at 9/17/2024 1200  Gross per 24 hour   Intake 240 ml   Output 1350 ml   Net -1110 ml        Laboratory  Recent Labs     09/14/24  1826   WBC 10.0   RBC 5.53   HEMOGLOBIN 15.6   HEMATOCRIT 47.7   MCV 86.3   MCH 28.2   MCHC 32.7   RDW 48.3   PLATELETCT 252   MPV 8.9*     Recent Labs     09/14/24  1826   SODIUM 142   POTASSIUM 4.0   CHLORIDE 106   CO2 23   GLUCOSE 93   BUN 15   CREATININE 0.94    CALCIUM 9.4                   Imaging  [id number] VIEW   Final Result      1.  Cholecystectomy.   2.  Right lateral abdominal wall mesh hernia repair   3.  No acute intra-abdominal or pelvic disease process otherwise evident      CT-ABDOMEN-PELVIS WITH   Final Result         1. Several decompressed small bowel loops in the mid abdomen with mild wall thickening. This could be due to underdistention or mild enteritis.   2. No bowel obstruction. No free fluid or free air in the abdomen.   3. Small hiatal hernia.      DX-CHEST-PORTABLE (1 VIEW)   Final Result      No acute cardiopulmonary disease.           Assessment/Plan  * Recurrent seizures (HCC)- (present on admission)  Assessment & Plan  Continue Vimpat and Depakote, valium  Continuous EEG negative after 24 hours, discontinued  IV lorazepam as needed  Seizure precautions  Supportive care  Follow up with patients neurologist as outpatient    Continue supportive treatment.   Hopefully dc in am with [facility].    Abdominal pain  Assessment & Plan  Kub ordered and reviewed  No acute finding  Ct abd on admission possible mild enteritis, no diarrhea, tolerating diet.     Tachycardia  Assessment & Plan  EKG ordered and reviewed SR, no ischemic changes.     Nicotine use- (present on admission)  Assessment & Plan  Nicotine patch ordered    Hypertension- (present on admission)  Assessment & Plan  Continue with losartan and amlodipine  Consider low dose metoprolol.          VTE prophylaxis: scd    I have performed a physical exam and reviewed and updated ROS and Plan today (9/17/2024). In review of yesterday's note (9/16/2024), there are no changes except as documented above.      Total time of 51 minutes spent prepping to see patient (e.g. reviewing  tests/imaging results, notes from consultants, bedside nurse, night shift ) obtaining and/or reviewing separately obtained history. Performing a medically appropriate examination and evaluation.  Counseling and educating the  patient.  Ordering medications, tests, or procedures.  Referring and communicating with other health care professionals.  Documenting clinical information in EPIC.  Independently interpreting results and communicating results to patient.  Care coordination.

## 2024-09-17 NOTE — PROGRESS NOTES
~0330: Patient O2 alarm rang, desatted to 85% on 2L. Oxygen demand increase to 12L oxymask. Patient began to have seizure-like episode. Patient able to follow commands during episode. Episode lasted about 15 minutes per supervisor RN. Ativan prepared but never given. Patient complaining of abdominal pain after episode, medicated patient for pain per MAR.    ~0416: Rounded on patient, patient back to 2L. Patient shows no signs of distress, patient resting comfortably in bed.

## 2024-09-17 NOTE — PROGRESS NOTES
Pt had an assisted fall with primary RN holding onto him. Pt. Was able to lower himself to ground with his forearm. Pt. Did not strike head. Was assisted back to bed with primary RN, Vahe HART RN and a nursing student. Pt. Denies pain at this time. Bed alarm on. Socks were on patient. Bed in lowest position. Pt. Was able to sit up to edge of bed by himself with no help. Pt. Able to reposition himself when back into bed to his own comfort level.

## 2024-09-17 NOTE — CARE PLAN
The patient is Stable - Low risk of patient condition declining or worsening    Shift Goals  Clinical Goals: stable neuro assessments, pain management, monitor for seizures  Patient Goals: go home and pain control  Family Goals: for pt to discharge soon    Progress made toward(s) clinical / shift goals:      Problem: Pain - Standard  Goal: Alleviation of pain or a reduction in pain to the patient’s comfort goal  Outcome: Progressing     Problem: Knowledge Deficit - Standard  Goal: Patient and family/care givers will demonstrate understanding of plan of care, disease process/condition, diagnostic tests and medications  Outcome: Progressing     Problem: Fall Risk  Goal: Patient will remain free from falls  Outcome: Progressing     Problem: Neuro Status  Goal: Neuro status will remain stable or improve  Outcome: Progressing       Patient is not progressing towards the following goals: n/a

## 2024-09-17 NOTE — CARE PLAN
The patient is Stable - Low risk of patient condition declining or worsening    Shift Goals  Clinical Goals: stable nuero assessment, vss, safety, pain control  Patient Goals: pain control, go home  Family Goals: na    Progress made toward(s) clinical / shift goals:    Problem: Knowledge Deficit - Standard  Goal: Patient and family/care givers will demonstrate understanding of plan of care, disease process/condition, diagnostic tests and medications  Outcome: Progressing     Problem: Neuro Status  Goal: Neuro status will remain stable or improve  Outcome: Progressing       Patient is not progressing towards the following goals:      Problem: Pain - Standard  Goal: Alleviation of pain or a reduction in pain to the patient’s comfort goal  Outcome: Not Progressing     Problem: Fall Risk  Goal: Patient will remain free from falls  Outcome: Not Progressing      Pt. Did have an assisted fall this shift. Pt. Continues to have behavioral noncompliance with instead of using call light will reach for trash can or make large gestures. Pt. Is axox4, neurochecks are still in place.

## 2024-09-17 NOTE — ASSESSMENT & PLAN NOTE
Kub ordered and reviewed  No acute finding  Ct abd on admission possible mild enteritis, no diarrhea, tolerating diet.

## 2024-09-18 VITALS
OXYGEN SATURATION: 91 % | RESPIRATION RATE: 18 BRPM | HEART RATE: 100 BPM | DIASTOLIC BLOOD PRESSURE: 77 MMHG | BODY MASS INDEX: 30.37 KG/M2 | TEMPERATURE: 97.9 F | SYSTOLIC BLOOD PRESSURE: 135 MMHG | HEIGHT: 69 IN | WEIGHT: 205.03 LBS

## 2024-09-18 PROBLEM — R00.0 TACHYCARDIA: Status: RESOLVED | Noted: 2024-09-17 | Resolved: 2024-09-18

## 2024-09-18 PROBLEM — R10.9 ABDOMINAL PAIN: Status: RESOLVED | Noted: 2024-09-17 | Resolved: 2024-09-18

## 2024-09-18 LAB
ALBUMIN SERPL BCP-MCNC: 3.5 G/DL (ref 3.2–4.9)
ALBUMIN/GLOB SERPL: 1 G/DL
ALP SERPL-CCNC: 108 U/L (ref 30–99)
ALT SERPL-CCNC: 33 U/L (ref 2–50)
ANION GAP SERPL CALC-SCNC: 10 MMOL/L (ref 7–16)
AST SERPL-CCNC: 27 U/L (ref 12–45)
BILIRUB SERPL-MCNC: 0.2 MG/DL (ref 0.1–1.5)
BUN SERPL-MCNC: 12 MG/DL (ref 8–22)
CALCIUM ALBUM COR SERPL-MCNC: 9.2 MG/DL (ref 8.5–10.5)
CALCIUM SERPL-MCNC: 8.8 MG/DL (ref 8.5–10.5)
CHLORIDE SERPL-SCNC: 103 MMOL/L (ref 96–112)
CO2 SERPL-SCNC: 25 MMOL/L (ref 20–33)
CREAT SERPL-MCNC: 0.81 MG/DL (ref 0.5–1.4)
ERYTHROCYTE [DISTWIDTH] IN BLOOD BY AUTOMATED COUNT: 50.4 FL (ref 35.9–50)
GFR SERPLBLD CREATININE-BSD FMLA CKD-EPI: 106 ML/MIN/1.73 M 2
GLOBULIN SER CALC-MCNC: 3.4 G/DL (ref 1.9–3.5)
GLUCOSE SERPL-MCNC: 81 MG/DL (ref 65–99)
HCT VFR BLD AUTO: 45.1 % (ref 42–52)
HGB BLD-MCNC: 14.6 G/DL (ref 14–18)
LACOSAMIDE SERPL-MCNC: 5 UG/ML (ref 1–10)
MAGNESIUM SERPL-MCNC: 2.1 MG/DL (ref 1.5–2.5)
MCH RBC QN AUTO: 29 PG (ref 27–33)
MCHC RBC AUTO-ENTMCNC: 32.4 G/DL (ref 32.3–36.5)
MCV RBC AUTO: 89.5 FL (ref 81.4–97.8)
PLATELET # BLD AUTO: 201 K/UL (ref 164–446)
PMV BLD AUTO: 8.7 FL (ref 9–12.9)
POTASSIUM SERPL-SCNC: 4.1 MMOL/L (ref 3.6–5.5)
PROT SERPL-MCNC: 6.9 G/DL (ref 6–8.2)
RBC # BLD AUTO: 5.04 M/UL (ref 4.7–6.1)
SODIUM SERPL-SCNC: 138 MMOL/L (ref 135–145)
WBC # BLD AUTO: 12.1 K/UL (ref 4.8–10.8)

## 2024-09-18 PROCEDURE — 80053 COMPREHEN METABOLIC PANEL: CPT

## 2024-09-18 PROCEDURE — 700102 HCHG RX REV CODE 250 W/ 637 OVERRIDE(OP)

## 2024-09-18 PROCEDURE — A9270 NON-COVERED ITEM OR SERVICE: HCPCS

## 2024-09-18 PROCEDURE — A9270 NON-COVERED ITEM OR SERVICE: HCPCS | Performed by: STUDENT IN AN ORGANIZED HEALTH CARE EDUCATION/TRAINING PROGRAM

## 2024-09-18 PROCEDURE — 83735 ASSAY OF MAGNESIUM: CPT

## 2024-09-18 PROCEDURE — 85027 COMPLETE CBC AUTOMATED: CPT

## 2024-09-18 PROCEDURE — 700102 HCHG RX REV CODE 250 W/ 637 OVERRIDE(OP): Performed by: STUDENT IN AN ORGANIZED HEALTH CARE EDUCATION/TRAINING PROGRAM

## 2024-09-18 PROCEDURE — 99239 HOSP IP/OBS DSCHRG MGMT >30: CPT | Performed by: HOSPITALIST

## 2024-09-18 RX ORDER — DIVALPROEX SODIUM 500 MG/1
1000 TABLET, FILM COATED, EXTENDED RELEASE ORAL 2 TIMES DAILY
Qty: 30 TABLET | Refills: 0 | Status: SHIPPED | OUTPATIENT
Start: 2024-09-18

## 2024-09-18 RX ADMIN — LACOSAMIDE 100 MG: 100 TABLET, FILM COATED ORAL at 05:04

## 2024-09-18 RX ADMIN — DIAZEPAM 2 MG: 2 TABLET ORAL at 05:05

## 2024-09-18 RX ADMIN — MOMETASONE FUROATE AND FORMOTEROL FUMARATE DIHYDRATE 2 PUFF: 200; 5 AEROSOL RESPIRATORY (INHALATION) at 05:10

## 2024-09-18 RX ADMIN — LOSARTAN POTASSIUM 50 MG: 50 TABLET, FILM COATED ORAL at 05:04

## 2024-09-18 RX ADMIN — DIVALPROEX SODIUM 1000 MG: 500 TABLET, EXTENDED RELEASE ORAL at 05:04

## 2024-09-18 RX ADMIN — DIAZEPAM 5 MG: 5 TABLET ORAL at 05:04

## 2024-09-18 RX ADMIN — NICOTINE 14 MG: 14 PATCH TRANSDERMAL at 05:03

## 2024-09-18 RX ADMIN — Medication 400 MG: at 05:04

## 2024-09-18 RX ADMIN — AMLODIPINE BESYLATE 5 MG: 5 TABLET ORAL at 05:04

## 2024-09-18 ASSESSMENT — FIBROSIS 4 INDEX: FIB4 SCORE: 1.91

## 2024-09-18 ASSESSMENT — PAIN DESCRIPTION - PAIN TYPE: TYPE: ACUTE PAIN

## 2024-09-18 NOTE — CARE PLAN
The patient is Stable - Low risk of patient condition declining or worsening    Shift Goals  Clinical Goals: stable neuro assessments, safety, pain management  Patient Goals: pain control  Family Goals: updates    Progress made toward(s) clinical / shift goals:      Problem: Pain - Standard  Goal: Alleviation of pain or a reduction in pain to the patient’s comfort goal  Outcome: Progressing     Problem: Knowledge Deficit - Standard  Goal: Patient and family/care givers will demonstrate understanding of plan of care, disease process/condition, diagnostic tests and medications  Outcome: Progressing     Problem: Fall Risk  Goal: Patient will remain free from falls  Outcome: Progressing     Problem: Neuro Status  Goal: Neuro status will remain stable or improve  Outcome: Progressing       Patient is not progressing towards the following goals: n/a

## 2024-09-18 NOTE — DISCHARGE SUMMARY
Discharge Summary    CHIEF COMPLAINT ON ADMISSION  Chief Complaint   Patient presents with    Seizure     Pt BIB EMS. Per report pt was at work, he's a  at a [employer] in [place]. Pt found by staff having a seizure outside, pt with hx of seizures. Pt comes to [facility] x [facility], complaining of ABD pain. Which he states happens after having a seizure due to his contractions. Pt given 15mg Versed PTA (5mg IN/ 5mg IM/ 5mg IV).  PTA.     Abdominal Pain       Reason for Admission  EMS     Admission Date  9/14/2024    CODE STATUS  Full Code    HPI & HOSPITAL COURSE  Please see original H&P and consult notes for specific information    Donal Carpio is a 54 y.o. person with past medical history of seizure disorder who presented 9/14/2024 with seizure at work. Patient reports he is a  reports bystanders told him it was 7 minutes long. He denies any tongue biting or urinary or fecal incontinence. Does report having flulike symptoms the past week. He is compliant with home medications. In the ER, patient noted to have another seizure given lorazepam. On chart review it appears patient supposed to have a continuous video EEG in epilepsy monitoring unit per his neurologist, this is being arranged outpatient. Patient hospitalized for breakthrough seizure. EEG showing no active seizure activity. Patient had recurrent seizure and so continuous EEG started, however no active seizures seen on cEEG for 24 hours, this was discontinued. Neurology recommend increasing home depakote dose to 1000 mg BID which is ordered. At this time, will monitor patient overnight, if does well can discharge home tomorrow morning with new seizure regimen. He is to follow up with his neurologist as outpatient.     Will need to follow up with his outpatient neurologist, Dr. Smith.         Patient today is feeling much better, he is alert oriented follows commands he is tolerating diet, no dizziness no lightheadedness he is feeling back to  baseline and would like to go back to work, patient does not drive, again he is alert oriented follows commands he is recommended to follow-up with his neurology and primary care doctor as outpatient, patient works as a  and somebody drive him to work and then back to home, patient expressed understanding of safety recommendations, all questions were answered..    Advise no driving, no swimming or tub bathing alone, no working from heights, no handling weapons, no high risk activity that may result in harm to self or others in the event of a seizure.    Therefore, he is discharged in good and stable condition to home with close outpatient follow-up.    The patient met 2-midnight criteria for an inpatient stay at the time of discharge.    Discharge Date  9/18/2024    FOLLOW UP ITEMS POST DISCHARGE  Primary care doctor  Neurology    DISCHARGE DIAGNOSES  Principal Problem:    Recurrent seizures (HCC) (POA: Yes)  Active Problems:    Hypertension (POA: Yes)    Nicotine use (POA: Yes)  Resolved Problems:    Tachycardia (POA: Unknown)    Abdominal pain (POA: Unknown)      FOLLOW UP  No future appointments.  No follow-up provider specified.    MEDICATIONS ON DISCHARGE     Medication List        CHANGE how you take these medications        Instructions   divalproex  MG Tb24  What changed:   how much to take  additional instructions  Another medication with the same name was removed. Continue taking this medication, and follow the directions you see here.  Commonly known as: Depakote ER   Take 2 Tablets by mouth 2 times a day.  Dose: 1,000 mg            CONTINUE taking these medications        Instructions   amLODIPine 5 MG Tabs  Commonly known as: Norvasc   TAKE 1 TABLET BY MOUTH EVERY DAY  Dose: 5 mg     budesonide-formoterol 80-4.5 MCG/ACT Aero  Commonly known as: Symbicort   Inhale 2 Puffs 2 times a day.  Dose: 2 Puff     * diazePAM 5 MG Tabs  Commonly known as: Valium   Take 5 mg by mouth every day. Take with  Diazepam 2 mg = 7 mg  Dose: 5 mg     * diazePAM 5 MG Tabs  Commonly known as: Valium   Take 5 mg by mouth every day. Take with diazepam 2 mg = 5 mg  Dose: 5 mg     ferrous sulfate 325 (65 Fe) MG tablet   TAKE 1 TABLET BY MOUTH EVERY DAY  Dose: 325 mg     losartan 50 MG Tabs  Commonly known as: Cozaar   Take 1 Tablet by mouth every day.  Dose: 50 mg     Multivitamin Men Tabs   Take 1 Tablet by mouth every day.  Dose: 1 Tablet     nicotine 21 MG/24HR Pt24  Commonly known as: Nicoderm   Place 1 Patch on the skin every 24 hours.  Dose: 1 Patch     Nurtec 75 MG Tbdp  Generic drug: Rimegepant Sulfate   Take 75 mg by mouth every 48 hours.  Dose: 75 mg     omeprazole 40 MG delayed-release capsule  Commonly known as: PriLOSEC   TAKE 1 CAPSULE BY MOUTH TWICE A DAY  Dose: 40 mg     sucralfate 1 GM Tabs  Commonly known as: Carafate   Take 1 g by mouth 2 times a day.  Dose: 1 g     Vimpat 100 MG Tabs tablet  Generic drug: lacosamide   Take 100 mg by mouth every day.  Dose: 100 mg           * This list has 2 medication(s) that are the same as other medications prescribed for you. Read the directions carefully, and ask your doctor or other care provider to review them with you.                  Allergies  Allergies   Allergen Reactions    Penicillins Unspecified     Unknown childhood reaction        DIET  Orders Placed This Encounter   Procedures    Diet Order Diet: Regular     Standing Status:   Standing     Number of Occurrences:   1     Order Specific Question:   Diet:     Answer:   Regular [1]       ACTIVITY  As tolerated.  Weight bearing as tolerated    CONSULTATIONS  Neurology    PROCEDURES  None    LABORATORY  Lab Results   Component Value Date    SODIUM 138 09/18/2024    POTASSIUM 4.1 09/18/2024    CHLORIDE 103 09/18/2024    CO2 25 09/18/2024    GLUCOSE 81 09/18/2024    BUN 12 09/18/2024    CREATININE 0.81 09/18/2024        Lab Results   Component Value Date    WBC 12.1 (H) 09/18/2024    HEMOGLOBIN 14.6 09/18/2024     HEMATOCRIT 45.1 09/18/2024    PLATELETCT 201 09/18/2024        Total time of the discharge process exceeds 33 minutes.

## 2024-09-23 NOTE — ED NOTES
Patient states he fell and has pain to face with bruising to left side of face and pain to tailbone. Seizure pads in place. Er doctor at bedside.   Detail Level: [facility] Gentle Skin Care Counseling: I recommended use a gentle skin cleanser ( Dove soap or Cerave gentle cleanser)  when washing the skin. I also recommended application of a moisturizer to wet skin once daily after bathing to  twice daily. Products with fragrances, preservatives and dyes should be avoided.

## 2024-09-27 NOTE — DOCUMENTATION QUERY
[facility]                                                                       Query Response Note      PATIENT:               JERICHO ALBA  ACCT #:                  [id number]  MRN:                     [id number]  :                      1970  ADMIT DATE:       2024 4:59 PM  DISCH DATE:        2024 11:18 AM  RESPONDING  PROVIDER #:        [id number]           QUERY TEXT:    Epilepsy with seizures is documented in the Medical Record. Please specify type of associated seizures.    NOTE:  If an appropriate response is not listed below, please respond with a new note.    The patient's Clinical Indicators include:  Clinical indicators  Per H&P, medical history of seizure disorder who presented 2024 with seizure at work.  Per Neurology Consult, patient has a noted history for psychogenic nonepileptic seizures.  Per DS, EEG showing no active seizure activity. Patient had recurrent seizure and so continuous EEG started, however no active seizures seen on cEEG for 24 hours, this was discontinued.     Treatment or Monitoring  Increasing home depakote dose to 1000 mg BID. EEG    Risk Factors  Seizure Disorder    Marti [email or web address]  Options provided:   -- Psychogenic Non-epileptic Seizure   -- Seizure Disorder/Epilepsy   -- Other finding: please specify, (please specify)   -- Unable to determine      Query created by: Marti Beltran on 2024 4:30 AM    RESPONSE TEXT:    Unable to determine          Electronically signed by:  REYES ROJAS MD 2024 2:42 PM

## 2024-10-02 ENCOUNTER — TELEPHONE (OUTPATIENT)
Dept: HEALTH INFORMATION MANAGEMENT | Facility: OTHER | Age: 54
End: 2024-10-02
Payer: COMMERCIAL

## 2024-11-04 ENCOUNTER — HOSPITAL ENCOUNTER (EMERGENCY)
Facility: MEDICAL CENTER | Age: 54
End: 2024-11-04
Attending: EMERGENCY MEDICINE
Payer: COMMERCIAL

## 2024-11-04 ENCOUNTER — APPOINTMENT (OUTPATIENT)
Dept: RADIOLOGY | Facility: MEDICAL CENTER | Age: 54
End: 2024-11-04
Attending: EMERGENCY MEDICINE
Payer: COMMERCIAL

## 2024-11-04 VITALS
HEART RATE: 67 BPM | TEMPERATURE: 97.8 F | DIASTOLIC BLOOD PRESSURE: 68 MMHG | HEIGHT: 69 IN | SYSTOLIC BLOOD PRESSURE: 135 MMHG | BODY MASS INDEX: 30.36 KG/M2 | WEIGHT: 205 LBS | OXYGEN SATURATION: 93 % | RESPIRATION RATE: 17 BRPM

## 2024-11-04 DIAGNOSIS — R25.1 TREMOR: ICD-10-CM

## 2024-11-04 DIAGNOSIS — R56.9 SEIZURE (HCC): ICD-10-CM

## 2024-11-04 DIAGNOSIS — G43.D0 ABDOMINAL MIGRAINE, NOT INTRACTABLE: ICD-10-CM

## 2024-11-04 LAB
ALBUMIN SERPL BCP-MCNC: 3.9 G/DL (ref 3.2–4.9)
ALBUMIN/GLOB SERPL: 1.1 G/DL
ALP SERPL-CCNC: 91 U/L (ref 30–99)
ALT SERPL-CCNC: 55 U/L (ref 2–50)
ANION GAP SERPL CALC-SCNC: 11 MMOL/L (ref 7–16)
AST SERPL-CCNC: 73 U/L (ref 12–45)
BASOPHILS # BLD AUTO: 0.7 % (ref 0–1.8)
BASOPHILS # BLD: 0.05 K/UL (ref 0–0.12)
BILIRUB SERPL-MCNC: 0.3 MG/DL (ref 0.1–1.5)
BUN SERPL-MCNC: 15 MG/DL (ref 8–22)
CALCIUM ALBUM COR SERPL-MCNC: 9.5 MG/DL (ref 8.5–10.5)
CALCIUM SERPL-MCNC: 9.4 MG/DL (ref 8.5–10.5)
CHLORIDE SERPL-SCNC: 107 MMOL/L (ref 96–112)
CO2 SERPL-SCNC: 22 MMOL/L (ref 20–33)
CREAT SERPL-MCNC: 0.89 MG/DL (ref 0.5–1.4)
EKG IMPRESSION: NORMAL
EOSINOPHIL # BLD AUTO: 0.19 K/UL (ref 0–0.51)
EOSINOPHIL NFR BLD: 2.5 % (ref 0–6.9)
ERYTHROCYTE [DISTWIDTH] IN BLOOD BY AUTOMATED COUNT: 49.6 FL (ref 35.9–50)
GFR SERPLBLD CREATININE-BSD FMLA CKD-EPI: 103 ML/MIN/1.73 M 2
GLOBULIN SER CALC-MCNC: 3.4 G/DL (ref 1.9–3.5)
GLUCOSE SERPL-MCNC: 97 MG/DL (ref 65–99)
HCT VFR BLD AUTO: 44.6 % (ref 42–52)
HGB BLD-MCNC: 14.3 G/DL (ref 14–18)
IMM GRANULOCYTES # BLD AUTO: 0.03 K/UL (ref 0–0.11)
IMM GRANULOCYTES NFR BLD AUTO: 0.4 % (ref 0–0.9)
LIPASE SERPL-CCNC: 23 U/L (ref 11–82)
LYMPHOCYTES # BLD AUTO: 2.54 K/UL (ref 1–4.8)
LYMPHOCYTES NFR BLD: 33.2 % (ref 22–41)
MCH RBC QN AUTO: 28.7 PG (ref 27–33)
MCHC RBC AUTO-ENTMCNC: 32.1 G/DL (ref 32.3–36.5)
MCV RBC AUTO: 89.6 FL (ref 81.4–97.8)
MONOCYTES # BLD AUTO: 0.58 K/UL (ref 0–0.85)
MONOCYTES NFR BLD AUTO: 7.6 % (ref 0–13.4)
NEUTROPHILS # BLD AUTO: 4.26 K/UL (ref 1.82–7.42)
NEUTROPHILS NFR BLD: 55.6 % (ref 44–72)
NRBC # BLD AUTO: 0 K/UL
NRBC BLD-RTO: 0 /100 WBC (ref 0–0.2)
PLATELET # BLD AUTO: 249 K/UL (ref 164–446)
PMV BLD AUTO: 9 FL (ref 9–12.9)
POTASSIUM SERPL-SCNC: 3.7 MMOL/L (ref 3.6–5.5)
PROT SERPL-MCNC: 7.3 G/DL (ref 6–8.2)
RBC # BLD AUTO: 4.98 M/UL (ref 4.7–6.1)
SODIUM SERPL-SCNC: 140 MMOL/L (ref 135–145)
WBC # BLD AUTO: 7.7 K/UL (ref 4.8–10.8)

## 2024-11-04 PROCEDURE — 96375 TX/PRO/DX INJ NEW DRUG ADDON: CPT

## 2024-11-04 PROCEDURE — 80053 COMPREHEN METABOLIC PANEL: CPT

## 2024-11-04 PROCEDURE — 71045 X-RAY EXAM CHEST 1 VIEW: CPT

## 2024-11-04 PROCEDURE — 36415 COLL VENOUS BLD VENIPUNCTURE: CPT

## 2024-11-04 PROCEDURE — 93005 ELECTROCARDIOGRAM TRACING: CPT | Performed by: EMERGENCY MEDICINE

## 2024-11-04 PROCEDURE — 700111 HCHG RX REV CODE 636 W/ 250 OVERRIDE (IP): Performed by: EMERGENCY MEDICINE

## 2024-11-04 PROCEDURE — 85025 COMPLETE CBC W/AUTO DIFF WBC: CPT

## 2024-11-04 PROCEDURE — 99284 EMERGENCY DEPT VISIT MOD MDM: CPT

## 2024-11-04 PROCEDURE — 83690 ASSAY OF LIPASE: CPT

## 2024-11-04 PROCEDURE — 96374 THER/PROPH/DIAG INJ IV PUSH: CPT

## 2024-11-04 RX ORDER — LORAZEPAM 2 MG/ML
2 INJECTION INTRAMUSCULAR ONCE
Status: COMPLETED | OUTPATIENT
Start: 2024-11-04 | End: 2024-11-04

## 2024-11-04 RX ORDER — DIPHENHYDRAMINE HYDROCHLORIDE 50 MG/ML
50 INJECTION INTRAMUSCULAR; INTRAVENOUS ONCE
Status: COMPLETED | OUTPATIENT
Start: 2024-11-04 | End: 2024-11-04

## 2024-11-04 RX ADMIN — LORAZEPAM 2 MG: 2 INJECTION INTRAMUSCULAR; INTRAVENOUS at 14:20

## 2024-11-04 RX ADMIN — DIPHENHYDRAMINE HYDROCHLORIDE 50 MG: 50 INJECTION, SOLUTION INTRAMUSCULAR; INTRAVENOUS at 14:20

## 2024-11-04 ASSESSMENT — FIBROSIS 4 INDEX: FIB4 SCORE: 1.22

## 2024-11-04 NOTE — ED TRIAGE NOTES
"Chief Complaint   Patient presents with    Seizure     Pt reports he became \"anxious\" and suffered a seizure while on a \"video call\" with his neurologist and psychiatrist    Abdominal Pain     Pt reports generalized abd pain. Pt states, \"I get stomach migraines.\"       Pt BIB [facility] from home for above complaint. Pt initially taken to triage. However, pt had another seizure while having his vitals taken. Pt taken to R11 via wheelchair. On arrival to room, pt nonverbal. Now, pt is alert and oriented, speaking in full sentences, follows commands and responds appropriately to questions. NAD. Resp are even and unlabored.  Pt placed on monitor. Chart up for ERP  "

## 2024-11-04 NOTE — ED PROVIDER NOTES
"CHIEF COMPLAINT  Chief Complaint   Patient presents with    Seizure     Pt reports he became \"anxious\" and suffered a seizure while on a \"video call\" with his neurologist and psychiatrist    Abdominal Pain     Pt reports generalized abd pain. Pt states, \"I get stomach migraines.\"       LIMITATION TO HISTORY   Select: none  HPI    Donal Carpio is a 54 y.o. male who presents to the Emergency Department for evaluation of tremors in addition to abdominal pain. The patient follows with neurology as well as psychiatry for care. He reports taking Diazepam at 7 mg daily.  5 mg in the morning 2 mg in the evening apparently his psychiatrist is giving him these medications.  The patient states his NP Psychiatrist has sent him here for evaluation of his shaking. He reports being sent home frequently from work as he is a , due to symptoms of shaking. The patient reports having frequent abdominal migraines in which he takes narcotics for, in addition to multiple benzodiazepines. He feels his abdominal migraines occur are induced after seizure like activity.     OUTSIDE HISTORIAN(S):  Select: None    EXTERNAL RECORDS REVIEWED  Select: Review of records reveal that that the patient was admitted for care on 9/14 for recurrent seizures. He has a past medical history of seizure disorder who presented 9/14/2024 with seizure at work. Patient reports he is a  reports bystanders told him it was 7 minutes long. He denies any tongue biting or urinary or fecal incontinence. Does report having flulike symptoms the past week. He is compliant with home medications. In the ER, patient noted to have another seizure given lorazepam. On chart review it appears patient supposed to have a continuous video EEG in epilepsy monitoring unit per his neurologist, this is being arranged outpatient. Patient hospitalized for breakthrough seizure. EEG showing no active seizure activity. Patient had recurrent seizure and so continuous EEG started, " however no active seizures seen on cEEG for 24 hours, this was discontinued. Neurology recommend increasing home depakote dose to 1000 mg BID which is ordered. At this time, will monitor patient overnight, if does well can discharge home tomorrow morning with new seizure regimen. He is to follow up with his neurologist as outpatient.     PAST MEDICAL HISTORY  Past Medical History:   Diagnosis Date    Alcohol withdrawal delirium (HCC) 9/19/2022    Drug-seeking behavior     Peptic ulcer     Seizure (HCC)     Seizure disorder (HCC)     Ulcer of abdomen wall (HCC)      .    SURGICAL HISTORY  Past Surgical History:   Procedure Laterality Date    PA LAP,DIAGNOSTIC ABDOMEN N/A 7/7/2022    Procedure: DIAGNOSTIC LAPAROSCOPY;  Surgeon: Nahid Russ D.O.;  Location: SURGERY [facility];  Service: Gen Robotic    WOUND EXPLORATION ORTHO Right 7/7/2022    Procedure: EXPLORATION, WOUND, ABDOMINAL WALL;  Surgeon: Nahid Russ D.O.;  Location: SURGERY [facility];  Service: Gen Robotic    PA UPPER GI ENDOSCOPY,DIAGNOSIS N/A 6/30/2022    Procedure: ESOPHAGOGASTRODUODENOSCOPY;  Surgeon: Jess Lombardo D.O.;  Location: SURGERY SAME DAY [facility];  Service: Gastroenterology    PA UPPER GI ENDOSCOPY,BIOPSY N/A 6/30/2022    Procedure: GASTROSCOPY, WITH BIOPSY;  Surgeon: Jess Lombardo D.O.;  Location: SURGERY SAME DAY [facility];  Service: Gastroenterology    APPENDECTOMY           FAMILY HISTORY  No family history on file.       SOCIAL HISTORY  Social History     Tobacco Use    Smoking status: Never    Smokeless tobacco: Never   Vaping Use    Vaping status: Former    Substances: Nicotine   Substance Use Topics    Alcohol use: Not Currently    Drug use: Yes     Types: Inhaled, Marijuana     Comment: cannabis         CURRENT MEDICATIONS  No current facility-administered medications on file prior to encounter.     Current Outpatient Medications on File Prior to Encounter   Medication Sig Dispense Refill    divalproex ER (DEPAKOTE ER) 500  "MG TABLET SR 24 HR Take 2 Tablets by mouth 2 times a day. 30 Tablet 0    lacosamide (VIMPAT) 100 MG Tab tablet Take 100 mg by mouth every day.      sucralfate (CARAFATE) 1 GM Tab Take 1 g by mouth 2 times a day.      diazePAM (VALIUM) 5 MG Tab Take 5 mg by mouth every day. Take with Diazepam 2 mg = 7 mg      diazePAM (VALIUM) 5 MG Tab Take 5 mg by mouth every day. Take with diazepam 2 mg = 5 mg      amLODIPine (NORVASC) 5 MG Tab TAKE 1 TABLET BY MOUTH EVERY DAY 30 Tablet 2    nicotine (NICODERM) 21 MG/24HR PATCH 24 HR Place 1 Patch on the skin every 24 hours. 30 Patch 1    losartan (COZAAR) 50 MG Tab Take 1 Tablet by mouth every day. 30 Tablet 2    omeprazole (PRILOSEC) 40 MG delayed-release capsule TAKE 1 CAPSULE BY MOUTH TWICE A DAY 60 Capsule 2    ferrous sulfate 325 (65 Fe) MG tablet TAKE 1 TABLET BY MOUTH EVERY DAY 30 Tablet 2    budesonide-formoterol (SYMBICORT) 80-4.5 MCG/ACT Aerosol Inhale 2 Puffs 2 times a day. 6.9 g 5    Multiple Vitamins-Minerals (MULTIVITAMIN MEN) Tab Take 1 Tablet by mouth every day.      Rimegepant Sulfate (NURTEC) 75 MG TABLET DISPERSIBLE Take 75 mg by mouth every 48 hours.           ALLERGIES  Allergies   Allergen Reactions    Penicillins Unspecified     Unknown childhood reaction        PHYSICAL EXAM  VITAL SIGNS:BP (!) 145/86   Pulse 80   Temp 36.6 °C (97.8 °F) (Temporal)   Resp 20   Ht 1.753 m (5' 9\")   Wt 93 kg (205 lb)   SpO2 95%   BMI 30.27 kg/m²       Constitutional: Anxious but no acute distress   HENT: Normocephalic, Atraumatic, Bilateral external ears normal.  Eyes:  conjunctiva are normal.   Neck: Supple.  Nontender midline  Cardiovascular: Tachycardic but normal   Thorax & Lungs: No respiratory distress. Breathing comfortably. Lungs are clear to auscultation bilaterally, there are no wheezes no rales. Chest wall is nontender.  Abdomen: Soft, diffuse abdominal tenderness   Skin: Warm, Dry, No erythema,   Back: No tenderness, No CVA tenderness.  Musculoskeletal: No " clubbing cyanosis or edema good range of motion   Neurologic: Alert & oriented x 3, normal sensation moving all extremities appears normal. Cranial nerves II-XII grossly intact, moving all 4 extremities, 5/5 strength in all 4 extremities, cerebellar functions intact, no other focal abnormalities.  Patient has no significant tremors upon initial evaluation here.  Psychiatric: Extremely anxious holding abdomen.     DIAGNOSTIC STUDIES / PROCEDURES        LABS  Results for orders placed or performed during the hospital encounter of 11/04/24   CBC WITH DIFFERENTIAL    Collection Time: 11/04/24  1:25 PM   Result Value Ref Range    WBC 7.7 4.8 - 10.8 K/uL    RBC 4.98 4.70 - 6.10 M/uL    Hemoglobin 14.3 14.0 - 18.0 g/dL    Hematocrit 44.6 42.0 - 52.0 %    MCV 89.6 81.4 - 97.8 fL    MCH 28.7 27.0 - 33.0 pg    MCHC 32.1 (L) 32.3 - 36.5 g/dL    RDW 49.6 35.9 - 50.0 fL    Platelet Count 249 164 - 446 K/uL    MPV 9.0 9.0 - 12.9 fL    Neutrophils-Polys 55.60 44.00 - 72.00 %    Lymphocytes 33.20 22.00 - 41.00 %    Monocytes 7.60 0.00 - 13.40 %    Eosinophils 2.50 0.00 - 6.90 %    Basophils 0.70 0.00 - 1.80 %    Immature Granulocytes 0.40 0.00 - 0.90 %    Nucleated RBC 0.00 0.00 - 0.20 /100 WBC    Neutrophils (Absolute) 4.26 1.82 - 7.42 K/uL    Lymphs (Absolute) 2.54 1.00 - 4.80 K/uL    Monos (Absolute) 0.58 0.00 - 0.85 K/uL    Eos (Absolute) 0.19 0.00 - 0.51 K/uL    Baso (Absolute) 0.05 0.00 - 0.12 K/uL    Immature Granulocytes (abs) 0.03 0.00 - 0.11 K/uL    NRBC (Absolute) 0.00 K/uL   COMP METABOLIC PANEL    Collection Time: 11/04/24  1:25 PM   Result Value Ref Range    Sodium 140 135 - 145 mmol/L    Potassium 3.7 3.6 - 5.5 mmol/L    Chloride 107 96 - 112 mmol/L    Co2 22 20 - 33 mmol/L    Anion Gap 11.0 7.0 - 16.0    Glucose 97 65 - 99 mg/dL    Bun 15 8 - 22 mg/dL    Creatinine 0.89 0.50 - 1.40 mg/dL    Calcium 9.4 8.5 - 10.5 mg/dL    Correct Calcium 9.5 8.5 - 10.5 mg/dL    AST(SGOT) 73 (H) 12 - 45 U/L    ALT(SGPT) 55 (H) 2 - 50  U/L    Alkaline Phosphatase 91 30 - 99 U/L    Total Bilirubin 0.3 0.1 - 1.5 mg/dL    Albumin 3.9 3.2 - 4.9 g/dL    Total Protein 7.3 6.0 - 8.2 g/dL    Globulin 3.4 1.9 - 3.5 g/dL    A-G Ratio 1.1 g/dL   LIPASE    Collection Time: 24  1:25 PM   Result Value Ref Range    Lipase 23 11 - 82 U/L   ESTIMATED GFR    Collection Time: 24  1:25 PM   Result Value Ref Range    GFR (CKD-EPI) 103 >60 mL/min/1.73 m 2   EKG (NOW)    Collection Time: 24  2:08 PM   Result Value Ref Range    Report       [facility] Emergency Dept.    Test Date:  2024  Pt Name:    JERICHO ALBA                  Department: ER  MRN:        [id number]                      Room:       [facility]  Gender:     Male                         Technician: [id number]  :        1970                   Requested By:BETO COLLINS  Order #:    [id number]                    Reading MD: BETO COLLINS MD    Measurements  Intervals                                Axis  Rate:       71                           P:          28  CT:         149                          QRS:        -7  QRSD:       95                           T:          24  QT:         395  QTc:        430    Interpretive Statements  Sinus rhythm  Inferior infarct, old  Baseline wander in lead(s) V2  Compared to ECG 2024 15:31:11  No significant changes  Electronically Signed On 2024 16:06:33 PST by BETO COLLINS MD       EKG:   I have independently interpreted this EKG as detailed above.    RADIOLOGY  I have independently interpreted the diagnostic imaging associated with this visit and am waiting the final reading from the radiologist.   My preliminary interpretation is as follows: No acute infiltrates on chest x-ray    Radiologist interpretation:   DX-CHEST-PORTABLE (1 VIEW)   Final Result      No acute cardiac or pulmonary abnormalities are identified.           COURSE & MEDICAL DECISION MAKING    ED COURSE:    ED Observation Status? No, The  patient does not qualify for observation status     INTERVENTIONS BY ME:  Medications   LORazepam (Ativan) injection 2 mg (2 mg Intravenous Given 11/4/24 1420)   diphenhydrAMINE (Benadryl) injection 50 mg (50 mg Intravenous Given 11/4/24 1420)       1:46 PM - Patient seen and examined at bedside. Discussed plan of care, including diagnostic workup. Patient agrees to the plan of care. The patient will be medicated with benadryl and ativan. Ordered for DX chest, CBC w/ differential, CMP, lipase, EKG to evaluate his symptoms.     3:13 PM- Patient was reevaluated at bedside. He is sleeping at this time, and his significant other is now present at bedside providing additional information on history of present illness. She explains that the patient's Psychiatric Physician referred him here due to an increase in tremors. She reports that last night his tremors were at a high severity, in comparison to any in the past. Discussed medication list that the patient is currently on, in addition to possible side effects which can be leading to the etiology of his symptoms. Reportedly Padmini Smith (APRN) has recommended that the patient be scheduled as an in patient for a multiple day stay to be taken off of all his medications. The patient's significant other states that she is experiencing scheduling difficulties for this. Discussed discharge with the patient's wife, and plans of care moving forward. She understands and agrees moving forward.       INITIAL ASSESSMENT, COURSE AND PLAN  Care Narrative: This is an emergency department evaluation of a 54 year old male presenting with concerns of tremors and abdominal pain.  Patient presents for the above complaints.  He was very anxious and holding his abdomen states that he typically gets an abdominal migraine.  He was requesting narcotic pain medications for his abdominal pain and at this point I do not feel narcotics is very appropriate he was extremely anxious.  I gave the  patient Ativan with a dose of Benadryl as well as some Haldol and he was sleeping comfortably after the initial treatment of this.  I did have a very long discussion with the wife as well sounds like the patient has been suffering from seizure-like activity.  The patient was just recently in the hospital did have a 24-hour video EEG done that showed no seizure-like activity so it appears that this is most likely pseudoseizures.  The wife accounted for the the conversations that she has been having with the psychiatrist and the neurology group.  Apparently the neurologist stated that the patient is having pseudoseizures and recommend that the patient be seen by the psychiatrist the psychiatrist then started the patient on diazepam twice a day and he has been on that for about a month.  At this point I am very concerned about that particular medical regiment to treat especially with the concerns for seizure and seizure activity and for withdrawals.  I expressed my concerns about the current treatment.  At this point there is no tremors there is no signs of status epilepticus or any other emergent process and I have implored the patient as well as the patient's wife to follow-up both with their neurologist and psychiatrist and have them discussed the case is with each other as well to come up with a viable treatment plan.  At this point there is no emergent process present and I do feel the patient is stable for discharge she is much more comfortable after the above treatment for his abdominal migraine.     ADDITIONAL PROBLEM LIST  Pseudoseizures    DISPOSITION AND DISCUSSIONS  I have discussed management of the patient with the following physicians/ CAROLA's/ ancillary staff:  None    Escalation of care considered, and ultimately not performed:acute inpatient care management, however at this time, the patient is most appropriate for outpatient management    Barriers to care at this time, including but not limited to:  None.     Decision tools and prescription drugs considered including, but not limited to: The above.    The patient will return for new or worsening symptoms and is stable at the time of discharge.    DISPOSITION:  Patient will be discharged home in stable condition.    FOLLOW UP:  Bobby Batres M.D.  [address] [facility] [address]  [phone]          KATHRYN QuintanillaP.R.N.  [address]  [facility] [address]  [phone]            OUTPATIENT MEDICATIONS:  Discharge Medication List as of 11/4/2024  4:12 PM          FINAL DIAGNOSIS  1. Abdominal migraine, not intractable    2. Tremor    3. Seizure (HCC)h/o psudoseizures         Shelly NEWTON (Scribe), am scribing for, and in the presence of, Nahid Simmons M.D..    Electronically signed by: Shelly Briggs (Scribe), 11/4/2024    Nahid NEWTON M.D. personally performed the services described in this documentation, as scribed by Shelly Briggs in my presence, and it is both accurate and complete.     Electronically signed by: Nahid Simmons M.D.,4:24 PM 11/04/24

## 2024-11-05 NOTE — ED NOTES
BREAK RN: Pt provided discharge instructions and follow-up information. Pt verbalized understanding and all questions answered. PIV removed. Pt assisted from ED in WC carrying all belongings.

## 2024-11-18 RX ORDER — LOSARTAN POTASSIUM 50 MG/1
50 TABLET ORAL DAILY
Qty: 30 TABLET | Refills: 2 | Status: SHIPPED | OUTPATIENT
Start: 2024-11-18

## 2024-11-18 NOTE — TELEPHONE ENCOUNTER
Received request via: Pharmacy    Was the patient seen in the last year in this department? Yes    Does the patient have an active prescription (recently filled or refills available) for medication(s) requested? No    Pharmacy Name:   To be filled at: [facility]/pharmacy #[id number] - [place], [place] - [address]          Does the patient have half-way Plus and need 100-day supply? (This applies to ALL medications) Patient does not have SCP

## 2024-11-19 DIAGNOSIS — I10 PRIMARY HYPERTENSION: ICD-10-CM

## 2024-11-20 RX ORDER — AMLODIPINE BESYLATE 5 MG/1
5 TABLET ORAL DAILY
Qty: 30 TABLET | Refills: 2 | Status: SHIPPED | OUTPATIENT
Start: 2024-11-20

## 2024-11-20 NOTE — TELEPHONE ENCOUNTER
Received request via: Pharmacy    Was the patient seen in the last year in this department? Yes    Does the patient have an active prescription (recently filled or refills available) for medication(s) requested? No    Pharmacy Name:   To be filled at: [facility]/pharmacy #[id number] - [place], [place] - [address]          Does the patient have shelter Plus and need 100-day supply? (This applies to ALL medications) Patient does not have SCP

## 2024-12-07 NOTE — ASSESSMENT & PLAN NOTE
CIWA  Start librium   Monitor neurostatus  Cessation encouraged  Seizure precautions  
IV fluids  Treat for EtOH w/d  Monitor vitals  
Likely EtOH  Monitor CMP  
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  
Monitor vitals  Amlodipine 5mg daily  As needed clonidine 0.1mg prn and hydralazine prn  
On outpatient Keppra but likely EtOH withdrawal  Seizure precautions    
Troponin negative x2   EKG not reflecting ACS  Concern of GI related  
Influenza Vaccination
